# Patient Record
Sex: FEMALE | Race: WHITE | Employment: OTHER | URBAN - METROPOLITAN AREA
[De-identification: names, ages, dates, MRNs, and addresses within clinical notes are randomized per-mention and may not be internally consistent; named-entity substitution may affect disease eponyms.]

---

## 2017-04-11 ENCOUNTER — OFFICE VISIT (OUTPATIENT)
Dept: INTERNAL MEDICINE CLINIC | Age: 67
End: 2017-04-11

## 2017-04-11 VITALS
RESPIRATION RATE: 15 BRPM | BODY MASS INDEX: 32.02 KG/M2 | HEART RATE: 123 BPM | WEIGHT: 174 LBS | SYSTOLIC BLOOD PRESSURE: 124 MMHG | HEIGHT: 62 IN | DIASTOLIC BLOOD PRESSURE: 76 MMHG

## 2017-04-11 DIAGNOSIS — E11.9 TYPE 2 DIABETES MELLITUS WITHOUT COMPLICATION, WITHOUT LONG-TERM CURRENT USE OF INSULIN (HCC): Primary | ICD-10-CM

## 2017-04-11 DIAGNOSIS — I73.9 PVD (PERIPHERAL VASCULAR DISEASE) (HCC): ICD-10-CM

## 2017-04-11 DIAGNOSIS — I10 ESSENTIAL HYPERTENSION: ICD-10-CM

## 2017-04-11 DIAGNOSIS — Z12.31 ENCOUNTER FOR SCREENING MAMMOGRAM FOR BREAST CANCER: ICD-10-CM

## 2017-04-11 DIAGNOSIS — I25.10 CORONARY ARTERY DISEASE INVOLVING NATIVE HEART WITHOUT ANGINA PECTORIS, UNSPECIFIED VESSEL OR LESION TYPE: ICD-10-CM

## 2017-04-11 DIAGNOSIS — E78.2 MIXED HYPERLIPIDEMIA: ICD-10-CM

## 2017-04-11 DIAGNOSIS — Z78.0 POSTMENOPAUSAL: ICD-10-CM

## 2017-04-11 LAB — HBA1C MFR BLD: 9.3 %

## 2017-04-11 PROCEDURE — 83036 HEMOGLOBIN GLYCOSYLATED A1C: CPT | Performed by: INTERNAL MEDICINE

## 2017-04-11 PROCEDURE — 99215 OFFICE O/P EST HI 40 MIN: CPT | Performed by: INTERNAL MEDICINE

## 2017-04-11 RX ORDER — GLIPIZIDE 5 MG/1
5 TABLET ORAL DAILY
Qty: 30 TABLET | Refills: 3 | Status: SHIPPED | OUTPATIENT
Start: 2017-04-11 | End: 2017-05-12 | Stop reason: SDUPTHER

## 2017-04-11 ASSESSMENT — ENCOUNTER SYMPTOMS
WHEEZING: 0
ABDOMINAL PAIN: 0
VOMITING: 0
CONSTIPATION: 0
NAUSEA: 0
COUGH: 0
EYE PAIN: 0
DIARRHEA: 0
SHORTNESS OF BREATH: 0
BACK PAIN: 0
SORE THROAT: 0

## 2017-05-11 ENCOUNTER — TELEPHONE (OUTPATIENT)
Dept: INTERNAL MEDICINE CLINIC | Age: 67
End: 2017-05-11

## 2017-05-12 ENCOUNTER — OFFICE VISIT (OUTPATIENT)
Dept: INTERNAL MEDICINE CLINIC | Age: 67
End: 2017-05-12

## 2017-05-12 VITALS
HEART RATE: 89 BPM | DIASTOLIC BLOOD PRESSURE: 84 MMHG | WEIGHT: 176.2 LBS | SYSTOLIC BLOOD PRESSURE: 132 MMHG | BODY MASS INDEX: 32.23 KG/M2

## 2017-05-12 DIAGNOSIS — E11.9 TYPE 2 DIABETES MELLITUS WITHOUT COMPLICATION, WITHOUT LONG-TERM CURRENT USE OF INSULIN (HCC): Primary | ICD-10-CM

## 2017-05-12 DIAGNOSIS — E78.2 MIXED HYPERLIPIDEMIA: ICD-10-CM

## 2017-05-12 DIAGNOSIS — I25.10 CORONARY ARTERY DISEASE INVOLVING NATIVE HEART WITHOUT ANGINA PECTORIS, UNSPECIFIED VESSEL OR LESION TYPE: ICD-10-CM

## 2017-05-12 DIAGNOSIS — D64.9 ANEMIA, UNSPECIFIED TYPE: ICD-10-CM

## 2017-05-12 PROCEDURE — 83036 HEMOGLOBIN GLYCOSYLATED A1C: CPT | Performed by: INTERNAL MEDICINE

## 2017-05-12 PROCEDURE — 99213 OFFICE O/P EST LOW 20 MIN: CPT | Performed by: INTERNAL MEDICINE

## 2017-05-12 RX ORDER — GLIPIZIDE 5 MG/1
5 TABLET ORAL DAILY
Qty: 30 TABLET | Refills: 5 | Status: SHIPPED | OUTPATIENT
Start: 2017-05-12 | End: 2018-01-15 | Stop reason: SDUPTHER

## 2017-05-12 RX ORDER — CILOSTAZOL 100 MG/1
TABLET ORAL
Refills: 11 | COMMUNITY
Start: 2017-03-17 | End: 2017-05-12

## 2017-05-12 RX ORDER — SIMVASTATIN 10 MG
10 TABLET ORAL NIGHTLY
Qty: 30 TABLET | Refills: 5 | Status: SHIPPED | OUTPATIENT
Start: 2017-05-12 | End: 2017-05-12

## 2017-05-12 RX ORDER — SIMVASTATIN 20 MG
20 TABLET ORAL EVERY EVENING
Qty: 30 TABLET | Refills: 5 | Status: SHIPPED | OUTPATIENT
Start: 2017-05-12 | End: 2018-07-20 | Stop reason: ALTCHOICE

## 2017-05-12 ASSESSMENT — ENCOUNTER SYMPTOMS
SHORTNESS OF BREATH: 0
ABDOMINAL PAIN: 0
EYE PAIN: 0
WHEEZING: 0
DIARRHEA: 0
CONSTIPATION: 0
BACK PAIN: 0
SORE THROAT: 0
COUGH: 0

## 2017-05-15 LAB
CREATININE URINE: 135.3 MG/DL (ref 28–259)
MICROALBUMIN UR-MCNC: 2.7 MG/DL
MICROALBUMIN/CREAT UR-RTO: 20 MG/G (ref 0–30)

## 2017-05-16 PROBLEM — R80.9 MICROALBUMINURIA: Status: ACTIVE | Noted: 2017-05-16

## 2017-09-19 LAB — DIABETIC RETINOPATHY: NEGATIVE

## 2017-10-10 ENCOUNTER — HOSPITAL ENCOUNTER (OUTPATIENT)
Dept: SURGERY | Age: 67
Discharge: HOME | End: 2017-10-10
Attending: INTERNAL MEDICINE | Admitting: INTERNAL MEDICINE

## 2017-10-10 VITALS
WEIGHT: 170 LBS | HEART RATE: 68 BPM | HEIGHT: 63 IN | DIASTOLIC BLOOD PRESSURE: 52 MMHG | OXYGEN SATURATION: 100 % | TEMPERATURE: 97 F | RESPIRATION RATE: 16 BRPM | BODY MASS INDEX: 30.12 KG/M2 | SYSTOLIC BLOOD PRESSURE: 135 MMHG

## 2017-10-10 DIAGNOSIS — R13.10 DYSPHAGIA, UNSPECIFIED TYPE: ICD-10-CM

## 2017-10-10 LAB — GLUCOSE BLD-MCNC: 214 MG/DL (ref 70–99)

## 2017-10-10 RX ORDER — SODIUM CHLORIDE, SODIUM LACTATE, POTASSIUM CHLORIDE, CALCIUM CHLORIDE 600; 310; 30; 20 MG/100ML; MG/100ML; MG/100ML; MG/100ML
INJECTION, SOLUTION INTRAVENOUS CONTINUOUS
Status: DISCONTINUED | OUTPATIENT
Start: 2017-10-10 | End: 2017-10-11 | Stop reason: HOSPADM

## 2017-10-10 RX ADMIN — SODIUM CHLORIDE, SODIUM LACTATE, POTASSIUM CHLORIDE, CALCIUM CHLORIDE: 600; 310; 30; 20 INJECTION, SOLUTION INTRAVENOUS at 07:56

## 2017-10-10 ASSESSMENT — PAIN SCALES - GENERAL
PAINLEVEL_OUTOF10: 0
PAINLEVEL_OUTOF10: 0

## 2017-10-10 ASSESSMENT — PAIN - FUNCTIONAL ASSESSMENT: PAIN_FUNCTIONAL_ASSESSMENT: 0-10

## 2017-10-10 NOTE — OP NOTE
Full note in [facility]    Indication: esophageal stricture    Procedure:  EGD with savary dilation under fluoroscopy    Findings:   esophageal stricture at 15cms from incisors, scope passed with moderate resistance, dilated with 9, 11 and 12mm savary with heme and mucosal tear produced  Large hiatal hernia  Esophageal candidiasis      EBL: 5ml      Complications: none      Plan:  Resume aspirin in 2 days  Resume plavix in  3 days  Resume pletal in 5 days  Soft foods for next 3 days and then advanced as tolerated  RTC in 6 weeks  Twice daily PPI

## 2017-10-10 NOTE — H&P
I have examined the patient within 24 hours before the procedure and there is no change in the history and physical exam  recorded by me previously. I have reviewed with the patient and/or family the risks, benefits, and alternatives to the procedure.     William Cook MD  [facility] gastroenterology  10/10/2017  7:53 AM

## 2017-10-10 NOTE — PROGRESS NOTES
9387  Received in pacu from endo lab. Awake and alert.  at bedside. 0850  Sitting up in bed taking po fluids well. 0930  Discharge instructions given to patient and  at bedside. 0474  Dr. Nava Tapia here to update patient and  on results of procedure. 1895  Discharged to home with . Taken to car per ambulatory with steady gait to car.

## 2018-01-15 DIAGNOSIS — E11.9 TYPE 2 DIABETES MELLITUS WITHOUT COMPLICATION, WITHOUT LONG-TERM CURRENT USE OF INSULIN (HCC): ICD-10-CM

## 2018-01-15 RX ORDER — GLIPIZIDE 5 MG/1
5 TABLET ORAL DAILY
Qty: 30 TABLET | Refills: 0 | Status: SHIPPED | OUTPATIENT
Start: 2018-01-15 | End: 2018-02-19 | Stop reason: SDUPTHER

## 2018-01-31 ENCOUNTER — HOSPITAL ENCOUNTER (OUTPATIENT)
Dept: OTHER | Age: 68
Discharge: HOME | End: 2018-01-31
Attending: INTERNAL MEDICINE | Admitting: INTERNAL MEDICINE

## 2018-01-31 LAB
ALBUMIN SERPL-MCNC: 4 GM/DL (ref 3.4–5)
ALP BLD-CCNC: 105 IU/L (ref 40–129)
ALT SERPL-CCNC: 7 U/L (ref 10–40)
ANION GAP SERPL CALCULATED.3IONS-SCNC: 11 MMOL/L (ref 4–16)
AST SERPL-CCNC: 11 IU/L (ref 15–37)
BILIRUB SERPL-MCNC: 0.2 MG/DL (ref 0–1)
BUN BLDV-MCNC: 9 MG/DL (ref 6–23)
CALCIUM SERPL-MCNC: 9.2 MG/DL (ref 8.3–10.6)
CHLORIDE BLD-SCNC: 99 MMOL/L (ref 99–110)
CO2: 27 MMOL/L (ref 21–32)
CREAT SERPL-MCNC: 0.8 MG/DL (ref 0.6–1.1)
FERRITIN: 7 NG/ML (ref 15–150)
FOLATE: 7.4 NG/ML (ref 3.1–17.5)
GFR AFRICAN AMERICAN: >60 ML/MIN/1.73M2
GFR NON-AFRICAN AMERICAN: >60 ML/MIN/1.73M2
GLUCOSE BLD-MCNC: 130 MG/DL (ref 70–99)
IRON: 12 UG/DL (ref 37–145)
PCT TRANSFERRIN: 3 % (ref 10–44)
POTASSIUM SERPL-SCNC: 3.9 MMOL/L (ref 3.5–5.1)
SODIUM BLD-SCNC: 137 MMOL/L (ref 135–145)
TOTAL IRON BINDING CAPACITY: 412 UG/DL (ref 250–450)
TOTAL PROTEIN: 6.9 GM/DL (ref 6.4–8.2)
UNSATURATED IRON BINDING CAPACITY: 400 UG/DL (ref 110–370)
VITAMIN B-12: 391.9 PG/ML (ref 211–911)

## 2018-02-02 LAB
ALBUMIN ELP: 3.2 GM/DL (ref 3.2–5.6)
ALPHA-1-GLOBULIN: 0.3 GM/DL (ref 0.1–0.4)
ALPHA-2-GLOBULIN: 0.9 GM/DL (ref 0.4–1.2)
BETA GLOBULIN: 1.2 GM/DL (ref 0.5–1.3)
GAMMA GLOBULIN: 1.3 GM/DL (ref 0.5–1.6)
IMMUNOFIXATION ELECTROPHORESIS 1: NORMAL
TOTAL PROTEIN: 6.9 GM/DL (ref 6.4–8.2)

## 2018-02-19 ENCOUNTER — TELEPHONE (OUTPATIENT)
Dept: INTERNAL MEDICINE CLINIC | Age: 68
End: 2018-02-19

## 2018-02-19 ENCOUNTER — OFFICE VISIT (OUTPATIENT)
Dept: INTERNAL MEDICINE CLINIC | Age: 68
End: 2018-02-19

## 2018-02-19 VITALS
WEIGHT: 159 LBS | DIASTOLIC BLOOD PRESSURE: 68 MMHG | SYSTOLIC BLOOD PRESSURE: 126 MMHG | BODY MASS INDEX: 28.17 KG/M2 | HEART RATE: 90 BPM | RESPIRATION RATE: 14 BRPM | OXYGEN SATURATION: 99 %

## 2018-02-19 DIAGNOSIS — E11.9 TYPE 2 DIABETES MELLITUS WITHOUT COMPLICATION, WITHOUT LONG-TERM CURRENT USE OF INSULIN (HCC): ICD-10-CM

## 2018-02-19 LAB — HBA1C MFR BLD: 6.9 %

## 2018-02-19 PROCEDURE — 83036 HEMOGLOBIN GLYCOSYLATED A1C: CPT | Performed by: FAMILY MEDICINE

## 2018-02-19 PROCEDURE — 99213 OFFICE O/P EST LOW 20 MIN: CPT | Performed by: FAMILY MEDICINE

## 2018-02-19 RX ORDER — CILOSTAZOL 100 MG/1
TABLET ORAL
Refills: 11 | COMMUNITY
Start: 2018-01-09 | End: 2018-02-19 | Stop reason: CLARIF

## 2018-02-19 RX ORDER — GLIPIZIDE 5 MG/1
5 TABLET ORAL DAILY
Qty: 30 TABLET | Refills: 5 | Status: SHIPPED | OUTPATIENT
Start: 2018-02-19 | End: 2018-08-23 | Stop reason: SDUPTHER

## 2018-02-19 RX ORDER — LANSOPRAZOLE 30 MG/1
CAPSULE, DELAYED RELEASE ORAL
Refills: 3 | COMMUNITY
Start: 2018-01-09 | End: 2021-01-21

## 2018-02-19 ASSESSMENT — ENCOUNTER SYMPTOMS
SHORTNESS OF BREATH: 0
NAUSEA: 0
VOMITING: 0
BACK PAIN: 0
EYE DISCHARGE: 0
COUGH: 0
SORE THROAT: 0
DIARRHEA: 0
BLOOD IN STOOL: 0
SINUS PRESSURE: 0

## 2018-02-19 ASSESSMENT — PATIENT HEALTH QUESTIONNAIRE - PHQ9
1. LITTLE INTEREST OR PLEASURE IN DOING THINGS: 0
SUM OF ALL RESPONSES TO PHQ9 QUESTIONS 1 & 2: 0
2. FEELING DOWN, DEPRESSED OR HOPELESS: 0
SUM OF ALL RESPONSES TO PHQ QUESTIONS 1-9: 0

## 2018-02-19 NOTE — TELEPHONE ENCOUNTER
Patient has never been under the care of anyone in this office. We'll have to wait until Wednesday when she establishes.

## 2018-02-19 NOTE — PROGRESS NOTES
[facility]  1950  75 y.o. SUBJECT FREDDIE:    Chief Complaint   Patient presents with    Diabetes    Medication Refill     Patient in to establish care with new provider. She has a history of coronary artery disease and diabetes. She is in need of refills on her diabetic medications. She denies any chest pain or shortness of breath. She reports that she is tolerating her medications well. Patient also has a history of iron deficiency and anemia. This was discovered after severe fatigue. Continues with follow-up and workup through GI. Extreme fatigue needed so that she doesn't need as much as well. He's been losing weight over the last year as well. Diabetes   She presents for her follow-up diabetic visit. She has type 2 diabetes mellitus. Her disease course has been improving. There are no hypoglycemic associated symptoms. Pertinent negatives for hypoglycemia include no dizziness, headaches or nervousness/anxiousness. Associated symptoms include fatigue. Pertinent negatives for diabetes include no chest pain and no polydipsia. There are no hypoglycemic complications. Symptoms are stable. Diabetic complications include heart disease. Risk factors for coronary artery disease include diabetes mellitus, stress and post-menopausal. Current diabetic treatment includes oral agent (dual therapy). She is compliant with treatment all of the time. She is following a generally healthy diet. She rarely participates in exercise. There is no change in her home blood glucose trend. An ACE inhibitor/angiotensin II receptor blocker is being taken. She does not see a podiatrist.Eye exam is current. Review of Systems   Constitutional: Positive for fatigue. Negative for chills and fever. HENT: Negative for congestion, ear pain, sinus pressure and sore throat. Eyes: Negative for discharge and visual disturbance. Respiratory: Negative for cough and shortness of breath.     Cardiovascular: Negative for chest pain and leg swelling. Gastrointestinal: Negative for blood in stool, diarrhea, nausea and vomiting. Endocrine: Negative for polydipsia. Genitourinary: Negative for dysuria, frequency and hematuria. Musculoskeletal: Negative for back pain and gait problem. Skin: Negative for rash. Allergic/Immunologic: Negative for environmental allergies and food allergies. Neurological: Negative for dizziness and headaches. Psychiatric/Behavioral: Negative for behavioral problems, sleep disturbance and suicidal ideas. The patient is not nervous/anxious. Past Medical, Family, and Social History have been reviewed today. OBJECTIVE:     /68   Pulse 90   Resp 14   Wt 159 lb (72.1 kg)   LMP 01/19/2002   SpO2 99%   BMI 28.17 kg/m²       Physical Exam   Constitutional: She is oriented to person, place, and time. She appears well-developed and well-nourished. HENT:   Head: Normocephalic and atraumatic. Right Ear: External ear normal.   Left Ear: External ear normal.   Eyes: Conjunctivae and EOM are normal. Pupils are equal, round, and reactive to light. No scleral icterus. Neck: Normal range of motion. Neck supple. Cardiovascular: Normal rate, regular rhythm, normal heart sounds and intact distal pulses. Exam reveals no gallop and no friction rub. No murmur heard. Pulmonary/Chest: Effort normal and breath sounds normal. No respiratory distress. She has no wheezes. She has no rales. Musculoskeletal: Normal range of motion. Neurological: She is alert and oriented to person, place, and time. Skin: Skin is warm and dry. No rash noted. Psychiatric: She has a normal mood and affect. Her behavior is normal. Judgment and thought content normal.       Results for orders placed or performed in visit on 02/19/18   POCT glycosylated hemoglobin (Hb A1C)   Result Value Ref Range    Hemoglobin A1C 6.9 %       ASSESSMENT/ PLAN:    1.  Type 2 diabetes mellitus without complication,

## 2018-03-09 LAB
ALBUMIN SERPL-MCNC: 4 G/DL
ALP BLD-CCNC: 99 U/L
ALT SERPL-CCNC: 8 U/L
ANION GAP SERPL CALCULATED.3IONS-SCNC: ABNORMAL MMOL/L
AST SERPL-CCNC: 13 U/L
AVERAGE GLUCOSE: NORMAL
BASOPHILS ABSOLUTE: 0.1 /ΜL
BASOPHILS RELATIVE PERCENT: 1.1 %
BILIRUB SERPL-MCNC: 0.2 MG/DL (ref 0.1–1.4)
BUN BLDV-MCNC: 12 MG/DL
CALCIUM SERPL-MCNC: 9.4 MG/DL
CHLORIDE BLD-SCNC: 102 MMOL/L
CHOLESTEROL, TOTAL: 290 MG/DL
CHOLESTEROL/HDL RATIO: ABNORMAL
CO2: 23 MMOL/L
CREAT SERPL-MCNC: 0.9 MG/DL
EOSINOPHILS ABSOLUTE: 0.1 /ΜL
EOSINOPHILS RELATIVE PERCENT: 1.5 %
GFR CALCULATED: 66
GLUCOSE BLD-MCNC: 170 MG/DL
HBA1C MFR BLD: 5.4 %
HCT VFR BLD CALC: 35.3 % (ref 36–46)
HDLC SERPL-MCNC: 41 MG/DL (ref 35–70)
HEMOGLOBIN: 10.6 G/DL (ref 12–16)
LDL CHOLESTEROL CALCULATED: 203 MG/DL (ref 0–160)
LYMPHOCYTES ABSOLUTE: 1.4 /ΜL
LYMPHOCYTES RELATIVE PERCENT: 20.1 %
MCH RBC QN AUTO: 21.7 PG
MCHC RBC AUTO-ENTMCNC: 30.1 G/DL
MCV RBC AUTO: 72.2 FL
MONOCYTES ABSOLUTE: 0.5 /ΜL
MONOCYTES RELATIVE PERCENT: 6.8 %
NEUTROPHILS ABSOLUTE: 4.9 /ΜL
NEUTROPHILS RELATIVE PERCENT: 70.5 %
PDW BLD-RTO: 35.1 %
PLATELET # BLD: 452 K/ΜL
PMV BLD AUTO: ABNORMAL FL
POTASSIUM SERPL-SCNC: 4.2 MMOL/L
RBC # BLD: 4.9 10^6/ΜL
SODIUM BLD-SCNC: 140 MMOL/L
TOTAL PROTEIN: 7.1
TRIGL SERPL-MCNC: 230 MG/DL
TSH SERPL DL<=0.05 MIU/L-ACNC: 1.69 UIU/ML
VLDLC SERPL CALC-MCNC: 46 MG/DL
WBC # BLD: 6.9 10^3/ML

## 2018-03-13 DIAGNOSIS — E11.9 TYPE 2 DIABETES MELLITUS WITHOUT COMPLICATION, WITHOUT LONG-TERM CURRENT USE OF INSULIN (HCC): ICD-10-CM

## 2018-03-21 ENCOUNTER — HOSPITAL ENCOUNTER (OUTPATIENT)
Dept: OTHER | Age: 68
Discharge: HOME | End: 2018-03-21
Attending: INTERNAL MEDICINE | Admitting: INTERNAL MEDICINE

## 2018-03-21 LAB
ALBUMIN SERPL-MCNC: 4.4 GM/DL (ref 3.4–5)
ALP BLD-CCNC: 95 IU/L (ref 40–129)
ALT SERPL-CCNC: 8 U/L (ref 10–40)
ANION GAP SERPL CALCULATED.3IONS-SCNC: 15 MMOL/L (ref 4–16)
AST SERPL-CCNC: 11 IU/L (ref 15–37)
BILIRUB SERPL-MCNC: 0.2 MG/DL (ref 0–1)
BUN BLDV-MCNC: 18 MG/DL (ref 6–23)
CALCIUM SERPL-MCNC: 9.4 MG/DL (ref 8.3–10.6)
CHLORIDE BLD-SCNC: 102 MMOL/L (ref 99–110)
CO2: 25 MMOL/L (ref 21–32)
CREAT SERPL-MCNC: 1.2 MG/DL (ref 0.6–1.1)
FERRITIN: 80 NG/ML (ref 15–150)
GFR AFRICAN AMERICAN: 54 ML/MIN/1.73M2
GFR NON-AFRICAN AMERICAN: 45 ML/MIN/1.73M2
GLUCOSE BLD-MCNC: 133 MG/DL (ref 70–99)
IRON: 53 UG/DL (ref 37–145)
PCT TRANSFERRIN: 16 % (ref 10–44)
POTASSIUM SERPL-SCNC: 4.7 MMOL/L (ref 3.5–5.1)
SODIUM BLD-SCNC: 142 MMOL/L (ref 135–145)
TOTAL IRON BINDING CAPACITY: 334 UG/DL (ref 250–450)
TOTAL PROTEIN: 7.4 GM/DL (ref 6.4–8.2)
UNSATURATED IRON BINDING CAPACITY: 281 UG/DL (ref 110–370)

## 2018-04-23 DIAGNOSIS — D64.9 ANEMIA, UNSPECIFIED TYPE: ICD-10-CM

## 2018-05-18 ENCOUNTER — OFFICE VISIT (OUTPATIENT)
Dept: INTERNAL MEDICINE CLINIC | Age: 68
End: 2018-05-18

## 2018-05-18 VITALS
HEART RATE: 85 BPM | RESPIRATION RATE: 14 BRPM | BODY MASS INDEX: 29.23 KG/M2 | DIASTOLIC BLOOD PRESSURE: 90 MMHG | OXYGEN SATURATION: 99 % | WEIGHT: 165 LBS | SYSTOLIC BLOOD PRESSURE: 130 MMHG

## 2018-05-18 DIAGNOSIS — I10 ESSENTIAL HYPERTENSION: ICD-10-CM

## 2018-05-18 DIAGNOSIS — E11.9 TYPE 2 DIABETES MELLITUS WITHOUT COMPLICATION, WITHOUT LONG-TERM CURRENT USE OF INSULIN (HCC): Primary | ICD-10-CM

## 2018-05-18 LAB — HBA1C MFR BLD: 7.1 %

## 2018-05-18 PROCEDURE — 83036 HEMOGLOBIN GLYCOSYLATED A1C: CPT | Performed by: FAMILY MEDICINE

## 2018-05-18 PROCEDURE — 99214 OFFICE O/P EST MOD 30 MIN: CPT | Performed by: FAMILY MEDICINE

## 2018-05-18 ASSESSMENT — ENCOUNTER SYMPTOMS
SORE THROAT: 0
BACK PAIN: 0
EYE DISCHARGE: 0
COUGH: 0
VOMITING: 0
BLOOD IN STOOL: 0
DIARRHEA: 0
SINUS PRESSURE: 0
NAUSEA: 0
BLURRED VISION: 1
SHORTNESS OF BREATH: 0

## 2018-06-27 ENCOUNTER — HOSPITAL ENCOUNTER (OUTPATIENT)
Dept: OTHER | Age: 68
Discharge: HOME | End: 2018-06-27
Attending: INTERNAL MEDICINE | Admitting: INTERNAL MEDICINE

## 2018-06-27 LAB
ALBUMIN SERPL-MCNC: 4.3 GM/DL (ref 3.4–5)
ALP BLD-CCNC: 116 IU/L (ref 40–129)
ALT SERPL-CCNC: 8 U/L (ref 10–40)
ANION GAP SERPL CALCULATED.3IONS-SCNC: 14 MMOL/L (ref 4–16)
APTT: 27.4 SECONDS (ref 21.2–33)
AST SERPL-CCNC: 10 IU/L (ref 15–37)
BILIRUB SERPL-MCNC: 0.3 MG/DL (ref 0–1)
BUN BLDV-MCNC: 13 MG/DL (ref 6–23)
CALCIUM SERPL-MCNC: 9.7 MG/DL (ref 8.3–10.6)
CHLORIDE BLD-SCNC: 99 MMOL/L (ref 99–110)
CO2: 28 MMOL/L (ref 21–32)
CREAT SERPL-MCNC: 0.9 MG/DL (ref 0.6–1.1)
FERRITIN: 32 NG/ML (ref 15–150)
GFR AFRICAN AMERICAN: >60 ML/MIN/1.73M2
GFR NON-AFRICAN AMERICAN: >60 ML/MIN/1.73M2
GLUCOSE BLD-MCNC: 108 MG/DL (ref 70–99)
INR BLD: 0.9 INDEX
IRON: 49 UG/DL (ref 37–145)
PCT TRANSFERRIN: 15 % (ref 10–44)
POTASSIUM SERPL-SCNC: 4.2 MMOL/L (ref 3.5–5.1)
PROTHROMBIN TIME: 10.5 SECONDS
SODIUM BLD-SCNC: 141 MMOL/L (ref 135–145)
TOTAL IRON BINDING CAPACITY: 336 UG/DL (ref 250–450)
TOTAL PROTEIN: 7.4 GM/DL (ref 6.4–8.2)
UNSATURATED IRON BINDING CAPACITY: 287 UG/DL (ref 110–370)

## 2018-06-29 LAB
HEMOGLOBIN A/HEMOGLOBIN TOTAL: 97.2
HEMOGLOBIN A2/HEMOGLOBIN TOTAL: 2.6
HEMOGLOBIN C/HEMOGLOBIN TOTAL: 0
HEMOGLOBIN E QUANTITATION: 0
HEMOGLOBIN ELECTROPHORESIS: NORMAL
HEMOGLOBIN F/HEMOGLOBIN TOTAL: 0.2
HEMOGLOBIN OTHER: 0
HEMOGLOBIN S/HEMOGLOBIN TOTAL: 0
HGB INTERPRETATION: NORMAL

## 2018-07-20 ENCOUNTER — OFFICE VISIT (OUTPATIENT)
Dept: INTERNAL MEDICINE CLINIC | Age: 68
End: 2018-07-20

## 2018-07-20 VITALS
SYSTOLIC BLOOD PRESSURE: 153 MMHG | OXYGEN SATURATION: 97 % | DIASTOLIC BLOOD PRESSURE: 80 MMHG | WEIGHT: 168.6 LBS | BODY MASS INDEX: 29.87 KG/M2 | RESPIRATION RATE: 16 BRPM | HEART RATE: 81 BPM

## 2018-07-20 DIAGNOSIS — E11.9 TYPE 2 DIABETES MELLITUS WITHOUT COMPLICATION, WITHOUT LONG-TERM CURRENT USE OF INSULIN (HCC): ICD-10-CM

## 2018-07-20 DIAGNOSIS — R80.9 MICROALBUMINURIA: ICD-10-CM

## 2018-07-20 DIAGNOSIS — I25.10 CORONARY ARTERY DISEASE INVOLVING NATIVE HEART WITHOUT ANGINA PECTORIS, UNSPECIFIED VESSEL OR LESION TYPE: ICD-10-CM

## 2018-07-20 DIAGNOSIS — I10 ESSENTIAL HYPERTENSION: Primary | ICD-10-CM

## 2018-07-20 DIAGNOSIS — D64.9 ANEMIA, UNSPECIFIED TYPE: ICD-10-CM

## 2018-07-20 LAB — HBA1C MFR BLD: 6.7 %

## 2018-07-20 PROCEDURE — 99213 OFFICE O/P EST LOW 20 MIN: CPT | Performed by: FAMILY MEDICINE

## 2018-07-20 PROCEDURE — 83036 HEMOGLOBIN GLYCOSYLATED A1C: CPT | Performed by: FAMILY MEDICINE

## 2018-07-20 RX ORDER — DOXYCYCLINE HYCLATE 50 MG/1
324 CAPSULE, GELATIN COATED ORAL 2 TIMES DAILY
COMMUNITY
End: 2020-10-01

## 2018-07-20 RX ORDER — AMIODARONE HYDROCHLORIDE 200 MG/1
200 TABLET ORAL DAILY
COMMUNITY
End: 2022-08-02

## 2018-07-20 RX ORDER — LISINOPRIL 5 MG/1
5 TABLET ORAL DAILY
Qty: 30 TABLET | Refills: 3 | Status: SHIPPED | OUTPATIENT
Start: 2018-07-20 | End: 2018-08-23 | Stop reason: SDUPTHER

## 2018-07-20 ASSESSMENT — ENCOUNTER SYMPTOMS
BLOOD IN STOOL: 0
VISUAL CHANGE: 0
BLURRED VISION: 0
EYE DISCHARGE: 0
ORTHOPNEA: 0
SORE THROAT: 0
NAUSEA: 0
SINUS PRESSURE: 0
DIARRHEA: 0
SHORTNESS OF BREATH: 0
VOMITING: 0
COUGH: 0
BACK PAIN: 0

## 2018-07-20 NOTE — PROGRESS NOTES
Jos Kaspervictoriacem  1950  75 y.o. SUBJECT FREDDIE:    Chief Complaint   Patient presents with    Hypertension    Diabetes     HPI    Patient presents for routine follow-up of her hypertension and diabetes. Patient also has a history of coronary artery disease, mixed hyperlipidemia and peripheral vascular disease. Patient's blood pressure is elevated today. She hasn't had any change in her medication although she is not attempting about 8 pounds since her last visit. Patient denies any chest pain or shortness of breath. No swelling in her ankles. Next      Review of Systems   Constitutional: Negative for chills, fatigue and fever. HENT: Negative for congestion, ear pain, sinus pressure and sore throat. Eyes: Negative for discharge and visual disturbance. Respiratory: Negative for cough and shortness of breath. Cardiovascular: Negative for chest pain and leg swelling. Gastrointestinal: Negative for blood in stool, diarrhea, nausea and vomiting. Endocrine: Negative for polydipsia. Genitourinary: Negative for dysuria, frequency and hematuria. Musculoskeletal: Negative for back pain and gait problem. Skin: Negative for rash. Allergic/Immunologic: Negative for environmental allergies and food allergies. Neurological: Negative for dizziness and headaches. Psychiatric/Behavioral: Negative for behavioral problems, sleep disturbance and suicidal ideas. The patient is not nervous/anxious. Past Medical, Family, and Social History have been reviewed today.     OBJECTIVE:     BP (!) 153/80   Pulse 81   Resp 16   Wt 168 lb 9.6 oz (76.5 kg)   LMP 01/19/2002   SpO2 97%   BMI 29.87 kg/m²   BP Readings from Last 3 Encounters:   07/20/18 (!) 153/80   05/18/18 (!) 130/90   02/19/18 126/68       Wt Readings from Last 3 Encounters:   07/20/18 168 lb 9.6 oz (76.5 kg)   05/18/18 165 lb (74.8 kg)   02/19/18 159 lb (72.1 kg)       Lab Results   Component Value Date    CHOL 290 (H) 03/08/2018 tablet    amiodarone (CORDARONE) 200 MG tablet    lisinopril (PRINIVIL;ZESTRIL) 5 MG tablet    Microalbuminuria     Patient continues with lisinopril for microalbuminuria. We'll continue to monitor at least annually. Relevant Medications    lisinopril (PRINIVIL;ZESTRIL) 5 MG tablet    Type 2 diabetes mellitus without complication, without long-term current use of insulin ([address] - Primary     Diabetes control is better. Her A1c is down to 6.7. We'll continue current treatment plan and routine monitoring. Relevant Medications    sitaGLIPtan-metformin (JANUMET)  MG per tablet    glipiZIDE (GLUCOTROL) 5 MG tablet    Other Relevant Orders    POCT glycosylated hemoglobin (Hb A1C) (Completed)            Orders Placed This Encounter   Procedures    POCT glycosylated hemoglobin (Hb A1C)       Return in about 1 month (around 8/20/2018), or if symptoms worsen or fail to improve, for HTN.

## 2018-08-21 DIAGNOSIS — E11.9 TYPE 2 DIABETES MELLITUS WITHOUT COMPLICATION, WITHOUT LONG-TERM CURRENT USE OF INSULIN (HCC): ICD-10-CM

## 2018-08-21 RX ORDER — SITAGLIPTIN AND METFORMIN HYDROCHLORIDE 1000; 50 MG/1; MG/1
TABLET, FILM COATED ORAL
Qty: 30 TABLET | Refills: 0 | Status: SHIPPED | OUTPATIENT
Start: 2018-08-21 | End: 2018-08-23 | Stop reason: SDUPTHER

## 2018-08-23 ENCOUNTER — OFFICE VISIT (OUTPATIENT)
Dept: INTERNAL MEDICINE CLINIC | Age: 68
End: 2018-08-23

## 2018-08-23 VITALS
WEIGHT: 166.4 LBS | HEART RATE: 95 BPM | BODY MASS INDEX: 29.48 KG/M2 | RESPIRATION RATE: 14 BRPM | OXYGEN SATURATION: 98 % | SYSTOLIC BLOOD PRESSURE: 128 MMHG | DIASTOLIC BLOOD PRESSURE: 74 MMHG

## 2018-08-23 DIAGNOSIS — Z72.89 OTHER PROBLEMS RELATED TO LIFESTYLE: ICD-10-CM

## 2018-08-23 DIAGNOSIS — R80.9 MICROALBUMINURIA: ICD-10-CM

## 2018-08-23 DIAGNOSIS — E11.9 TYPE 2 DIABETES MELLITUS WITHOUT COMPLICATION, WITHOUT LONG-TERM CURRENT USE OF INSULIN (HCC): ICD-10-CM

## 2018-08-23 DIAGNOSIS — I10 ESSENTIAL HYPERTENSION: Primary | ICD-10-CM

## 2018-08-23 PROCEDURE — 99213 OFFICE O/P EST LOW 20 MIN: CPT | Performed by: FAMILY MEDICINE

## 2018-08-23 RX ORDER — LISINOPRIL 5 MG/1
5 TABLET ORAL DAILY
Qty: 30 TABLET | Refills: 3 | Status: SHIPPED | OUTPATIENT
Start: 2018-08-23 | End: 2021-01-21 | Stop reason: SDUPTHER

## 2018-08-23 RX ORDER — GLIPIZIDE 5 MG/1
5 TABLET ORAL DAILY
Qty: 30 TABLET | Refills: 5 | Status: SHIPPED | OUTPATIENT
Start: 2018-08-23 | End: 2018-10-22

## 2018-08-23 ASSESSMENT — ENCOUNTER SYMPTOMS
SORE THROAT: 0
BLOOD IN STOOL: 0
NAUSEA: 0
VOMITING: 0
BACK PAIN: 0
COUGH: 0
SINUS PRESSURE: 0
SHORTNESS OF BREATH: 0
DIARRHEA: 0
EYE DISCHARGE: 0

## 2018-08-23 NOTE — ASSESSMENT & PLAN NOTE
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 Have ordered this with refills.

## 2018-08-23 NOTE — PROGRESS NOTES
Mukul Beltran  1950  75 y.o. SUBJECT FREDDIE:    Chief Complaint   Patient presents with    Hypertension    Blood Work     pt wants Hep C screen done, pt refuses to do mammogram and Dexa       HPI     Patient in for recheck on Lisinopril increase to 5 mg daily. She denies any chest pain or sob. No headache or visual changes. Patient would like to get the Hep C ordered. Patient had choked on food more frequently lately. She will be calling Dr. Moises Savage for follow up. No chest pain or sob. No headache or visual changes. Review of Systems   Constitutional: Negative for chills, fatigue and fever. HENT: Negative for congestion, ear pain, sinus pressure and sore throat. Eyes: Negative for discharge and visual disturbance. Respiratory: Negative for cough and shortness of breath. Cardiovascular: Negative for chest pain and leg swelling. Gastrointestinal: Negative for blood in stool, diarrhea, nausea and vomiting. Endocrine: Negative for polydipsia. Genitourinary: Negative for dysuria, frequency and hematuria. Musculoskeletal: Negative for back pain and gait problem. Skin: Negative for rash. Allergic/Immunologic: Negative for environmental allergies and food allergies. Neurological: Negative for dizziness and headaches. Psychiatric/Behavioral: Negative for behavioral problems, sleep disturbance and suicidal ideas. The patient is not nervous/anxious. Past Medical, Family, and Social History have been reviewed today.     OBJECTIVE:     /74   Pulse 95   Resp 14   Wt 166 lb 6.4 oz (75.5 kg)   LMP 01/19/2002   SpO2 98%   BMI 29.48 kg/m²   BP Readings from Last 3 Encounters:   08/23/18 128/74   07/20/18 (!) 153/80   05/18/18 (!) 130/90       Wt Readings from Last 3 Encounters:   08/23/18 166 lb 6.4 oz (75.5 kg)   07/20/18 168 lb 9.6 oz (76.5 kg)   05/18/18 165 lb (74.8 kg)       Lab Results   Component Value Date    CHOL 290 (H) 03/08/2018    CHOL 237 10/12/2016 CHOL 180 08/19/2015     Lab Results   Component Value Date    TRIG 230 (H) 03/08/2018    TRIG 229 10/12/2016    TRIG 243 (H) 08/19/2015     Lab Results   Component Value Date    HDL 41 03/08/2018    HDL 46 10/12/2016    HDL 34 (L) 08/19/2015     Lab Results   Component Value Date    LDLCALC 203 (H) 03/08/2018    LDLCALC 145 10/12/2016    LDLCALC 227 (A) 12/30/2014     Lab Results   Component Value Date    VLDL 46 (H) 03/08/2018    VLDL 46 10/12/2016       Lab Results   Component Value Date    LABA1C 6.7 07/20/2018    LABA1C 7.1 05/18/2018    LABA1C 5.4 03/08/2018       Physical Exam   Constitutional: She is oriented to person, place, and time. She appears well-developed and well-nourished. HENT:   Head: Normocephalic and atraumatic. Right Ear: External ear normal.   Left Ear: External ear normal.   Eyes: Pupils are equal, round, and reactive to light. Conjunctivae and EOM are normal. No scleral icterus. Neck: Normal range of motion. Neck supple. Cardiovascular: Normal rate, regular rhythm, normal heart sounds and intact distal pulses. Exam reveals no gallop and no friction rub. No murmur heard. Pulmonary/Chest: Effort normal and breath sounds normal. No respiratory distress. She has no wheezes. She has no rales. Musculoskeletal: Normal range of motion. Neurological: She is alert and oriented to person, place, and time. Skin: Skin is warm and dry. No rash noted. Psychiatric: She has a normal mood and affect. Her behavior is normal. Judgment and thought content normal.       ASSESSMENT/ PLAN:    Problem List     Essential hypertension - Primary     Patient's blood pressure much better control. Continue lisinopril 5 mg daily. We'll repeat her microalbumin annually. She had this done at the copy that lab hearing to help make sure we get those results.          Relevant Medications    aspirin 81 MG EC tablet    amiodarone (CORDARONE) 200 MG tablet    lisinopril (PRINIVIL;ZESTRIL) 5 MG tablet

## 2018-08-23 NOTE — ASSESSMENT & PLAN NOTE
Patient's blood pressure much better control. Continue lisinopril 5 mg daily. We'll repeat her microalbumin annually. She had this done at the copy that lab hearing to help make sure we get those results.

## 2018-08-24 LAB — HEPATITIS C ANTIBODY INTERPRETATION: NORMAL

## 2018-09-20 LAB — DIABETIC RETINOPATHY: NEGATIVE

## 2018-10-24 ENCOUNTER — HOSPITAL ENCOUNTER (OUTPATIENT)
Age: 68
Setting detail: SPECIMEN
Discharge: HOME | End: 2018-10-24
Payer: COMMERCIAL

## 2018-10-24 LAB
ALBUMIN SERPL-MCNC: 4.1 GM/DL (ref 3.4–5)
ALP BLD-CCNC: 114 IU/L (ref 40–129)
ALT SERPL-CCNC: 7 U/L (ref 10–40)
ANION GAP SERPL CALCULATED.3IONS-SCNC: 15 MMOL/L (ref 4–16)
AST SERPL-CCNC: 10 IU/L (ref 15–37)
BILIRUB SERPL-MCNC: 0.3 MG/DL (ref 0–1)
BUN BLDV-MCNC: 11 MG/DL (ref 6–23)
CALCIUM SERPL-MCNC: 9.7 MG/DL (ref 8.3–10.6)
CHLORIDE BLD-SCNC: 99 MMOL/L (ref 99–110)
CO2: 26 MMOL/L (ref 21–32)
CREAT SERPL-MCNC: 1 MG/DL (ref 0.6–1.1)
FERRITIN: 62 NG/ML (ref 15–150)
GFR AFRICAN AMERICAN: >60 ML/MIN/1.73M2
GFR NON-AFRICAN AMERICAN: 55 ML/MIN/1.73M2
GLUCOSE BLD-MCNC: 101 MG/DL (ref 70–99)
IRON: 53 UG/DL (ref 37–145)
PCT TRANSFERRIN: 16 % (ref 10–44)
POTASSIUM SERPL-SCNC: 4.4 MMOL/L (ref 3.5–5.1)
SODIUM BLD-SCNC: 140 MMOL/L (ref 135–145)
TOTAL IRON BINDING CAPACITY: 338 UG/DL (ref 250–450)
TOTAL PROTEIN: 7.3 GM/DL (ref 6.4–8.2)
UNSATURATED IRON BINDING CAPACITY: 285 UG/DL (ref 110–370)

## 2018-10-24 PROCEDURE — 82728 ASSAY OF FERRITIN: CPT

## 2018-10-24 PROCEDURE — 83540 ASSAY OF IRON: CPT

## 2018-10-24 PROCEDURE — 83550 IRON BINDING TEST: CPT

## 2018-10-24 PROCEDURE — 80053 COMPREHEN METABOLIC PANEL: CPT

## 2018-12-10 ENCOUNTER — HOSPITAL ENCOUNTER (OUTPATIENT)
Dept: CARDIAC CATH/INVASIVE PROCEDURES | Age: 68
Discharge: HOME | End: 2018-12-10
Attending: INTERNAL MEDICINE | Admitting: INTERNAL MEDICINE
Payer: COMMERCIAL

## 2018-12-10 VITALS
OXYGEN SATURATION: 98 % | WEIGHT: 163 LBS | HEART RATE: 83 BPM | DIASTOLIC BLOOD PRESSURE: 77 MMHG | SYSTOLIC BLOOD PRESSURE: 174 MMHG | BODY MASS INDEX: 30.78 KG/M2 | HEIGHT: 61 IN | RESPIRATION RATE: 14 BRPM | TEMPERATURE: 98.7 F

## 2018-12-10 LAB — GLUCOSE BLD-MCNC: 114 MG/DL (ref 70–99)

## 2018-12-10 PROCEDURE — 6370000000 HC RX 637 (ALT 250 FOR IP): Performed by: INTERNAL MEDICINE

## 2018-12-10 PROCEDURE — C1894 INTRO/SHEATH, NON-LASER: HCPCS

## 2018-12-10 PROCEDURE — C1887 CATHETER, GUIDING: HCPCS

## 2018-12-10 PROCEDURE — 94761 N-INVAS EAR/PLS OXIMETRY MLT: CPT

## 2018-12-10 PROCEDURE — 6360000002 HC RX W HCPCS

## 2018-12-10 PROCEDURE — 2709999900 HC NON-CHARGEABLE SUPPLY

## 2018-12-10 PROCEDURE — 2580000003 HC RX 258: Performed by: INTERNAL MEDICINE

## 2018-12-10 PROCEDURE — 6360000004 HC RX CONTRAST MEDICATION

## 2018-12-10 PROCEDURE — 82962 GLUCOSE BLOOD TEST: CPT

## 2018-12-10 PROCEDURE — C1769 GUIDE WIRE: HCPCS

## 2018-12-10 PROCEDURE — 36200 PLACE CATHETER IN AORTA: CPT

## 2018-12-10 PROCEDURE — 2500000003 HC RX 250 WO HCPCS

## 2018-12-10 PROCEDURE — 75630 X-RAY AORTA LEG ARTERIES: CPT

## 2018-12-10 RX ORDER — SODIUM CHLORIDE 9 MG/ML
INJECTION, SOLUTION INTRAVENOUS CONTINUOUS
Status: DISCONTINUED | OUTPATIENT
Start: 2018-12-10 | End: 2018-12-10 | Stop reason: HOSPADM

## 2018-12-10 RX ORDER — SODIUM CHLORIDE 0.9 % (FLUSH) 0.9 %
10 SYRINGE (ML) INJECTION PRN
Status: DISCONTINUED | OUTPATIENT
Start: 2018-12-10 | End: 2018-12-10 | Stop reason: HOSPADM

## 2018-12-10 RX ORDER — DIPHENHYDRAMINE HCL 25 MG
25 TABLET ORAL ONCE
Status: COMPLETED | OUTPATIENT
Start: 2018-12-10 | End: 2018-12-10

## 2018-12-10 RX ORDER — CARVEDILOL 6.25 MG/1
6.25 TABLET ORAL 2 TIMES DAILY WITH MEALS
Qty: 60 TABLET | Refills: 3 | Status: SHIPPED | OUTPATIENT
Start: 2018-12-10

## 2018-12-10 RX ORDER — AMLODIPINE BESYLATE 5 MG/1
5 TABLET ORAL DAILY
Qty: 30 TABLET | Refills: 3 | Status: SHIPPED | OUTPATIENT
Start: 2018-12-10 | End: 2022-04-07

## 2018-12-10 RX ORDER — MORPHINE SULFATE 2 MG/ML
1 INJECTION, SOLUTION INTRAMUSCULAR; INTRAVENOUS
Status: DISCONTINUED | OUTPATIENT
Start: 2018-12-10 | End: 2018-12-10 | Stop reason: HOSPADM

## 2018-12-10 RX ORDER — ACETAMINOPHEN 325 MG/1
650 TABLET ORAL EVERY 4 HOURS PRN
Status: DISCONTINUED | OUTPATIENT
Start: 2018-12-10 | End: 2018-12-10 | Stop reason: HOSPADM

## 2018-12-10 RX ORDER — CARVEDILOL 6.25 MG/1
6.25 TABLET ORAL 2 TIMES DAILY WITH MEALS
Status: DISCONTINUED | OUTPATIENT
Start: 2018-12-10 | End: 2018-12-10 | Stop reason: HOSPADM

## 2018-12-10 RX ORDER — ATROPINE SULFATE 0.4 MG/ML
0.5 AMPUL (ML) INJECTION
Status: DISCONTINUED | OUTPATIENT
Start: 2018-12-10 | End: 2018-12-10 | Stop reason: HOSPADM

## 2018-12-10 RX ORDER — AMLODIPINE BESYLATE 5 MG/1
5 TABLET ORAL DAILY
Status: DISCONTINUED | OUTPATIENT
Start: 2018-12-10 | End: 2018-12-10 | Stop reason: HOSPADM

## 2018-12-10 RX ORDER — SODIUM CHLORIDE 0.9 % (FLUSH) 0.9 %
10 SYRINGE (ML) INJECTION EVERY 12 HOURS SCHEDULED
Status: DISCONTINUED | OUTPATIENT
Start: 2018-12-10 | End: 2018-12-10 | Stop reason: HOSPADM

## 2018-12-10 RX ORDER — DIAZEPAM 5 MG/1
5 TABLET ORAL ONCE
Status: COMPLETED | OUTPATIENT
Start: 2018-12-10 | End: 2018-12-10

## 2018-12-10 RX ADMIN — AMLODIPINE BESYLATE 5 MG: 5 TABLET ORAL at 14:41

## 2018-12-10 RX ADMIN — SODIUM CHLORIDE: 9 INJECTION, SOLUTION INTRAVENOUS at 09:16

## 2018-12-10 RX ADMIN — DIAZEPAM 5 MG: 5 TABLET ORAL at 09:16

## 2018-12-10 RX ADMIN — DIPHENHYDRAMINE HCL 25 MG: 25 TABLET ORAL at 09:16

## 2018-12-10 RX ADMIN — CARVEDILOL 6.25 MG: 6.25 TABLET, FILM COATED ORAL at 14:41

## 2018-12-10 NOTE — H&P
Bilateral knee surgeries done, gallbladder  surgery, _____ esophagus, and CABG done. Last heart cath was done in  2012, found to have severe three vessel coronary artery disease present  at that time, and underwent bypass surgery. Hypertension, hyperlipidemia, and diabetes present. SOCIAL HISTORY:  She does not smoke, does not drink. ALLERGIES:  TETANUS. MEDICATIONS AT HOME:  She is on Glucotrol, Janumet, lisinopril,  amiodarone, Prevacid, Pletal, Plavix, and aspirin. PHYSICAL EXAMINATION:  GENERAL:  The patient is awake and alert, answering questions. VITAL SIGNS:  Temperature is afebrile, pulse is 70, and blood pressure  128/80. HEENT:  Head is normocephalic and atraumatic. Pupils are equal and  reactive. CHEST:  Equal expansion. LUNGS:  Clear to auscultation. No wheezing or rhonchi appreciated. HEART:  Regular rhythm. ABDOMEN:  Soft and nontender. Bowel sounds are present. No  hepatosplenomegaly or guarding appreciated. EXTREMITIES:  No cyanosis or clubbing noted. NEUROLOGIC:  Cranial nerves II through XII are grossly intact. Left leg  pulses are decreased. LABORATORY DATA:  Labs are pending. IMPRESSION:  A 70-year-old female patient with history of coronary  artery disease status post CABG done, history of peripheral vascular  disease status post bilateral lower extremity intervention done,  hypertension, hyperlipidemia present. She is having claudication  symptoms on the left leg present and ZENAIDA's also abnormal.    PLAN:  The plan is for peripheral angiogram.  We will make further  recommendation based on that.         Blessing Valladares MD    D: 12/10/2018 8:30:36       T: 12/10/2018 10:07:01     [email or web address]  Job#: [id number]     Doc#: [id number]    CC:

## 2018-12-10 NOTE — FLOWSHEET NOTE
Received from cath lab. Monitor and alarms on. Rt groin site wnl. No bleeding or hematoma noted. Family at bedside.

## 2018-12-13 ENCOUNTER — HOSPITAL ENCOUNTER (OUTPATIENT)
Dept: CARDIAC CATH/INVASIVE PROCEDURES | Age: 68
Setting detail: OBSERVATION
Discharge: HOME | End: 2018-12-14
Attending: INTERNAL MEDICINE | Admitting: INTERNAL MEDICINE
Payer: COMMERCIAL

## 2018-12-13 VITALS
HEART RATE: 75 BPM | RESPIRATION RATE: 13 BRPM | TEMPERATURE: 98.4 F | OXYGEN SATURATION: 96 % | BODY MASS INDEX: 30.78 KG/M2 | HEIGHT: 61 IN | DIASTOLIC BLOOD PRESSURE: 61 MMHG | SYSTOLIC BLOOD PRESSURE: 131 MMHG | WEIGHT: 163 LBS

## 2018-12-13 LAB
ACTIVATED CLOTTING TIME, LOW RANGE: 171 SEC
ACTIVATED CLOTTING TIME, LOW RANGE: 186 SEC
ACTIVATED CLOTTING TIME, LOW RANGE: 209 SEC
ACTIVATED CLOTTING TIME, LOW RANGE: 232 SEC
ACTIVATED CLOTTING TIME, LOW RANGE: 255 SEC
ACTIVATED CLOTTING TIME, LOW RANGE: 299 SEC
ANION GAP SERPL CALCULATED.3IONS-SCNC: 14 MMOL/L (ref 4–16)
APTT: 27.6 SECONDS (ref 21.2–33)
BUN BLDV-MCNC: 16 MG/DL (ref 6–23)
CALCIUM SERPL-MCNC: 8.9 MG/DL (ref 8.3–10.6)
CHLORIDE BLD-SCNC: 100 MMOL/L (ref 99–110)
CO2: 25 MMOL/L (ref 21–32)
CREAT SERPL-MCNC: 1.3 MG/DL (ref 0.6–1.1)
ESTIMATED AVERAGE GLUCOSE: 131 MG/DL
GFR AFRICAN AMERICAN: 49 ML/MIN/1.73M2
GFR NON-AFRICAN AMERICAN: 41 ML/MIN/1.73M2
GLUCOSE BLD-MCNC: 151 MG/DL (ref 70–99)
GLUCOSE BLD-MCNC: 151 MG/DL (ref 70–99)
GLUCOSE BLD-MCNC: 173 MG/DL (ref 70–99)
HBA1C MFR BLD: 6.2 % (ref 4.2–6.3)
HCT VFR BLD CALC: 43.2 % (ref 37–47)
HEMOGLOBIN: 13.5 GM/DL (ref 12.5–16)
INR BLD: 0.98 INDEX
MCH RBC QN AUTO: 28.5 PG (ref 27–31)
MCHC RBC AUTO-ENTMCNC: 31.3 % (ref 32–36)
MCV RBC AUTO: 91.3 FL (ref 78–100)
PDW BLD-RTO: 12.4 % (ref 11.7–14.9)
PLATELET # BLD: 338 K/CU MM (ref 140–440)
PMV BLD AUTO: 11.2 FL (ref 7.5–11.1)
POTASSIUM SERPL-SCNC: 4.1 MMOL/L (ref 3.5–5.1)
PROTHROMBIN TIME: 11.4 SECONDS (ref 9.12–12.5)
RBC # BLD: 4.73 M/CU MM (ref 4.2–5.4)
SODIUM BLD-SCNC: 139 MMOL/L (ref 135–145)
WBC # BLD: 8.6 K/CU MM (ref 4–10.5)

## 2018-12-13 PROCEDURE — C1887 CATHETER, GUIDING: HCPCS

## 2018-12-13 PROCEDURE — 6370000000 HC RX 637 (ALT 250 FOR IP): Performed by: INTERNAL MEDICINE

## 2018-12-13 PROCEDURE — C1894 INTRO/SHEATH, NON-LASER: HCPCS

## 2018-12-13 PROCEDURE — 85027 COMPLETE CBC AUTOMATED: CPT

## 2018-12-13 PROCEDURE — 6360000002 HC RX W HCPCS

## 2018-12-13 PROCEDURE — 80048 BASIC METABOLIC PNL TOTAL CA: CPT

## 2018-12-13 PROCEDURE — 85347 COAGULATION TIME ACTIVATED: CPT

## 2018-12-13 PROCEDURE — 6370000000 HC RX 637 (ALT 250 FOR IP)

## 2018-12-13 PROCEDURE — 75774 ARTERY X-RAY EACH VESSEL: CPT

## 2018-12-13 PROCEDURE — 83036 HEMOGLOBIN GLYCOSYLATED A1C: CPT

## 2018-12-13 PROCEDURE — 2580000003 HC RX 258: Performed by: INTERNAL MEDICINE

## 2018-12-13 PROCEDURE — 37225 HC FEM POP TERRITORY ATHERECTOMY: CPT

## 2018-12-13 PROCEDURE — C1769 GUIDE WIRE: HCPCS

## 2018-12-13 PROCEDURE — 2709999900 HC NON-CHARGEABLE SUPPLY

## 2018-12-13 PROCEDURE — 85610 PROTHROMBIN TIME: CPT

## 2018-12-13 PROCEDURE — C2623 CATH, TRANSLUMIN, DRUG-COAT: HCPCS

## 2018-12-13 PROCEDURE — 6360000004 HC RX CONTRAST MEDICATION

## 2018-12-13 PROCEDURE — 6360000002 HC RX W HCPCS: Performed by: INTERNAL MEDICINE

## 2018-12-13 PROCEDURE — 82962 GLUCOSE BLOOD TEST: CPT

## 2018-12-13 PROCEDURE — C1760 CLOSURE DEV, VASC: HCPCS

## 2018-12-13 PROCEDURE — 2500000003 HC RX 250 WO HCPCS

## 2018-12-13 PROCEDURE — C1725 CATH, TRANSLUMIN NON-LASER: HCPCS

## 2018-12-13 PROCEDURE — 2580000003 HC RX 258

## 2018-12-13 PROCEDURE — 75710 ARTERY X-RAYS ARM/LEG: CPT

## 2018-12-13 PROCEDURE — G0378 HOSPITAL OBSERVATION PER HR: HCPCS

## 2018-12-13 PROCEDURE — 85730 THROMBOPLASTIN TIME PARTIAL: CPT

## 2018-12-13 PROCEDURE — C1724 CATH, TRANS ATHEREC,ROTATION: HCPCS

## 2018-12-13 RX ORDER — DEXTROSE MONOHYDRATE 25 G/50ML
12.5 INJECTION, SOLUTION INTRAVENOUS PRN
Status: DISCONTINUED | OUTPATIENT
Start: 2018-12-13 | End: 2018-12-13 | Stop reason: SDUPTHER

## 2018-12-13 RX ORDER — MORPHINE SULFATE 2 MG/ML
1 INJECTION, SOLUTION INTRAMUSCULAR; INTRAVENOUS
Status: ACTIVE | OUTPATIENT
Start: 2018-12-13 | End: 2018-12-13

## 2018-12-13 RX ORDER — NICOTINE POLACRILEX 4 MG
15 LOZENGE BUCCAL PRN
Status: DISCONTINUED | OUTPATIENT
Start: 2018-12-13 | End: 2018-12-13 | Stop reason: SDUPTHER

## 2018-12-13 RX ORDER — ACETAMINOPHEN 325 MG/1
650 TABLET ORAL EVERY 4 HOURS PRN
Status: DISCONTINUED | OUTPATIENT
Start: 2018-12-13 | End: 2018-12-13 | Stop reason: SDUPTHER

## 2018-12-13 RX ORDER — ATROPINE SULFATE 0.4 MG/ML
0.5 AMPUL (ML) INJECTION
Status: ACTIVE | OUTPATIENT
Start: 2018-12-13 | End: 2018-12-13

## 2018-12-13 RX ORDER — CILOSTAZOL 50 MG/1
50 TABLET ORAL
Status: DISCONTINUED | OUTPATIENT
Start: 2018-12-13 | End: 2018-12-14 | Stop reason: HOSPADM

## 2018-12-13 RX ORDER — ASPIRIN 81 MG/1
81 TABLET ORAL DAILY
Status: DISCONTINUED | OUTPATIENT
Start: 2018-12-13 | End: 2018-12-14 | Stop reason: HOSPADM

## 2018-12-13 RX ORDER — ONDANSETRON 2 MG/ML
4 INJECTION INTRAMUSCULAR; INTRAVENOUS EVERY 6 HOURS PRN
Status: DISCONTINUED | OUTPATIENT
Start: 2018-12-13 | End: 2018-12-14 | Stop reason: HOSPADM

## 2018-12-13 RX ORDER — CARVEDILOL 6.25 MG/1
6.25 TABLET ORAL 2 TIMES DAILY WITH MEALS
Status: DISCONTINUED | OUTPATIENT
Start: 2018-12-13 | End: 2018-12-14 | Stop reason: HOSPADM

## 2018-12-13 RX ORDER — CLOPIDOGREL BISULFATE 75 MG/1
75 TABLET ORAL DAILY
Status: DISCONTINUED | OUTPATIENT
Start: 2018-12-14 | End: 2018-12-14 | Stop reason: HOSPADM

## 2018-12-13 RX ORDER — GLIPIZIDE 10 MG/1
10 TABLET, FILM COATED, EXTENDED RELEASE ORAL
Status: DISCONTINUED | OUTPATIENT
Start: 2018-12-14 | End: 2018-12-14 | Stop reason: HOSPADM

## 2018-12-13 RX ORDER — SODIUM CHLORIDE 9 MG/ML
INJECTION, SOLUTION INTRAVENOUS CONTINUOUS
Status: DISCONTINUED | OUTPATIENT
Start: 2018-12-13 | End: 2018-12-13 | Stop reason: SDUPTHER

## 2018-12-13 RX ORDER — DIPHENHYDRAMINE HCL 25 MG
25 TABLET ORAL ONCE
Status: COMPLETED | OUTPATIENT
Start: 2018-12-13 | End: 2018-12-13

## 2018-12-13 RX ORDER — NICOTINE POLACRILEX 4 MG
15 LOZENGE BUCCAL PRN
Status: DISCONTINUED | OUTPATIENT
Start: 2018-12-13 | End: 2018-12-14 | Stop reason: HOSPADM

## 2018-12-13 RX ORDER — GLIPIZIDE 5 MG/1
5 TABLET ORAL
Status: DISCONTINUED | OUTPATIENT
Start: 2018-12-13 | End: 2018-12-13 | Stop reason: DRUGHIGH

## 2018-12-13 RX ORDER — ASPIRIN 81 MG/1
81 TABLET, CHEWABLE ORAL DAILY
Status: DISCONTINUED | OUTPATIENT
Start: 2018-12-14 | End: 2018-12-13 | Stop reason: SDUPTHER

## 2018-12-13 RX ORDER — DEXTROSE MONOHYDRATE 50 MG/ML
100 INJECTION, SOLUTION INTRAVENOUS PRN
Status: DISCONTINUED | OUTPATIENT
Start: 2018-12-13 | End: 2018-12-14 | Stop reason: HOSPADM

## 2018-12-13 RX ORDER — SODIUM CHLORIDE 9 MG/ML
INJECTION, SOLUTION INTRAVENOUS CONTINUOUS
Status: DISCONTINUED | OUTPATIENT
Start: 2018-12-13 | End: 2018-12-14 | Stop reason: HOSPADM

## 2018-12-13 RX ORDER — FERROUS GLUCONATE 324(37.5)
324 TABLET ORAL 2 TIMES DAILY WITH MEALS
Status: DISCONTINUED | OUTPATIENT
Start: 2018-12-13 | End: 2018-12-14 | Stop reason: HOSPADM

## 2018-12-13 RX ORDER — CLOPIDOGREL BISULFATE 75 MG/1
75 TABLET ORAL DAILY
Status: DISCONTINUED | OUTPATIENT
Start: 2018-12-13 | End: 2018-12-13 | Stop reason: SDUPTHER

## 2018-12-13 RX ORDER — SODIUM CHLORIDE 0.9 % (FLUSH) 0.9 %
10 SYRINGE (ML) INJECTION EVERY 12 HOURS SCHEDULED
Status: DISCONTINUED | OUTPATIENT
Start: 2018-12-13 | End: 2018-12-14 | Stop reason: HOSPADM

## 2018-12-13 RX ORDER — DIAZEPAM 5 MG/1
5 TABLET ORAL ONCE
Status: COMPLETED | OUTPATIENT
Start: 2018-12-13 | End: 2018-12-13

## 2018-12-13 RX ORDER — SODIUM CHLORIDE 0.9 % (FLUSH) 0.9 %
10 SYRINGE (ML) INJECTION PRN
Status: DISCONTINUED | OUTPATIENT
Start: 2018-12-13 | End: 2018-12-14 | Stop reason: HOSPADM

## 2018-12-13 RX ORDER — DEXTROSE MONOHYDRATE 50 MG/ML
100 INJECTION, SOLUTION INTRAVENOUS PRN
Status: DISCONTINUED | OUTPATIENT
Start: 2018-12-13 | End: 2018-12-13 | Stop reason: SDUPTHER

## 2018-12-13 RX ORDER — AMLODIPINE BESYLATE 5 MG/1
5 TABLET ORAL DAILY
Status: DISCONTINUED | OUTPATIENT
Start: 2018-12-13 | End: 2018-12-14 | Stop reason: HOSPADM

## 2018-12-13 RX ORDER — DEXTROSE MONOHYDRATE 25 G/50ML
12.5 INJECTION, SOLUTION INTRAVENOUS PRN
Status: DISCONTINUED | OUTPATIENT
Start: 2018-12-13 | End: 2018-12-14 | Stop reason: HOSPADM

## 2018-12-13 RX ORDER — ACETAMINOPHEN 500 MG
1000 TABLET ORAL EVERY 6 HOURS PRN
Status: DISCONTINUED | OUTPATIENT
Start: 2018-12-13 | End: 2018-12-14 | Stop reason: HOSPADM

## 2018-12-13 RX ORDER — LISINOPRIL 5 MG/1
5 TABLET ORAL DAILY
Status: DISCONTINUED | OUTPATIENT
Start: 2018-12-13 | End: 2018-12-14 | Stop reason: HOSPADM

## 2018-12-13 RX ORDER — AMIODARONE HYDROCHLORIDE 200 MG/1
200 TABLET ORAL DAILY
Status: DISCONTINUED | OUTPATIENT
Start: 2018-12-13 | End: 2018-12-14 | Stop reason: HOSPADM

## 2018-12-13 RX ADMIN — SODIUM CHLORIDE: 9 INJECTION, SOLUTION INTRAVENOUS at 22:49

## 2018-12-13 RX ADMIN — FERROUS GLUCONATE TAB 324 MG (37.5 MG ELEMENTAL IRON) 324 MG: 324 (37.5 FE) TAB at 17:50

## 2018-12-13 RX ADMIN — SODIUM CHLORIDE: 9 INJECTION, SOLUTION INTRAVENOUS at 10:10

## 2018-12-13 RX ADMIN — AMLODIPINE BESYLATE 5 MG: 5 TABLET ORAL at 17:51

## 2018-12-13 RX ADMIN — CARVEDILOL 6.25 MG: 6.25 TABLET, FILM COATED ORAL at 17:51

## 2018-12-13 RX ADMIN — AMIODARONE HYDROCHLORIDE 200 MG: 200 TABLET ORAL at 17:51

## 2018-12-13 RX ADMIN — ONDANSETRON 4 MG: 2 INJECTION INTRAMUSCULAR; INTRAVENOUS at 17:43

## 2018-12-13 RX ADMIN — DIPHENHYDRAMINE HCL 25 MG: 25 TABLET ORAL at 10:10

## 2018-12-13 RX ADMIN — LISINOPRIL 5 MG: 5 TABLET ORAL at 17:51

## 2018-12-13 RX ADMIN — CILOSTAZOL 50 MG: 50 TABLET ORAL at 17:51

## 2018-12-13 RX ADMIN — INSULIN LISPRO 1 UNITS: 100 INJECTION, SOLUTION INTRAVENOUS; SUBCUTANEOUS at 22:25

## 2018-12-13 RX ADMIN — DIAZEPAM 5 MG: 5 TABLET ORAL at 10:09

## 2018-12-13 NOTE — H&P
[address], [address] [facility]                              HISTORY AND PHYSICAL    PATIENT NAME: Felipe Lassiter                    :        1950  MED REC NO:   [id number]                          ROOM:  ACCOUNT NO:   [de-identified]                           ADMIT DATE: 2018  PROVIDER:     Alli Rosenbaum MD    HISTORY OF PRESENT ILLNESS:  The patient underwent a peripheral  angiogram few days ago. The patient is found to have abdominal aorta is  patent. Bilateral renal arteries are patent. Bilateral common,  external, internal, common femoral arteries were patent. Bilateral  profunda arteries are patent. Right SFA has mild disease. Right  popliteal arteries were patent. AT was filling the foot and collateral  was filing the PT in the right leg. Left SFA is 100% occluded. Popliteal arteries have moderate disease. AT was filling the foot with  collateral circulation with a weak flow. The patient is here for  intervention of the left leg today. The patient has some renal  insufficiency and therefore the patient was brought back for  intervention today. PAST MEDICAL HISTORY:  Coronary artery disease. She is status post CABG  done. She had a four vessel CABG done in . LIMA to LAD, SVG to  circ, SVG to PDA, SVG to RCA. She is status post both legs intervention  in the past.  Now left leg intervention again. History of hypertension, hyperlipidemia, diabetes present, and anemia. PAST SURGICAL HISTORY:  Bilateral knee surgery, gallbladder surgery  done, CABG done. Heart catheterization done in  found to have  significant native three vessel disease present and underwent bypass  surgery. Bilateral peripheral intervention of both legs. SOCIAL HISTORY:  She does not smoke and does not drink. ALLERGIES:  TETANUS.     MEDICATIONS:  Please see the list.    PHYSICAL EXAMINATION:  GENERAL:  The

## 2018-12-13 NOTE — FLOWSHEET NOTE
#6F RFA sheath pulled and manual pressure initiated and held x25 minutes per this RN. Site ecchymotic prior to sheath pull and had noted golf ball sized hematoma.  After hemostasis achieved, no hematoma noted and D Stat and tegaderm secured; pt tolerated well

## 2018-12-13 NOTE — FLOWSHEET NOTE
Received from cath lab. Monitor and alarms on. Awaiting on bed for admission; will continue to monitor.

## 2018-12-13 NOTE — PLAN OF CARE
Problem: Infection:  Goal: Will remain free from infection  Will remain free from infection  Outcome: Ongoing      Problem: Bleeding:  Goal: Will show no signs and symptoms of excessive bleeding  Will show no signs and symptoms of excessive bleeding  Outcome: Ongoing

## 2018-12-14 LAB
ANION GAP SERPL CALCULATED.3IONS-SCNC: 11 MMOL/L (ref 4–16)
BUN BLDV-MCNC: 16 MG/DL (ref 6–23)
CALCIUM SERPL-MCNC: 8.5 MG/DL (ref 8.3–10.6)
CHLORIDE BLD-SCNC: 103 MMOL/L (ref 99–110)
CO2: 26 MMOL/L (ref 21–32)
CREAT SERPL-MCNC: 1.1 MG/DL (ref 0.6–1.1)
GFR AFRICAN AMERICAN: 60 ML/MIN/1.73M2
GFR NON-AFRICAN AMERICAN: 49 ML/MIN/1.73M2
GLUCOSE BLD-MCNC: 140 MG/DL (ref 70–99)
GLUCOSE BLD-MCNC: 175 MG/DL (ref 70–99)
HCT VFR BLD CALC: 37.1 % (ref 37–47)
HEMOGLOBIN: 11.1 GM/DL (ref 12.5–16)
MCH RBC QN AUTO: 28.2 PG (ref 27–31)
MCHC RBC AUTO-ENTMCNC: 29.9 % (ref 32–36)
MCV RBC AUTO: 94.2 FL (ref 78–100)
PDW BLD-RTO: 12.3 % (ref 11.7–14.9)
PLATELET # BLD: 258 K/CU MM (ref 140–440)
PMV BLD AUTO: 11.2 FL (ref 7.5–11.1)
POTASSIUM SERPL-SCNC: 4.9 MMOL/L (ref 3.5–5.1)
RBC # BLD: 3.94 M/CU MM (ref 4.2–5.4)
SODIUM BLD-SCNC: 140 MMOL/L (ref 135–145)
WBC # BLD: 7.2 K/CU MM (ref 4–10.5)

## 2018-12-14 PROCEDURE — G0378 HOSPITAL OBSERVATION PER HR: HCPCS

## 2018-12-14 PROCEDURE — 80048 BASIC METABOLIC PNL TOTAL CA: CPT

## 2018-12-14 PROCEDURE — 2580000003 HC RX 258: Performed by: INTERNAL MEDICINE

## 2018-12-14 PROCEDURE — 94761 N-INVAS EAR/PLS OXIMETRY MLT: CPT

## 2018-12-14 PROCEDURE — 36415 COLL VENOUS BLD VENIPUNCTURE: CPT

## 2018-12-14 PROCEDURE — 6370000000 HC RX 637 (ALT 250 FOR IP): Performed by: INTERNAL MEDICINE

## 2018-12-14 PROCEDURE — 82962 GLUCOSE BLOOD TEST: CPT

## 2018-12-14 PROCEDURE — 85027 COMPLETE CBC AUTOMATED: CPT

## 2018-12-14 RX ADMIN — CILOSTAZOL 50 MG: 50 TABLET ORAL at 08:16

## 2018-12-14 RX ADMIN — CARVEDILOL 6.25 MG: 6.25 TABLET, FILM COATED ORAL at 08:15

## 2018-12-14 RX ADMIN — AMIODARONE HYDROCHLORIDE 200 MG: 200 TABLET ORAL at 08:16

## 2018-12-14 RX ADMIN — ASPIRIN 81 MG: 81 TABLET, COATED ORAL at 08:16

## 2018-12-14 RX ADMIN — LISINOPRIL 5 MG: 5 TABLET ORAL at 08:15

## 2018-12-14 RX ADMIN — CLOPIDOGREL BISULFATE 75 MG: 75 TABLET ORAL at 08:16

## 2018-12-14 RX ADMIN — FERROUS GLUCONATE TAB 324 MG (37.5 MG ELEMENTAL IRON) 324 MG: 324 (37.5 FE) TAB at 08:15

## 2018-12-14 RX ADMIN — GLIPIZIDE 10 MG: 10 TABLET, FILM COATED, EXTENDED RELEASE ORAL at 08:15

## 2018-12-14 RX ADMIN — Medication 2000 UNITS: at 08:15

## 2018-12-14 RX ADMIN — AMLODIPINE BESYLATE 5 MG: 5 TABLET ORAL at 08:16

## 2018-12-14 RX ADMIN — SODIUM CHLORIDE, PRESERVATIVE FREE 10 ML: 5 INJECTION INTRAVENOUS at 08:16

## 2018-12-14 NOTE — PROCEDURES
complication noted.         Caitlin Guerra MD    D: 12/13/2018 14:05:55       T: 12/13/2018 18:28:58     [email or web address]  Job#: [id number]     Doc#: [id number]    CC:

## 2018-12-14 NOTE — PROGRESS NOTES
Progress Note( Dr. Ruma Hussein)  12/14/2018  Subjective:   Admit Date: 12/13/2018  PCP: No primary care provider on file. Admitted For :    Consulted For:     Interval History:    Denies any chest pains,   Denies SOB . Denies nausea or vomiting. No new bowel or bladder symptoms. No intake or output data in the 24 hours ending 12/14/18 0740    DATA    CBC:   Recent Labs      12/13/18   0930  12/14/18   0444   WBC  8.6  7.2   HGB  13.5  11.1*   PLT  338  258    CMP:  Recent Labs      12/13/18   0930  12/14/18   0444   NA  139  140   K  4.1  4.9   CL  100  103   CO2  25  26   BUN  16  16   CREATININE  1.3*  1.1   CALCIUM  8.9  8.5     Lipids:   Lab Results   Component Value Date    CHOL 290 03/08/2018    HDL 41 03/08/2018    TRIG 230 03/08/2018     Glucose:  Recent Labs      12/13/18   1748  12/13/18   2156   POCGLU  151*  151*     [id number]:  Lab Results   Component Value Date    LABA1C 6.2 12/13/2018     High Sensitivity TSH:   Lab Results   Component Value Date    TSHHS 1.101 01/22/2014     Free T3:   Lab Results   Component Value Date    FT3 2.6 08/02/2012     Free T4:  Lab Results   Component Value Date    T4FREE 1.19 01/22/2014       No results found.     Scheduled Medicines   Medications:    aspirin  81 mg Oral Daily    cilostazol  50 mg Oral BID AC    vitamin D  2,000 Units Oral Daily    amiodarone  200 mg Oral Daily    ferrous gluconate  324 mg Oral BID WC    lisinopril  5 mg Oral Daily    carvedilol  6.25 mg Oral BID WC    amLODIPine  5 mg Oral Daily    sodium chloride flush  10 mL Intravenous 2 times per day    clopidogrel  75 mg Oral Daily    insulin lispro  0-6 Units Subcutaneous TID WC    insulin lispro  0-3 Units Subcutaneous Nightly    glipiZIDE  10 mg Oral QAM AC      Infusions:    sodium chloride 75 mL/hr at 12/13/18 2249    dextrose           Objective:   Vitals: /61   Pulse 75   Temp 98.4 °F (36.9 °C) (Oral)   Resp 13   Ht 5' 1\" (1.549 m)   Wt 163 lb (73.9 kg)

## 2019-02-25 ENCOUNTER — HOSPITAL ENCOUNTER (OUTPATIENT)
Age: 69
Setting detail: SPECIMEN
Discharge: HOME | End: 2019-02-25
Payer: COMMERCIAL

## 2019-02-25 LAB
ALBUMIN SERPL-MCNC: 4 GM/DL (ref 3.4–5)
ALP BLD-CCNC: 101 IU/L (ref 40–129)
ALT SERPL-CCNC: 10 U/L (ref 10–40)
ANION GAP SERPL CALCULATED.3IONS-SCNC: 13 MMOL/L (ref 4–16)
AST SERPL-CCNC: 13 IU/L (ref 15–37)
BILIRUB SERPL-MCNC: 0.3 MG/DL (ref 0–1)
BUN BLDV-MCNC: 12 MG/DL (ref 6–23)
CALCIUM SERPL-MCNC: 8.6 MG/DL (ref 8.3–10.6)
CHLORIDE BLD-SCNC: 101 MMOL/L (ref 99–110)
CO2: 25 MMOL/L (ref 21–32)
CREAT SERPL-MCNC: 1 MG/DL (ref 0.6–1.1)
FERRITIN: 76 NG/ML (ref 15–150)
GFR AFRICAN AMERICAN: >60 ML/MIN/1.73M2
GFR NON-AFRICAN AMERICAN: 55 ML/MIN/1.73M2
GLUCOSE BLD-MCNC: 160 MG/DL (ref 70–99)
IRON: 76 UG/DL (ref 37–145)
PCT TRANSFERRIN: 26 % (ref 10–44)
POTASSIUM SERPL-SCNC: 4.1 MMOL/L (ref 3.5–5.1)
SODIUM BLD-SCNC: 139 MMOL/L (ref 135–145)
TOTAL IRON BINDING CAPACITY: 292 UG/DL (ref 250–450)
TOTAL PROTEIN: 6.8 GM/DL (ref 6.4–8.2)
UNSATURATED IRON BINDING CAPACITY: 216 UG/DL (ref 110–370)

## 2019-02-25 PROCEDURE — 83540 ASSAY OF IRON: CPT

## 2019-02-25 PROCEDURE — 82728 ASSAY OF FERRITIN: CPT

## 2019-02-25 PROCEDURE — 80053 COMPREHEN METABOLIC PANEL: CPT

## 2019-02-25 PROCEDURE — 83550 IRON BINDING TEST: CPT

## 2019-04-16 ENCOUNTER — OFFICE VISIT (OUTPATIENT)
Dept: INTERNAL MEDICINE CLINIC | Age: 69
End: 2019-04-16
Payer: COMMERCIAL

## 2019-04-16 VITALS
OXYGEN SATURATION: 98 % | WEIGHT: 174 LBS | HEIGHT: 61 IN | BODY MASS INDEX: 32.85 KG/M2 | DIASTOLIC BLOOD PRESSURE: 70 MMHG | HEART RATE: 72 BPM | SYSTOLIC BLOOD PRESSURE: 130 MMHG

## 2019-04-16 DIAGNOSIS — I10 ESSENTIAL HYPERTENSION: ICD-10-CM

## 2019-04-16 DIAGNOSIS — E11.9 TYPE 2 DIABETES MELLITUS WITHOUT COMPLICATION, WITHOUT LONG-TERM CURRENT USE OF INSULIN (HCC): Primary | ICD-10-CM

## 2019-04-16 DIAGNOSIS — E78.5 HYPERLIPIDEMIA, UNSPECIFIED HYPERLIPIDEMIA TYPE: ICD-10-CM

## 2019-04-16 PROCEDURE — 99203 OFFICE O/P NEW LOW 30 MIN: CPT | Performed by: FAMILY MEDICINE

## 2019-04-16 RX ORDER — GLIPIZIDE 5 MG/1
5 TABLET ORAL DAILY
Qty: 30 TABLET | Refills: 5 | Status: SHIPPED | OUTPATIENT
Start: 2019-04-16 | End: 2019-07-06 | Stop reason: SDUPTHER

## 2019-04-16 RX ORDER — METOPROLOL SUCCINATE 25 MG/1
TABLET, EXTENDED RELEASE ORAL
Refills: 11 | COMMUNITY
Start: 2019-03-15 | End: 2021-07-13 | Stop reason: SINTOL

## 2019-04-16 ASSESSMENT — PATIENT HEALTH QUESTIONNAIRE - PHQ9
SUM OF ALL RESPONSES TO PHQ9 QUESTIONS 1 & 2: 0
2. FEELING DOWN, DEPRESSED OR HOPELESS: 0
SUM OF ALL RESPONSES TO PHQ QUESTIONS 1-9: 0
SUM OF ALL RESPONSES TO PHQ QUESTIONS 1-9: 0
1. LITTLE INTEREST OR PLEASURE IN DOING THINGS: 0

## 2019-04-21 ASSESSMENT — ENCOUNTER SYMPTOMS
CHEST TIGHTNESS: 0
ABDOMINAL PAIN: 0
BLOOD IN STOOL: 0
NAUSEA: 0
SHORTNESS OF BREATH: 0
WHEEZING: 0
BACK PAIN: 0
EYES NEGATIVE: 1

## 2019-04-21 NOTE — PROGRESS NOTES
Crystal Rosenbaum  1950  70 y.o.  female    Chief Complaint   Patient presents with    New Patient         History of Present Illness  Crystal Rosenbaum is a 70 y.o. female who presents today for new patient visit. She has DM II, HTN, and HL. Last labs reviewed. No Cp or Sob.  -DM II- Last Hba1c 6.2. No low BS. Stable on Glipizide and Janumet  -HTN- Stable on medications  -HL- Last labs not controlled. , and she is not on any medication.  -Pt with CAD and PAD. She is stable and managed by Dr. Sergo Ingram      Review of Systems   Constitutional: Negative for chills, diaphoresis and fever. HENT: Negative. Eyes: Negative. Respiratory: Negative for chest tightness, shortness of breath and wheezing. Cardiovascular: Negative for chest pain, palpitations and leg swelling. Gastrointestinal: Negative for abdominal pain, blood in stool and nausea. Genitourinary: Negative for difficulty urinating and dysuria. Musculoskeletal: Negative for back pain and neck pain. Skin: Negative. Neurological: Negative for dizziness, light-headedness and headaches. Psychiatric/Behavioral:        No changes in mood       Allergies   Allergen Reactions    Tetanus Toxoids Swelling and Rash     fever       Past Medical History:   Diagnosis Date    Arthritis     CAD (coronary artery disease)     follows with Dr Funmi Garcia Diabetes mellitus type 2, uncontrolled ([facility] [address]     \"dx 2949    Diastolic dysfunction 90/1380    [facility]    Hiatal hernia     History of blood transfusion     \"had blood transfusion with 4th child- have hx also of iron infusions for anemia 2017    Hyperlipidemia     Hypertension     IBS (irritable bowel syndrome)     with diarrhea    Iron deficiency anemia     Iron Infusions- follows with Julia Araiza and Jose G Cardenas    LVH (left ventricular hypertrophy) 09/2012    [facility]    Multiple gastric polyps 2004    Dr Hannah Mcintyre Obesity     PAD (peripheral artery disease) ([facility] [address]     S/P CABG x 4 08/06/2012    4V CABG- LIMA-LAD, SVG-CX, SVG-PDA, SVG-RCA- Follows with Dr Gisselle Trotter Sinus tachycardia     follows with [facility]    SVT (supra[facility]ricular tachycardia) [facility])     old chart gives hx of SVT in the past    Vitamin D deficiency        Past Surgical History:   Procedure Laterality Date    BALLOON ANGIOPLASTY, ARTERY Right 08/19/2015    RIght SFA 90%->10%, Right Popliteal 1000%->10%    CARDIAC CATHETERIZATION  08/02/2012    CARDIAC SURGERY      open heart surgery 8/2012    CHOLECYSTECTOMY  15+ yrs ago    COLONOSCOPY  2017    CORONARY ARTERY BYPASS GRAFT  08/02/2012    4V CABG- LIMA-LAD, SVG-CX, SVG-PDA, SVG-RCA    ENDOSCOPY, COLON, DIAGNOSTIC  10/10/2017    esophageal stricture, hiatal hernia, candidiasis    KNEE SURGERY      biltateral- \"total of 9 surgeries- all scopes\"    TRANSLUMINAL ANGIOPLASTY Left 09/23/2015 9/23/2015-Left mid and distal SFA and Left Popliteal    TUBAL LIGATION  1978     Family History   Problem Relation Age of Onset    Kidney Disease Mother         Dialysis    Diabetes Mother     High Blood Pressure Mother     Coronary Art Dis Mother         MI   [facility] Cancer Father         pancreatic cancer (Smoking)    Coronary Art Dis Father 43        MI early onset    Diabetes Other        Social History     Tobacco Use    Smoking status: Never Smoker    Smokeless tobacco: Never Used   Substance Use Topics    Alcohol use: No    Drug use: No       Current Outpatient Medications   Medication Sig Dispense Refill    metoprolol succinate (TOPROL XL) 25 MG extended release tablet TK 1 T PO QPM  11    sitaGLIPtan-metformin (JANUMET)  MG per tablet Take 1 tablet by mouth daily 30 tablet 5    glipiZIDE (GLUCOTROL) 5 MG tablet Take 1 tablet by mouth daily 30 tablet 5    carvedilol (COREG) 6.25 MG tablet Take 1 tablet by mouth 2 times daily (with meals) 60 tablet 3    amLODIPine (NORVASC) 5 MG tablet Take 1 tablet by mouth Psychiatric: She has a normal mood and affect. Vitals reviewed. ASSESSMENT:  1. Type 2 diabetes mellitus without complication, without long-term current use of insulin ([address]    2. Essential hypertension    3. Hyperlipidemia, unspecified hyperlipidemia type        PLAN:    Orders Placed This Encounter   Procedures    Hemoglobin A1C    Lipid Panel    Microalbumin / Creatinine Urine Ratio       Orders Placed This Encounter   Medications    sitaGLIPtan-metformin (JANUMET)  MG per tablet     Sig: Take 1 tablet by mouth daily     Dispense:  30 tablet     Refill:  5    glipiZIDE (GLUCOTROL) 5 MG tablet     Sig: Take 1 tablet by mouth daily     Dispense:  30 tablet     Refill:  5   Old records reviewed. Obtain last notes from specialists to update her medical record  Continue medication  Monitor BS  Refused preventative screens  Keep f/u with specialists         Return for Follow up in 5 to 6 months for diabetes.     Electronically Signed by Dora Merritt DO

## 2019-05-13 ENCOUNTER — HOSPITAL ENCOUNTER (OUTPATIENT)
Age: 69
Setting detail: SPECIMEN
Discharge: HOME | End: 2019-05-13
Payer: COMMERCIAL

## 2019-05-13 LAB
FERRITIN: 77 NG/ML (ref 15–150)
IRON: 61 UG/DL (ref 37–145)
PCT TRANSFERRIN: 20 % (ref 10–44)
TOTAL IRON BINDING CAPACITY: 311 UG/DL (ref 250–450)
UNSATURATED IRON BINDING CAPACITY: 250 UG/DL (ref 110–370)

## 2019-05-13 PROCEDURE — 82728 ASSAY OF FERRITIN: CPT

## 2019-05-13 PROCEDURE — 83550 IRON BINDING TEST: CPT

## 2019-05-13 PROCEDURE — 83540 ASSAY OF IRON: CPT

## 2019-06-12 LAB
CREATININE, URINE: 203.5
MICROALBUMIN/CREAT 24H UR: 1330 MG/G{CREAT}
MICROALBUMIN/CREAT UR-RTO: 7

## 2019-06-13 ENCOUNTER — TELEPHONE (OUTPATIENT)
Dept: INTERNAL MEDICINE CLINIC | Age: 69
End: 2019-06-13

## 2019-06-13 NOTE — TELEPHONE ENCOUNTER
[facility] lab: , nl <100, HDL 35 nl >50,  nl < 150 ( she is diabetic and has refused Statins or other treatment for cholesterol) She can change her diet--lower saturated fats, carbs, sweets  Hba1c 6.7 controlled  Microalbumin/Cre ratio: 7 normal

## 2019-07-06 DIAGNOSIS — E11.9 TYPE 2 DIABETES MELLITUS WITHOUT COMPLICATION, WITHOUT LONG-TERM CURRENT USE OF INSULIN (HCC): ICD-10-CM

## 2019-07-08 RX ORDER — GLIPIZIDE 5 MG/1
5 TABLET ORAL DAILY
Qty: 90 TABLET | Refills: 1 | Status: SHIPPED | OUTPATIENT
Start: 2019-07-08 | End: 2019-11-14

## 2019-08-14 ENCOUNTER — OFFICE VISIT (OUTPATIENT)
Dept: INTERNAL MEDICINE CLINIC | Age: 69
End: 2019-08-14
Payer: COMMERCIAL

## 2019-08-14 VITALS
HEIGHT: 61 IN | HEART RATE: 83 BPM | BODY MASS INDEX: 32.06 KG/M2 | WEIGHT: 169.8 LBS | OXYGEN SATURATION: 97 % | DIASTOLIC BLOOD PRESSURE: 78 MMHG | SYSTOLIC BLOOD PRESSURE: 116 MMHG

## 2019-08-14 DIAGNOSIS — E11.9 TYPE 2 DIABETES MELLITUS WITHOUT COMPLICATION, WITHOUT LONG-TERM CURRENT USE OF INSULIN (HCC): Primary | ICD-10-CM

## 2019-08-14 DIAGNOSIS — R13.10 DYSPHAGIA, UNSPECIFIED TYPE: ICD-10-CM

## 2019-08-14 PROCEDURE — 99213 OFFICE O/P EST LOW 20 MIN: CPT | Performed by: FAMILY MEDICINE

## 2019-08-19 ASSESSMENT — ENCOUNTER SYMPTOMS
NAUSEA: 0
BLOOD IN STOOL: 0
COUGH: 0
ABDOMINAL PAIN: 0
SHORTNESS OF BREATH: 0
CONSTIPATION: 0
DIARRHEA: 0
BACK PAIN: 0
CHEST TIGHTNESS: 0

## 2019-11-14 DIAGNOSIS — E11.9 TYPE 2 DIABETES MELLITUS WITHOUT COMPLICATION, WITHOUT LONG-TERM CURRENT USE OF INSULIN (HCC): ICD-10-CM

## 2019-11-14 RX ORDER — GLIPIZIDE 5 MG/1
5 TABLET ORAL DAILY
Qty: 90 TABLET | Refills: 0 | Status: SHIPPED | OUTPATIENT
Start: 2019-11-14 | End: 2019-12-18 | Stop reason: SDUPTHER

## 2019-12-09 RX ORDER — SITAGLIPTIN 50 MG/1
TABLET, FILM COATED ORAL
Qty: 30 TABLET | Refills: 0 | Status: SHIPPED | OUTPATIENT
Start: 2019-12-09 | End: 2019-12-18 | Stop reason: SDUPTHER

## 2019-12-18 DIAGNOSIS — E11.9 TYPE 2 DIABETES MELLITUS WITHOUT COMPLICATION, WITHOUT LONG-TERM CURRENT USE OF INSULIN (HCC): ICD-10-CM

## 2019-12-18 RX ORDER — GLIPIZIDE 5 MG/1
5 TABLET ORAL DAILY
Qty: 90 TABLET | Refills: 0 | Status: SHIPPED | OUTPATIENT
Start: 2019-12-18 | End: 2020-03-03

## 2020-01-02 ENCOUNTER — OFFICE VISIT (OUTPATIENT)
Dept: INTERNAL MEDICINE CLINIC | Age: 70
End: 2020-01-02
Payer: COMMERCIAL

## 2020-01-02 VITALS
WEIGHT: 171 LBS | BODY MASS INDEX: 32.28 KG/M2 | OXYGEN SATURATION: 98 % | SYSTOLIC BLOOD PRESSURE: 117 MMHG | HEART RATE: 61 BPM | DIASTOLIC BLOOD PRESSURE: 68 MMHG | HEIGHT: 61 IN

## 2020-01-02 PROCEDURE — 99214 OFFICE O/P EST MOD 30 MIN: CPT | Performed by: FAMILY MEDICINE

## 2020-01-02 RX ORDER — ATORVASTATIN CALCIUM 20 MG/1
20 TABLET, FILM COATED ORAL DAILY
Qty: 90 TABLET | Refills: 0 | Status: SHIPPED | OUTPATIENT
Start: 2020-01-02 | End: 2020-04-07

## 2020-01-02 ASSESSMENT — PATIENT HEALTH QUESTIONNAIRE - PHQ9
SUM OF ALL RESPONSES TO PHQ9 QUESTIONS 1 & 2: 0
SUM OF ALL RESPONSES TO PHQ QUESTIONS 1-9: 0
1. LITTLE INTEREST OR PLEASURE IN DOING THINGS: 0
2. FEELING DOWN, DEPRESSED OR HOPELESS: 0
SUM OF ALL RESPONSES TO PHQ QUESTIONS 1-9: 0

## 2020-01-02 NOTE — PROGRESS NOTES
Roro Peng  1950  78 y.o.  female    Chief Complaint   Patient presents with    Follow-up     Pt here for 4 month f/u Diabetes         History of Present Illness  Roro Peng is a 78 y.o. female who presents today for a check up. Last labs reviewed. She has seen Gi and she will have a EGD with dilation in February. She c/o of some more irritation to the sides of her mouth for the past month. Hx of anemia and she has follow up with hematology  -DM II- Controlled on Metformin, Glipizide and Januvia  -HTN-Controlled on medications  -HLD-Last CCT000, HDL 35, . Pt apparently was on a Statin in the past. She is willing to restart    Review of Systems   Constitutional: Negative for diaphoresis and fever. HENT:        Irritation at corners of lips   Respiratory: Negative for cough, chest tightness and shortness of breath. Cardiovascular: Negative for chest pain and palpitations. Gastrointestinal: Negative for abdominal pain and nausea. Genitourinary: Negative for difficulty urinating and dysuria. Musculoskeletal: Negative for back pain and joint swelling. Neurological: Negative for dizziness and headaches. Psychiatric/Behavioral: Negative for dysphoric mood and sleep disturbance. Allergies   Allergen Reactions    Tetanus Toxoids Swelling and Rash     fever       Past Medical History:   Diagnosis Date    Anemia     Managed by Dr. Noé Cedeno CAD (coronary artery disease)     follows with Dr Froylan Santos Diabetes mellitus type 2, uncontrolled ([facility] [address]     \"dx 6323    Di[facility] 02/3303    [facility]    Hiatal hernia     History of blood transfusion     \"had blood transfusion with 4th child- have hx also of iron infusions for anemia 2017    Hyperlipidemia     Hypertension     IBS (irritable bowel syndrome)     with diarrhea    Iron deficiency anemia     Iron Infusions- follows with Julia Araiza and Jose G Cardeans    LVH (left ventricular hypertrophy) 09/2012    [facility]    Multiple gastric polyps 2004    Dr Emperatriz Humphrey Obesity     PAD (peripheral artery disease) ([facility] [address]     S/P CABG x 4 08/06/2012    4V CABG- LIMA-LAD, SVG-CX, SVG-PDA, SVG-RCA- Follows with Dr Bernard Cain Sinus tachycardia     follows with [facility]    SV[facility])     old chart gives hx of SVT in the past    Vitamin D deficiency        Past Surgical History:   Procedure Laterality Date    BALLOON ANGIOPLASTY, ARTERY Right 08/19/2015    RIght SFA 90%->10%, Right Popliteal 1000%->10%    CARDIAC CATHETERIZATION  08/02/2012    CARDIAC SURGERY      open heart surgery 8/2012    CHOLECYSTECTOMY  15+ yrs ago    COLONOSCOPY  2017    CORONARY ARTERY BYPASS GRAFT  08/02/2012    4V CABG- LIMA-LAD, SVG-CX, SVG-PDA, SVG-RCA    ENDOSCOPY, COLON, DIAGNOSTIC  10/10/2017    esophageal stricture, hiatal hernia, candidiasis   Kell Pak      biltateral- \"total of 9 surgeries- all scopes\"    TRANSLUMINAL ANGIOPLASTY Left 09/23/2015 9/23/2015-Left mid and distal SFA and Left Popliteal    TUBAL LIGATION  1978       Family History   Problem Relation Age of Onset    Kidney Disease Mother         Dialysis    Diabetes Mother     High Blood Pressure Mother     Coronary Art Dis Mother         MI   [facility] Cancer Father         pancreatic cancer (Smoking)    Coronary Art Dis Father 43        MI early onset    Diabetes Other        Social History     Tobacco Use    Smoking status: Never Smoker    Smokeless tobacco: Never Used   Substance Use Topics    Alcohol use: No    Drug use: No       Current Outpatient Medications   Medication Sig Dispense Refill    SITagliptin (JANUVIA) 50 MG tablet Take 1 tablet by mouth daily 90 tablet 1    atorvastatin (LIPITOR) 20 MG tablet Take 1 tablet by mouth daily 90 tablet 0    mupirocin (BACTROBAN) 2 % ointment Apply topically 3 times daily to affected area as directed 22 g 0    glipiZIDE (GLUCOTROL) 5 MG tablet Take 1 tablet by mouth daily 90 tablet 0    metFORMIN (GLUCOPHAGE) 1000 MG tablet Take 1 tablet by mouth daily (with breakfast) 90 tablet 0    metoprolol succinate (TOPROL XL) 25 MG extended release tablet TK 1 T PO QPM  11    carvedilol (COREG) 6.25 MG tablet Take 1 tablet by mouth 2 times daily (with meals) 60 tablet 3    amLODIPine (NORVASC) 5 MG tablet Take 1 tablet by mouth daily 30 tablet 3    lisinopril (PRINIVIL;ZESTRIL) 5 MG tablet Take 1 tablet by mouth daily 30 tablet 3    amiodarone (CORDARONE) 200 MG tablet Take 200 mg by mouth daily      ferrous gluconate (FERGON) 324 (38 Fe) MG tablet Take 324 mg by mouth 2 times daily      lansoprazole (PREVACID) 30 MG delayed release capsule TK 1 C PO BID  3    Cholecalciferol (VITAMIN D) 2000 UNITS CAPS capsule Take 2,000 Units by mouth daily Then weekly 50,000      cilostazol (PLETAL) 50 MG tablet Take 1 tablet by mouth 2 times daily (before meals) 60 tablet 3    clopidogrel (PLAVIX) 75 MG tablet Take 1 tablet by mouth daily 30 tablet 3    acetaminophen (TYLENOL) 500 MG tablet Take 1,000 mg by mouth every 6 hours as needed for Pain      aspirin 81 MG EC tablet Take 81 mg by mouth daily. No current facility-administered medications for this visit. OBJECTIVE:    /68   Pulse 61   Ht 5' 0.98\" (1.549 m)   Wt 171 lb (77.6 kg)   LMP 01/19/2002   SpO2 98%   Breastfeeding? No   BMI 32.33 kg/m²     Physical Exam  Vitals signs reviewed. Constitutional:       General: She is not in acute distress. Appearance: She is well-developed. HENT:      Mouth/Throat:      Comments: Slight cracking to corners of the mouth  Eyes:      Conjunctiva/sclera: Conjunctivae normal.   Neck:      Musculoskeletal: Neck supple. Cardiovascular:      Rate and Rhythm: Normal rate and regular rhythm. Heart sounds: Normal heart sounds.    Pulmonary:      Effort: Pulmonary effort is normal. No respiratory distress. Breath sounds: Normal breath sounds. Abdominal:      General: Bowel sounds are normal.      Palpations: Abdomen is soft. Tenderness: There is no tenderness. Musculoskeletal:      Right lower leg: No edema. Left lower leg: No edema. Neurological:      Mental Status: She is alert and oriented to person, place, and time. Cranial Nerves: No cranial nerve deficit. Psychiatric:         Mood and Affect: Mood normal.         ASSESSMENT:  1. Type 2 diabetes mellitus without complication, without long-term current use of insulin ([facility] [address]    2. Essential hypertension    3. Hyperlipidemia, unspecified hyperlipidemia type    4. Angular cheilitis        PLAN:    Orders Placed This Encounter   Procedures    Hemoglobin A1C    BASIC METABOLIC PANEL       Orders Placed This Encounter   Medications    SITagliptin (JANUVIA) 50 MG tablet     Sig: Take 1 tablet by mouth daily     Dispense:  90 tablet     Refill:  1    atorvastatin (LIPITOR) 20 MG tablet     Sig: Take 1 tablet by mouth daily     Dispense:  90 tablet     Refill:  0    mupirocin (BACTROBAN) 2 % ointment     Sig: Apply topically 3 times daily to affected area as directed     Dispense:  22 g     Refill:  0   Obtain lab  Continue medications. Start Lipitor  Start Bactroban as directed  ADR's explained  The patient is asked to make an attempt to improve diet and exercise patterns to aid in medical management   Keep f/u with specialists  Pt will call if any problems or symptoms persist           Return in about 3 months (around 4/2/2020) for HLD.     Electronically Signed by Antony Fenton DO

## 2020-01-05 PROBLEM — K13.0 ANGULAR CHEILITIS: Status: ACTIVE | Noted: 2020-01-05

## 2020-01-05 ASSESSMENT — ENCOUNTER SYMPTOMS
COUGH: 0
BACK PAIN: 0
SHORTNESS OF BREATH: 0
NAUSEA: 0
CHEST TIGHTNESS: 0
ABDOMINAL PAIN: 0

## 2020-03-03 RX ORDER — GLIPIZIDE 5 MG/1
5 TABLET ORAL DAILY
Qty: 90 TABLET | Refills: 0 | Status: SHIPPED | OUTPATIENT
Start: 2020-03-03 | End: 2020-05-26

## 2020-04-07 RX ORDER — ATORVASTATIN CALCIUM 20 MG/1
20 TABLET, FILM COATED ORAL DAILY
Qty: 90 TABLET | Refills: 0 | Status: SHIPPED | OUTPATIENT
Start: 2020-04-07 | End: 2020-06-26 | Stop reason: SDUPTHER

## 2020-04-14 PROBLEM — D50.0 IRON DEFICIENCY ANEMIA SECONDARY TO BLOOD LOSS (CHRONIC): Status: ACTIVE | Noted: 2020-04-14

## 2020-04-14 PROBLEM — R58 HEMORRHAGE, NOT ELSEWHERE CLASSIFIED: Status: ACTIVE | Noted: 2020-04-14

## 2020-04-14 PROBLEM — I25.89 OTHER FORMS OF CHRONIC ISCHEMIC HEART DISEASE: Status: ACTIVE | Noted: 2020-04-14

## 2020-04-14 PROBLEM — K44.9 DIAPHRAGMATIC HERNIA WITHOUT OBSTRUCTION OR GANGRENE: Status: ACTIVE | Noted: 2020-04-14

## 2020-04-14 PROBLEM — Z87.19 PERSONAL HISTORY OF OTHER DISEASES OF THE DIGESTIVE SYSTEM: Status: ACTIVE | Noted: 2020-04-14

## 2020-05-23 ENCOUNTER — HOSPITAL ENCOUNTER (OUTPATIENT)
Age: 70
Setting detail: SPECIMEN
Discharge: HOME | End: 2020-05-23
Payer: COMMERCIAL

## 2020-05-23 PROCEDURE — U0002 COVID-19 LAB TEST NON-CDC: HCPCS

## 2020-05-25 LAB
SARS-COV-2: NOT DETECTED
SOURCE: NORMAL

## 2020-05-26 RX ORDER — GLIPIZIDE 5 MG/1
5 TABLET ORAL DAILY
Qty: 90 TABLET | Refills: 0 | Status: SHIPPED | OUTPATIENT
Start: 2020-05-26 | End: 2020-06-26 | Stop reason: SDUPTHER

## 2020-06-25 LAB
BUN BLDV-MCNC: 20 MG/DL (ref 3–29)
BUN/CREAT BLD: 17 (ref 7–25)
CALCIUM SERPL-MCNC: 9.3 MG/DL (ref 8.5–10.5)
CHLORIDE BLD-SCNC: 107 MEQ/L (ref 96–110)
CO2: 24 MEQ/L (ref 19–32)
CREAT SERPL-MCNC: 1.2 MG/DL (ref 0.5–1.2)
FASTING STATUS: ABNORMAL
GFR AFRICAN AMERICAN: 53 MLS/MIN/1.73M2
GFR NON-AFRICAN AMERICAN: 46 MLS/MIN/1.73M2
GLUCOSE BLD-MCNC: 100 MG/DL (ref 70–99)
HBA1C MFR BLD: 6.2 % (ref 4–6)
POTASSIUM SERPL-SCNC: 4.8 MEQ/L (ref 3.4–5.3)
SODIUM BLD-SCNC: 145 MEQ/L (ref 135–148)

## 2020-06-26 ENCOUNTER — OFFICE VISIT (OUTPATIENT)
Dept: INTERNAL MEDICINE CLINIC | Age: 70
End: 2020-06-26
Payer: COMMERCIAL

## 2020-06-26 VITALS
WEIGHT: 168.8 LBS | OXYGEN SATURATION: 97 % | BODY MASS INDEX: 31.91 KG/M2 | DIASTOLIC BLOOD PRESSURE: 80 MMHG | HEART RATE: 61 BPM | SYSTOLIC BLOOD PRESSURE: 130 MMHG

## 2020-06-26 PROCEDURE — 99214 OFFICE O/P EST MOD 30 MIN: CPT | Performed by: FAMILY MEDICINE

## 2020-06-26 RX ORDER — GLIPIZIDE 5 MG/1
5 TABLET ORAL DAILY
Qty: 90 TABLET | Refills: 1 | Status: SHIPPED | OUTPATIENT
Start: 2020-06-26 | End: 2021-01-21 | Stop reason: SDUPTHER

## 2020-06-26 RX ORDER — ATORVASTATIN CALCIUM 20 MG/1
20 TABLET, FILM COATED ORAL DAILY
Qty: 90 TABLET | Refills: 1 | Status: SHIPPED | OUTPATIENT
Start: 2020-06-26 | End: 2020-10-01

## 2020-06-26 NOTE — PROGRESS NOTES
3    lisinopril (PRINIVIL;ZESTRIL) 5 MG tablet Take 1 tablet by mouth daily 30 tablet 3    amiodarone (CORDARONE) 200 MG tablet Take 200 mg by mouth daily      ferrous gluconate (FERGON) 324 (38 Fe) MG tablet Take 324 mg by mouth 2 times daily      lansoprazole (PREVACID) 30 MG delayed release capsule TK 1 C PO BID  3    Cholecalciferol (VITAMIN D) 2000 UNITS CAPS capsule Take 2,000 Units by mouth daily Then weekly 50,000      cilostazol (PLETAL) 50 MG tablet Take 1 tablet by mouth 2 times daily (before meals) 60 tablet 3    clopidogrel (PLAVIX) 75 MG tablet Take 1 tablet by mouth daily 30 tablet 3    acetaminophen (TYLENOL) 500 MG tablet Take 1,000 mg by mouth every 6 hours as needed for Pain      aspirin 81 MG EC tablet Take 81 mg by mouth daily. No current facility-administered medications for this visit. OBJECTIVE:    /80   Pulse 61   Wt 168 lb 12.8 oz (76.6 kg)   LMP 01/19/2002   SpO2 97%   Breastfeeding No   BMI 31.91 kg/m²     Physical Exam  Vitals signs reviewed. Constitutional:       General: She is not in acute distress. Appearance: She is well-developed. Eyes:      Conjunctiva/sclera: Conjunctivae normal.   Neck:      Musculoskeletal: Neck supple. Cardiovascular:      Rate and Rhythm: Normal rate and regular rhythm. Heart sounds: Normal heart sounds. Pulmonary:      Effort: Pulmonary effort is normal. No respiratory distress. Breath sounds: Normal breath sounds. Abdominal:      General: Bowel sounds are normal.      Palpations: Abdomen is soft. Tenderness: There is no abdominal tenderness. Musculoskeletal:      Right lower leg: No edema. Left lower leg: No edema. Neurological:      Mental Status: She is alert and oriented to person, place, and time. Cranial Nerves: No cranial nerve deficit. Psychiatric:         Mood and Affect: Mood normal.         ASSESSMENT:  1.  Type 2 diabetes mellitus without complication, without

## 2020-06-27 ASSESSMENT — ENCOUNTER SYMPTOMS
CHEST TIGHTNESS: 0
BLOOD IN STOOL: 0
ABDOMINAL PAIN: 0
BACK PAIN: 0
CONSTIPATION: 0
SHORTNESS OF BREATH: 0
DIARRHEA: 0
COUGH: 0
NAUSEA: 0

## 2020-07-21 RX ORDER — SITAGLIPTIN 50 MG/1
TABLET, FILM COATED ORAL
Qty: 90 TABLET | Refills: 1 | Status: SHIPPED | OUTPATIENT
Start: 2020-07-21 | End: 2021-01-19

## 2020-09-25 LAB
A/G RATIO: 1.6
ALBUMIN SERPL-MCNC: 4.1 G/DL
ALP BLD-CCNC: 87 U/L
ALT SERPL-CCNC: 6 U/L
AST SERPL-CCNC: 10 U/L
BILIRUB SERPL-MCNC: 0.5 MG/DL (ref 0.1–1.4)
BILIRUBIN DIRECT: 0.2 MG/DL
BILIRUBIN, INDIRECT: NORMAL
CHOLESTEROL, TOTAL: 219 MG/DL
CHOLESTEROL/HDL RATIO: NORMAL
CREATININE, URINE: 85.1
GLOBULIN: 2.5
HDLC SERPL-MCNC: 36 MG/DL (ref 35–70)
LDL CHOLESTEROL CALCULATED: 151 MG/DL (ref 0–160)
MICROALBUMIN/CREAT 24H UR: 1230 MG/G{CREAT}
MICROALBUMIN/CREAT UR-RTO: 14
NONHDLC SERPL-MCNC: NORMAL MG/DL
PROTEIN TOTAL: 6.6 G/DL
TRIGL SERPL-MCNC: 160 MG/DL
VLDLC SERPL CALC-MCNC: 32 MG/DL

## 2020-10-01 ENCOUNTER — OFFICE VISIT (OUTPATIENT)
Dept: INTERNAL MEDICINE CLINIC | Age: 70
End: 2020-10-01
Payer: COMMERCIAL

## 2020-10-01 VITALS
OXYGEN SATURATION: 99 % | BODY MASS INDEX: 32.14 KG/M2 | TEMPERATURE: 97.1 F | WEIGHT: 170 LBS | DIASTOLIC BLOOD PRESSURE: 60 MMHG | HEART RATE: 60 BPM | SYSTOLIC BLOOD PRESSURE: 104 MMHG

## 2020-10-01 PROCEDURE — 99214 OFFICE O/P EST MOD 30 MIN: CPT | Performed by: FAMILY MEDICINE

## 2020-10-01 RX ORDER — ATORVASTATIN CALCIUM 40 MG/1
40 TABLET, FILM COATED ORAL DAILY
Qty: 90 TABLET | Refills: 1 | Status: SHIPPED | OUTPATIENT
Start: 2020-10-01 | End: 2021-01-21

## 2020-10-01 ASSESSMENT — ENCOUNTER SYMPTOMS
ABDOMINAL PAIN: 0
NAUSEA: 0
COUGH: 0
CHEST TIGHTNESS: 0
SHORTNESS OF BREATH: 0
BACK PAIN: 0

## 2020-10-01 NOTE — PROGRESS NOTES
x 4 08/06/2012    4V CABG- LIMA-LAD, SVG-CX, SVG-PDA, SVG-RCA- Follows with Dr Marie Abad Sinus tachycardia     [facility] with [facility]    SV[facility]supra[facility]ricular [facility]cardia[facility])     old chart gives hx of SVT in the past    Vitamin D deficiency        Past Surgical History:   Procedure Laterality Date    BALLOON ANGIOPLASTY, ARTERY Right 08/19/2015    RIght SFA 90%->10%, Right Popliteal 1000%->10%    CARDIAC CATHETERIZATION  08/02/2012    CARDIAC SURGERY      open heart surgery 8/2012    CHOLECYSTECTOMY  15+ yrs ago    COLONOSCOPY  2017    CORONARY ARTERY BYPASS GRAFT  08/02/2012    4V CABG- LIMA-LAD, SVG-CX, SVG-PDA, SVG-RCA    ENDOSCOPY, COLON, DIAGNOSTIC  10/10/2017    esophageal stricture, hiatal hernia, candidiasis   Emma Da Silva      biltateral- \"total of 9 surgeries- all scopes\"    TRANSLUMINAL ANGIOPLASTY Left 09/23/2015 9/23/2015-Left mid and distal SFA and Left Popliteal    TUBAL LIGATION  1978       Family History   Problem Relation Age of Onset    Kidney Disease Mother         Dialysis    Diabetes Mother     High Blood Pressure Mother     Coronary Art Dis Mother         MI   [place] Cancer Father         pancreatic cancer (Smoking)    Coronary Art Dis Father 43        MI early onset    Diabetes Other        Social History     Tobacco Use    Smoking status: Never Smoker    Smokeless tobacco: Never Used   Substance Use Topics    Alcohol use: No    Drug use: No       Current Outpatient Medications   Medication Sig Dispense Refill    atorvastatin (LIPITOR) 40 MG tablet Take 1 tablet by mouth daily 90 tablet 1    JANUVIA 50 MG tablet TAKE 1 TABLET BY MOUTH DAILY 90 tablet 1    glipiZIDE (GLUCOTROL) 5 MG tablet Take 1 tablet by mouth daily 90 tablet 1    metoprolol succinate (TOPROL XL) 25 MG extended release tablet TK 1 T PO QPM  11    carvedilol (COREG) 6.25 MG tablet Take 1 tablet by mouth 2 times daily (with meals) 60 tablet 3    lisinopril (PRINIVIL;ZESTRIL) 5 MG tablet Take 1 tablet by mouth daily 30 tablet 3    lansoprazole (PREVACID) 30 MG delayed release capsule TK 1 C PO BID  3    Cholecalciferol (VITAMIN D) 2000 UNITS CAPS capsule Take 2,000 Units by mouth daily Then weekly 50,000      clopidogrel (PLAVIX) 75 MG tablet Take 1 tablet by mouth daily 30 tablet 3    acetaminophen (TYLENOL) 500 MG tablet Take 1,000 mg by mouth every 6 hours as needed for Pain      aspirin 81 MG EC tablet Take 81 mg by mouth daily.  amLODIPine (NORVASC) 5 MG tablet Take 1 tablet by mouth daily 30 tablet 3    amiodarone (CORDARONE) 200 MG tablet Take 200 mg by mouth daily       No current facility-administered medications for this visit. OBJECTIVE:    /60   Pulse 60   Temp 97.1 °F (36.2 °C)   Wt 170 lb (77.1 kg)   LMP 01/19/2002   SpO2 99%   BMI 32.14 kg/m²     Physical Exam  Vitals signs reviewed. Constitutional:       General: She is not in acute distress. Appearance: She is well-developed. Eyes:      Extraocular Movements: Extraocular movements intact. Conjunctiva/sclera: Conjunctivae normal.      Pupils: Pupils are equal, round, and reactive to light. Neck:      Musculoskeletal: Neck supple. Cardiovascular:      Rate and Rhythm: Normal rate and regular rhythm. Pulmonary:      Effort: Pulmonary effort is normal. No respiratory distress. Breath sounds: Normal breath sounds. Abdominal:      General: Bowel sounds are normal. There is no distension. Palpations: Abdomen is soft. Tenderness: There is no abdominal tenderness. Musculoskeletal:      Right lower leg: No edema. Left lower leg: No edema. Comments: Crepitance L knee   Skin:     General: Skin is warm and dry. Neurological:      Mental Status: She is alert and oriented to person, place, and time. Cranial Nerves: No cranial nerve deficit. Psychiatric:         Mood and Affect: Mood normal.         ASSESSMENT:  1.  Type 2 diabetes mellitus without complication, without long-term current use of insulin ([address]    2. Hyperlipidemia, unspecified hyperlipidemia type    3. Essential hypertension    4. Chronic pain of left knee        PLAN:    Orders Placed This Encounter   Procedures    BASIC METABOLIC PANEL    Lipid Panel    Hemoglobin A1C       Orders Placed This Encounter   Medications    atorvastatin (LIPITOR) 40 MG tablet     Sig: Take 1 tablet by mouth daily     Dispense:  90 tablet     Refill:  1   Increase Lipitor 40  Obtain labs before next visit  Pt off Metformin. She stopped herself. Monitor BS  Declines Xray at this time  The patient is asked to make an attempt to improve diet and exercise patterns to aid in medical management of this problem. Keep f/u with specialists         Return for Follow up  in 3 to 4  months for diabetes.     Electronically Signed by Elana Singh DO

## 2020-10-10 PROBLEM — R58 HEMORRHAGE, NOT ELSEWHERE CLASSIFIED: Status: RESOLVED | Noted: 2020-04-14 | Resolved: 2020-10-10

## 2020-10-10 PROBLEM — Z72.89 OTHER PROBLEMS RELATED TO LIFESTYLE: Status: RESOLVED | Noted: 2018-08-23 | Resolved: 2020-10-10

## 2020-10-10 ASSESSMENT — ENCOUNTER SYMPTOMS
DIARRHEA: 0
CONSTIPATION: 0
BLOOD IN STOOL: 0

## 2021-01-18 LAB
AVERAGE GLUCOSE: NORMAL
BUN BLDV-MCNC: 17 MG/DL
CALCIUM SERPL-MCNC: 9.4 MG/DL
CHLORIDE BLD-SCNC: 104 MMOL/L
CHOLESTEROL, TOTAL: 234 MG/DL
CHOLESTEROL/HDL RATIO: ABNORMAL
CO2: 24 MMOL/L
CREAT SERPL-MCNC: 1.2 MG/DL
GFR CALCULATED: NORMAL
GLUCOSE BLD-MCNC: 203 MG/DL
HBA1C MFR BLD: 7.5 %
HDLC SERPL-MCNC: 39 MG/DL (ref 35–70)
LDL CHOLESTEROL CALCULATED: 164 MG/DL (ref 0–160)
NONHDLC SERPL-MCNC: ABNORMAL MG/DL
POTASSIUM SERPL-SCNC: 4.9 MMOL/L
SODIUM BLD-SCNC: 140 MMOL/L
TRIGL SERPL-MCNC: 154 MG/DL
VLDLC SERPL CALC-MCNC: 31 MG/DL

## 2021-01-19 DIAGNOSIS — E11.9 TYPE 2 DIABETES MELLITUS WITHOUT COMPLICATION, WITHOUT LONG-TERM CURRENT USE OF INSULIN (HCC): ICD-10-CM

## 2021-01-19 RX ORDER — SITAGLIPTIN 50 MG/1
TABLET, FILM COATED ORAL
Qty: 90 TABLET | Refills: 1 | Status: SHIPPED | OUTPATIENT
Start: 2021-01-19 | End: 2021-01-21 | Stop reason: SDUPTHER

## 2021-01-21 ENCOUNTER — OFFICE VISIT (OUTPATIENT)
Dept: INTERNAL MEDICINE CLINIC | Age: 71
End: 2021-01-21
Payer: COMMERCIAL

## 2021-01-21 VITALS
HEART RATE: 80 BPM | HEIGHT: 63 IN | DIASTOLIC BLOOD PRESSURE: 66 MMHG | TEMPERATURE: 97.1 F | BODY MASS INDEX: 31.01 KG/M2 | SYSTOLIC BLOOD PRESSURE: 118 MMHG | OXYGEN SATURATION: 99 % | WEIGHT: 175 LBS

## 2021-01-21 DIAGNOSIS — E11.9 TYPE 2 DIABETES MELLITUS WITHOUT COMPLICATION, WITHOUT LONG-TERM CURRENT USE OF INSULIN (HCC): Primary | ICD-10-CM

## 2021-01-21 DIAGNOSIS — I10 ESSENTIAL HYPERTENSION: ICD-10-CM

## 2021-01-21 DIAGNOSIS — E78.5 HYPERLIPIDEMIA, UNSPECIFIED HYPERLIPIDEMIA TYPE: ICD-10-CM

## 2021-01-21 PROCEDURE — 3051F HG A1C>EQUAL 7.0%<8.0%: CPT | Performed by: FAMILY MEDICINE

## 2021-01-21 PROCEDURE — 99214 OFFICE O/P EST MOD 30 MIN: CPT | Performed by: FAMILY MEDICINE

## 2021-01-21 RX ORDER — GLIPIZIDE 5 MG/1
5 TABLET ORAL 2 TIMES DAILY WITH MEALS
Qty: 180 TABLET | Refills: 1 | Status: SHIPPED | OUTPATIENT
Start: 2021-01-21 | End: 2021-06-03 | Stop reason: SDUPTHER

## 2021-01-21 RX ORDER — OMEPRAZOLE 40 MG/1
CAPSULE, DELAYED RELEASE ORAL
Status: ON HOLD | COMMUNITY
Start: 2020-11-09 | End: 2022-04-02 | Stop reason: HOSPADM

## 2021-01-21 RX ORDER — LISINOPRIL 5 MG/1
5 TABLET ORAL DAILY
Qty: 90 TABLET | Refills: 1 | Status: SHIPPED | OUTPATIENT
Start: 2021-01-21 | End: 2021-06-03 | Stop reason: SDUPTHER

## 2021-01-21 RX ORDER — LISINOPRIL 5 MG/1
5 TABLET ORAL DAILY
Qty: 30 TABLET | Refills: 3 | Status: SHIPPED | OUTPATIENT
Start: 2021-01-21 | End: 2021-01-21

## 2021-01-21 RX ORDER — ROSUVASTATIN CALCIUM 10 MG/1
10 TABLET, COATED ORAL NIGHTLY
Qty: 90 TABLET | Refills: 1 | Status: SHIPPED | OUTPATIENT
Start: 2021-01-21 | End: 2021-06-03 | Stop reason: SDUPTHER

## 2021-01-21 ASSESSMENT — ENCOUNTER SYMPTOMS
ABDOMINAL PAIN: 0
DIARRHEA: 0
CONSTIPATION: 0
COUGH: 0
NAUSEA: 0
CHEST TIGHTNESS: 0
BACK PAIN: 0
SHORTNESS OF BREATH: 0

## 2021-01-21 ASSESSMENT — PATIENT HEALTH QUESTIONNAIRE - PHQ9
SUM OF ALL RESPONSES TO PHQ QUESTIONS 1-9: 1

## 2021-01-21 NOTE — PROGRESS NOTES
Brody Watkins  1950  69 y.o.  female    Chief Complaint   Patient presents with    3 Month Follow-Up         History of Present Illness  Brody Watkins is a 69 y.o. female who presents today for a check up. -DM II- Hba1c 7.5- elevated was 6.2. Pt on Januvia and Glipizide. She stopped Metformin as she did not 'trust' the production of this medication  -HLD- Uncontrolled-, HDL 39, Trig 154  -HTN-Stable  -CAD- managed by cardiology  Esophageal stricture- Pt to follow up with GI for additional procedures    Review of Systems   Constitutional: Negative for diaphoresis and fever. HENT: Negative. Respiratory: Negative for cough, chest tightness and shortness of breath. Cardiovascular: Negative for chest pain and palpitations. Gastrointestinal: Negative for abdominal pain, constipation, diarrhea and nausea. Genitourinary: Negative for difficulty urinating. Musculoskeletal: Negative for back pain. Neurological: Negative for dizziness and headaches. Psychiatric/Behavioral: Negative for dysphoric mood. Allergies   Allergen Reactions    Tetanus Toxoids Swelling and Rash     fever       Past Medical History:   Diagnosis Date    Anemia     Managed by Dr. Allie Raygoza CAD (coronary artery disease)     follows with Dr Nithya Valle Diabetes mellitus type 2, uncontrolled ([facility] [address]     \"dx 4286    Diastolic dysfunction 05/6477    [facility]    Esophageal stricture     dilation per GI    Hiatal hernia     History of blood transfusion     \"had blood transfusion with 4th child- have hx also of iron infusions for anemia 2017    Hyperlipidemia     Hypertension     IBS (irritable bowel syndrome)     with diarrhea    Iron deficiency anemia     Iron Infusions- follows with Julia Araiza and Jose G Cardenas    LVH (left ventricular hypertrophy) 09/2012    [facility]    Multiple gastric polyps 2004    Dr Marylu Bryan Obesity  PAD (peripheral artery disease) ([facility])     S/P CABG x 4 08/06/2012    4V CABG- LIMA-LAD, SVG-CX, SVG-PDA, SVG-RCA- Follows with Dr Salzaar Harvey Sinus tachycardia     follows with [facility]    SVT [facility]supraventricular tachycardia) [facility])     old chart gives hx of SVT in the past    Vitamin D deficiency        Past Surgical History:   Procedure Laterality Date    BALLOON ANGIOPLASTY, ARTERY Right 08/19/2015    RIght SFA 90%->10%, Right Popliteal 1000%->10%    CARDIAC CATHETERIZATION  08/02/2012    CARDIAC SURGERY      open heart surgery 8/2012    CHOLECYSTECTOMY  15+ yrs ago    COLONOSCOPY  2017    CORONARY ARTERY BYPASS GRAFT  08/02/2012    4V CABG- LIMA-LAD, SVG-CX, SVG-PDA, SVG-RCA    ENDOSCOPY, COLON, DIAGNOSTIC  10/10/2017    esophageal stricture, hiatal hernia, candidiasis   Micah Barraza      biltateral- \"total of 9 surgeries- all scopes\"    TRANSLUMINAL ANGIOPLASTY Left 09/23/2015 9/23/2015-Left mid and distal SFA and Left Popliteal    TUBAL LIGATION  1978       Family History   Problem Relation Age of Onset    Kidney Disease Mother         Dialysis    Diabetes Mother     High Blood Pressure Mother     Coronary Art Dis Mother         MI   [facility] Father         pancreatic cancer (Smoking)    Coronary Art Dis Father 43        MI early onset    Diabetes Other        Social History     Tobacco Use    Smoking status: Never Smoker    Smokeless tobacco: Never Used   Substance Use Topics    Alcohol use: No    Drug use: No       Current Outpatient Medications   Medication Sig Dispense Refill    omeprazole (PRILOSEC) 40 MG delayed release capsule TK 1 C PO QD 30 MIN B PREMA      glipiZIDE (GLUCOTROL) 5 MG tablet Take 1 tablet by mouth 2 times daily (with meals) 180 tablet 1    SITagliptin (JANUVIA) 50 MG tablet TAKE 1 TABLET BY MOUTH DAILY 90 tablet 1    rosuvastatin (CRESTOR) 10 MG tablet Take 1 tablet by mouth nightly 90 tablet 1  metoprolol succinate (TOPROL XL) 25 MG extended release tablet TK 1 T PO QPM  11    carvedilol (COREG) 6.25 MG tablet Take 1 tablet by mouth 2 times daily (with meals) 60 tablet 3    amLODIPine (NORVASC) 5 MG tablet Take 1 tablet by mouth daily 30 tablet 3    Cholecalciferol (VITAMIN D) 2000 UNITS CAPS capsule Take 2,000 Units by mouth daily Then weekly 50,000      clopidogrel (PLAVIX) 75 MG tablet Take 1 tablet by mouth daily 30 tablet 3    acetaminophen (TYLENOL) 500 MG tablet Take 1,000 mg by mouth every 6 hours as needed for Pain      aspirin 81 MG EC tablet Take 81 mg by mouth daily.  lisinopril (PRINIVIL;ZESTRIL) 5 MG tablet TAKE 1 TABLET BY MOUTH DAILY 90 tablet 1    amiodarone (CORDARONE) 200 MG tablet Take 200 mg by mouth daily       No current facility-administered medications for this visit. OBJECTIVE:    /66   Pulse 80   Temp 97.1 °F (36.2 °C)   Ht 5' 3\" (1.6 m)   Wt 175 lb (79.4 kg)   LMP 01/19/2002   SpO2 99%   BMI 31.00 kg/m²     Physical Exam  Vitals signs reviewed. Constitutional:       General: She is not in acute distress. Eyes:      Extraocular Movements: Extraocular movements intact. Conjunctiva/sclera: Conjunctivae normal.      Pupils: Pupils are equal, round, and reactive to light. Neck:      Musculoskeletal: Neck supple. Vascular: No carotid bruit. Cardiovascular:      Rate and Rhythm: Normal rate and regular rhythm. Pulmonary:      Effort: Pulmonary effort is normal. No respiratory distress. Breath sounds: Normal breath sounds. Abdominal:      General: Bowel sounds are normal.      Palpations: Abdomen is soft. Tenderness: There is no abdominal tenderness. Musculoskeletal:      Right lower leg: No edema. Left lower leg: No edema. Neurological:      Mental Status: She is alert and oriented to person, place, and time. Cranial Nerves: No cranial nerve deficit.    Psychiatric:         Mood and Affect: Mood normal. ASSESSMENT:  1. Type 2 diabetes mellitus without complication, without long-term current use of insulin ([facility] [address]    2. Hyperlipidemia, unspecified hyperlipidemia type    3. Essential hypertension        PLAN:    Orders Placed This Encounter   Procedures    Hemoglobin A1C    Lipid Panel    BASIC METABOLIC PANEL       Orders Placed This Encounter   Medications    glipiZIDE (GLUCOTROL) 5 MG tablet     Sig: Take 1 tablet by mouth 2 times daily (with meals)     Dispense:  180 tablet     Refill:  1    DISCONTD: lisinopril (PRINIVIL;ZESTRIL) 5 MG tablet     Sig: Take 1 tablet by mouth daily     Dispense:  30 tablet     Refill:  3     Please DC previous order for lisinopril 2.5 patients now been increased to 5 mg daily.  SITagliptin (JANUVIA) 50 MG tablet     Sig: TAKE 1 TABLET BY MOUTH DAILY     Dispense:  90 tablet     Refill:  1    rosuvastatin (CRESTOR) 10 MG tablet     Sig: Take 1 tablet by mouth nightly     Dispense:  90 tablet     Refill:  1     D/C Lipitor   Obtain lab  Increase Glipizide to bid  D/C Lipitor and Start Crestor  The patient is asked to make an attempt to improve diet and exercise patterns to aid in medical management of this problem. Keep f/u with specialists         Return in about 4 months (around 5/21/2021) for Check up, Diabetes.     Electronically Signed by Casey Choudhary DO

## 2021-04-06 NOTE — PROCEDURES
[address] [place] [address], [address] [place] [address]                            CARDIAC CATHETERIZATION    PATIENT NAME: Rehana Elias                    :        1950  MED REC NO:   [id number]                          ROOM:  ACCOUNT NO:   [de-identified]                           ADMIT DATE: 12/10/2018  PROVIDER:     Antonia Miller MD    DATE OF PROCEDURE:  12/10/2018    PERIPHERAL ANGIOGRAM REPORT    INDICATION:  Claudication in the left leg. The patient is a 69-year-old female patient with a history of coronary  artery disease status post CABG done , known to have peripheral vascular  disease also present. The patient was brought to the cath lab today. Informed consent was  obtained from the patient. The patient was prepped and draped in a  sterile fashion. The patient was injected with 20 mL of 2% lidocaine in  the right femoral artery region. Using a micropuncture needle, left  femoral artery was canalized and a 4-Bulgarian sheath was placed in the  left femoral artery. The entire procedure was done using guidewire. The sheath was flushed between the procedures. Using a pigtail catheter, abdominal angiogram was performed. Abdominal  angiogram showed the abdominal aorta was patent. Bilateral renal  arteries were widely patent. Bilateral common iliac arteries were  patent. Bilateral internal arteries were patent. Bilateral external  arteries were patent. Selective angiogram was performed of both legs. Then, using a RIM catheter it was crossed over. Left leg angiogram was  performed. Left leg angiogram shows left common femoral artery is  patent. Left SFA is occluded. Left profunda was patent. Left SFA  fills in almost in the popliteal region. The popliteal artery also has  moderate disease present. It is under fill, but it fills in the  popliteal artery.     Below the knee in the left leg, there is a one vessel runoff Note Text (......Xxx Chief Complaint.): This diagnosis correlates with the

## 2021-04-12 ENCOUNTER — TELEPHONE (OUTPATIENT)
Dept: INTERNAL MEDICINE CLINIC | Age: 71
End: 2021-04-12

## 2021-04-20 NOTE — TELEPHONE ENCOUNTER
Pt called requesting refill of Atorvastatin. Atorvastatin d/c'd at [address] 01/21/21. Changed to Rosuvastatin on 01/21/21. Pt did not realize, she had been taking both Rx. Pt is aware to d/c Atorvastatin, and continue Rosuvastatin. Recheck labs, 1 week prior to appt. No further action is needed, at this time.

## 2021-06-01 DIAGNOSIS — E11.9 TYPE 2 DIABETES MELLITUS WITHOUT COMPLICATION, WITHOUT LONG-TERM CURRENT USE OF INSULIN (HCC): ICD-10-CM

## 2021-06-01 DIAGNOSIS — E78.5 HYPERLIPIDEMIA, UNSPECIFIED HYPERLIPIDEMIA TYPE: ICD-10-CM

## 2021-06-01 LAB
ANION GAP SERPL CALCULATED.3IONS-SCNC: 12 MMOL/L (ref 3–16)
BUN BLDV-MCNC: 18 MG/DL (ref 7–20)
CALCIUM SERPL-MCNC: 9.2 MG/DL (ref 8.3–10.6)
CHLORIDE BLD-SCNC: 101 MMOL/L (ref 99–110)
CHOLESTEROL, TOTAL: 218 MG/DL (ref 0–199)
CO2: 25 MMOL/L (ref 21–32)
CREAT SERPL-MCNC: 1.1 MG/DL (ref 0.6–1.2)
GFR AFRICAN AMERICAN: 59
GFR NON-AFRICAN AMERICAN: 49
GLUCOSE BLD-MCNC: 198 MG/DL (ref 70–99)
HDLC SERPL-MCNC: 37 MG/DL (ref 40–60)
LDL CHOLESTEROL CALCULATED: 136 MG/DL
POTASSIUM SERPL-SCNC: 4.6 MMOL/L (ref 3.5–5.1)
SODIUM BLD-SCNC: 138 MMOL/L (ref 136–145)
TRIGL SERPL-MCNC: 227 MG/DL (ref 0–150)
VLDLC SERPL CALC-MCNC: 45 MG/DL

## 2021-06-02 LAB
ESTIMATED AVERAGE GLUCOSE: 200.1 MG/DL
HBA1C MFR BLD: 8.6 %

## 2021-06-03 ENCOUNTER — OFFICE VISIT (OUTPATIENT)
Dept: INTERNAL MEDICINE CLINIC | Age: 71
End: 2021-06-03
Payer: COMMERCIAL

## 2021-06-03 VITALS
OXYGEN SATURATION: 100 % | BODY MASS INDEX: 32.7 KG/M2 | SYSTOLIC BLOOD PRESSURE: 140 MMHG | DIASTOLIC BLOOD PRESSURE: 80 MMHG | HEART RATE: 72 BPM | WEIGHT: 184.6 LBS

## 2021-06-03 DIAGNOSIS — E78.5 HYPERLIPIDEMIA, UNSPECIFIED HYPERLIPIDEMIA TYPE: ICD-10-CM

## 2021-06-03 DIAGNOSIS — E11.9 TYPE 2 DIABETES MELLITUS WITHOUT COMPLICATION, WITHOUT LONG-TERM CURRENT USE OF INSULIN (HCC): Primary | ICD-10-CM

## 2021-06-03 DIAGNOSIS — G89.29 CHRONIC PAIN OF LEFT KNEE: ICD-10-CM

## 2021-06-03 DIAGNOSIS — M25.562 CHRONIC PAIN OF LEFT KNEE: ICD-10-CM

## 2021-06-03 DIAGNOSIS — I10 ESSENTIAL HYPERTENSION: ICD-10-CM

## 2021-06-03 PROCEDURE — 99214 OFFICE O/P EST MOD 30 MIN: CPT | Performed by: FAMILY MEDICINE

## 2021-06-03 PROCEDURE — 3052F HG A1C>EQUAL 8.0%<EQUAL 9.0%: CPT | Performed by: FAMILY MEDICINE

## 2021-06-03 RX ORDER — METFORMIN HYDROCHLORIDE 500 MG/1
500 TABLET, EXTENDED RELEASE ORAL
Qty: 90 TABLET | Refills: 1 | Status: SHIPPED | OUTPATIENT
Start: 2021-06-03 | End: 2021-06-29

## 2021-06-03 RX ORDER — LISINOPRIL 5 MG/1
5 TABLET ORAL DAILY
Qty: 90 TABLET | Refills: 1 | Status: SHIPPED | OUTPATIENT
Start: 2021-06-03 | End: 2022-02-10

## 2021-06-03 RX ORDER — ROSUVASTATIN CALCIUM 10 MG/1
10 TABLET, COATED ORAL NIGHTLY
Qty: 90 TABLET | Refills: 1 | Status: SHIPPED | OUTPATIENT
Start: 2021-06-03 | End: 2022-02-09

## 2021-06-03 RX ORDER — GLIPIZIDE 5 MG/1
5 TABLET ORAL 2 TIMES DAILY WITH MEALS
Qty: 180 TABLET | Refills: 1 | Status: SHIPPED | OUTPATIENT
Start: 2021-06-03 | End: 2021-09-07 | Stop reason: SDUPTHER

## 2021-06-03 ASSESSMENT — ENCOUNTER SYMPTOMS
BACK PAIN: 0
CHEST TIGHTNESS: 0
COUGH: 0
ABDOMINAL PAIN: 0
SHORTNESS OF BREATH: 0
NAUSEA: 0

## 2021-06-03 NOTE — PROGRESS NOTES
and headaches. Psychiatric/Behavioral: Negative for dysphoric mood. Allergies   Allergen Reactions    Tetanus Toxoids Swelling and Rash     fever       Past Medical History:   Diagnosis Date    Anemia     Managed by Dr. Cristel Canela CAD (coronary artery disease)     follows with Dr Agusto Daly Diabetes mellitus type 2, uncontrolled ([facility] [address]     \"dx 5095    Diastolic dysfunction 56/6850    [facility]    Esophageal stricture     dilation per GI    Hiatal hernia     History of blood transfusion     \"had blood transfusion with 4th child- have hx also of iron infusions for anemia 2017    Hyperlipidemia     Hypertension     IBS (irritable bowel syndrome)     with diarrhea    Iron deficiency anemia     Iron Infusions- follows with Julia Araiza and Jose G Cardenas    LVH (left ventricular hypertrophy) 09/2012    [facility]    Multiple gastric polyps 2004    Dr Raquel Ortiz Obesity     PAD (peripheral artery disease) ([facility] [address]     S/P CABG x 4 08/06/2012    4V CABG- LIMA-LAD, SVG-CX, SVG-PDA, SVG-RCA- Follows with Dr Mitul Nickerson Sinus tachycardia     follows with [facility]    SVT (supraventricular tachycardia) [facility])     old chart gives hx of SVT in the past    Vitamin D deficiency        Past Surgical History:   Procedure Laterality Date    BALLOON ANGIOPLASTY, ARTERY Right 08/19/2015    RIght SFA 90%->10%, Right Popliteal 1000%->10%    CARDIAC CATHETERIZATION  08/02/2012    CARDIAC SURGERY      open heart surgery 8/2012    CHOLECYSTECTOMY  15+ yrs ago    COLONOSCOPY  2017    CORONARY ARTERY BYPASS GRAFT  08/02/2012    4V CABG- LIMA-LAD, SVG-CX, SVG-PDA, SVG-RCA    ENDOSCOPY, COLON, DIAGNOSTIC  10/10/2017    esophageal stricture, hiatal hernia, candidiasis    ESOPHAGEAL DILATATION      Dr. Garcia Organ      biltateral- \"total of 9 surgeries- all scopes\"    TRANSLUMINAL ANGIOPLASTY Left 09/23/2015 9/23/2015-Left mid and distal SFA and Left Popliteal    TUBAL LIGATION  1978       Family History   Problem Relation Age of Onset    Kidney Disease Mother         Dialysis    Diabetes Mother     High Blood Pressure Mother     Coronary Art Dis Mother         MI   [facility] Father         pancreatic cancer (Smoking)    Coronary Art Dis Father 43        MI early onset    Diabetes Other        Social History     Tobacco Use    Smoking status: Never Smoker    Smokeless tobacco: Never Used   Vaping Use    Vaping Use: Never used   Substance Use Topics    Alcohol use: No    Drug use: No       Current Outpatient Medications   Medication Sig Dispense Refill    metFORMIN (GLUCOPHAGE-XR) 500 MG extended release tablet Take 1 tablet by mouth daily (with breakfast) 90 tablet 1    lisinopril (PRINIVIL;ZESTRIL) 5 MG tablet Take 1 tablet by mouth daily 90 tablet 1    rosuvastatin (CRESTOR) 10 MG tablet Take 1 tablet by mouth nightly 90 tablet 1    glipiZIDE (GLUCOTROL) 5 MG tablet Take 1 tablet by mouth 2 times daily (with meals) 180 tablet 1    omeprazole (PRILOSEC) 40 MG delayed release capsule TK 1 C PO QD 30 MIN B PREMA      SITagliptin (JANUVIA) 50 MG tablet TAKE 1 TABLET BY MOUTH DAILY 90 tablet 1    metoprolol succinate (TOPROL XL) 25 MG extended release tablet TK 1 T PO QPM  11    carvedilol (COREG) 6.25 MG tablet Take 1 tablet by mouth 2 times daily (with meals) 60 tablet 3    amLODIPine (NORVASC) 5 MG tablet Take 1 tablet by mouth daily 30 tablet 3    amiodarone (CORDARONE) 200 MG tablet Take 200 mg by mouth daily      Cholecalciferol (VITAMIN D) 2000 UNITS CAPS capsule Take 2,000 Units by mouth daily Then weekly 50,000      clopidogrel (PLAVIX) 75 MG tablet Take 1 tablet by mouth daily 30 tablet 3    acetaminophen (TYLENOL) 500 MG tablet Take 1,000 mg by mouth every 6 hours as needed for Pain      aspirin 81 MG EC tablet Take 81 mg by mouth daily. No current facility-administered medications for this visit.        OBJECTIVE: BP (!) 140/80   Pulse 72   Wt 184 lb 9.6 oz (83.7 kg)   LMP 01/19/2002   SpO2 100%   BMI 32.70 kg/m²     Physical Exam  Vitals reviewed. Constitutional:       General: She is not in acute distress. Eyes:      Conjunctiva/sclera: Conjunctivae normal.   Cardiovascular:      Rate and Rhythm: Normal rate and regular rhythm. Pulmonary:      Effort: Pulmonary effort is normal. No respiratory distress. Breath sounds: Normal breath sounds. Abdominal:      General: Bowel sounds are normal.      Palpations: Abdomen is soft. Tenderness: There is no abdominal tenderness. Musculoskeletal:      Cervical back: Neck supple. Right lower leg: No edema. Left lower leg: No edema. Comments: Crepitance in L knee   Neurological:      Mental Status: She is alert and oriented to person, place, and time. Cranial Nerves: No cranial nerve deficit. Psychiatric:         Mood and Affect: Mood normal.         ASSESSMENT:  1. Type 2 diabetes mellitus without complication, without long-term current use of insulin ([facility] [address]    2. Essential hypertension    3. Hyperlipidemia, unspecified hyperlipidemia type    4.  Chronic pain of left knee        PLAN:    Orders Placed This Encounter   Procedures    XR KNEE LEFT (3 VIEWS)    Hemoglobin A1C    BASIC METABOLIC PANEL       Orders Placed This Encounter   Medications    metFORMIN (GLUCOPHAGE-XR) 500 MG extended release tablet     Sig: Take 1 tablet by mouth daily (with breakfast)     Dispense:  90 tablet     Refill:  1    lisinopril (PRINIVIL;ZESTRIL) 5 MG tablet     Sig: Take 1 tablet by mouth daily     Dispense:  90 tablet     Refill:  1     **Patient requests 90 days supply**    rosuvastatin (CRESTOR) 10 MG tablet     Sig: Take 1 tablet by mouth nightly     Dispense:  90 tablet     Refill:  1     D/C Lipitor    glipiZIDE (GLUCOTROL) 5 MG tablet     Sig: Take 1 tablet by mouth 2 times daily (with meals)     Dispense:  180 tablet     Refill:  1   Obtain lab Obtain Xray  Continue medications  Restart Metformin  ADR's explained  The patient is asked to make an attempt to improve diet and exercise patterns   Keep f/u with specialists           Return in about 4 months (around 10/3/2021) for Diabetes.     Electronically Signed by Alonso Langley DO

## 2021-06-09 ENCOUNTER — HOSPITAL ENCOUNTER (OUTPATIENT)
Dept: GENERAL RADIOLOGY | Age: 71
Discharge: HOME | End: 2021-06-09
Payer: COMMERCIAL

## 2021-06-09 ENCOUNTER — HOSPITAL ENCOUNTER (OUTPATIENT)
Age: 71
Discharge: HOME | End: 2021-06-09
Payer: COMMERCIAL

## 2021-06-09 DIAGNOSIS — G89.29 CHRONIC PAIN OF LEFT KNEE: ICD-10-CM

## 2021-06-09 DIAGNOSIS — M25.562 CHRONIC PAIN OF LEFT KNEE: ICD-10-CM

## 2021-06-09 PROCEDURE — 73562 X-RAY EXAM OF KNEE 3: CPT

## 2021-06-28 ENCOUNTER — TELEPHONE (OUTPATIENT)
Dept: INTERNAL MEDICINE CLINIC | Age: 71
End: 2021-06-28

## 2021-06-28 NOTE — TELEPHONE ENCOUNTER
Pt called c/o leg cramping, worse at night and bruising of arms and legs. No known trauma, she believes this is a SE of Metformin. Please advise.

## 2021-06-29 NOTE — TELEPHONE ENCOUNTER
Pt returned call. Informed pt, that PCP may call after hours, keep phone handy. Pt voiced understanding/thanks.

## 2021-07-13 RX ORDER — AMLODIPINE BESYLATE 5 MG/1
5 TABLET ORAL DAILY
Qty: 30 TABLET | Refills: 3 | OUTPATIENT
Start: 2021-07-13

## 2021-07-13 RX ORDER — CARVEDILOL 6.25 MG/1
6.25 TABLET ORAL 2 TIMES DAILY WITH MEALS
Qty: 60 TABLET | Refills: 3 | OUTPATIENT
Start: 2021-07-13

## 2021-08-12 ENCOUNTER — TELEPHONE (OUTPATIENT)
Dept: INTERNAL MEDICINE CLINIC | Age: 71
End: 2021-08-12

## 2021-08-12 NOTE — TELEPHONE ENCOUNTER
Pt was tested 8/04/21, at [facility] on [address] [place] [address]. She is Covid-19 (+)      Persistent dry cough, low grade 99.0 temp. She denies any other Covid-19 like SX. She is requesting Rx for Cough Syrup to [facility] on [address]. Advised pt on quarantine and [place] protocol. She voiced understanding/thanks. Informed pt of eligibility for infusion [place], she will consider and contact office, if she chooses to do so. She was also informed, this is time sensitive. Please advise.

## 2021-08-13 RX ORDER — BENZONATATE 100 MG/1
100 CAPSULE ORAL 3 TIMES DAILY PRN
Qty: 30 CAPSULE | Refills: 0 | Status: ON HOLD | OUTPATIENT
Start: 2021-08-13 | End: 2021-08-27

## 2021-08-18 ENCOUNTER — HOSPITAL ENCOUNTER (INPATIENT)
Age: 71
LOS: 9 days | Discharge: HOME | DRG: 177 | End: 2021-08-27
Attending: EMERGENCY MEDICINE | Admitting: INTERNAL MEDICINE
Payer: MEDICARE

## 2021-08-18 ENCOUNTER — APPOINTMENT (OUTPATIENT)
Dept: GENERAL RADIOLOGY | Age: 71
DRG: 177 | End: 2021-08-18
Payer: MEDICARE

## 2021-08-18 DIAGNOSIS — U07.1 COVID-19: Primary | ICD-10-CM

## 2021-08-18 DIAGNOSIS — R73.9 HYPERGLYCEMIA: ICD-10-CM

## 2021-08-18 DIAGNOSIS — R09.02 HYPOXIA: ICD-10-CM

## 2021-08-18 DIAGNOSIS — J18.9 MULTIFOCAL PNEUMONIA: ICD-10-CM

## 2021-08-18 DIAGNOSIS — R53.83 FATIGUE, UNSPECIFIED TYPE: ICD-10-CM

## 2021-08-18 PROBLEM — J96.01 ACUTE HYPOXEMIC RESPIRATORY FAILURE DUE TO COVID-19 (HCC): Status: ACTIVE | Noted: 2021-08-18

## 2021-08-18 LAB
ABO/RH: NORMAL
ALBUMIN SERPL-MCNC: 2.9 GM/DL (ref 3.4–5)
ALP BLD-CCNC: 75 IU/L (ref 40–128)
ALT SERPL-CCNC: 10 U/L (ref 10–40)
ANION GAP SERPL CALCULATED.3IONS-SCNC: 16 MMOL/L (ref 4–16)
ANTIBODY SCREEN: NEGATIVE
AST SERPL-CCNC: 20 IU/L (ref 15–37)
BASE EXCESS: 3 (ref 0–2.4)
BASOPHILS ABSOLUTE: 0 K/CU MM
BASOPHILS RELATIVE PERCENT: 0.3 % (ref 0–1)
BILIRUB SERPL-MCNC: 0.5 MG/DL (ref 0–1)
BUN BLDV-MCNC: 33 MG/DL (ref 6–23)
CALCIUM SERPL-MCNC: 9.6 MG/DL (ref 8.3–10.6)
CHLORIDE BLD-SCNC: 105 MMOL/L (ref 99–110)
CO2: 21 MMOL/L (ref 21–32)
COMMENT: NORMAL
CREAT SERPL-MCNC: 1.4 MG/DL (ref 0.6–1.1)
DIFFERENTIAL TYPE: ABNORMAL
EOSINOPHILS ABSOLUTE: 0 K/CU MM
EOSINOPHILS RELATIVE PERCENT: 0 % (ref 0–3)
GFR AFRICAN AMERICAN: 45 ML/MIN/1.73M2
GFR NON-AFRICAN AMERICAN: 37 ML/MIN/1.73M2
GLUCOSE BLD-MCNC: 340 MG/DL (ref 70–99)
GLUCOSE BLD-MCNC: 382 MG/DL (ref 70–99)
GLUCOSE BLD-MCNC: 417 MG/DL (ref 70–99)
HCO3 VENOUS: 22 MMOL/L (ref 19–25)
HCT VFR BLD CALC: 40 % (ref 37–47)
HEMOGLOBIN: 12 GM/DL (ref 12.5–16)
IMMATURE NEUTROPHIL %: 1.9 % (ref 0–0.43)
LYMPHOCYTES ABSOLUTE: 1 K/CU MM
LYMPHOCYTES RELATIVE PERCENT: 13.5 % (ref 24–44)
MCH RBC QN AUTO: 26 PG (ref 27–31)
MCHC RBC AUTO-ENTMCNC: 30 % (ref 32–36)
MCV RBC AUTO: 86.6 FL (ref 78–100)
MONOCYTES ABSOLUTE: 0.4 K/CU MM
MONOCYTES RELATIVE PERCENT: 5.1 % (ref 0–4)
NUCLEATED RBC %: 0 %
O2 SAT, VEN: 59.7 % (ref 50–70)
PCO2, VEN: 39 MMHG (ref 38–52)
PDW BLD-RTO: 13.7 % (ref 11.7–14.9)
PH VENOUS: 7.36 (ref 7.32–7.42)
PLATELET # BLD: 475 K/CU MM (ref 140–440)
PMV BLD AUTO: 10.7 FL (ref 7.5–11.1)
PO2, VEN: 31 MMHG (ref 28–48)
POTASSIUM SERPL-SCNC: 4.5 MMOL/L (ref 3.5–5.1)
RBC # BLD: 4.62 M/CU MM (ref 4.2–5.4)
SARS-COV-2, NAAT: DETECTED
SEGMENTED NEUTROPHILS ABSOLUTE COUNT: 5.9 K/CU MM
SEGMENTED NEUTROPHILS RELATIVE PERCENT: 79.2 % (ref 36–66)
SODIUM BLD-SCNC: 142 MMOL/L (ref 135–145)
SOURCE: ABNORMAL
TOTAL IMMATURE NEUTOROPHIL: 0.14 K/CU MM
TOTAL NUCLEATED RBC: 0 K/CU MM
TOTAL PROTEIN: 7.4 GM/DL (ref 6.4–8.2)
WBC # BLD: 7.4 K/CU MM (ref 4–10.5)

## 2021-08-18 PROCEDURE — 86850 RBC ANTIBODY SCREEN: CPT

## 2021-08-18 PROCEDURE — 1200000000 HC SEMI PRIVATE

## 2021-08-18 PROCEDURE — 2580000003 HC RX 258: Performed by: EMERGENCY MEDICINE

## 2021-08-18 PROCEDURE — 86901 BLOOD TYPING SEROLOGIC RH(D): CPT

## 2021-08-18 PROCEDURE — 84145 PROCALCITONIN (PCT): CPT

## 2021-08-18 PROCEDURE — 82962 GLUCOSE BLOOD TEST: CPT

## 2021-08-18 PROCEDURE — 6360000002 HC RX W HCPCS: Performed by: EMERGENCY MEDICINE

## 2021-08-18 PROCEDURE — 2580000003 HC RX 258: Performed by: NURSE PRACTITIONER

## 2021-08-18 PROCEDURE — 2060000000 HC ICU INTERMEDIATE R&B

## 2021-08-18 PROCEDURE — 84443 ASSAY THYROID STIM HORMONE: CPT

## 2021-08-18 PROCEDURE — 83690 ASSAY OF LIPASE: CPT

## 2021-08-18 PROCEDURE — 96361 HYDRATE IV INFUSION ADD-ON: CPT

## 2021-08-18 PROCEDURE — 86900 BLOOD TYPING SEROLOGIC ABO: CPT

## 2021-08-18 PROCEDURE — 6370000000 HC RX 637 (ALT 250 FOR IP): Performed by: NURSE PRACTITIONER

## 2021-08-18 PROCEDURE — 87635 SARS-COV-2 COVID-19 AMP PRB: CPT

## 2021-08-18 PROCEDURE — 83735 ASSAY OF MAGNESIUM: CPT

## 2021-08-18 PROCEDURE — 82010 KETONE BODYS QUAN: CPT

## 2021-08-18 PROCEDURE — 93005 ELECTROCARDIOGRAM TRACING: CPT | Performed by: EMERGENCY MEDICINE

## 2021-08-18 PROCEDURE — 96374 THER/PROPH/DIAG INJ IV PUSH: CPT

## 2021-08-18 PROCEDURE — 71045 X-RAY EXAM CHEST 1 VIEW: CPT

## 2021-08-18 PROCEDURE — 6360000002 HC RX W HCPCS: Performed by: NURSE PRACTITIONER

## 2021-08-18 PROCEDURE — 82805 BLOOD GASES W/O2 SATURATION: CPT

## 2021-08-18 PROCEDURE — 6370000000 HC RX 637 (ALT 250 FOR IP): Performed by: EMERGENCY MEDICINE

## 2021-08-18 PROCEDURE — 87040 BLOOD CULTURE FOR BACTERIA: CPT

## 2021-08-18 PROCEDURE — 94761 N-INVAS EAR/PLS OXIMETRY MLT: CPT

## 2021-08-18 PROCEDURE — 94640 AIRWAY INHALATION TREATMENT: CPT

## 2021-08-18 PROCEDURE — 84484 ASSAY OF TROPONIN QUANT: CPT

## 2021-08-18 PROCEDURE — 99285 EMERGENCY DEPT VISIT HI MDM: CPT

## 2021-08-18 PROCEDURE — 83880 ASSAY OF NATRIURETIC PEPTIDE: CPT

## 2021-08-18 PROCEDURE — 80053 COMPREHEN METABOLIC PANEL: CPT

## 2021-08-18 PROCEDURE — 2700000000 HC OXYGEN THERAPY PER DAY

## 2021-08-18 PROCEDURE — 85025 COMPLETE CBC W/AUTO DIFF WBC: CPT

## 2021-08-18 RX ORDER — ONDANSETRON 4 MG/1
4 TABLET, ORALLY DISINTEGRATING ORAL EVERY 8 HOURS PRN
Status: DISCONTINUED | OUTPATIENT
Start: 2021-08-18 | End: 2021-08-27 | Stop reason: HOSPADM

## 2021-08-18 RX ORDER — SODIUM CHLORIDE 9 MG/ML
25 INJECTION, SOLUTION INTRAVENOUS PRN
Status: DISCONTINUED | OUTPATIENT
Start: 2021-08-18 | End: 2021-08-27 | Stop reason: HOSPADM

## 2021-08-18 RX ORDER — GUAIFENESIN 100 MG/5ML
200 SOLUTION ORAL ONCE
Status: COMPLETED | OUTPATIENT
Start: 2021-08-18 | End: 2021-08-18

## 2021-08-18 RX ORDER — DEXAMETHASONE SODIUM PHOSPHATE 10 MG/ML
6 INJECTION, SOLUTION INTRAMUSCULAR; INTRAVENOUS EVERY 24 HOURS
Status: DISCONTINUED | OUTPATIENT
Start: 2021-08-18 | End: 2021-08-18

## 2021-08-18 RX ORDER — SODIUM CHLORIDE 0.9 % (FLUSH) 0.9 %
5-40 SYRINGE (ML) INJECTION PRN
Status: DISCONTINUED | OUTPATIENT
Start: 2021-08-18 | End: 2021-08-27 | Stop reason: HOSPADM

## 2021-08-18 RX ORDER — ROSUVASTATIN CALCIUM 5 MG/1
10 TABLET, COATED ORAL NIGHTLY
Status: DISCONTINUED | OUTPATIENT
Start: 2021-08-18 | End: 2021-08-27 | Stop reason: HOSPADM

## 2021-08-18 RX ORDER — ACETAMINOPHEN 325 MG/1
650 TABLET ORAL EVERY 6 HOURS PRN
Status: DISCONTINUED | OUTPATIENT
Start: 2021-08-18 | End: 2021-08-27 | Stop reason: HOSPADM

## 2021-08-18 RX ORDER — ONDANSETRON 2 MG/ML
4 INJECTION INTRAMUSCULAR; INTRAVENOUS EVERY 6 HOURS PRN
Status: DISCONTINUED | OUTPATIENT
Start: 2021-08-18 | End: 2021-08-27 | Stop reason: HOSPADM

## 2021-08-18 RX ORDER — GUAIFENESIN/DEXTROMETHORPHAN 100-10MG/5
5 SYRUP ORAL EVERY 4 HOURS PRN
Status: DISCONTINUED | OUTPATIENT
Start: 2021-08-18 | End: 2021-08-27 | Stop reason: HOSPADM

## 2021-08-18 RX ORDER — ASPIRIN 81 MG/1
81 TABLET ORAL DAILY
Status: DISCONTINUED | OUTPATIENT
Start: 2021-08-18 | End: 2021-08-27 | Stop reason: HOSPADM

## 2021-08-18 RX ORDER — VITAMIN B COMPLEX
2000 TABLET ORAL DAILY
Status: DISCONTINUED | OUTPATIENT
Start: 2021-08-18 | End: 2021-08-20 | Stop reason: SDUPTHER

## 2021-08-18 RX ORDER — 0.9 % SODIUM CHLORIDE 0.9 %
1000 INTRAVENOUS SOLUTION INTRAVENOUS ONCE
Status: COMPLETED | OUTPATIENT
Start: 2021-08-18 | End: 2021-08-18

## 2021-08-18 RX ORDER — DEXTROSE MONOHYDRATE 50 MG/ML
100 INJECTION, SOLUTION INTRAVENOUS PRN
Status: DISCONTINUED | OUTPATIENT
Start: 2021-08-18 | End: 2021-08-27 | Stop reason: HOSPADM

## 2021-08-18 RX ORDER — CARVEDILOL 6.25 MG/1
6.25 TABLET ORAL 2 TIMES DAILY WITH MEALS
Status: DISCONTINUED | OUTPATIENT
Start: 2021-08-19 | End: 2021-08-27 | Stop reason: HOSPADM

## 2021-08-18 RX ORDER — DEXAMETHASONE SODIUM PHOSPHATE 10 MG/ML
6 INJECTION, SOLUTION INTRAMUSCULAR; INTRAVENOUS EVERY 24 HOURS
Status: DISCONTINUED | OUTPATIENT
Start: 2021-08-19 | End: 2021-08-27 | Stop reason: HOSPADM

## 2021-08-18 RX ORDER — ACETAMINOPHEN 650 MG/1
650 SUPPOSITORY RECTAL EVERY 6 HOURS PRN
Status: DISCONTINUED | OUTPATIENT
Start: 2021-08-18 | End: 2021-08-27 | Stop reason: HOSPADM

## 2021-08-18 RX ORDER — ALBUTEROL SULFATE 90 UG/1
2 AEROSOL, METERED RESPIRATORY (INHALATION) EVERY 6 HOURS PRN
Status: DISCONTINUED | OUTPATIENT
Start: 2021-08-18 | End: 2021-08-27 | Stop reason: HOSPADM

## 2021-08-18 RX ORDER — DEXTROSE MONOHYDRATE 25 G/50ML
12.5 INJECTION, SOLUTION INTRAVENOUS PRN
Status: DISCONTINUED | OUTPATIENT
Start: 2021-08-18 | End: 2021-08-27 | Stop reason: HOSPADM

## 2021-08-18 RX ORDER — METOPROLOL SUCCINATE 25 MG/1
25 TABLET, EXTENDED RELEASE ORAL NIGHTLY
Status: ON HOLD | COMMUNITY
End: 2021-08-27 | Stop reason: HOSPADM

## 2021-08-18 RX ORDER — DEXAMETHASONE SODIUM PHOSPHATE 10 MG/ML
10 INJECTION, SOLUTION INTRAMUSCULAR; INTRAVENOUS ONCE
Status: COMPLETED | OUTPATIENT
Start: 2021-08-18 | End: 2021-08-18

## 2021-08-18 RX ORDER — POLYETHYLENE GLYCOL 3350 17 G/17G
17 POWDER, FOR SOLUTION ORAL DAILY PRN
Status: DISCONTINUED | OUTPATIENT
Start: 2021-08-18 | End: 2021-08-27 | Stop reason: HOSPADM

## 2021-08-18 RX ORDER — BENZONATATE 100 MG/1
100 CAPSULE ORAL ONCE
Status: COMPLETED | OUTPATIENT
Start: 2021-08-18 | End: 2021-08-18

## 2021-08-18 RX ORDER — PANTOPRAZOLE SODIUM 40 MG/1
40 TABLET, DELAYED RELEASE ORAL
Status: DISCONTINUED | OUTPATIENT
Start: 2021-08-19 | End: 2021-08-19

## 2021-08-18 RX ORDER — SODIUM CHLORIDE 0.9 % (FLUSH) 0.9 %
5-40 SYRINGE (ML) INJECTION EVERY 12 HOURS SCHEDULED
Status: DISCONTINUED | OUTPATIENT
Start: 2021-08-18 | End: 2021-08-27 | Stop reason: HOSPADM

## 2021-08-18 RX ORDER — NICOTINE POLACRILEX 4 MG
15 LOZENGE BUCCAL PRN
Status: DISCONTINUED | OUTPATIENT
Start: 2021-08-18 | End: 2021-08-27 | Stop reason: HOSPADM

## 2021-08-18 RX ADMIN — GUAIFENESIN 200 MG: 200 SOLUTION ORAL at 16:41

## 2021-08-18 RX ADMIN — AZITHROMYCIN MONOHYDRATE 500 MG: 500 INJECTION, POWDER, LYOPHILIZED, FOR SOLUTION INTRAVENOUS at 19:35

## 2021-08-18 RX ADMIN — CEFTRIAXONE SODIUM 1000 MG: 1 INJECTION, POWDER, FOR SOLUTION INTRAMUSCULAR; INTRAVENOUS at 18:56

## 2021-08-18 RX ADMIN — CEFEPIME HYDROCHLORIDE 2000 MG: 2 INJECTION, POWDER, FOR SOLUTION INTRAVENOUS at 22:09

## 2021-08-18 RX ADMIN — DEXAMETHASONE SODIUM PHOSPHATE 10 MG: 10 INJECTION, SOLUTION INTRAMUSCULAR; INTRAVENOUS at 17:18

## 2021-08-18 RX ADMIN — ALBUTEROL SULFATE 2 PUFF: 90 AEROSOL, METERED RESPIRATORY (INHALATION) at 17:01

## 2021-08-18 RX ADMIN — ENOXAPARIN SODIUM 30 MG: 30 INJECTION SUBCUTANEOUS at 22:10

## 2021-08-18 RX ADMIN — SODIUM CHLORIDE 1000 ML: 9 INJECTION, SOLUTION INTRAVENOUS at 16:43

## 2021-08-18 RX ADMIN — BISACODYL 5 MG: 5 TABLET, COATED ORAL at 22:10

## 2021-08-18 RX ADMIN — ROSUVASTATIN CALCIUM 10 MG: 5 TABLET, COATED ORAL at 22:09

## 2021-08-18 RX ADMIN — ASPIRIN 81 MG: 81 TABLET, COATED ORAL at 22:09

## 2021-08-18 RX ADMIN — BENZONATATE 100 MG: 100 CAPSULE ORAL at 16:41

## 2021-08-18 ASSESSMENT — ENCOUNTER SYMPTOMS
EYES NEGATIVE: 1
ABDOMINAL PAIN: 0
ALLERGIC/IMMUNOLOGIC NEGATIVE: 1
SHORTNESS OF BREATH: 1
COUGH: 1
CONSTIPATION: 0
NAUSEA: 0
VOMITING: 0
GASTROINTESTINAL NEGATIVE: 1
SORE THROAT: 0

## 2021-08-18 NOTE — ED NOTES
[place],lactic acid,venous ph and type & screen was drawn on patient     Jesenia Woodsem  08/18/21 [id number]

## 2021-08-18 NOTE — ED PROVIDER NOTES
Twelve-lead EKG obtained at 1420 on 18 August 2021 and interpreted by me in the absence of a cardiologist.  There is no criteria ST elevation or reciprocal change. There are no hyperacute T wave changes. There is no sign of acute ischemia or infarction. This tracing shows a normal sinus rhythm with nonspecific T wave changes. Rate and intervals are 88 beats per minute, WI interval 120 milliseconds, QRS duration 82 milliseconds, QTc interval 430 milliseconds, and R axis normal at 12 degrees. There are no old EKGs currently viable for comparison.         Priyanka Murphy MD  08/18/21 [id number]

## 2021-08-18 NOTE — CARE COORDINATION
CM consult per Dr Barbara Chamorro to initiate discharge planning. Pt to ER for fatigue, COVID +. Pt has [address] CM/LSW visited pt who lives alone, dtr is staying with pt to care for her while she is ill. Pt and dtr denied any needs for discharge. Dr Barbara Chamorro tells Harriett Banuelos CM/ LSW, he plans to discharge pt home. MAX COOPER/WILI      Addendum: Rodney Gregorio RN states pt is now admitted and wants co complete MPOA paperwork while here. CM not going into pt room at this time due to positive COVID, without N95 mask at this time. . RN will explain it can be completed once pt out of quarantine.  MAX COOPER/CM Statement Selected

## 2021-08-18 NOTE — ED NOTES
DR Hailey Garcia ANSWERED     Azul Singh.  [facility]  08/18/21 [id number]

## 2021-08-18 NOTE — ED PROVIDER NOTES
[facility]      TRIAGE CHIEF COMPLAINT:   Fatigue (tested pos for COVID 08/08/2021)      [place]:  Viviano Lesches is a 69 y.o. female that presents with complaint of fatigue, malaise, cough, congestion, decreased activity and appetite. Patient was diagnosed with COVID-19 on the eighth of this month. She has not been vaccinated. States she got it from her family. Family was concerned that she has not been eating or drinking well with they were worried she was dehydrated. Fevers have subsided still has a cough nonproductive. Has been on cough medication but no antibiotics. Patient does have medical problems again not of them have been vaccinated. Patient denies other questions or concerns, just fatigue malaise no energy. No chest pain shortness of breath abdominal pain nausea vomiting or diarrhea. [place] REVIEW OF SYSTEMS:  At least 10 systems reviewed and otherwise acutely negative except as in the [address] Review of Systems   Constitutional: Positive for activity change, appetite change, chills and fatigue. HENT: Positive for congestion. Eyes: Negative. Respiratory: Positive for cough. Cardiovascular: Negative. Gastrointestinal: Negative. Endocrine: Negative. Genitourinary: Negative. Musculoskeletal: Negative. Skin: Negative. Allergic/Immunologic: Negative. Neurological: Negative. Hematological: Negative. Psychiatric/Behavioral: Negative. All other systems reviewed and are negative.       Past Medical History:   Diagnosis Date    Anemia     Managed by Dr. Usman Lowry CAD (coronary artery disease)     follows with Dr Audrey Briceno Diabetes mellitus type 2, uncontrolled ([facility] [address]     \"dx 2779    Diastolic dysfunction 92/4075    [facility]    Esophageal stricture     dilation per GI    Hiatal hernia     History of blood transfusion     \"had blood transfusion with 4th child- have hx also of iron infusions for anemia 2017    Hyperlipidemia     Hypertension     IBS (irritable bowel syndrome)     with diarrhea    Iron deficiency anemia     Iron Infusions- follows with Julia Araiza and Jose G Cardenas    LVH (left ventricular hypertrophy) 09/2012    [facility]    Multiple gastric polyps 2004    Dr Eugenio Goode    Obesity     PAD (peripheral artery disease) ([address]     S/P CABG x 4 08/06/2012    4V CABG- LIMA-LAD, SVG-CX, SVG-PDA, SVG-RCA- Follows with Dr Rona Manzo Sinus tachycardia     follows with [facility]    SVT (supraventricular tachycardia) [facility])     old chart gives hx of SVT in the past    Vitamin D deficiency      Past Surgical History:   Procedure Laterality Date    BALLOON ANGIOPLASTY, ARTERY Right 08/19/2015    RIght SFA 90%->10%, Right Popliteal 1000%->10%    CARDIAC CATHETERIZATION  08/02/2012    CARDIAC SURGERY      open heart surgery 8/2012    CHOLECYSTECTOMY  15+ yrs ago    COLONOSCOPY  2017    CORONARY ARTERY BYPASS GRAFT  08/02/2012    4V CABG- LIMA-LAD, SVG-CX, SVG-PDA, SVG-RCA    ENDOSCOPY, COLON, DIAGNOSTIC  10/10/2017    esophageal stricture, hiatal hernia, candidiasis   [facility]      Dr. Adelita Fair      biltateral- \"total of 9 surgeries- all scopes\"    TRANSLUMINAL ANGIOPLASTY Left 09/23/2015 9/23/2015-Left mid and distal SFA and Left Popliteal    TUBAL LIGATION  1978     Family History   Problem Relation Age of Onset    Kidney Disease Mother         Dialysis    Diabetes Mother     High Blood Pressure Mother     Coronary Art Dis Mother         MI   [facility] [address] Cancer Father         pancreatic cancer (Smoking)    Coronary Art Dis Father 43        MI early onset    Diabetes Other      Social History     Socioeconomic History    Marital status:      Spouse name: Sydni Troncoso Number of children: 11    Years of education: Not on file    Highest education level: Not on file   Occupational History    Not on file   Tobacco Use    Smoking status: Never Smoker    Smokeless tobacco: Never Used   Vaping Use    Vaping Use: Never used   Substance and Sexual Activity    Alcohol use: No    Drug use: No    Sexual activity: Not Currently     Partners: Male   Other Topics Concern    Not on file   Social History Narrative    Not on file     Social Determinants of Health     Financial Resource Strain:     Difficulty of Paying Living Expenses:    Food Insecurity:     Worried About Running Out of Food in the Last Year:     [address] in the Last Year:    Transportation Needs:     Lack of Transportation (Medical):      Lack of Transportation (Non-Medical):    Physical Activity:     Days of Exercise per Week:     Minutes of Exercise per Session:    Stress:     Feeling of Stress :    Social Connections:     Frequency of Communication with Friends and Family:     Frequency of Social Gatherings with Friends and Family:     Attends Mandaen Services:     Active Member of Clubs or Organizations:     Attends Club or Organization Meetings:     Marital Status:    Intimate Partner Violence:     Fear of Current or Ex-Partner:     Emotionally Abused:     Physically Abused:     Sexually Abused:      Current Facility-Administered Medications   Medication Dose Route Frequency Provider Last Rate Last Admin    albuterol sulfate  (90 Base) MCG/ACT inhaler 2 puff  2 puff Inhalation Q6H PRN Baby Rainer, APRN - CNP   2 puff at 08/18/21 1701    sodium chloride flush 0.9 % injection 5-40 mL  5-40 mL Intravenous 2 times per day JOSE RAMON Eugene CNP        sodium chloride flush 0.9 % injection 5-40 mL  5-40 mL Intravenous PRN JOSE RAMON Eugene CNP        0.9 % sodium chloride infusion  25 mL Intravenous PRN JOSE RAMON Eugene CNP        enoxaparin (LOVENOX) injection 30 mg  30 mg Subcutaneous BID Baby Rainer, APRN - CNP   30 mg at 08/18/21 2210    ondansetron (ZOFRAN-ODT) disintegrating tablet 4 mg  4 mg Oral Q8H PRN JOSE RAMON Farr CNP        Or    ondansetron (ZOFRAN) injection 4 mg  4 mg Intravenous Q6H PRN JOSE RAMON Eugene CNP        polyethylene glycol (GLYCOLAX) packet 17 g  17 g Oral Daily PRN JOSE RAMON Eugene CNP        acetaminophen (TYLENOL) tablet 650 mg  650 mg Oral Q6H PRN JOSE RAMON Eugene CNP        Or    acetaminophen (TYLENOL) suppository 650 mg  650 mg Rectal Q6H PRN JOSE RAMON Eugene CNP        guaiFENesin-dextromethorphan (ROBITUSSIN DM) 100-10 MG/5ML syrup 5 mL  5 mL Oral Q4H PRN JOSE RAMON Eugene CNP        Vitamin D (CHOLECALCIFEROL) tablet 2,000 Units  2,000 Units Oral Daily JOSE RAMON Eugene CNP        azithromycin (ZITHROMAX) 500 mg in dextrose 5 % 250 mL IVPB  500 mg Intravenous Q24H JOSE RAMON Eugene CNP        cefepime (MAXIPIME) 2000 mg IVPB minibag  2,000 mg Intravenous Q12H JOSE RAMON Eugene  mL/hr at 08/18/21 2209 2,000 mg at 08/18/21 2209    bisacodyl (DULCOLAX) EC tablet 5 mg  5 mg Oral Daily JOSE RAMON Eugene CNP   5 mg at 08/18/21 2210    dexamethasone (PF) (DECADRON) injection 6 mg  6 mg Intravenous Q24H JOSE RAMON Eugene CNP        aspirin EC tablet 81 mg  81 mg Oral Daily JOSE RAMON Eugene CNP   81 mg at 08/18/21 2209    insulin lispro (HUMALOG) injection vial 0-12 Units  0-12 Units Subcutaneous TID WC JOSE RAMON Eugene CNP        insulin lispro (HUMALOG) injection vial 0-6 Units  0-6 Units Subcutaneous Nightly JOSE RAMON Eugene CNP   6 Units at 08/18/21 2215    carvedilol (COREG) tablet 6.25 mg  6.25 mg Oral BID  JOSE RAMON Eugene CNP        rosuvastatin (CRESTOR) tablet 10 mg  10 mg Oral Nightly JOSE RAMON Eugene CNP   10 mg at 08/18/21 2209    pantoprazole (PROTONIX) tablet 40 mg  40 mg Oral QAM AC JOSE RAMON Eugene CNP        glucose (GLUTOSE) 40 % oral gel 15 g  15 g Oral PRN Sheryl I JOSE RAMON Vanegas - CNP        dextrose 50 % IV solution  12.5 g Intravenous PRN Sheryl I JOSE RAMON Vanegas - CNP        glucagon (rDNA) injection 1 mg  1 mg Intramuscular PRN Sheryl I JOSE RAMON Vanegas - CNP        dextrose 5 % solution  100 mL/hr Intravenous PRN Sheryl I JAIME VanegasN - CNP          Allergies   Allergen Reactions    Tetanus Toxoids Swelling and Rash     fever     Current Facility-Administered Medications   Medication Dose Route Frequency Provider Last Rate Last Admin    albuterol sulfate  (90 Base) MCG/ACT inhaler 2 puff  2 puff Inhalation Q6H PRN Sheryl Vanegas APRN - CNP   2 puff at 08/18/21 1701    sodium chloride flush 0.9 % injection 5-40 mL  5-40 mL Intravenous 2 times per day JOSE RAMON Eugene - CNP        sodium chloride flush 0.9 % injection 5-40 mL  5-40 mL Intravenous PRN Sheryl I JOSE RAMON Vanegas - CNP        0.9 % sodium chloride infusion  25 mL Intravenous PRN Sheryl I JOSE RAMON Vanegas - CNP        enoxaparin (LOVENOX) injection 30 mg  30 mg Subcutaneous BID JOSE RAMON Eugene - CNP   30 mg at 08/18/21 2210    ondansetron (ZOFRAN-ODT) disintegrating tablet 4 mg  4 mg Oral Q8H PRN Sheryl I JOSE RAMON Vanegas - CNP        Or    ondansetron (ZOFRAN) injection 4 mg  4 mg Intravenous Q6H PRN JOSE RAMON Eugene - CNP        polyethylene glycol (GLYCOLAX) packet 17 g  17 g Oral Daily PRN JOSE RAMON Eugene - CNP        acetaminophen (TYLENOL) tablet 650 mg  650 mg Oral Q6H PRN Sheryl I JOSE RAMON Vanegas - CNP        Or    acetaminophen (TYLENOL) suppository 650 mg  650 mg Rectal Q6H PRN Sheryl I JOSE RAMON Vanegas - CNP        guaiFENesin-dextromethorphan (ROBITUSSIN DM) 100-10 MG/5ML syrup 5 mL  5 mL Oral Q4H PRN Sheryl I JOSE RAMON Vanegas CNP        Vitamin D (CHOLECALCIFEROL) tablet 2,000 Units  2,000 Units Oral Daily JOSE RAMON Eugene CNP        azithromycin (ZITHROMAX) 500 mg in dextrose 5 % 250 mL IVPB  500 mg Intravenous Q24H JOSE RAMON Eugene CNP        cefepime (MAXIPIME) 2000 mg IVPB minibag  2,000 mg Intravenous Q12H JOSE RAMON [place]  mL/hr at 08/18/21 2209 2,000 mg at 08/18/21 2209    bisacodyl (DULCOLAX) EC tablet 5 mg  5 mg Oral Daily JOSE RAMON Eugene CNP   5 mg at 08/18/21 2210    dexamethasone (PF) (DECADRON) injection 6 mg  6 mg Intravenous Q24H JOSE RAMON Eugene CNP        aspirin EC tablet 81 mg  81 mg Oral Daily JOSE RAMON Eugene CNP   81 mg at 08/18/21 2209    insulin lispro (HUMALOG) injection vial 0-12 Units  0-12 Units Subcutaneous TID  JOSE RAMON Eugene CNP        insulin lispro (HUMALOG) injection vial 0-6 Units  0-6 Units Subcutaneous Nightly JOSE RAMON [place] CNP   6 Units at 08/18/21 2215    carvedilol (COREG) tablet 6.25 mg  6.25 mg Oral BID  JOSE RAMON Eugene CNP        rosuvastatin (CRESTOR) tablet 10 mg  10 mg Oral Nightly JOSE RAMON Eugene CNP   10 mg at 08/18/21 2209    pantoprazole (PROTONIX) tablet 40 mg  40 mg Oral QAM AC JOSE RAMON Eugene CNP        glucose (GLUTOSE) 40 % oral gel 15 g  15 g Oral PRN JOSE RAMON Eugene CNP        dextrose 50 % IV solution  12.5 g Intravenous PRN JOSE RAMON Eugene CNP        glucagon (rDNA) injection 1 mg  1 mg Intramuscular PRN JOSE RAMON [place] CNP        dextrose 5 % solution  100 mL/hr Intravenous JOSE RAMON Hendricks CNP           Nursing Notes Reviewed    VITAL SIGNS:  ED Triage Vitals   Enc Vitals Group      BP 08/18/21 1411 131/65      Pulse 08/18/21 1411 90      Resp 08/18/21 1411 18      Temp 08/18/21 1412 98.8 °F (37.1 °C)      Temp Source 08/18/21 1411 Oral      SpO2 08/18/21 1411 93 %      Weight 08/18/21 1411 160 lb (72.6 kg)      Height 08/18/21 1411 5' 3\" (1.6 m)      Head Circumference --       Peak Flow --       Pain Score -- Pain Loc --       Pain Edu? --       Excl. in [address]? --        PHYSICAL EXAM:  Physical Exam  Vitals and nursing note reviewed. Constitutional:       General: She is sleeping. She is not in acute distress. Appearance: Normal appearance. She is well-developed and well-groomed. She is not ill-appearing, toxic-appearing or diaphoretic. HENT:      Head: Normocephalic and atraumatic. Right Ear: External ear normal.      Left Ear: External ear normal.      Nose: Congestion present. Eyes:      General: No scleral icterus. Right eye: No discharge. Left eye: No discharge. Extraocular Movements: Extraocular movements intact. Conjunctiva/sclera: Conjunctivae normal.   Neck:      Vascular: No JVD. Trachea: Phonation normal.   Cardiovascular:      Rate and Rhythm: Normal rate and regular rhythm. Pulses: Normal pulses. Heart sounds: Normal heart sounds. No murmur heard. No friction rub. No gallop. Pulmonary:      Effort: Pulmonary effort is normal. No respiratory distress. Breath sounds: Normal breath sounds. No stridor. No wheezing, rhonchi or rales. Comments: Cough present  Abdominal:      General: Bowel sounds are normal. There is no distension. Palpations: Abdomen is soft. There is no mass. Tenderness: There is no abdominal tenderness. There is no guarding or rebound. Negative signs include Loera's sign, Rovsing's sign and McBurney's sign. Hernia: No hernia is present. Musculoskeletal:         General: No swelling, tenderness, deformity or signs of injury. Normal range of motion. Cervical back: Full passive range of motion without pain and normal range of motion. No edema, erythema, signs of trauma, rigidity, torticollis or crepitus. No pain with movement. Normal range of motion. Right lower leg: No edema. Left lower leg: No edema. Skin:     General: Skin is warm. Coloration: Skin is not jaundiced or pale. 0.14 K/CU MM    Immature Neutrophil % 1.9 (H) 0 - 0.43 %   CMP   Result Value Ref Range    Sodium 142 135 - 145 MMOL/L    Potassium 4.5 3.5 - 5.1 MMOL/L    Chloride 105 99 - 110 mMol/L    CO2 21 21 - 32 MMOL/L    BUN 33 (H) 6 - 23 MG/DL    CREATININE 1.4 (H) 0.6 - 1.1 MG/DL    Glucose 382 (H) 70 - 99 MG/DL    Calcium 9.6 8.3 - 10.6 MG/DL    Albumin 2.9 (L) 3.4 - 5.0 GM/DL    Total Protein 7.4 6.4 - 8.2 GM/DL    Total Bilirubin 0.5 0.0 - 1.0 MG/DL    ALT 10 10 - 40 U/L    AST 20 15 - 37 IU/L    Alkaline Phosphatase 75 40 - 128 IU/L    GFR Non- 37 (L) >60 mL/min/1.73m2    GFR  45 (L) >60 mL/min/1.73m2    Anion Gap 16 4 - 16   Blood Gas, Venous   Result Value Ref Range    pH, [place] 7.36 7.32 - 7.42    pCO2, [place] 39 38 - 52 mmHG    pO2, [place] 31 28 - 48 mmHG    Base Excess 3 (H) 0 - 2.4    HCO3, Venous 22.0 19 - 25 MMOL/L    O2 Sat, [place] 59.7 50 - 70 %   POCT Glucose   Result Value Ref Range    POC Glucose 340 (H) 70 - 99 MG/DL   TYPE AND SCREEN   Result Value Ref Range    ABO/Rh A POSITIVE     Antibody Screen NEGATIVE     Comment TUBE YIELDS ABO WHILE SPRCA YIELDS NEG SCREEN         Radiographs (if obtained):  [] The following radiograph was interpreted by myself in the absence of a radiologist:  [x] Radiologist's Report Reviewed:    CXR    EKG (if obtained): (All EKG's are interpreted by myself in the absence of a cardiologist)    I did order an EKG but unfortunately it was not performed. Total critical care time today provided was at least 30 minutes. This excludes seperately billable procedure. Critical care time provided for reviewing labs, images giving breathing treatments, steroids, oxygen, antibiotics, consulting medicine that required close evaluation and/or intervention with concern for patient decompensation. MDM:    Patient here with fatigue and malaise, COVID-19 positive here again tested positive on eighth of this month.   Got up from her family none of which including herself but not vaccinated. She has been at home with her  has not been eating or drinking well having cough congestio fatigue malaise. Granddaughter is worried she is dehydrated. Patient otherwise appears well vital signs are stable she is in room sleeping she is not confused she has no current pain again vital signs are stable I did order lab work she was mildly hyperglycemic I will give her IV fluids she is not currently in DKA. Will check chest x-ray her creatinine is 1.4 not horrible we will have case management see them about a full discharge home with home health care and assistance with daily activities otherwise patient stable. Patient did have multifocal pneumonia seen on chest x-ray earlier cultures, given antibiotics she is requiring oxygen as she does desaturate to 88% at rest.  She will desaturate more for exertion. We will get cultures given Decadron and again breathing treatments will consult hospital medicine for admission otherwise patient stable. She is on eloquis. Her serum creatinine is mildly elevated she is mildly hyperglycemic and IV fluids not evidence of DKA. Patient otherwise stable admitted. I did order an EKG but unfortunately was not performed patient admitted    CLINICAL IMPRESSION:  Final diagnoses:   Fatigue, unspecified type   COVID-19   Multifocal pneumonia   Hypoxia   Hyperglycemia       (Please note that portions of this note may have been completed with a voice recognition program. Efforts were made to edit the dictations but occasionally words aremis-transcribed.)    DISPOSITION REFERRAL (if applicable):  No follow-up provider specified.     DISPOSITION MEDICATIONS (if applicable):  Current Discharge Medication List             Hussain Cadet, [facility],   08/19/21 0055

## 2021-08-19 LAB
ALBUMIN SERPL-MCNC: 3.3 GM/DL (ref 3.4–5)
ALP BLD-CCNC: 74 IU/L (ref 40–129)
ALT SERPL-CCNC: 8 U/L (ref 10–40)
ANION GAP SERPL CALCULATED.3IONS-SCNC: 16 MMOL/L (ref 4–16)
ANISOCYTOSIS: ABNORMAL
AST SERPL-CCNC: 14 IU/L (ref 15–37)
BACTERIA: ABNORMAL /HPF
BANDED NEUTROPHILS ABSOLUTE COUNT: 0.14 K/CU MM
BANDED NEUTROPHILS RELATIVE PERCENT: 3 % (ref 5–11)
BILIRUB SERPL-MCNC: 0.3 MG/DL (ref 0–1)
BILIRUBIN URINE: NEGATIVE MG/DL
BLOOD, URINE: ABNORMAL
BUN BLDV-MCNC: 40 MG/DL (ref 6–23)
CALCIUM SERPL-MCNC: 8.7 MG/DL (ref 8.3–10.6)
CHLORIDE BLD-SCNC: 104 MMOL/L (ref 99–110)
CLARITY: ABNORMAL
CO2: 21 MMOL/L (ref 21–32)
COLOR: ABNORMAL
CREAT SERPL-MCNC: 1.2 MG/DL (ref 0.6–1.1)
D DIMER: 887 NG/ML(DDU)
DIFFERENTIAL TYPE: ABNORMAL
EKG ATRIAL RATE: 88 BPM
EKG DIAGNOSIS: NORMAL
EKG P AXIS: 15 DEGREES
EKG P-R INTERVAL: 120 MS
EKG Q-T INTERVAL: 356 MS
EKG QRS DURATION: 82 MS
EKG QTC CALCULATION (BAZETT): 430 MS
EKG R AXIS: 12 DEGREES
EKG T AXIS: -16 DEGREES
EKG VENTRICULAR RATE: 88 BPM
FERRITIN: 566 NG/ML (ref 15–150)
FIBRINOGEN LEVEL: 877 MG/DL (ref 196.9–442.1)
GFR AFRICAN AMERICAN: 54 ML/MIN/1.73M2
GFR NON-AFRICAN AMERICAN: 44 ML/MIN/1.73M2
GLUCOSE BLD-MCNC: 214 MG/DL (ref 70–99)
GLUCOSE BLD-MCNC: 283 MG/DL (ref 70–99)
GLUCOSE BLD-MCNC: 350 MG/DL (ref 70–99)
GLUCOSE BLD-MCNC: 385 MG/DL (ref 70–99)
GLUCOSE BLD-MCNC: 390 MG/DL (ref 70–99)
GLUCOSE, URINE: >500 MG/DL
HCT VFR BLD CALC: 37.9 % (ref 37–47)
HEMOGLOBIN: 11.6 GM/DL (ref 12.5–16)
KETONES, URINE: ABNORMAL MG/DL
LACTATE DEHYDROGENASE: 400 IU/L (ref 120–246)
LACTATE: 1.2 MMOL/L (ref 0.4–2)
LEUKOCYTE ESTERASE, URINE: ABNORMAL
LIPASE: 45 IU/L (ref 13–60)
LYMPHOCYTES ABSOLUTE: 0.3 K/CU MM
LYMPHOCYTES RELATIVE PERCENT: 7 % (ref 24–44)
MAGNESIUM: 2.3 MG/DL (ref 1.8–2.4)
MCH RBC QN AUTO: 26.1 PG (ref 27–31)
MCHC RBC AUTO-ENTMCNC: 30.6 % (ref 32–36)
MCV RBC AUTO: 85.4 FL (ref 78–100)
METAMYELOCYTES ABSOLUTE COUNT: 0.09 K/CU MM
METAMYELOCYTES PERCENT: 2 %
MONOCYTES ABSOLUTE: 0 K/CU MM
MONOCYTES RELATIVE PERCENT: 1 % (ref 0–4)
MUCUS: ABNORMAL HPF
NITRITE URINE, QUANTITATIVE: NEGATIVE
NON SQUAM EPI CELLS: <1 /HPF
PDW BLD-RTO: 13.8 % (ref 11.7–14.9)
PH, URINE: 6 (ref 5–8)
PLATELET # BLD: 439 K/CU MM (ref 140–440)
PMV BLD AUTO: 10.9 FL (ref 7.5–11.1)
POTASSIUM SERPL-SCNC: 4.3 MMOL/L (ref 3.5–5.1)
PRO-BNP: 2340 PG/ML
PROCALCITONIN: 0.32
PROTEIN UA: 30 MG/DL
RBC # BLD: 4.44 M/CU MM (ref 4.2–5.4)
RBC URINE: <1 /HPF (ref 0–6)
SEGMENTED NEUTROPHILS ABSOLUTE COUNT: 4.1 K/CU MM
SEGMENTED NEUTROPHILS RELATIVE PERCENT: 87 % (ref 36–66)
SODIUM BLD-SCNC: 141 MMOL/L (ref 135–145)
SPECIFIC GRAVITY UA: 1.02 (ref 1–1.03)
SQUAMOUS EPITHELIAL: 1 /HPF
TOTAL PROTEIN: 6.3 GM/DL (ref 6.4–8.2)
TRICHOMONAS: ABNORMAL /HPF
TROPONIN T: <0.01 NG/ML
TSH HIGH SENSITIVITY: 0.19 UIU/ML (ref 0.27–4.2)
UROBILINOGEN, URINE: NEGATIVE MG/DL (ref 0.2–1)
VITAMIN D 25-HYDROXY: 47.9 NG/ML
WBC # BLD: 4.6 K/CU MM (ref 4–10.5)
WBC UA: 1 /HPF (ref 0–5)

## 2021-08-19 PROCEDURE — 87449 NOS EACH ORGANISM AG IA: CPT

## 2021-08-19 PROCEDURE — 82962 GLUCOSE BLOOD TEST: CPT

## 2021-08-19 PROCEDURE — 83605 ASSAY OF LACTIC ACID: CPT

## 2021-08-19 PROCEDURE — 94761 N-INVAS EAR/PLS OXIMETRY MLT: CPT

## 2021-08-19 PROCEDURE — 6370000000 HC RX 637 (ALT 250 FOR IP): Performed by: NURSE PRACTITIONER

## 2021-08-19 PROCEDURE — 6370000000 HC RX 637 (ALT 250 FOR IP): Performed by: FAMILY MEDICINE

## 2021-08-19 PROCEDURE — 85027 COMPLETE CBC AUTOMATED: CPT

## 2021-08-19 PROCEDURE — 87899 AGENT NOS ASSAY W/OPTIC: CPT

## 2021-08-19 PROCEDURE — 80053 COMPREHEN METABOLIC PANEL: CPT

## 2021-08-19 PROCEDURE — 83615 LACTATE (LD) (LDH) ENZYME: CPT

## 2021-08-19 PROCEDURE — 2060000000 HC ICU INTERMEDIATE R&B

## 2021-08-19 PROCEDURE — 85007 BL SMEAR W/DIFF WBC COUNT: CPT

## 2021-08-19 PROCEDURE — 1200000000 HC SEMI PRIVATE

## 2021-08-19 PROCEDURE — 2580000003 HC RX 258: Performed by: NURSE PRACTITIONER

## 2021-08-19 PROCEDURE — 85384 FIBRINOGEN ACTIVITY: CPT

## 2021-08-19 PROCEDURE — 82728 ASSAY OF FERRITIN: CPT

## 2021-08-19 PROCEDURE — 93010 ELECTROCARDIOGRAM REPORT: CPT | Performed by: INTERNAL MEDICINE

## 2021-08-19 PROCEDURE — 6360000002 HC RX W HCPCS: Performed by: NURSE PRACTITIONER

## 2021-08-19 PROCEDURE — 2700000000 HC OXYGEN THERAPY PER DAY

## 2021-08-19 PROCEDURE — 82306 VITAMIN D 25 HYDROXY: CPT

## 2021-08-19 PROCEDURE — 81001 URINALYSIS AUTO W/SCOPE: CPT

## 2021-08-19 PROCEDURE — 87086 URINE CULTURE/COLONY COUNT: CPT

## 2021-08-19 PROCEDURE — 36415 COLL VENOUS BLD VENIPUNCTURE: CPT

## 2021-08-19 PROCEDURE — 85379 FIBRIN DEGRADATION QUANT: CPT

## 2021-08-19 RX ORDER — PANTOPRAZOLE SODIUM 40 MG/1
40 TABLET, DELAYED RELEASE ORAL
Status: DISCONTINUED | OUTPATIENT
Start: 2021-08-19 | End: 2021-08-27 | Stop reason: HOSPADM

## 2021-08-19 RX ORDER — GLIPIZIDE 5 MG/1
5 TABLET ORAL
Status: DISCONTINUED | OUTPATIENT
Start: 2021-08-19 | End: 2021-08-27 | Stop reason: HOSPADM

## 2021-08-19 RX ORDER — LOPERAMIDE HYDROCHLORIDE 2 MG/1
2 CAPSULE ORAL 4 TIMES DAILY PRN
Status: DISCONTINUED | OUTPATIENT
Start: 2021-08-19 | End: 2021-08-27 | Stop reason: HOSPADM

## 2021-08-19 RX ORDER — INSULIN GLARGINE 100 [IU]/ML
20 INJECTION, SOLUTION SUBCUTANEOUS NIGHTLY
Status: DISCONTINUED | OUTPATIENT
Start: 2021-08-19 | End: 2021-08-21

## 2021-08-19 RX ADMIN — GLIPIZIDE 5 MG: 5 TABLET ORAL at 09:23

## 2021-08-19 RX ADMIN — PANTOPRAZOLE SODIUM 40 MG: 40 TABLET, DELAYED RELEASE ORAL at 16:55

## 2021-08-19 RX ADMIN — INSULIN LISPRO 4 UNITS: 100 INJECTION, SOLUTION INTRAVENOUS; SUBCUTANEOUS at 16:57

## 2021-08-19 RX ADMIN — PANTOPRAZOLE SODIUM 40 MG: 40 TABLET, DELAYED RELEASE ORAL at 09:23

## 2021-08-19 RX ADMIN — ROSUVASTATIN CALCIUM 10 MG: 5 TABLET, COATED ORAL at 21:32

## 2021-08-19 RX ADMIN — GLIPIZIDE 5 MG: 5 TABLET ORAL at 16:55

## 2021-08-19 RX ADMIN — ENOXAPARIN SODIUM 30 MG: 30 INJECTION SUBCUTANEOUS at 21:32

## 2021-08-19 RX ADMIN — SODIUM CHLORIDE, PRESERVATIVE FREE 10 ML: 5 INJECTION INTRAVENOUS at 09:27

## 2021-08-19 RX ADMIN — INSULIN GLARGINE 20 UNITS: 100 INJECTION, SOLUTION SUBCUTANEOUS at 21:32

## 2021-08-19 RX ADMIN — ASPIRIN 81 MG: 81 TABLET, COATED ORAL at 09:24

## 2021-08-19 RX ADMIN — INSULIN LISPRO 10 UNITS: 100 INJECTION, SOLUTION INTRAVENOUS; SUBCUTANEOUS at 12:03

## 2021-08-19 RX ADMIN — AZITHROMYCIN MONOHYDRATE 500 MG: 500 INJECTION, POWDER, LYOPHILIZED, FOR SOLUTION INTRAVENOUS at 21:31

## 2021-08-19 RX ADMIN — BISACODYL 5 MG: 5 TABLET, COATED ORAL at 09:24

## 2021-08-19 RX ADMIN — CARVEDILOL 6.25 MG: 6.25 TABLET, FILM COATED ORAL at 09:24

## 2021-08-19 RX ADMIN — LOPERAMIDE HYDROCHLORIDE 2 MG: 2 CAPSULE ORAL at 18:09

## 2021-08-19 RX ADMIN — CEFEPIME HYDROCHLORIDE 2000 MG: 2 INJECTION, POWDER, FOR SOLUTION INTRAVENOUS at 09:26

## 2021-08-19 RX ADMIN — CARVEDILOL 6.25 MG: 6.25 TABLET, FILM COATED ORAL at 16:55

## 2021-08-19 RX ADMIN — SODIUM CHLORIDE, PRESERVATIVE FREE 10 ML: 5 INJECTION INTRAVENOUS at 21:33

## 2021-08-19 RX ADMIN — ENOXAPARIN SODIUM 30 MG: 30 INJECTION SUBCUTANEOUS at 09:26

## 2021-08-19 RX ADMIN — Medication 2000 UNITS: at 09:26

## 2021-08-19 RX ADMIN — INSULIN LISPRO 10 UNITS: 100 INJECTION, SOLUTION INTRAVENOUS; SUBCUTANEOUS at 09:46

## 2021-08-19 RX ADMIN — CEFEPIME HYDROCHLORIDE 2000 MG: 2 INJECTION, POWDER, FOR SOLUTION INTRAVENOUS at 22:40

## 2021-08-19 RX ADMIN — DEXAMETHASONE SODIUM PHOSPHATE 6 MG: 10 INJECTION INTRAMUSCULAR; INTRAVENOUS at 09:25

## 2021-08-19 NOTE — PROGRESS NOTES
Hospitalist Progress Note      Name:  Quinton Duarte /Age/Sex: 1950  (69 y.o. female)   MRN & CSN:  [id number] & [id number] Admission Date/Time: 2021  3:58 PM   Location:  -A PCP: [address] Day: 2    Assessment and Plan:   Quinton Duarte is a 69 y.o.  female  who presents with <principal problem not specified>    1. PNEUMONIA DUE TO COVID-19  -Stable continue oxygen and steroid    2. ACUTE RESPIRATORY FAILURE WITH HYPOXIA  -sec to above. See plan above, continue oxygen and wean as tolerated    3. TYPE 2 DIABETES WITH HYPERGLYCEMIA  -glucose high, on SSI coverage, home meds held on admission. Restart glipizide and continue to monitor FBGs, also added basal insulin    4. CORONARY ARTERY DISEASE  -stable no issues    5. ESSENTIAL HYPERTENSION  -Stable monitor        Diet ADULT DIET; Regular; 3 carb choices (45 gm/meal)  Adult Oral Nutrition Supplement; Diabetic Oral Supplement   DVT Prophylaxis [x] Lovenox, []  Heparin, [] SCDs, [] Ambulation   GI Prophylaxis [] PPI,  [] H2 Blocker,  [] Carafate,  [] Diet/Tube Feeds   Code Status Full Code   Disposition Patient requires continued admission due to resp failure   MDM [] Low, [] Moderate,[]  High  Patient's risk as above due to resp failure     History of Present Illness:     Chief Complaint: <principal problem not specified>  Quinton Duarte is a 69 y.o.  female  who presents with SOB    Currently she is resting comfortably in bed currently on 2L oxygen and saturating in low 90s%. No specific complaints       Ten point ROS reviewed negative, unless as noted above    Objective: Intake/Output Summary (Last 24 hours) at 2021 1508  Last data filed at 2021 1206  Gross per 24 hour   Intake 400 ml   Output 100 ml   Net 300 ml      Vitals:   Vitals:    21 1203   BP: (!) 151/66   Pulse: 70   Resp: 29   Temp:    SpO2: 95%     Physical Exam:   GEN Awake female, sitting upright in bed in no apparent distress. Appears given age. EYES Pupils are equally round. No scleral erythema, discharge, or conjunctivitis. HENT Mucous membranes are moist. Oral pharynx without exudates, no evidence of thrush. NECK Supple, no apparent thyromegaly or masses. RESP Clear to auscultation, no wheezes, rales or rhonchi. Symmetric chest movement  CARDIO/VASC S1/S2 auscultated. Regular rate without appreciable murmurs, rubs, or gallops. No JVD or carotid bruits. Peripheral pulses equal bilaterally and palpable. No peripheral edema. GI Abdomen is soft without significant tenderness, masses, or guarding. Bowel sounds are normoactive. Rectal exam deferred. HEME/LYMPH No palpable cervical lymphadenopathy and no hepatosplenomegaly. No petechiae or ecchymoses. MSK No gross joint deformities. SKIN Normal coloration, warm, dry. NEURO Cranial nerves appear grossly intact, normal speech, no lateralizing weakness. PSYCH Awake, alert, oriented x 4. Affect appropriate.     Medications:   Medications:    glipiZIDE  5 mg Oral BID AC    insulin glargine  20 Units Subcutaneous Nightly    pantoprazole  40 mg Oral BID AC    sodium chloride flush  5-40 mL Intravenous 2 times per day    enoxaparin  30 mg Subcutaneous BID    Vitamin D  2,000 Units Oral Daily    azithromycin  500 mg Intravenous Q24H    cefepime  2,000 mg Intravenous Q12H    bisacodyl  5 mg Oral Daily    dexamethasone  6 mg Intravenous Q24H    aspirin  81 mg Oral Daily    insulin lispro  0-12 Units Subcutaneous TID WC    insulin lispro  0-6 Units Subcutaneous Nightly    carvedilol  6.25 mg Oral BID WC    rosuvastatin  10 mg Oral Nightly      Infusions:    sodium chloride      dextrose       PRN Meds: albuterol sulfate HFA, 2 puff, Q6H PRN  sodium chloride flush, 5-40 mL, PRN  sodium chloride, 25 mL, PRN  ondansetron, 4 mg, Q8H PRN   Or  ondansetron, 4 mg, Q6H PRN  polyethylene glycol, 17 g, Daily PRN  acetaminophen, 650 mg, Q6H PRN   Or  acetaminophen, 650 mg, Q6H PRN  guaiFENesin-dextromethorphan, 5 mL, Q4H PRN  glucose, 15 g, PRN  dextrose, 12.5 g, PRN  glucagon (rDNA), 1 mg, PRN  dextrose, 100 mL/hr, PRN

## 2021-08-19 NOTE — H&P
HISTORY AND PHYSICAL             Date: 8/18/2021        Patient Name: Liam Herron     YOB: 1950      Age:  69 y.o. Chief Complaint     Chief Complaint   Patient presents with    Fatigue     tested pos for COVID 08/08/2021      Fatigue general malaise    History Obtained From   patient, family member -granddaughter, electronic medical record    History of Present Illness   69-year-old female who tested positive for Covid 8/8/2021. Presents with worsening malaise x3 days, cough, congestion, decreased appetite activity and appetite. She has not been vaccinated. Rapid COVID-19 is confirmed positive. Patient states she has been on cough medicine at home but no antibiotics. She reports she has been ambulating around home for last week has been staying in bed for past 2 to 3 days. Denies nausea vomiting diarrhea blood in stool or urine. Past Medical History     Past Medical History:   Diagnosis Date    Anemia     Managed by Dr. Carmen Holly CAD (coronary artery disease)     follows with Dr Yonny Nicolas Diabetes mellitus type 2, uncontrolled ([facility] [address]     \"dx 7327    Diastolic dysfunction 52/9140    [facility]    Esophageal stricture     dilation per GI    Hiatal hernia     History of blood transfusion     \"had blood transfusion with 4th child- have hx also of iron infusions for anemia 2017    Hyperlipidemia     Hypertension     IBS (irritable bowel syndrome)     with diarrhea    Iron deficiency anemia     Iron Infusions- follows with Julia Araiza and Jose G Cardenas    LVH (left ventricular hypertrophy) 09/2012    [facility]    Multiple gastric polyps 2004    Dr Donna Miller Obesity     PAD (peripheral artery disease) ([facility] [address]     S/P CABG x 4 08/06/2012    4V CABG- LIMA-LAD, SVG-CX, SVG-PDA, SVG-RCA- Follows with Dr Malissa Hernandez Sinus tachycardia     follows with [facility]    SVT (supraventricular tachycardia) [facility])     old chart gives hx of SVT in the past    Vitamin D deficiency         Past Surgical History     Past Surgical History:   Procedure Laterality Date    BALLOON ANGIOPLASTY, ARTERY Right 08/19/2015    RIght SFA 90%->10%, Right Popliteal 1000%->10%    CARDIAC CATHETERIZATION  08/02/2012    CARDIAC SURGERY      open heart surgery 8/2012    CHOLECYSTECTOMY  15+ yrs ago    COLONOSCOPY  2017    CORONARY ARTERY BYPASS GRAFT  08/02/2012    4V CABG- LIMA-LAD, SVG-CX, SVG-PDA, SVG-RCA    ENDOSCOPY, COLON, DIAGNOSTIC  10/10/2017    esophageal stricture, hiatal hernia, candidiasis    ESOPHAGEAL DILATATION      Dr. Sheridan Jackson      biltateral- \"total of 9 surgeries- all scopes\"    TRANSLUMINAL ANGIOPLASTY Left 09/23/2015 9/23/2015-Left mid and distal SFA and Left Popliteal    TUBAL LIGATION  1978        Medications Prior to Admission     Prior to Admission medications    Medication Sig Start Date End Date Taking?  Authorizing Provider   metoprolol succinate (TOPROL XL) 25 MG extended release tablet Take 25 mg by mouth nightly   Yes Historical Provider, MD   benzonatate (TESSALON) 100 MG capsule Take 1 capsule by mouth 3 times daily as needed for Cough 8/13/21 8/20/21  Reba Duran, DO   SITagliptin (JANUVIA) 50 MG tablet TAKE 1 TABLET BY MOUTH DAILY 7/13/21   Reba Duran, DO   dapagliflozin (FARXIGA) 5 MG tablet Take 1 tablet by mouth every morning 6/29/21   Reba Duran, DO   lisinopril (PRINIVIL;ZESTRIL) 5 MG tablet Take 1 tablet by mouth daily 6/3/21   Reba Duran, DO   rosuvastatin (CRESTOR) 10 MG tablet Take 1 tablet by mouth nightly 6/3/21   Reba Duran, DO   glipiZIDE (GLUCOTROL) 5 MG tablet Take 1 tablet by mouth 2 times daily (with meals) 6/3/21   Reba Duran, DO   omeprazole (PRILOSEC) 40 MG delayed release capsule TK 1 C PO QD 30 MIN B PREMA 11/9/20   Historical Provider, MD   carvedilol (COREG) 6.25 MG tablet Take 1 tablet by mouth 2 times daily (with meals) 12/10/18   Alina Pino MD amLODIPine (NORVASC) 5 MG tablet Take 1 tablet by mouth daily 12/10/18   Gilbert Britton MD   amiodarone (CORDARONE) 200 MG tablet Take 200 mg by mouth daily    Historical Provider, MD   Cholecalciferol (VITAMIN D) 2000 UNITS CAPS capsule Take 2,000 Units by mouth daily Then weekly 50,000    Historical Provider, MD   clopidogrel (PLAVIX) 75 MG tablet Take 1 tablet by mouth daily 8/20/15   Gilbert Britton MD   acetaminophen (TYLENOL) 500 MG tablet Take 1,000 mg by mouth every 6 hours as needed for Pain    Historical Provider, MD   aspirin 81 MG EC tablet Take 81 mg by mouth daily. Historical Provider, MD        Allergies   Tetanus toxoids    Social History     Social History     Tobacco History     Smoking Status  Never Smoker    Smokeless Tobacco Use  Never Used          Alcohol History     Alcohol Use Status  No          Drug Use     Drug Use Status  No          Sexual Activity     Sexually Active  Not Currently Partners  Male                Family History     Family History   Problem Relation Age of Onset    Kidney Disease Mother         Dialysis    Diabetes Mother     High Blood Pressure Mother     Coronary Art Dis Mother         MI    Cancer Father         pancreatic cancer (Smoking)    Coronary Art Dis Father 43        MI early onset    Diabetes Other        Review of Systems   Review of Systems   Constitutional: Positive for activity change, appetite change, chills and fever. HENT: Negative for congestion and sore throat. Eyes: Negative. Respiratory: Positive for cough and shortness of breath. Cardiovascular: Negative for chest pain and leg swelling. Gastrointestinal: Negative for abdominal pain, constipation, nausea and vomiting. Endocrine: Negative. Genitourinary: Negative for dysuria and hematuria. Musculoskeletal: Positive for myalgias. Negative for neck pain. Skin: Negative for wound. Neurological: Negative for dizziness and light-headedness.    All other systems reviewed and are negative. Physical Exam   BP (!) 156/70   Pulse 92   Temp 98.8 °F (37.1 °C) (Oral)   Resp 29   Ht 5' 3\" (1.6 m)   Wt 160 lb (72.6 kg)   LMP 01/19/2002   SpO2 93%   BMI 28.34 kg/m²     Physical Exam  Vitals and nursing note reviewed. Constitutional:       General: She is not in acute distress. Appearance: She is ill-appearing. HENT:      Head: Normocephalic. Mouth/Throat:      Mouth: Mucous membranes are moist.      Pharynx: Posterior oropharyngeal erythema present. Cardiovascular:      Pulses: Normal pulses. Pulmonary:      Effort: Pulmonary effort is normal. No respiratory distress. Breath sounds: Decreased air movement present. Rhonchi present. No wheezing. Abdominal:      General: Abdomen is flat. Bowel sounds are normal. There is no distension. Musculoskeletal:         General: Normal range of motion. Skin:     General: Skin is warm and dry. Capillary Refill: Capillary refill takes 2 to 3 seconds. Neurological:      General: No focal deficit present. Mental Status: She is alert and oriented to person, place, and time.          Labs      Recent Results (from the past 24 hour(s))   POCT Glucose    Collection Time: 08/18/21  2:14 PM   Result Value Ref Range    POC Glucose 340 (H) 70 - 99 MG/DL   CBC auto diff    Collection Time: 08/18/21  2:22 PM   Result Value Ref Range    WBC 7.4 4.0 - 10.5 K/CU MM    RBC 4.62 4.2 - 5.4 M/CU MM    Hemoglobin 12.0 (L) 12.5 - 16.0 GM/DL    Hematocrit 40.0 37 - 47 %    MCV 86.6 78 - 100 FL    MCH 26.0 (L) 27 - 31 PG    MCHC 30.0 (L) 32.0 - 36.0 %    RDW 13.7 11.7 - 14.9 %    Platelets 659 (H) 688 - 440 K/CU MM    MPV 10.7 7.5 - 11.1 FL    Differential Type AUTOMATED DIFFERENTIAL     Segs Relative 79.2 (H) 36 - 66 %    Lymphocytes % 13.5 (L) 24 - 44 %    Monocytes % 5.1 (H) 0 - 4 %    Eosinophils % 0.0 0 - 3 %    Basophils % 0.3 0 - 1 %    Segs Absolute 5.9 K/CU MM    Lymphocytes Absolute 1.0 K/CU MM    Monocytes Absolute 0.4 K/CU MM    Eosinophils Absolute 0.0 K/CU MM    Basophils Absolute 0.0 K/CU MM    Nucleated RBC % 0.0 %    Total Nucleated RBC 0.0 K/CU MM    Total Immature Neutrophil 0.14 K/CU MM    Immature Neutrophil % 1.9 (H) 0 - 0.43 %   CMP    Collection Time: 08/18/21  2:22 PM   Result Value Ref Range    Sodium 142 135 - 145 MMOL/L    Potassium 4.5 3.5 - 5.1 MMOL/L    Chloride 105 99 - 110 mMol/L    CO2 21 21 - 32 MMOL/L    BUN 33 (H) 6 - 23 MG/DL    CREATININE 1.4 (H) 0.6 - 1.1 MG/DL    Glucose 382 (H) 70 - 99 MG/DL    Calcium 9.6 8.3 - 10.6 MG/DL    Albumin 2.9 (L) 3.4 - 5.0 GM/DL    Total Protein 7.4 6.4 - 8.2 GM/DL    Total Bilirubin 0.5 0.0 - 1.0 MG/DL    ALT 10 10 - 40 U/L    AST 20 15 - 37 IU/L    Alkaline Phosphatase 75 40 - 128 IU/L    GFR Non- 37 (L) >60 mL/min/1.73m2    GFR  45 (L) >60 mL/min/1.73m2    Anion Gap 16 4 - 16   TYPE AND SCREEN    Collection Time: 08/18/21  2:22 PM   Result Value Ref Range    ABO/Rh A POSITIVE     Antibody Screen NEGATIVE     Comment TUBE YIELDS ABO WHILE SPRCA YIELDS NEG SCREEN    Blood Gas, Venous    Collection Time: 08/18/21  4:30 PM   Result Value Ref Range    pH, [place] 7.36 7.32 - 7.42    pCO2, [place] 39 38 - 52 mmHG    pO2, [place] 31 28 - 48 mmHG    Base Excess 3 (H) 0 - 2.4    HCO3, Venous 22.0 19 - 25 MMOL/L    O2 Sat, [place] 59.7 50 - 70 %   COVID-19, Rapid    Collection Time: 08/18/21  4:45 PM    Specimen: Nasopharyngeal   Result Value Ref Range    Source THROAT     SARS-CoV-2, NAAT DETECTED (A) NOT DETECTED        Imaging/Diagnostics Last 24 Hours   XR CHEST PORTABLE    Result Date: 8/18/2021  EXAMINATION: ONE XRAY VIEW OF THE CHEST 8/18/2021 4:20 pm COMPARISON: None.  HISTORY: ORDERING SYSTEM PROVIDED HISTORY: covid/fatigue TECHNOLOGIST PROVIDED HISTORY: Reason for exam:->covid/fatigue Reason for Exam: covid+    fatigue Acuity: Acute Type of Exam: Initial Relevant Medical/Surgical History: cad FINDINGS: Bilateral airspace disease is identified Held  ADULT DIET;  Regular; 3 carb choices (45 gm/meal)  DIET EFFECTIVE NOW      Signed and Held                 DVT Prophylaxis [x] Lovenox, []  Heparin, [] SCDs, [] Ambulation   GI Prophylaxis [x] PPI,  [] H2 Blocker,  [] Carafate,  [x] Diet/Tube Feeds   Code Status Prior   Disposition Patient requires continued admission due to COVID 19 PNA     MDM [] Low, [] Moderate,[x]  High  Patient's risk as above                   Consultations Ordered:  IP CONSULT TO CASE MANAGEMENT  IP CONSULT TO HOSPITALIST  IP CONSULT TO Kathy    Electronically signed by JOSE RAMON Henderson CNP on 8/18/21 at 8:27 PM EDT

## 2021-08-19 NOTE — FLOWSHEET NOTE
08/19/21 0935   Encounter Summary   Services provided to: Patient not available   Referral/Consult From: Rounding   Routine   Type Initial   Assessment Unable to respond

## 2021-08-20 LAB
CULTURE: NORMAL
D DIMER: 751 NG/ML(DDU)
GLUCOSE BLD-MCNC: 278 MG/DL (ref 70–99)
GLUCOSE BLD-MCNC: 297 MG/DL (ref 70–99)
GLUCOSE BLD-MCNC: 318 MG/DL (ref 70–99)
GLUCOSE BLD-MCNC: 348 MG/DL (ref 70–99)
LEGIONELLA URINARY AG: NEGATIVE
LV EF: 53 %
LVEF MODALITY: NORMAL
Lab: NORMAL
SPECIMEN: NORMAL
STREP PNEUMONIAE ANTIGEN: NORMAL

## 2021-08-20 PROCEDURE — 84145 PROCALCITONIN (PCT): CPT

## 2021-08-20 PROCEDURE — 1200000000 HC SEMI PRIVATE

## 2021-08-20 PROCEDURE — 94761 N-INVAS EAR/PLS OXIMETRY MLT: CPT

## 2021-08-20 PROCEDURE — 93306 TTE W/DOPPLER COMPLETE: CPT

## 2021-08-20 PROCEDURE — 6360000002 HC RX W HCPCS: Performed by: NURSE PRACTITIONER

## 2021-08-20 PROCEDURE — 2060000000 HC ICU INTERMEDIATE R&B

## 2021-08-20 PROCEDURE — 6370000000 HC RX 637 (ALT 250 FOR IP): Performed by: NURSE PRACTITIONER

## 2021-08-20 PROCEDURE — 6370000000 HC RX 637 (ALT 250 FOR IP): Performed by: FAMILY MEDICINE

## 2021-08-20 PROCEDURE — 82962 GLUCOSE BLOOD TEST: CPT

## 2021-08-20 PROCEDURE — 2700000000 HC OXYGEN THERAPY PER DAY

## 2021-08-20 PROCEDURE — 2580000003 HC RX 258: Performed by: NURSE PRACTITIONER

## 2021-08-20 PROCEDURE — 36415 COLL VENOUS BLD VENIPUNCTURE: CPT

## 2021-08-20 PROCEDURE — 85379 FIBRIN DEGRADATION QUANT: CPT

## 2021-08-20 RX ORDER — BENZONATATE 100 MG/1
100 CAPSULE ORAL 3 TIMES DAILY PRN
Status: DISCONTINUED | OUTPATIENT
Start: 2021-08-20 | End: 2021-08-27 | Stop reason: HOSPADM

## 2021-08-20 RX ORDER — SUCRALFATE 1 G/1
1 TABLET ORAL 2 TIMES DAILY
Status: DISCONTINUED | OUTPATIENT
Start: 2021-08-20 | End: 2021-08-27 | Stop reason: HOSPADM

## 2021-08-20 RX ORDER — AMIODARONE HYDROCHLORIDE 200 MG/1
200 TABLET ORAL DAILY
Status: DISCONTINUED | OUTPATIENT
Start: 2021-08-20 | End: 2021-08-27 | Stop reason: HOSPADM

## 2021-08-20 RX ORDER — SUCRALFATE 1 G/1
1 TABLET ORAL 2 TIMES DAILY
Status: ON HOLD | COMMUNITY
Start: 2021-08-04 | End: 2022-04-02 | Stop reason: HOSPADM

## 2021-08-20 RX ORDER — CLOPIDOGREL BISULFATE 75 MG/1
75 TABLET ORAL DAILY
Status: DISCONTINUED | OUTPATIENT
Start: 2021-08-20 | End: 2021-08-27 | Stop reason: HOSPADM

## 2021-08-20 RX ORDER — AMLODIPINE BESYLATE 5 MG/1
5 TABLET ORAL DAILY
Status: DISCONTINUED | OUTPATIENT
Start: 2021-08-20 | End: 2021-08-27 | Stop reason: HOSPADM

## 2021-08-20 RX ORDER — LISINOPRIL 5 MG/1
5 TABLET ORAL DAILY
Status: DISCONTINUED | OUTPATIENT
Start: 2021-08-20 | End: 2021-08-27 | Stop reason: HOSPADM

## 2021-08-20 RX ORDER — ALOGLIPTIN 12.5 MG/1
12.5 TABLET, FILM COATED ORAL DAILY
Status: DISCONTINUED | OUTPATIENT
Start: 2021-08-20 | End: 2021-08-27 | Stop reason: HOSPADM

## 2021-08-20 RX ADMIN — AMIODARONE HYDROCHLORIDE 200 MG: 200 TABLET ORAL at 16:22

## 2021-08-20 RX ADMIN — BISACODYL 5 MG: 5 TABLET, COATED ORAL at 08:01

## 2021-08-20 RX ADMIN — ENOXAPARIN SODIUM 30 MG: 30 INJECTION SUBCUTANEOUS at 20:52

## 2021-08-20 RX ADMIN — ASPIRIN 81 MG: 81 TABLET, COATED ORAL at 08:00

## 2021-08-20 RX ADMIN — CARVEDILOL 6.25 MG: 6.25 TABLET, FILM COATED ORAL at 16:22

## 2021-08-20 RX ADMIN — GLIPIZIDE 5 MG: 5 TABLET ORAL at 08:01

## 2021-08-20 RX ADMIN — ENOXAPARIN SODIUM 30 MG: 30 INJECTION SUBCUTANEOUS at 08:00

## 2021-08-20 RX ADMIN — Medication 2000 UNITS: at 08:00

## 2021-08-20 RX ADMIN — Medication 2000 UNITS: at 16:21

## 2021-08-20 RX ADMIN — SUCRALFATE 1 G: 1 TABLET ORAL at 20:52

## 2021-08-20 RX ADMIN — CEFEPIME HYDROCHLORIDE 2000 MG: 2 INJECTION, POWDER, FOR SOLUTION INTRAVENOUS at 20:53

## 2021-08-20 RX ADMIN — GLIPIZIDE 5 MG: 5 TABLET ORAL at 16:22

## 2021-08-20 RX ADMIN — CLOPIDOGREL BISULFATE 75 MG: 75 TABLET ORAL at 16:22

## 2021-08-20 RX ADMIN — PANTOPRAZOLE SODIUM 40 MG: 40 TABLET, DELAYED RELEASE ORAL at 16:22

## 2021-08-20 RX ADMIN — AZITHROMYCIN MONOHYDRATE 500 MG: 500 INJECTION, POWDER, LYOPHILIZED, FOR SOLUTION INTRAVENOUS at 18:10

## 2021-08-20 RX ADMIN — CEFEPIME HYDROCHLORIDE 2000 MG: 2 INJECTION, POWDER, FOR SOLUTION INTRAVENOUS at 08:01

## 2021-08-20 RX ADMIN — INSULIN LISPRO 8 UNITS: 100 INJECTION, SOLUTION INTRAVENOUS; SUBCUTANEOUS at 11:37

## 2021-08-20 RX ADMIN — SODIUM CHLORIDE, PRESERVATIVE FREE 10 ML: 5 INJECTION INTRAVENOUS at 20:53

## 2021-08-20 RX ADMIN — INSULIN LISPRO 9 UNITS: 100 INJECTION, SOLUTION INTRAVENOUS; SUBCUTANEOUS at 16:22

## 2021-08-20 RX ADMIN — ALOGLIPTIN 12.5 MG: 12.5 TABLET, FILM COATED ORAL at 14:48

## 2021-08-20 RX ADMIN — INSULIN LISPRO 6 UNITS: 100 INJECTION, SOLUTION INTRAVENOUS; SUBCUTANEOUS at 08:01

## 2021-08-20 RX ADMIN — DEXAMETHASONE SODIUM PHOSPHATE 6 MG: 10 INJECTION INTRAMUSCULAR; INTRAVENOUS at 08:00

## 2021-08-20 RX ADMIN — ROSUVASTATIN CALCIUM 10 MG: 5 TABLET, COATED ORAL at 20:52

## 2021-08-20 RX ADMIN — LISINOPRIL 5 MG: 5 TABLET ORAL at 16:22

## 2021-08-20 RX ADMIN — AMLODIPINE BESYLATE 5 MG: 5 TABLET ORAL at 16:22

## 2021-08-20 RX ADMIN — CARVEDILOL 6.25 MG: 6.25 TABLET, FILM COATED ORAL at 08:01

## 2021-08-20 RX ADMIN — PANTOPRAZOLE SODIUM 40 MG: 40 TABLET, DELAYED RELEASE ORAL at 08:00

## 2021-08-20 RX ADMIN — INSULIN GLARGINE 20 UNITS: 100 INJECTION, SOLUTION SUBCUTANEOUS at 20:55

## 2021-08-20 RX ADMIN — SODIUM CHLORIDE, PRESERVATIVE FREE 10 ML: 5 INJECTION INTRAVENOUS at 08:01

## 2021-08-20 NOTE — PROGRESS NOTES
Hospitalist Progress Note      Name:  Jeevan Hancock /Age/Sex: 1950  (69 y.o. female)   MRN & CSN:  [id number] & [id number] Admission Date/Time: 2021  3:58 PM   Location:  -A PCP: [address] Day: 3    Assessment and Plan:   Jeevan Hancock is a 69 y.o.  female  who presents with <principal problem not specified>  1. PNEUMONIA DUE TO COVID-19  -Stable she is feeling better today, will continue antibiotics and steroids for now     2. ACUTE RESPIRATORY FAILURE WITH HYPOXIA  -sec to above. See plan above, continue oxygen and wean as tolerated     3. TYPE 2 DIABETES WITH HYPERGLYCEMIA  -glucose has improved but still high. Changed SSI to high coverage and restarted januvia, continue to monitor FBGs     4. CORONARY ARTERY DISEASE  -stable no issues     5. ESSENTIAL HYPERTENSION  -stable monitor BP. It was slightly elevated noted home meds not ordered. Restarted today    6. CKD 3a  -stable will recheck tomorrow        Diet ADULT DIET; Regular; 3 carb choices (45 gm/meal)  Adult Oral Nutrition Supplement; Diabetic Oral Supplement   DVT Prophylaxis [x] Lovenox, []  Heparin, [] SCDs, [] Ambulation   GI Prophylaxis [] PPI,  [] H2 Blocker,  [] Carafate,  [] Diet/Tube Feeds   Code Status Full Code   Disposition Patient requires continued admission due to resp failure   MDM [] Low, [] Moderate,[]  High  Patient's risk as above due to resp failure     History of Present Illness:     Chief Complaint: <principal problem not specified>  Jeevan Hancock is a 69 y.o.  female  who presents with SOB  No issues overnight. She slept well, appetite has been good. Her granddaughter present at bedside. No other specific complaints       Ten point ROS reviewed negative, unless as noted above    Objective:        Intake/Output Summary (Last 24 hours) at 2021 1750  Last data filed at 2021 204  Gross per 24 hour   Intake 100 ml   Output --   Net 100 ml      Vitals:   Vitals:    21 1700 BP: (!) 104/54   Pulse: 73   Resp: 22   Temp:    SpO2: (!) 87%     Physical Exam:   GEN Awake female, sitting upright in bed in no apparent distress. Appears given age. EYES Pupils are equally round. No scleral erythema, discharge, or conjunctivitis. HENT Mucous membranes are moist. Oral pharynx without exudates, no evidence of thrush. NECK Supple, no apparent thyromegaly or masses. RESP few scatt rhonchi lower lung fields, no wheezes, rales   Symmetric chest movement   CARDIO/VASC S1/S2 auscultated. Regular rate without appreciable murmurs, rubs, or gallops. No JVD or carotid bruits. Peripheral pulses equal bilaterally and palpable. No peripheral edema. GI Abdomen is soft without significant tenderness, masses, or guarding. Bowel sounds are normoactive. Rectal exam deferred. HEME/LYMPH No palpable cervical lymphadenopathy and no hepatosplenomegaly. No petechiae or ecchymoses. MSK No gross joint deformities. SKIN Normal coloration, warm, dry. NEURO Cranial nerves appear grossly intact, normal speech, no lateralizing weakness. PSYCH Awake, alert, oriented x 4. Affect appropriate.     Medications:   Medications:    alogliptin  12.5 mg Oral Daily    insulin lispro  0-18 Units Subcutaneous TID WC    insulin lispro  0-9 Units Subcutaneous Nightly    amiodarone  200 mg Oral Daily    amLODIPine  5 mg Oral Daily    vitamin D  2,000 Units Oral Daily    clopidogrel  75 mg Oral Daily    lisinopril  5 mg Oral Daily    sucralfate  1 g Oral BID    glipiZIDE  5 mg Oral BID AC    insulin glargine  20 Units Subcutaneous Nightly    pantoprazole  40 mg Oral BID AC    sodium chloride flush  5-40 mL Intravenous 2 times per day    enoxaparin  30 mg Subcutaneous BID    azithromycin  500 mg Intravenous Q24H    cefepime  2,000 mg Intravenous Q12H    bisacodyl  5 mg Oral Daily    dexamethasone  6 mg Intravenous Q24H    aspirin  81 mg Oral Daily    insulin lispro  0-6 Units Subcutaneous Nightly    carvedilol 6.25 mg Oral BID WC    rosuvastatin  10 mg Oral Nightly      Infusions:    sodium chloride      dextrose       PRN Meds: benzonatate, 100 mg, TID PRN  loperamide, 2 mg, 4x Daily PRN  albuterol sulfate HFA, 2 puff, Q6H PRN  sodium chloride flush, 5-40 mL, PRN  sodium chloride, 25 mL, PRN  ondansetron, 4 mg, Q8H PRN   Or  ondansetron, 4 mg, Q6H PRN  polyethylene glycol, 17 g, Daily PRN  acetaminophen, 650 mg, Q6H PRN   Or  acetaminophen, 650 mg, Q6H PRN  guaiFENesin-dextromethorphan, 5 mL, Q4H PRN  glucose, 15 g, PRN  dextrose, 12.5 g, PRN  glucagon (rDNA), 1 mg, PRN  dextrose, 100 mL/hr, PRN

## 2021-08-20 NOTE — CARE COORDINATION
Chart reviewed. Patient is from home; appears independent. She has a PCP and insurance that assists with Rx when needed. Original ED plan was home; will follow for needs. Jeovanny Moreno RN     [id number] Contacted patient by phone in room. She is alert and able to discuss needs. She has no DME or HHC prior to admission. She is agreeable to Home O2 or HHC if needed. Reinforced CM role; will follow.  Jeovanny Moreno RN

## 2021-08-21 LAB
ANION GAP SERPL CALCULATED.3IONS-SCNC: 12 MMOL/L (ref 4–16)
BETA-HYDROXYBUTYRATE: 9.9 MG/DL (ref 0–3)
BUN BLDV-MCNC: 33 MG/DL (ref 6–23)
CALCIUM SERPL-MCNC: 9 MG/DL (ref 8.3–10.6)
CHLORIDE BLD-SCNC: 102 MMOL/L (ref 99–110)
CO2: 24 MMOL/L (ref 21–32)
CREAT SERPL-MCNC: 0.9 MG/DL (ref 0.6–1.1)
D DIMER: 691 NG/ML(DDU)
GFR AFRICAN AMERICAN: >60 ML/MIN/1.73M2
GFR NON-AFRICAN AMERICAN: >60 ML/MIN/1.73M2
GLUCOSE BLD-MCNC: 143 MG/DL (ref 70–99)
GLUCOSE BLD-MCNC: 256 MG/DL (ref 70–99)
GLUCOSE BLD-MCNC: 285 MG/DL (ref 70–99)
GLUCOSE BLD-MCNC: 303 MG/DL (ref 70–99)
GLUCOSE BLD-MCNC: 320 MG/DL (ref 70–99)
GLUCOSE BLD-MCNC: 334 MG/DL (ref 70–99)
HCT VFR BLD CALC: 35.8 % (ref 37–47)
HEMOGLOBIN: 11.4 GM/DL (ref 12.5–16)
MCH RBC QN AUTO: 26.1 PG (ref 27–31)
MCHC RBC AUTO-ENTMCNC: 31.8 % (ref 32–36)
MCV RBC AUTO: 81.9 FL (ref 78–100)
PDW BLD-RTO: 13.4 % (ref 11.7–14.9)
PLATELET # BLD: 497 K/CU MM (ref 140–440)
PMV BLD AUTO: 11 FL (ref 7.5–11.1)
POTASSIUM SERPL-SCNC: 4 MMOL/L (ref 3.5–5.1)
PROCALCITONIN: 0.17
RBC # BLD: 4.37 M/CU MM (ref 4.2–5.4)
SODIUM BLD-SCNC: 138 MMOL/L (ref 135–145)
WBC # BLD: 11.8 K/CU MM (ref 4–10.5)

## 2021-08-21 PROCEDURE — 1200000000 HC SEMI PRIVATE

## 2021-08-21 PROCEDURE — 6370000000 HC RX 637 (ALT 250 FOR IP): Performed by: FAMILY MEDICINE

## 2021-08-21 PROCEDURE — 36415 COLL VENOUS BLD VENIPUNCTURE: CPT

## 2021-08-21 PROCEDURE — 2700000000 HC OXYGEN THERAPY PER DAY

## 2021-08-21 PROCEDURE — 80048 BASIC METABOLIC PNL TOTAL CA: CPT

## 2021-08-21 PROCEDURE — 94761 N-INVAS EAR/PLS OXIMETRY MLT: CPT

## 2021-08-21 PROCEDURE — 85027 COMPLETE CBC AUTOMATED: CPT

## 2021-08-21 PROCEDURE — 6360000002 HC RX W HCPCS: Performed by: NURSE PRACTITIONER

## 2021-08-21 PROCEDURE — 82962 GLUCOSE BLOOD TEST: CPT

## 2021-08-21 PROCEDURE — 6370000000 HC RX 637 (ALT 250 FOR IP): Performed by: NURSE PRACTITIONER

## 2021-08-21 PROCEDURE — 85379 FIBRIN DEGRADATION QUANT: CPT

## 2021-08-21 PROCEDURE — 2580000003 HC RX 258: Performed by: NURSE PRACTITIONER

## 2021-08-21 RX ORDER — INSULIN GLARGINE 100 [IU]/ML
30 INJECTION, SOLUTION SUBCUTANEOUS NIGHTLY
Status: DISCONTINUED | OUTPATIENT
Start: 2021-08-21 | End: 2021-08-23

## 2021-08-21 RX ADMIN — INSULIN LISPRO 12 UNITS: 100 INJECTION, SOLUTION INTRAVENOUS; SUBCUTANEOUS at 09:10

## 2021-08-21 RX ADMIN — ENOXAPARIN SODIUM 30 MG: 30 INJECTION SUBCUTANEOUS at 09:12

## 2021-08-21 RX ADMIN — ASPIRIN 81 MG: 81 TABLET, COATED ORAL at 09:12

## 2021-08-21 RX ADMIN — CLOPIDOGREL BISULFATE 75 MG: 75 TABLET ORAL at 09:12

## 2021-08-21 RX ADMIN — ROSUVASTATIN CALCIUM 10 MG: 5 TABLET, COATED ORAL at 20:13

## 2021-08-21 RX ADMIN — CEFEPIME HYDROCHLORIDE 2000 MG: 2 INJECTION, POWDER, FOR SOLUTION INTRAVENOUS at 09:11

## 2021-08-21 RX ADMIN — AMIODARONE HYDROCHLORIDE 200 MG: 200 TABLET ORAL at 09:13

## 2021-08-21 RX ADMIN — PANTOPRAZOLE SODIUM 40 MG: 40 TABLET, DELAYED RELEASE ORAL at 05:54

## 2021-08-21 RX ADMIN — CARVEDILOL 6.25 MG: 6.25 TABLET, FILM COATED ORAL at 09:11

## 2021-08-21 RX ADMIN — INSULIN LISPRO 3 UNITS: 100 INJECTION, SOLUTION INTRAVENOUS; SUBCUTANEOUS at 16:27

## 2021-08-21 RX ADMIN — CEFEPIME HYDROCHLORIDE 2000 MG: 2 INJECTION, POWDER, FOR SOLUTION INTRAVENOUS at 20:14

## 2021-08-21 RX ADMIN — PANTOPRAZOLE SODIUM 40 MG: 40 TABLET, DELAYED RELEASE ORAL at 16:28

## 2021-08-21 RX ADMIN — SODIUM CHLORIDE, PRESERVATIVE FREE 10 ML: 5 INJECTION INTRAVENOUS at 20:14

## 2021-08-21 RX ADMIN — ENOXAPARIN SODIUM 30 MG: 30 INJECTION SUBCUTANEOUS at 20:13

## 2021-08-21 RX ADMIN — ALOGLIPTIN 12.5 MG: 12.5 TABLET, FILM COATED ORAL at 09:10

## 2021-08-21 RX ADMIN — CARVEDILOL 6.25 MG: 6.25 TABLET, FILM COATED ORAL at 16:28

## 2021-08-21 RX ADMIN — GLIPIZIDE 5 MG: 5 TABLET ORAL at 05:54

## 2021-08-21 RX ADMIN — SUCRALFATE 1 G: 1 TABLET ORAL at 09:13

## 2021-08-21 RX ADMIN — INSULIN GLARGINE 30 UNITS: 100 INJECTION, SOLUTION SUBCUTANEOUS at 20:14

## 2021-08-21 RX ADMIN — AMLODIPINE BESYLATE 5 MG: 5 TABLET ORAL at 09:12

## 2021-08-21 RX ADMIN — GLIPIZIDE 5 MG: 5 TABLET ORAL at 16:28

## 2021-08-21 RX ADMIN — SUCRALFATE 1 G: 1 TABLET ORAL at 20:13

## 2021-08-21 RX ADMIN — DEXAMETHASONE SODIUM PHOSPHATE 6 MG: 10 INJECTION INTRAMUSCULAR; INTRAVENOUS at 09:28

## 2021-08-21 RX ADMIN — SODIUM CHLORIDE 25 ML: 9 INJECTION, SOLUTION INTRAVENOUS at 09:20

## 2021-08-21 RX ADMIN — LISINOPRIL 5 MG: 5 TABLET ORAL at 09:12

## 2021-08-21 RX ADMIN — INSULIN LISPRO 12 UNITS: 100 INJECTION, SOLUTION INTRAVENOUS; SUBCUTANEOUS at 11:32

## 2021-08-21 RX ADMIN — Medication 2000 UNITS: at 09:11

## 2021-08-21 ASSESSMENT — PAIN SCALES - GENERAL: PAINLEVEL_OUTOF10: 0

## 2021-08-21 NOTE — PROGRESS NOTES
Hospitalist Progress Note      Name:  Caity Ramos /Age/Sex: 1950  (69 y.o. female)   MRN & CSN:  [id number] & [id number] Admission Date/Time: 2021  3:58 PM   Location:  -A PCP: [address] Day: 4    Assessment and Plan:   Caity Ramos is a 69 y.o.  female  who presents with <principal problem not specified>      1. PNEUMONIA DUE TO COVID-19  -Stable she is feeling better today, will continue antibiotics and steroids for now. Procal has trended downward. DC azithromycin. She has been weak, order PT/OT evaluation     2. ACUTE RESPIRATORY FAILURE WITH HYPOXIA  -sec to above. Stable is on 4L at 93% will continue and wean as tolerated     3. TYPE 2 DIABETES WITH HYPERGLYCEMIA  -glucose still high. Currently on high SSI coverage. Will increase basal insulin to 30 units and continue to monitor     4. CORONARY ARTERY DISEASE  -stable no issues. Echo was unremarkable     5. ESSENTIAL HYPERTENSION  -BP has been up and down but still within acceptable range for now will continue to monitor BP     6. CKD 3a  Diet ADULT DIET; Regular; 3 carb choices (45 gm/meal)  Adult Oral Nutrition Supplement; Diabetic Oral Supplement   DVT Prophylaxis [x] Lovenox, []  Heparin, [] SCDs, [] Ambulation   GI Prophylaxis [] PPI,  [] H2 Blocker,  [] Carafate,  [] Diet/Tube Feeds   Code Status Full Code   Disposition Patient requires continued admission due to resp failure   MDM [] Low, [] Moderate,[]  High  Patient's risk as above due to resp failure     History of Present Illness:     Chief Complaint: <principal problem not specified>  Caity Ramos is a 69 y.o.  female  who presents with SOB     Overall she is better she is sitting up in chair in no distress. No specific complaints       Ten point ROS reviewed negative, unless as noted above    Objective:        Intake/Output Summary (Last 24 hours) at 2021 1440  Last data filed at 2021 1814  Gross per 24 hour   Intake --   Output 650 ml   Net -650 ml      Vitals:   Vitals:    08/21/21 0755   BP:    Pulse:    Resp:    Temp: 97.8 °F (36.6 °C)   SpO2:      Physical Exam:   GEN Awake female, sitting upright in bed in no apparent distress. Appears given age. EYES Pupils are equally round. No scleral erythema, discharge, or conjunctivitis. HENT Mucous membranes are moist. Oral pharynx without exudates, no evidence of thrush. NECK Supple, no apparent thyromegaly or masses. RESP Clear to auscultation, no wheezes, rales or rhonchi. Symmetric chest movement   CARDIO/VASC S1/S2 auscultated. Regular rate without appreciable murmurs, rubs, or gallops. No JVD or carotid bruits. Peripheral pulses equal bilaterally and palpable. No peripheral edema. GI Abdomen is soft without significant tenderness, masses, or guarding. Bowel sounds are normoactive. Rectal exam deferred. HEME/LYMPH No palpable cervical lymphadenopathy and no hepatosplenomegaly. No petechiae or ecchymoses. MSK No gross joint deformities. SKIN Normal coloration, warm, dry. NEURO Cranial nerves appear grossly intact, normal speech, no lateralizing weakness. PSYCH Awake, alert, oriented x 4. Affect appropriate.     Medications:   Medications:    insulin glargine  30 Units Subcutaneous Nightly    alogliptin  12.5 mg Oral Daily    insulin lispro  0-18 Units Subcutaneous TID     insulin lispro  0-9 Units Subcutaneous Nightly    amiodarone  200 mg Oral Daily    amLODIPine  5 mg Oral Daily    vitamin D  2,000 Units Oral Daily    clopidogrel  75 mg Oral Daily    lisinopril  5 mg Oral Daily    sucralfate  1 g Oral BID    glipiZIDE  5 mg Oral BID AC    pantoprazole  40 mg Oral BID AC    sodium chloride flush  5-40 mL Intravenous 2 times per day    enoxaparin  30 mg Subcutaneous BID    azithromycin  500 mg Intravenous Q24H    cefepime  2,000 mg Intravenous Q12H    dexamethasone  6 mg Intravenous Q24H    aspirin  81 mg Oral Daily    carvedilol  6.25 mg Oral BID     rosuvastatin  10 mg Oral Nightly      Infusions:    sodium chloride 25 mL (08/21/21 0920)    dextrose       PRN Meds: benzonatate, 100 mg, TID PRN  bisacodyl, 5 mg, Daily PRN  loperamide, 2 mg, 4x Daily PRN  albuterol sulfate HFA, 2 puff, Q6H PRN  sodium chloride flush, 5-40 mL, PRN  sodium chloride, 25 mL, PRN  ondansetron, 4 mg, Q8H PRN   Or  ondansetron, 4 mg, Q6H PRN  polyethylene glycol, 17 g, Daily PRN  acetaminophen, 650 mg, Q6H PRN   Or  acetaminophen, 650 mg, Q6H PRN  guaiFENesin-dextromethorphan, 5 mL, Q4H PRN  glucose, 15 g, PRN  dextrose, 12.5 g, PRN  glucagon (rDNA), 1 mg, PRN  dextrose, 100 mL/hr, PRN

## 2021-08-21 NOTE — PROGRESS NOTES
Pt got up to chair with assistance after breakfast and stayed until after lunch. Pt tolerated the moves, but was out of breath and o2 sats dropped to 85 both when getting into the chair and when getting back in bed. o2 returned to 90s within a minute of resting. Pt is now currently in bed, resting quietly.

## 2021-08-22 LAB
GLUCOSE BLD-MCNC: 194 MG/DL (ref 70–99)
GLUCOSE BLD-MCNC: 203 MG/DL (ref 70–99)
GLUCOSE BLD-MCNC: 209 MG/DL (ref 70–99)
GLUCOSE BLD-MCNC: 251 MG/DL (ref 70–99)

## 2021-08-22 PROCEDURE — 94640 AIRWAY INHALATION TREATMENT: CPT

## 2021-08-22 PROCEDURE — 6370000000 HC RX 637 (ALT 250 FOR IP): Performed by: FAMILY MEDICINE

## 2021-08-22 PROCEDURE — 2700000000 HC OXYGEN THERAPY PER DAY

## 2021-08-22 PROCEDURE — 6370000000 HC RX 637 (ALT 250 FOR IP): Performed by: NURSE PRACTITIONER

## 2021-08-22 PROCEDURE — 2580000003 HC RX 258: Performed by: NURSE PRACTITIONER

## 2021-08-22 PROCEDURE — 6360000002 HC RX W HCPCS: Performed by: NURSE PRACTITIONER

## 2021-08-22 PROCEDURE — 94761 N-INVAS EAR/PLS OXIMETRY MLT: CPT

## 2021-08-22 PROCEDURE — 1200000000 HC SEMI PRIVATE

## 2021-08-22 PROCEDURE — 82962 GLUCOSE BLOOD TEST: CPT

## 2021-08-22 RX ADMIN — ENOXAPARIN SODIUM 30 MG: 30 INJECTION SUBCUTANEOUS at 09:01

## 2021-08-22 RX ADMIN — DEXAMETHASONE SODIUM PHOSPHATE 6 MG: 10 INJECTION INTRAMUSCULAR; INTRAVENOUS at 09:00

## 2021-08-22 RX ADMIN — INSULIN GLARGINE 30 UNITS: 100 INJECTION, SOLUTION SUBCUTANEOUS at 21:37

## 2021-08-22 RX ADMIN — INSULIN LISPRO 6 UNITS: 100 INJECTION, SOLUTION INTRAVENOUS; SUBCUTANEOUS at 12:42

## 2021-08-22 RX ADMIN — LISINOPRIL 5 MG: 5 TABLET ORAL at 09:00

## 2021-08-22 RX ADMIN — PANTOPRAZOLE SODIUM 40 MG: 40 TABLET, DELAYED RELEASE ORAL at 16:40

## 2021-08-22 RX ADMIN — GLIPIZIDE 5 MG: 5 TABLET ORAL at 16:40

## 2021-08-22 RX ADMIN — INSULIN LISPRO 3 UNITS: 100 INJECTION, SOLUTION INTRAVENOUS; SUBCUTANEOUS at 08:53

## 2021-08-22 RX ADMIN — AMLODIPINE BESYLATE 5 MG: 5 TABLET ORAL at 09:00

## 2021-08-22 RX ADMIN — CLOPIDOGREL BISULFATE 75 MG: 75 TABLET ORAL at 09:00

## 2021-08-22 RX ADMIN — CEFEPIME HYDROCHLORIDE 2000 MG: 2 INJECTION, POWDER, FOR SOLUTION INTRAVENOUS at 21:33

## 2021-08-22 RX ADMIN — CARVEDILOL 6.25 MG: 6.25 TABLET, FILM COATED ORAL at 09:00

## 2021-08-22 RX ADMIN — SODIUM CHLORIDE, PRESERVATIVE FREE 10 ML: 5 INJECTION INTRAVENOUS at 21:34

## 2021-08-22 RX ADMIN — SODIUM CHLORIDE, PRESERVATIVE FREE 10 ML: 5 INJECTION INTRAVENOUS at 09:01

## 2021-08-22 RX ADMIN — ALOGLIPTIN 12.5 MG: 12.5 TABLET, FILM COATED ORAL at 09:00

## 2021-08-22 RX ADMIN — CARVEDILOL 6.25 MG: 6.25 TABLET, FILM COATED ORAL at 16:40

## 2021-08-22 RX ADMIN — ENOXAPARIN SODIUM 30 MG: 30 INJECTION SUBCUTANEOUS at 21:34

## 2021-08-22 RX ADMIN — ROSUVASTATIN CALCIUM 10 MG: 5 TABLET, COATED ORAL at 21:34

## 2021-08-22 RX ADMIN — INSULIN LISPRO 9 UNITS: 100 INJECTION, SOLUTION INTRAVENOUS; SUBCUTANEOUS at 16:40

## 2021-08-22 RX ADMIN — ASPIRIN 81 MG: 81 TABLET, COATED ORAL at 09:00

## 2021-08-22 RX ADMIN — ALBUTEROL SULFATE 2 PUFF: 90 AEROSOL, METERED RESPIRATORY (INHALATION) at 08:15

## 2021-08-22 RX ADMIN — GLIPIZIDE 5 MG: 5 TABLET ORAL at 09:00

## 2021-08-22 RX ADMIN — AMIODARONE HYDROCHLORIDE 200 MG: 200 TABLET ORAL at 09:00

## 2021-08-22 RX ADMIN — CEFEPIME HYDROCHLORIDE 2000 MG: 2 INJECTION, POWDER, FOR SOLUTION INTRAVENOUS at 09:01

## 2021-08-22 RX ADMIN — Medication 2000 UNITS: at 09:00

## 2021-08-22 RX ADMIN — PANTOPRAZOLE SODIUM 40 MG: 40 TABLET, DELAYED RELEASE ORAL at 05:55

## 2021-08-22 RX ADMIN — SUCRALFATE 1 G: 1 TABLET ORAL at 09:00

## 2021-08-22 ASSESSMENT — PAIN SCALES - GENERAL
PAINLEVEL_OUTOF10: 0
PAINLEVEL_OUTOF10: 0

## 2021-08-22 NOTE — PROGRESS NOTES
08/22/21 0815   Oxygen Therapy/Pulse Ox   O2 Therapy Oxygen   $Oxygen $Daily Charge   O2 Device High flow nasal cannula   O2 Flow Rate (L/min) 6 L/min   Resp 20   SpO2 91 %   Pulse Oximeter Device Mode Continuous   $Pulse Oximeter $Spot check (multiple/continuous)   Blood Gas  Performed? No   placed patient on a 7 L HFNC.   Will wean as tolerated

## 2021-08-22 NOTE — PROGRESS NOTES
Hospitalist Progress Note      Name:  Paul Gallagher /Age/Sex: 1950  (69 y.o. female)   MRN & CSN:  [id number] & [id number] Admission Date/Time: 2021  3:58 PM   Location:  -A PCP: [address] Day: 5    Assessment and Plan:   Paul Gallagher is a 69 y.o.  female  who presents with <principal problem not specified>    1. PNEUMONIA DUE TO COVID-19  -Stable she is feeling better today, will continue antibiotics and steroids for now.      2. ACUTE RESPIRATORY FAILURE WITH HYPOXIA  -sec to above. Stable is on 4L at 95% Will wean as tolerated     3. TYPE 2 DIABETES WITH HYPERGLYCEMIA  -FBGs trending downward will continue current regimen and monitor     4. CORONARY ARTERY DISEASE  -stable no issues. Echo was unremarkable     5. ESSENTIAL HYPERTENSION  Stable follow    6. GENERALIZED WEAKNESS  -awaiting PT/OT evaluation    Diet ADULT DIET; Regular; 3 carb choices (45 gm/meal)  Adult Oral Nutrition Supplement; Diabetic Oral Supplement   DVT Prophylaxis [x] Lovenox, []  Heparin, [] SCDs, [] Ambulation   GI Prophylaxis [] PPI,  [] H2 Blocker,  [] Carafate,  [] Diet/Tube Feeds   Code Status Full Code   Disposition Patient requires continued admission due to resp failure   MDM [] Low, [] Moderate,[]  High  Patient's risk as above due to resp failure     History of Present Illness:     Chief Complaint: <principal problem not specified>  Paul Gallagher is a 69 y.o.  female  who presents with SOB    NO issues, she continues to feel improved. Currently sitting up in bed. Appetite good no specific complaints       Ten point ROS reviewed negative, unless as noted above    Objective:   No intake or output data in the 24 hours ending 21 1442   Vitals:   Vitals:    21 0815   BP:    Pulse:    Resp: 20   Temp:    SpO2: 91%     Physical Exam:   GEN Awake female, sitting upright in bed in no apparent distress. Appears given age. EYES Pupils are equally round.   No scleral erythema, discharge, or conjunctivitis. HENT Mucous membranes are moist. Oral pharynx without exudates, no evidence of thrush. NECK Supple, no apparent thyromegaly or masses. RESP Clear to auscultation, no wheezes, rales or rhonchi. Symmetric chest movement  CARDIO/VASC S1/S2 auscultated. Regular rate without appreciable murmurs, rubs, or gallops. No JVD or carotid bruits. Peripheral pulses equal bilaterally and palpable. No peripheral edema. GI Abdomen is soft without significant tenderness, masses, or guarding. Bowel sounds are normoactive. Rectal exam deferred. HEME/LYMPH No palpable cervical lymphadenopathy and no hepatosplenomegaly. No petechiae or ecchymoses. MSK No gross joint deformities. SKIN Normal coloration, warm, dry. NEURO Cranial nerves appear grossly intact, normal speech, no lateralizing weakness. PSYCH Awake, alert, oriented x 4. Affect appropriate.     Medications:   Medications:    insulin glargine  30 Units Subcutaneous Nightly    alogliptin  12.5 mg Oral Daily    insulin lispro  0-18 Units Subcutaneous TID WC    insulin lispro  0-9 Units Subcutaneous Nightly    amiodarone  200 mg Oral Daily    amLODIPine  5 mg Oral Daily    vitamin D  2,000 Units Oral Daily    clopidogrel  75 mg Oral Daily    lisinopril  5 mg Oral Daily    sucralfate  1 g Oral BID    glipiZIDE  5 mg Oral BID AC    pantoprazole  40 mg Oral BID AC    sodium chloride flush  5-40 mL Intravenous 2 times per day    enoxaparin  30 mg Subcutaneous BID    cefepime  2,000 mg Intravenous Q12H    dexamethasone  6 mg Intravenous Q24H    aspirin  81 mg Oral Daily    carvedilol  6.25 mg Oral BID WC    rosuvastatin  10 mg Oral Nightly      Infusions:    sodium chloride 25 mL (08/21/21 0920)    dextrose       PRN Meds: benzonatate, 100 mg, TID PRN  bisacodyl, 5 mg, Daily PRN  loperamide, 2 mg, 4x Daily PRN  albuterol sulfate HFA, 2 puff, Q6H PRN  sodium chloride flush, 5-40 mL, PRN  sodium chloride, 25 mL, PRN  ondansetron, 4 mg, Q8H PRN   Or  ondansetron, 4 mg, Q6H PRN  polyethylene glycol, 17 g, Daily PRN  acetaminophen, 650 mg, Q6H PRN   Or  acetaminophen, 650 mg, Q6H PRN  guaiFENesin-dextromethorphan, 5 mL, Q4H PRN  glucose, 15 g, PRN  dextrose, 12.5 g, PRN  glucagon (rDNA), 1 mg, PRN  dextrose, 100 mL/hr, PRN

## 2021-08-22 NOTE — PROGRESS NOTES
08/22/21 1607   Oxygen Therapy/Pulse Ox   O2 Therapy Oxygen   O2 Device High flow nasal cannula   O2 Flow Rate (L/min) 6 L/min   Resp 20   SpO2 96 %   Pulse Oximeter Device Mode Continuous   decreased to 4LNC. Patient tolerated well.   Will continue to wean as tolerated

## 2021-08-23 LAB
CULTURE: NORMAL
CULTURE: NORMAL
GLUCOSE BLD-MCNC: 167 MG/DL (ref 70–99)
GLUCOSE BLD-MCNC: 207 MG/DL (ref 70–99)
GLUCOSE BLD-MCNC: 230 MG/DL (ref 70–99)
GLUCOSE BLD-MCNC: 88 MG/DL (ref 70–99)
Lab: NORMAL
Lab: NORMAL
SPECIMEN: NORMAL
SPECIMEN: NORMAL

## 2021-08-23 PROCEDURE — 6360000002 HC RX W HCPCS: Performed by: NURSE PRACTITIONER

## 2021-08-23 PROCEDURE — 1200000000 HC SEMI PRIVATE

## 2021-08-23 PROCEDURE — 97530 THERAPEUTIC ACTIVITIES: CPT

## 2021-08-23 PROCEDURE — 97166 OT EVAL MOD COMPLEX 45 MIN: CPT

## 2021-08-23 PROCEDURE — 97162 PT EVAL MOD COMPLEX 30 MIN: CPT

## 2021-08-23 PROCEDURE — 6370000000 HC RX 637 (ALT 250 FOR IP): Performed by: FAMILY MEDICINE

## 2021-08-23 PROCEDURE — 6370000000 HC RX 637 (ALT 250 FOR IP): Performed by: NURSE PRACTITIONER

## 2021-08-23 PROCEDURE — 97535 SELF CARE MNGMENT TRAINING: CPT

## 2021-08-23 PROCEDURE — 82962 GLUCOSE BLOOD TEST: CPT

## 2021-08-23 PROCEDURE — 2580000003 HC RX 258: Performed by: NURSE PRACTITIONER

## 2021-08-23 RX ORDER — INSULIN GLARGINE 100 [IU]/ML
40 INJECTION, SOLUTION SUBCUTANEOUS NIGHTLY
Status: DISCONTINUED | OUTPATIENT
Start: 2021-08-23 | End: 2021-08-24

## 2021-08-23 RX ADMIN — ASPIRIN 81 MG: 81 TABLET, COATED ORAL at 08:44

## 2021-08-23 RX ADMIN — GLIPIZIDE 5 MG: 5 TABLET ORAL at 16:59

## 2021-08-23 RX ADMIN — ALOGLIPTIN 12.5 MG: 12.5 TABLET, FILM COATED ORAL at 08:44

## 2021-08-23 RX ADMIN — PANTOPRAZOLE SODIUM 40 MG: 40 TABLET, DELAYED RELEASE ORAL at 08:51

## 2021-08-23 RX ADMIN — INSULIN GLARGINE 40 UNITS: 100 INJECTION, SOLUTION SUBCUTANEOUS at 23:04

## 2021-08-23 RX ADMIN — CLOPIDOGREL BISULFATE 75 MG: 75 TABLET ORAL at 08:44

## 2021-08-23 RX ADMIN — CARVEDILOL 6.25 MG: 6.25 TABLET, FILM COATED ORAL at 16:59

## 2021-08-23 RX ADMIN — SUCRALFATE 1 G: 1 TABLET ORAL at 08:44

## 2021-08-23 RX ADMIN — ENOXAPARIN SODIUM 30 MG: 30 INJECTION SUBCUTANEOUS at 08:43

## 2021-08-23 RX ADMIN — SODIUM CHLORIDE, PRESERVATIVE FREE 10 ML: 5 INJECTION INTRAVENOUS at 20:08

## 2021-08-23 RX ADMIN — ROSUVASTATIN CALCIUM 10 MG: 5 TABLET, COATED ORAL at 20:06

## 2021-08-23 RX ADMIN — Medication 2000 UNITS: at 08:44

## 2021-08-23 RX ADMIN — CEFEPIME HYDROCHLORIDE 2000 MG: 2 INJECTION, POWDER, FOR SOLUTION INTRAVENOUS at 08:41

## 2021-08-23 RX ADMIN — INSULIN LISPRO 6 UNITS: 100 INJECTION, SOLUTION INTRAVENOUS; SUBCUTANEOUS at 12:14

## 2021-08-23 RX ADMIN — SUCRALFATE 1 G: 1 TABLET ORAL at 20:07

## 2021-08-23 RX ADMIN — SODIUM CHLORIDE, PRESERVATIVE FREE 10 ML: 5 INJECTION INTRAVENOUS at 08:42

## 2021-08-23 RX ADMIN — AMLODIPINE BESYLATE 5 MG: 5 TABLET ORAL at 08:44

## 2021-08-23 RX ADMIN — AMIODARONE HYDROCHLORIDE 200 MG: 200 TABLET ORAL at 08:48

## 2021-08-23 RX ADMIN — LISINOPRIL 5 MG: 5 TABLET ORAL at 08:45

## 2021-08-23 RX ADMIN — PANTOPRAZOLE SODIUM 40 MG: 40 TABLET, DELAYED RELEASE ORAL at 16:59

## 2021-08-23 RX ADMIN — CEFEPIME HYDROCHLORIDE 2000 MG: 2 INJECTION, POWDER, FOR SOLUTION INTRAVENOUS at 20:06

## 2021-08-23 RX ADMIN — ENOXAPARIN SODIUM 30 MG: 30 INJECTION SUBCUTANEOUS at 20:07

## 2021-08-23 RX ADMIN — GLIPIZIDE 5 MG: 5 TABLET ORAL at 08:51

## 2021-08-23 RX ADMIN — DEXAMETHASONE SODIUM PHOSPHATE 6 MG: 10 INJECTION INTRAMUSCULAR; INTRAVENOUS at 08:42

## 2021-08-23 NOTE — PROGRESS NOTES
Occupational Therapy    [facility] CARE OCCUPATIONAL THERAPY EVALUATION  Pawan Moreland, 1950, 2010/2010-A, 8/23/2021    History  [place]:  The primary encounter diagnosis was COVID-19. Diagnoses of Fatigue, unspecified type, Multifocal pneumonia, Hypoxia, and Hyperglycemia were also pertinent to this visit. Patient  has a past medical history of Anemia, Arthritis, CAD (coronary artery disease), Colon polyps, COVID-19, Diabetes mellitus type 2, uncontrolled ([address] Diastolic dysfunction, Esophageal stricture, Hiatal hernia, History of blood transfusion, Hyperlipidemia, Hypertension, IBS (irritable bowel syndrome), Iron deficiency anemia, LVH (left ventricular hypertrophy), Multiple gastric polyps, Obesity, PAD (peripheral artery disease) ([address] S/P CABG x 4, Sinus tachycardia, SVT (supraventricular tachycardia) ([facility] and Vitamin D deficiency. Patient  has a past surgical history that includes knee surgery; Tubal ligation (1978); Coronary artery bypass graft (08/02/2012); Balloon angioplasty, artery (Right, 08/19/2015); transluminal angioplasty (Left, 09/23/2015); Cardiac surgery; Cardiac catheterization (08/02/2012); Cholecystectomy (15+ yrs ago); Colonoscopy (2017); Endoscopy, colon, diagnostic (10/10/2017); and Esophagus dilation. Subjective:  Patient states:  \"I'd like to get this underwear changed\"    Pain:  No.    Communication with other providers:  Handoff to RN, co-eval with PT.    Restrictions: Droplet Plus, General Precautions, Fall Risk    Home Setup/Prior level of function  Social/Functional History  Lives With: Spouse  Type of Home: House  Home Layout: One level  Home Access: Level entry  Bathroom Shower/Tub: Tub/Shower unit  Bathroom Toilet: Standard  Bathroom Accessibility: Accessible  ADL Assistance: [address] [place] [address] Avenue: Independent  Homemaking Responsibilities: Yes  Ambulation Assistance: Independent  Transfer Assistance: Independent  Active : Yes  Occupation: Retired  Leisure & Hobbies: babysit for family  Additional Comments: no h/o falls    Examination of body systems (includes body structures/functions, activity/participation limitations):  · Observation:  Supine in bed upon arrival, agreeable to therapy, bear  · Vision:  Glasses  · Hearing:  [facility]  · Cardiopulmonary:  6-7L of 02 desaturation w/ cardiopulmonary ~84%, w/ instruction in pursed lip breathing increased ~91% in 1 minute      Body Systems and functions:  · ROM R/L:  WFL. · Strength R/L:  4-/5,   · Sensation: WFL  · Tone: Normal  · Coordination: WFL  · Perception: WNL    Activities of Daily Living (ADLs):  · Feeding: Kaleb  · Grooming: CGA (washing face w/ warm washcloth)  · UB bathing: SBA  · LB bathing: Jani (washing jennifer area/buttocks in stand, fatigued, required seated rest break. See Cardiopulmonary for details)  · UB dressing: CGA   · LB dressing: Jani (doffing soiled depends in stand)  · Toileting: CGA (See LB bathing/dressing for details, pt soiled self at beginning of session)    Cognitive and Psychosocial Functioning:  · Overall cognitive status: WFL  · Affect: Normal        Mobility:  · Supine to sit:  SBA  · Transfers: CGA from EOB up to stand  · Sitting balance:  SBA. · Standing balance:  CGA  · Functional mobility: CGA ~5 feet.   · Toilet/Shower Transfers: DNT             AM-PAC Daily Activity Inpatient   How much help for putting on and taking off regular lower body clothing?: A Little  How much help for Bathing?: A Little  How much help for Toileting?: A Little  How much help for putting on and taking off regular upper body clothing?: A Little  How much help for taking care of personal grooming?: A Little  How much help for eating meals?: None  AM-PAC Inpatient Daily Activity Raw Score: 19  AM-PAC Inpatient ADL T-Scale Score : 40.22  ADL Inpatient CMS 0-100% Score: 42.8  ADL Inpatient CMS G-Code Modifier : CK    Treatment:  Self Care Training:   Cues were given for safety, sequence, UE/LE placement, visual cues, and balance. Activities performed today included grooming, LB bathing/dressing, toileting    Safety: patient left in chair with chair alarm, call light within reach, RN notified, gait belt used. Assessment:  Pt is a 68 yo female admitted from home for Acute hypoxemic respiratory failure due to COVID-19. Pt at baseline is Independent for ADLs Independent for high level IADLs and Independent for functional transfers/mobility. Pt currently presents w/ deficits in ADL and high level IADL independence, functional activity tolerance, dynamic sitting and standing balance and tolerance and functional transfers, BUE strength, SOB and OOB activity Pt would benefit from continued acute care OT services w/ discharge to SNF  Complexity: Moderate  Prognosis: Good, no significant barriers to participation at this time.    Plan  Times per week: 2x+  Times per day: Daily  Current Treatment Recommendations: Strengthening, Endurance Training, Patient/Caregiver Education & Training, Equipment Evaluation, Education, & procurement, Self-Care / ADL, Balance Training, Pain Management, Home Management Training, Functional Mobility Training, Safety Education & Training, Positioning, ROM     Equipment: defer    Goals:  Pt goal: go home  Time Frame for STGs: discharge  Goal 1: Pt will perform UE ADLs Independent  Goal 2: Pt will perform LE ADLs Independent  Goal 3: Pt will perform toileting Independent  Goal 4: Pt will perform functional transfer w/ AD Kaleb  Goal 5: Pt will perform functional mobility w/ AD Kaleb  Goal 6: Pt will perform therex/theract in order to increase functional activity tolerance and dynamic standing balance    Treatment plan:  Pt will perform functional task in stand reaching in all 3 planes in order to increase dynamic standing balance and functional activity tolerance    Recommendations for NURSING activity: Up to chair for all 3 meals and up to [facility] for all toileting

## 2021-08-23 NOTE — PROGRESS NOTES
Hospitalist Progress Note      Name:  Elena Hammond /Age/Sex: 1950  (69 y.o. female)   MRN & CSN:  [id number] & [id number] Admission Date/Time: 2021  3:58 PM   Location:  -A PCP: [address] Day: 6    Assessment and Plan:   Elena Hammond is a 69 y.o.  female  who presents with <principal problem not specified>    1. PNEUMONIA DUE TO COVID-19  -Stable she is feeling same today, same treatment     2. ACUTE RESPIRATORY FAILURE WITH HYPOXIA  -sec to above. Stable is on 4L at 95% Will wean as tolerated     3. TYPE 2 DIABETES WITH HYPERGLYCEMIA  -FBGs trending downward but still slightly over 200. Increase basal insulin and continue to monitor FBGs     4. CORONARY ARTERY DISEASE  -stable no issues. Echo was unremarkable     5. ESSENTIAL HYPERTENSION  Stable follow     6. GENERALIZED WEAKNESS  -awaiting PT/Ot recommendations    Diet ADULT DIET; Regular; 3 carb choices (45 gm/meal)  Adult Oral Nutrition Supplement; Diabetic Oral Supplement   DVT Prophylaxis [x] Lovenox, []  Heparin, [] SCDs, [] Ambulation   GI Prophylaxis [] PPI,  [] H2 Blocker,  [] Carafate,  [] Diet/Tube Feeds   Code Status Full Code   Disposition Patient requires continued admission due to resp failure   MDM [] Low, [] Moderate,[]  High  Patient's risk as above due to resp failure     History of Present Illness:     Chief Complaint: <principal problem not specified>  Elena Hammond is a 69 y.o.  female  who presents with SOB    No issues overnight. Currently sitting up in chair. She is feelin better, still weak has not gotten up yet. She was seen by therapy but note still pending. No specific complaints       Ten point ROS reviewed negative, unless as noted above    Objective:        Intake/Output Summary (Last 24 hours) at 2021 1006  Last data filed at 2021 0842  Gross per 24 hour   Intake 105 ml   Output 900 ml   Net -795 ml      Vitals:   Vitals:    21 1000   BP: 109/62   Pulse: 62 benzonatate, 100 mg, TID PRN  bisacodyl, 5 mg, Daily PRN  loperamide, 2 mg, 4x Daily PRN  albuterol sulfate HFA, 2 puff, Q6H PRN  sodium chloride flush, 5-40 mL, PRN  sodium chloride, 25 mL, PRN  ondansetron, 4 mg, Q8H PRN   Or  ondansetron, 4 mg, Q6H PRN  polyethylene glycol, 17 g, Daily PRN  acetaminophen, 650 mg, Q6H PRN   Or  acetaminophen, 650 mg, Q6H PRN  guaiFENesin-dextromethorphan, 5 mL, Q4H PRN  glucose, 15 g, PRN  dextrose, 12.5 g, PRN  glucagon (rDNA), 1 mg, PRN  dextrose, 100 mL/hr, PRN

## 2021-08-23 NOTE — CONSULTS
[address] KARSTEN Jean, 1950, 2010/2010-A, 8/23/2021    History  [place]:  The primary encounter diagnosis was COVID-19. Diagnoses of Fatigue, unspecified type, Multifocal pneumonia, Hypoxia, and Hyperglycemia were also pertinent to this visit. Patient  has a past medical history of Anemia, Arthritis, CAD (coronary artery disease), Colon polyps, COVID-19, Diabetes mellitus type 2, uncontrolled ([address] Diastolic dysfunction, Esophageal stricture, Hiatal hernia, History of blood transfusion, Hyperlipidemia, Hypertension, IBS (irritable bowel syndrome), Iron deficiency anemia, LVH (left ventricular hypertrophy), Multiple gastric polyps, Obesity, PAD (peripheral artery disease) ([facility] S/P CABG x 4, Sinus tachycardia, SVT (supraventricular tachycardia) ([facility] and Vitamin D deficiency. Patient  has a past surgical history that includes knee surgery; Tubal ligation (1978); Coronary artery bypass graft (08/02/2012); Balloon angioplasty, artery (Right, 08/19/2015); transluminal angioplasty (Left, 09/23/2015); Cardiac surgery; Cardiac catheterization (08/02/2012); Cholecystectomy (15+ yrs ago); Colonoscopy (2017); Endoscopy, colon, diagnostic (10/10/2017); and Esophagus dilation. Subjective:  Patient states: \"Id like to get this underwear changed\". Pain:  No c/o. Communication with other providers:  Handoff to RN, co-eval with OT, RN present end of session.    Restrictions: Fall risk, Tele, 6-7 L 02, desat with mobility, Purewick    Home Setup/Prior level of function  Social/Functional History  Lives With: Spouse  Type of Home: House  Home Layout: One level  Home Access: Level entry  Bathroom Shower/Tub: Tub/Shower unit  Bathroom Toilet: Standard  Bathroom Accessibility: Accessible  ADL Assistance: Independent  Homemaking Assistance: Independent  Homemaking Responsibilities: Yes  Ambulation Assistance: Independent  Transfer Assistance: Independent  Active : Yes  Occupation: Retired  Leisure & Hobbies: babysit for family  Additional Comments: no h/o falls    Examination of body systems (includes body structures/functions, activity/participation limitations):  · Observation:  Pt sitting up in bed, awake, on 6L 02 high-flow n/c, depends soiled with urine upon arrival  · Vision:  Glasses  · Hearing:  [facility]  · Cardiopulmonary:  Pt initially on 6L 02 ranged from 90-79% with PT increasing to 7L in order to promote recovery to 90%. Pt requires increased time seated rest to recover Sp02. BP taken in seated: 109/49 mmHg. · Cognition: WFL, A&O x3, see OT/SLP note for further evaluation. Musculoskeletal  · ROM R/L:  WFL BLE. · Strength R/L:  Generally 4-/5 BLE, decreased in function and endurance. · Neuro:  H/o CVA    · Gait pattern: AMB x5 ft to chair with decreased foot clearance and zane, AMB along bed with slight L trunk lean and UE support on mattress for steadying. Mobility:  · Supine to sit:  SBA  · Transfers: CGA  · Sitting balance:  SBA. · Standing balance:  CGA. · Gait: CGA    [facility] 6 Clicks Inpatient Mobility:  AM-PAC Inpatient Mobility Raw Score : 18    Treatment:  Bed mobility: With prompting, pt performs supine>sit transfer with SBA for safety purposes. With cues pt able to scoot anteriorly to EOB for feet to floor, SBA. Sitting balance: Pt maintains good upright sitting balance at EOB during PT assessment of LE ROM/MMT/sensation and preparation for mobility, additional seated rest breaks in between standing tasks d/t fatigue. Pt tolerates sitting x5 min without LOB. STS: Pt performs x3 STS from EOB with close CGA for support, min increased time to upright d/t weakness. Return to seated good eccentric control with adequate safety awareness demonstrated by pt reaching back for suface.     Standing balance: Pt demonstrates good static and dynamic standing balance during reaching tasks for jennifer care in standing x1'/x1' with seated rest break  Between d/t fatigue. Sp02 decreases to 79% on 6L with exertion, PT increases to 7L Sp02 increases with rest.    Gait: Pt AMB x5 ft along EOB to chair with pt demonstrating decreased foot clearance and zane, AMB along bed with slight L trunk lean and UE support on mattress for steadying. Increased fatigue and desat to 83% with mobility. EDU: PT discussed pt current LOF and prior LOF, educated on possible need for therapy services upon d/c as pt demonstrates significant decline. Safety: patient left in chair with LE reclined, all needs in reach, call light within reach, RN notified, gait belt used. Assessment:  Pt is a 68 y/o female admitted to ED 8/18 per chart with complaint of fatigue, malaise, cough, congestion, decreased activity and appetite. Patient was diagnosed with COVID-19 on the eighth of this month. Per pt report pt is from home with spouse where pt is  for multiple children of the family, is typically active w/o AD. At this time pt presents with decline in function, deficits in: functional balance and endurance, decreased LE strength, decreased activity tolerance d/t 02 desat with mobility. At this time pt would benefit from continued PT services in order to address deficits and promote return to PLOF. PT to recommend d/c to SNF v.s home with 24/7 and [facility] at this time pending pt improvement. Complexity: Moderate  Prognosis: Good, no significant barriers to participation at this time. Plan Times per week: 3+/week, 1 week,   Discharge Recommendations: Continue to assess pending progress, Subacute/Skilled Nursing Facility, 24 hour supervision or assist, Home with Home health PT  Equipment: Defer     Goals:  Short term goals  Time Frame for Short term goals: 1 week  Short term goal 1: Pt will AMB x30 ft with LRAD and CGA for safety, vitals stable.   Short term goal 2: Pt will perform transfers to/from commode with min v/c and SBA  Short term goal 3: Pt will tolerate x5

## 2021-08-24 LAB
ANION GAP SERPL CALCULATED.3IONS-SCNC: 11 MMOL/L (ref 4–16)
BUN BLDV-MCNC: 35 MG/DL (ref 6–23)
CALCIUM SERPL-MCNC: 8.8 MG/DL (ref 8.3–10.6)
CHLORIDE BLD-SCNC: 102 MMOL/L (ref 99–110)
CO2: 22 MMOL/L (ref 21–32)
CREAT SERPL-MCNC: 1.1 MG/DL (ref 0.6–1.1)
GFR AFRICAN AMERICAN: 59 ML/MIN/1.73M2
GFR NON-AFRICAN AMERICAN: 49 ML/MIN/1.73M2
GLUCOSE BLD-MCNC: 108 MG/DL (ref 70–99)
GLUCOSE BLD-MCNC: 118 MG/DL (ref 70–99)
GLUCOSE BLD-MCNC: 123 MG/DL (ref 70–99)
GLUCOSE BLD-MCNC: 140 MG/DL (ref 70–99)
GLUCOSE BLD-MCNC: 150 MG/DL (ref 70–99)
GLUCOSE BLD-MCNC: 48 MG/DL (ref 70–99)
HCT VFR BLD CALC: 36.8 % (ref 37–47)
HEMOGLOBIN: 11.5 GM/DL (ref 12.5–16)
MCH RBC QN AUTO: 26.4 PG (ref 27–31)
MCHC RBC AUTO-ENTMCNC: 31.3 % (ref 32–36)
MCV RBC AUTO: 84.4 FL (ref 78–100)
PDW BLD-RTO: 13.6 % (ref 11.7–14.9)
PLATELET # BLD: 473 K/CU MM (ref 140–440)
PMV BLD AUTO: 11.8 FL (ref 7.5–11.1)
POTASSIUM SERPL-SCNC: 3.7 MMOL/L (ref 3.5–5.1)
RBC # BLD: 4.36 M/CU MM (ref 4.2–5.4)
SODIUM BLD-SCNC: 135 MMOL/L (ref 135–145)
WBC # BLD: 14 K/CU MM (ref 4–10.5)

## 2021-08-24 PROCEDURE — 36415 COLL VENOUS BLD VENIPUNCTURE: CPT

## 2021-08-24 PROCEDURE — 6370000000 HC RX 637 (ALT 250 FOR IP): Performed by: NURSE PRACTITIONER

## 2021-08-24 PROCEDURE — 6370000000 HC RX 637 (ALT 250 FOR IP): Performed by: FAMILY MEDICINE

## 2021-08-24 PROCEDURE — 94761 N-INVAS EAR/PLS OXIMETRY MLT: CPT

## 2021-08-24 PROCEDURE — 85027 COMPLETE CBC AUTOMATED: CPT

## 2021-08-24 PROCEDURE — 94664 DEMO&/EVAL PT USE INHALER: CPT

## 2021-08-24 PROCEDURE — 80048 BASIC METABOLIC PNL TOTAL CA: CPT

## 2021-08-24 PROCEDURE — 2700000000 HC OXYGEN THERAPY PER DAY

## 2021-08-24 PROCEDURE — 6360000002 HC RX W HCPCS: Performed by: NURSE PRACTITIONER

## 2021-08-24 PROCEDURE — 82962 GLUCOSE BLOOD TEST: CPT

## 2021-08-24 PROCEDURE — 1200000000 HC SEMI PRIVATE

## 2021-08-24 PROCEDURE — 2580000003 HC RX 258: Performed by: NURSE PRACTITIONER

## 2021-08-24 RX ADMIN — ASPIRIN 81 MG: 81 TABLET, COATED ORAL at 09:35

## 2021-08-24 RX ADMIN — SODIUM CHLORIDE, PRESERVATIVE FREE 10 ML: 5 INJECTION INTRAVENOUS at 09:35

## 2021-08-24 RX ADMIN — LISINOPRIL 5 MG: 5 TABLET ORAL at 09:35

## 2021-08-24 RX ADMIN — AMIODARONE HYDROCHLORIDE 200 MG: 200 TABLET ORAL at 09:35

## 2021-08-24 RX ADMIN — CEFEPIME HYDROCHLORIDE 2000 MG: 2 INJECTION, POWDER, FOR SOLUTION INTRAVENOUS at 20:58

## 2021-08-24 RX ADMIN — PANTOPRAZOLE SODIUM 40 MG: 40 TABLET, DELAYED RELEASE ORAL at 06:02

## 2021-08-24 RX ADMIN — Medication 2000 UNITS: at 09:36

## 2021-08-24 RX ADMIN — CEFEPIME HYDROCHLORIDE 2000 MG: 2 INJECTION, POWDER, FOR SOLUTION INTRAVENOUS at 09:34

## 2021-08-24 RX ADMIN — DEXAMETHASONE SODIUM PHOSPHATE 6 MG: 10 INJECTION INTRAMUSCULAR; INTRAVENOUS at 09:38

## 2021-08-24 RX ADMIN — ENOXAPARIN SODIUM 30 MG: 30 INJECTION SUBCUTANEOUS at 09:34

## 2021-08-24 RX ADMIN — ENOXAPARIN SODIUM 30 MG: 30 INJECTION SUBCUTANEOUS at 20:59

## 2021-08-24 RX ADMIN — CLOPIDOGREL BISULFATE 75 MG: 75 TABLET ORAL at 09:35

## 2021-08-24 RX ADMIN — AMLODIPINE BESYLATE 5 MG: 5 TABLET ORAL at 09:35

## 2021-08-24 RX ADMIN — PANTOPRAZOLE SODIUM 40 MG: 40 TABLET, DELAYED RELEASE ORAL at 16:56

## 2021-08-24 RX ADMIN — GLIPIZIDE 5 MG: 5 TABLET ORAL at 06:02

## 2021-08-24 RX ADMIN — SUCRALFATE 1 G: 1 TABLET ORAL at 09:36

## 2021-08-24 RX ADMIN — SUCRALFATE 1 G: 1 TABLET ORAL at 20:58

## 2021-08-24 RX ADMIN — ROSUVASTATIN CALCIUM 10 MG: 5 TABLET, COATED ORAL at 20:58

## 2021-08-24 ASSESSMENT — PAIN SCALES - GENERAL: PAINLEVEL_OUTOF10: 0

## 2021-08-24 NOTE — CARE COORDINATION
Reviewed PT/OT recs of SNF. Contacted patient by phone in room. She is able to participate. Discussed PT/OT recs for SNF. Patient declines- has \"plenty of help at home\". Patient feels that she will do better when home. Reinforced concern for safety; patient continues to decline.  Bianca Lin RN

## 2021-08-24 NOTE — PROGRESS NOTES
Hospitalist Progress Note       8/24/2021 12:36 PM  Admit Date: 8/18/2021    PCP: Yashira Cervantes DO     Assessment and Plan:   1. Severe COVID-19 with hypoxia: Chest x-ray showed bilateral infiltrates. Echo-EF 50 to 55%. Procalcitonin is normal.  Blood culture-no growth. Will discontinue antibiotics. Continue dexamethasone. Patient is progressively improving-wean off oxygen as tolerated. 2.  Diabetes type 2: On glipizide, Farxiga and Januvia at home-currently on glipizide and alogliptin. Lantus and insulin sliding scale added-we will stop Lantus because of hypoglycemia for now and if necessary, will resume at lower dose. 3.  Generalized weakness: Continue PT/OT. Chronic problems include hypertension, hyperlipidemia, history of SVT, chronic diastolic CHF and CAD (status post CABG). DVT prophylaxis: Lovenox  Disposition: Home with  and daughter. Possibly in 1 to 2 days. Patient Active Problem List:     Anemia     Vitamin D deficiency     Type 2 diabetes mellitus without complication, without long-term current use of insulin ([facility] [address] [address]     Coronary artery disease involving native heart without angina pectoris     Essential hypertension     Mixed hyperlipidemia     PVD (peripheral vascular disease) (HCC)     Microalbuminuria     Angular cheilitis     Iron deficiency anemia secondary to blood loss (chronic)     Other forms of chronic ischemic heart disease     Diaphragmatic hernia without obstruction or gangrene     Personal history of other diseases of the digestive system     Esophageal stricture     Hiatal hernia     Iron deficiency anemia     Acute hypoxemic respiratory failure due to COVID-19 [facility])      Subjective:     Chief Complaint   Patient presents with    Fatigue     tested pos for COVID 08/08/2021       F/U:  Interval History: Discussed with the nurse. Patient had hypoglycemia this morning. Appetite is better. No fever or chills. Shortness of breath is better.     Objective:   No intake or output data in the 24 hours ending 08/24/21 1236   Vitals:   Vitals:    08/24/21 1219   BP:    Pulse: 52   Resp:    Temp:    SpO2: 95%     Physical Exam:  General: No apparent respiratory distress. Generally weak. Eyes: Not pale. Anicteric. Neck: No neck mass or thyromegaly  Lymph node: No cervical or supraclavicular lymphadenopathy  Neurology: Oriented x3. Generalized weakness. Cardiovascular: Regular. No murmur or S3  Respiratory: No wheezes or crepitation  Abdomen: Soft. No tenderness. No palpable mass. Extremities: No pedal edema.   Feet are warm    Electronically signed by Saige Mcelroy MD on 8/24/2021 at 12:36 PM  Rounding Hospitalist

## 2021-08-24 NOTE — PROGRESS NOTES
bs 48. Pt alert and oriented. Pt non symptomatic. Pt breakfast at bedside and also given orange juice.  Pt notified of current blood sugar and need to drink juice and eat eat breakfast along with that bs will be retaken shortly

## 2021-08-24 NOTE — PROGRESS NOTES
Family expressing concern because pt does not normally take insulin or lantus at home. They feel it is not needed at this time and could be contributing to pt low bs. Per family pt does not normally eat much food at baseline. Family requesting at this time insulin and lantus be held.  notified.

## 2021-08-25 LAB
GLUCOSE BLD-MCNC: 150 MG/DL (ref 70–99)
GLUCOSE BLD-MCNC: 182 MG/DL (ref 70–99)
GLUCOSE BLD-MCNC: 257 MG/DL (ref 70–99)
GLUCOSE BLD-MCNC: 76 MG/DL (ref 70–99)

## 2021-08-25 PROCEDURE — 6370000000 HC RX 637 (ALT 250 FOR IP): Performed by: FAMILY MEDICINE

## 2021-08-25 PROCEDURE — 6360000002 HC RX W HCPCS: Performed by: NURSE PRACTITIONER

## 2021-08-25 PROCEDURE — 1200000000 HC SEMI PRIVATE

## 2021-08-25 PROCEDURE — 82962 GLUCOSE BLOOD TEST: CPT

## 2021-08-25 PROCEDURE — 6370000000 HC RX 637 (ALT 250 FOR IP): Performed by: NURSE PRACTITIONER

## 2021-08-25 PROCEDURE — 97530 THERAPEUTIC ACTIVITIES: CPT

## 2021-08-25 PROCEDURE — 94761 N-INVAS EAR/PLS OXIMETRY MLT: CPT

## 2021-08-25 PROCEDURE — 2580000003 HC RX 258: Performed by: NURSE PRACTITIONER

## 2021-08-25 PROCEDURE — 2700000000 HC OXYGEN THERAPY PER DAY

## 2021-08-25 RX ADMIN — SUCRALFATE 1 G: 1 TABLET ORAL at 20:32

## 2021-08-25 RX ADMIN — SUCRALFATE 1 G: 1 TABLET ORAL at 10:39

## 2021-08-25 RX ADMIN — ENOXAPARIN SODIUM 30 MG: 30 INJECTION SUBCUTANEOUS at 10:13

## 2021-08-25 RX ADMIN — CLOPIDOGREL BISULFATE 75 MG: 75 TABLET ORAL at 10:14

## 2021-08-25 RX ADMIN — DEXAMETHASONE SODIUM PHOSPHATE 6 MG: 10 INJECTION INTRAMUSCULAR; INTRAVENOUS at 10:15

## 2021-08-25 RX ADMIN — PANTOPRAZOLE SODIUM 40 MG: 40 TABLET, DELAYED RELEASE ORAL at 06:32

## 2021-08-25 RX ADMIN — PANTOPRAZOLE SODIUM 40 MG: 40 TABLET, DELAYED RELEASE ORAL at 16:33

## 2021-08-25 RX ADMIN — AMLODIPINE BESYLATE 5 MG: 5 TABLET ORAL at 10:14

## 2021-08-25 RX ADMIN — ENOXAPARIN SODIUM 30 MG: 30 INJECTION SUBCUTANEOUS at 20:32

## 2021-08-25 RX ADMIN — AMIODARONE HYDROCHLORIDE 200 MG: 200 TABLET ORAL at 10:14

## 2021-08-25 RX ADMIN — INSULIN LISPRO 3 UNITS: 100 INJECTION, SOLUTION INTRAVENOUS; SUBCUTANEOUS at 16:33

## 2021-08-25 RX ADMIN — Medication 2000 UNITS: at 10:39

## 2021-08-25 RX ADMIN — CEFEPIME HYDROCHLORIDE 2000 MG: 2 INJECTION, POWDER, FOR SOLUTION INTRAVENOUS at 10:13

## 2021-08-25 RX ADMIN — ASPIRIN 81 MG: 81 TABLET, COATED ORAL at 10:14

## 2021-08-25 RX ADMIN — ROSUVASTATIN CALCIUM 10 MG: 5 TABLET, COATED ORAL at 20:32

## 2021-08-25 RX ADMIN — SODIUM CHLORIDE, PRESERVATIVE FREE 10 ML: 5 INJECTION INTRAVENOUS at 20:32

## 2021-08-25 RX ADMIN — SODIUM CHLORIDE, PRESERVATIVE FREE 10 ML: 5 INJECTION INTRAVENOUS at 10:14

## 2021-08-25 NOTE — PROGRESS NOTES
Occupational Therapy      Occupational Therapy Treatment Note      Name: Bruno Sesay MRN: [id number] :   1950   Date:  2021   Admission Date: 2021 Room:  -A     Primary Problem:  COVID-19    Restrictions/Precautions:    General Precautions, Fall Risk, Droplet Plus    Communication with other providers:  Nursing handoff    Subjective:  Patient states:  \"I'm sorry, I don't know why I got so dizzy today\"  Pain: No (location, type, intensity)    Objective:    Observation:  Pt received supine in bed, 1L of 02, agreeable to therapy  Objective Measures:  02 saturation decreased ~84% during stand, returned to supine increased ~92% w/ pursed lip breathing in 1 minute    Treatment, including education:  Therapeutic Activity Training:   Therapeutic activity training was instructed today. Cues were given for safety, sequence, UE/LE placement, awareness, and balance.     Activities performed today included bed mobility training, sup-sit, sit-stand, stand to sit, sit to supine, scooting to HOB    Supine to sit Supervision, sitting EOB Supervision ~2 minutes to decrease dizziness, STS from EOB up to stand CGA, attempted functional mobility pt swayed and became very dizzy, stand to sit Jani due to dizziness, sit to supine SBA, Scooting to HOB SBA    See Objective Measures for details    Pt supine in bed, bed alarm on, call light at side, nursing notified      Assessment / Impression:    Patient's tolerance of treatment: Pt tolerated treatment poorly  Adverse Reaction: increaed dizziness/SOB  Significant change in status and impact:  dizziness  Barriers to improvement: dizziness/SOB      Plan for Next Session:    Continue OT POC    Time in:  1038  Time out:  1054  Timed treatment minutes:  16 minutes  Total treatment time:  16 minutes      Electronically signed by:    Tong GARRISON/L [id number]  11:34 AM,2021

## 2021-08-25 NOTE — PROGRESS NOTES
Hospitalist Progress Note       8/25/2021 11:48 AM  Admit Date: 8/18/2021    PCP: Ace Villegas,      Assessment and Plan:   1. Severe COVID-19 with hypoxia: Chest x-ray showed bilateral infiltrates. Echo-EF 50 to 55%. Procalcitonin is normal.  Blood culture-no growth-antibiotics discontinued. Continue dexamethasone. Now on 1 to 2 L/min of oxygen. Patient is progressively improving-wean off oxygen as tolerated. 2.  Diabetes type 2: On glipizide, Farxiga and Januvia at home-currently on glipizide and alogliptin. Monitor glucose and cover with insulin sliding scale. 3.  Generalized weakness: Continue PT/OT. Chronic problems include hypertension, hyperlipidemia, history of SVT, chronic diastolic CHF and CAD (status post CABG). DVT prophylaxis: Lovenox  Disposition: Home with  and daughter. Possibly in 1 to 2 days. Patient Active Problem List:     Anemia     Vitamin D deficiency     Type 2 diabetes mellitus without complication, without long-term current use of insulin ([facility] [address]     Coronary artery disease involving native heart without angina pectoris     Essential hypertension     Mixed hyperlipidemia     PVD (peripheral vascular disease) (HCC)     Microalbuminuria     Angular cheilitis     Iron deficiency anemia secondary to blood loss (chronic)     Other forms of chronic ischemic heart disease     Diaphragmatic hernia without obstruction or gangrene     Personal history of other diseases of the digestive system     Esophageal stricture     Hiatal hernia     Iron deficiency anemia     Acute hypoxemic respiratory failure due to COVID-19 [facility])      Subjective:     Chief Complaint   Patient presents with    Fatigue     tested pos for COVID 08/08/2021       F/U:  Interval History: Discussed with the nurse at bedside. No more hypoglycemia. Patient feels better. No fever or chills. Objective:        Intake/Output Summary (Last 24 hours) at 8/25/2021 1148  Last data filed at 8/24/2021 2209  Gross per 24 hour   Intake --   Output 800 ml   Net -800 ml      Vitals:   Vitals:    08/25/21 0801   BP:    Pulse:    Resp:    Temp:    SpO2: 93%     Physical Exam:  General: No apparent respiratory distress. Generally weak. Eyes: Not pale. Anicteric. Neck: No neck mass or thyromegaly  Lymph node: No cervical or supraclavicular lymphadenopathy  Neurology: Oriented x3. Generalized weakness. Cardiovascular: Regular. No murmur or S3  Respiratory: No wheezes or crepitation  Abdomen: Soft. No tenderness. No palpable mass. Extremities: No pedal edema.   Feet are warm    Electronically signed by Yuridia Bolton MD on 8/25/2021 at 11:48 AM  Rounding Hospitalist

## 2021-08-25 NOTE — PROGRESS NOTES
Comprehensive Nutrition Assessment    Type and Reason for Visit:  Initial, RD Nutrition Re-Screen/LOS    Nutrition Recommendations/Plan:   · Continue carb controlled diet   · Add high protein, low carb snacks  · Discontinue diabetic oral nutrition supplement   · Encourage snacks as prn between meals   · Please document po intakes     Nutrition Assessment:  Admitted with severe COVID-19 with hypoxia, DMII, and DVT. PMH: HTN, HLD, Iron deficiency anemia, hiatal hernia, s/p CABG. Pt on regular carb 3 diet. Reports eating \"fine\" with oatmeal and peaches at breakfast. Pt typically is a small eater at baseline per family. Eats three small meals d/t concerns of hiatal hernia. Encouraged snacks between meals. Pt had issue with low BS in the morning. Agreed to have a evening snack with carb and high protein. Dislikes glucerna supp, will d/c. Per chart review, pt with 7.2% in 2 months, pt denies any wt loss. Will follow as a high nutrition risk r/t predicted suboptimal intake. Malnutrition Assessment:  Malnutrition Status: At risk for malnutrition (Comment)    Context:  Acute Illness (in contact isolation d/t covid-19)     Findings of the 6 clinical characteristics of malnutrition:  Energy Intake:  Mild decrease in energy intake (Comment)  Weight Loss:  1 - 7.5% over 3 months (7.2% over 2 months)     Body Fat Loss:  Unable to assess     Muscle Mass Loss:  Unable to assess    Fluid Accumulation:  Unable to assess     Strength:  Not Performed    Estimated Daily Nutrient Needs:  Energy (kcal):  4073-8363 ([address]); Weight Used for Energy Requirements:  Current     Protein (g):  52-62 (1-1.2 g/kg IBW);  Weight Used for Protein Requirements:  Ideal        Fluid (ml/day):  1500; Method Used for Fluid Requirements:  1 ml/kcal      Nutrition Related Findings:  A1c 8.6% (taken 6/3/21) blood sugars vary 70-140s, pt receiving decadron, glucotrol, immodium      Wounds:  None       Current Nutrition Therapies:    ADULT DIET; Regular; 3 carb choices (45 gm/meal)  Adult Oral Nutrition Supplement; Diabetic Oral Supplement    Anthropometric Measures:  · Height: 5' 3\" (160 cm)  · Current Body Weight: 170 lb 10.2 oz (77.4 kg)   · Admission Body Weight:      · Usual Body Weight: 184 lb (83.5 kg) (6/3/21)     · Ideal Body Weight: 115 lbs; % Ideal Body Weight 148.4 %   · BMI: 30.2  · Adjusted Body Weight:  ; No Adjustment   · Adjusted BMI:      · BMI Categories: Obese Class 1 (BMI 30.0-34. 9)       Nutrition Diagnosis:   · Unintended weight loss related to early satiety, inadequate protein-energy intake (reduced appetite) as evidenced by intake 0-25% (weight loss of 7.2% over 2 months)    Nutrition Interventions:   Food and/or Nutrient Delivery:  Continue Current Diet, Discontinue Oral Nutrition Supplement, Snacks (Comment)  Nutrition Education/Counseling:  Education needed   Coordination of Nutrition Care:  Continue to monitor while inpatient, Coordination of Community Care    Goals:  Pt will consume at least 50% of meals and snacks       Nutrition Monitoring and Evaluation:   Behavioral-Environmental Outcomes:  None Identified   Food/Nutrient Intake Outcomes:  Food and Nutrient Intake  Physical Signs/Symptoms Outcomes:  Biochemical Data, GI Status, Weight, Constipation     Discharge Planning:     Too soon to determine     Electronically signed by Miguel Angel Gil RD, LD on 8/25/21 at 11:58 AM EDT    [id number]

## 2021-08-25 NOTE — PLAN OF CARE
Care plans ongoing at this time
Nutrition Problem #1: Unintended weight loss  Intervention: Food and/or Nutrient Delivery: Continue Current Diet, Discontinue Oral Nutrition Supplement, Snacks (Comment)  Nutritional Goals: Pt will consume at least 50% of meals and snacks
Absence of new skin breakdown  Outcome: Ongoing

## 2021-08-26 LAB
GLUCOSE BLD-MCNC: 106 MG/DL (ref 70–99)
GLUCOSE BLD-MCNC: 185 MG/DL (ref 70–99)
GLUCOSE BLD-MCNC: 266 MG/DL (ref 70–99)
GLUCOSE BLD-MCNC: 327 MG/DL (ref 70–99)
GLUCOSE BLD-MCNC: 84 MG/DL (ref 70–99)

## 2021-08-26 PROCEDURE — 1200000000 HC SEMI PRIVATE

## 2021-08-26 PROCEDURE — 6370000000 HC RX 637 (ALT 250 FOR IP): Performed by: NURSE PRACTITIONER

## 2021-08-26 PROCEDURE — 6360000002 HC RX W HCPCS: Performed by: NURSE PRACTITIONER

## 2021-08-26 PROCEDURE — 6370000000 HC RX 637 (ALT 250 FOR IP): Performed by: FAMILY MEDICINE

## 2021-08-26 PROCEDURE — 94761 N-INVAS EAR/PLS OXIMETRY MLT: CPT

## 2021-08-26 PROCEDURE — 2700000000 HC OXYGEN THERAPY PER DAY

## 2021-08-26 PROCEDURE — 2580000003 HC RX 258: Performed by: NURSE PRACTITIONER

## 2021-08-26 PROCEDURE — 82962 GLUCOSE BLOOD TEST: CPT

## 2021-08-26 RX ADMIN — INSULIN LISPRO 9 UNITS: 100 INJECTION, SOLUTION INTRAVENOUS; SUBCUTANEOUS at 17:15

## 2021-08-26 RX ADMIN — SODIUM CHLORIDE, PRESERVATIVE FREE 10 ML: 5 INJECTION INTRAVENOUS at 08:58

## 2021-08-26 RX ADMIN — ROSUVASTATIN CALCIUM 10 MG: 5 TABLET, COATED ORAL at 20:57

## 2021-08-26 RX ADMIN — ASPIRIN 81 MG: 81 TABLET, COATED ORAL at 08:57

## 2021-08-26 RX ADMIN — DEXAMETHASONE SODIUM PHOSPHATE 6 MG: 10 INJECTION INTRAMUSCULAR; INTRAVENOUS at 08:57

## 2021-08-26 RX ADMIN — INSULIN LISPRO 3 UNITS: 100 INJECTION, SOLUTION INTRAVENOUS; SUBCUTANEOUS at 12:29

## 2021-08-26 RX ADMIN — PANTOPRAZOLE SODIUM 40 MG: 40 TABLET, DELAYED RELEASE ORAL at 05:36

## 2021-08-26 RX ADMIN — Medication 2000 UNITS: at 08:56

## 2021-08-26 RX ADMIN — AMLODIPINE BESYLATE 5 MG: 5 TABLET ORAL at 08:57

## 2021-08-26 RX ADMIN — SODIUM CHLORIDE, PRESERVATIVE FREE 10 ML: 5 INJECTION INTRAVENOUS at 20:58

## 2021-08-26 RX ADMIN — CARVEDILOL 6.25 MG: 6.25 TABLET, FILM COATED ORAL at 08:57

## 2021-08-26 RX ADMIN — ENOXAPARIN SODIUM 30 MG: 30 INJECTION SUBCUTANEOUS at 20:58

## 2021-08-26 RX ADMIN — LISINOPRIL 5 MG: 5 TABLET ORAL at 08:57

## 2021-08-26 RX ADMIN — ALOGLIPTIN 12.5 MG: 12.5 TABLET, FILM COATED ORAL at 08:57

## 2021-08-26 RX ADMIN — CLOPIDOGREL BISULFATE 75 MG: 75 TABLET ORAL at 08:57

## 2021-08-26 RX ADMIN — SUCRALFATE 1 G: 1 TABLET ORAL at 20:57

## 2021-08-26 RX ADMIN — CARVEDILOL 6.25 MG: 6.25 TABLET, FILM COATED ORAL at 16:41

## 2021-08-26 RX ADMIN — ENOXAPARIN SODIUM 30 MG: 30 INJECTION SUBCUTANEOUS at 08:57

## 2021-08-26 RX ADMIN — PANTOPRAZOLE SODIUM 40 MG: 40 TABLET, DELAYED RELEASE ORAL at 16:41

## 2021-08-26 RX ADMIN — AMIODARONE HYDROCHLORIDE 200 MG: 200 TABLET ORAL at 08:57

## 2021-08-26 RX ADMIN — GLIPIZIDE 5 MG: 5 TABLET ORAL at 16:41

## 2021-08-26 RX ADMIN — SUCRALFATE 1 G: 1 TABLET ORAL at 08:57

## 2021-08-26 ASSESSMENT — PAIN SCALES - GENERAL: PAINLEVEL_OUTOF10: 0

## 2021-08-26 NOTE — PROGRESS NOTES
Hospitalist Progress Note       8/26/2021 11:44 AM  Admit Date: 8/18/2021    PCP: Lauren Ontiveros DO     Assessment and Plan:   1. Severe COVID-19 with hypoxia: Chest x-ray showed bilateral infiltrates. Echo-EF 50 to 55%. Procalcitonin is normal.  Blood culture-no growth-antibiotics discontinued. Continue dexamethasone (last dose on 8/27/2021). Now on 1 liter per minute of oxygen. Patient is progressively improving-wean off oxygen as tolerated. 2.  Diabetes type 2: On glipizide, Farxiga and Januvia at home-currently on glipizide and alogliptin. Monitor glucose and cover with insulin sliding scale. 3.  Generalized weakness: Continue PT/OT. Chronic problems include hypertension, hyperlipidemia, history of SVT, chronic diastolic CHF and CAD (status post CABG). DVT prophylaxis: Lovenox  Disposition: Home with  and daughter. Possibly tomorrow a.m. Patient Active Problem List:     Anemia     Vitamin D deficiency     Type 2 diabetes mellitus without complication, without long-term current use of insulin ([facility] [address]     Coronary artery disease involving native heart without angina pectoris     Essential hypertension     Mixed hyperlipidemia     PVD (peripheral vascular disease) (HCC)     Microalbuminuria     Angular cheilitis     Iron deficiency anemia secondary to blood loss (chronic)     Other forms of chronic ischemic heart disease     Diaphragmatic hernia without obstruction or gangrene     Personal history of other diseases of the digestive system     Esophageal stricture     Hiatal hernia     Iron deficiency anemia     Acute hypoxemic respiratory failure due to COVID-19 [facility])      Subjective:     Chief Complaint   Patient presents with    Fatigue     tested pos for COVID 08/08/2021       F/U:  Interval History:     Patient feels better. Shortness of breath has improved.        Objective:     No intake or output data in the 24 hours ending 08/26/21 1144   Vitals:   Vitals:    08/26/21 0800   BP: 123/73   Pulse: 67   Resp: 15   Temp: 98.6 °F (37 °C)   SpO2: 93%     Physical Exam:  General: No apparent respiratory distress. Generally weak. Eyes: Not pale. Anicteric. Neck: No neck mass or thyromegaly  Lymph node: No cervical or supraclavicular lymphadenopathy  Neurology: Oriented x3. Generalized weakness. Cardiovascular: Regular. No murmur or S3  Respiratory: No wheezes or crepitation  Abdomen: Soft. No tenderness. No palpable mass. Extremities: No pedal edema.   Feet are warm    Electronically signed by Kiersten Britton MD on 8/26/2021 at 11:44 AM  Rounding Hospitalist

## 2021-08-27 VITALS
BODY MASS INDEX: 30.23 KG/M2 | DIASTOLIC BLOOD PRESSURE: 54 MMHG | OXYGEN SATURATION: 94 % | SYSTOLIC BLOOD PRESSURE: 127 MMHG | HEART RATE: 56 BPM | HEIGHT: 63 IN | TEMPERATURE: 97.9 F | RESPIRATION RATE: 14 BRPM | WEIGHT: 170.64 LBS

## 2021-08-27 LAB
GLUCOSE BLD-MCNC: 243 MG/DL (ref 70–99)
GLUCOSE BLD-MCNC: 246 MG/DL (ref 70–99)

## 2021-08-27 PROCEDURE — 2580000003 HC RX 258: Performed by: NURSE PRACTITIONER

## 2021-08-27 PROCEDURE — 6370000000 HC RX 637 (ALT 250 FOR IP): Performed by: FAMILY MEDICINE

## 2021-08-27 PROCEDURE — 94761 N-INVAS EAR/PLS OXIMETRY MLT: CPT

## 2021-08-27 PROCEDURE — 82962 GLUCOSE BLOOD TEST: CPT

## 2021-08-27 PROCEDURE — 6370000000 HC RX 637 (ALT 250 FOR IP): Performed by: NURSE PRACTITIONER

## 2021-08-27 PROCEDURE — 94618 PULMONARY STRESS TESTING: CPT

## 2021-08-27 PROCEDURE — 6360000002 HC RX W HCPCS: Performed by: NURSE PRACTITIONER

## 2021-08-27 RX ORDER — BENZONATATE 100 MG/1
100 CAPSULE ORAL 3 TIMES DAILY PRN
Qty: 30 CAPSULE | Refills: 0 | Status: SHIPPED | OUTPATIENT
Start: 2021-08-27 | End: 2021-09-03

## 2021-08-27 RX ADMIN — SUCRALFATE 1 G: 1 TABLET ORAL at 09:23

## 2021-08-27 RX ADMIN — PANTOPRAZOLE SODIUM 40 MG: 40 TABLET, DELAYED RELEASE ORAL at 05:34

## 2021-08-27 RX ADMIN — ASPIRIN 81 MG: 81 TABLET, COATED ORAL at 09:23

## 2021-08-27 RX ADMIN — LISINOPRIL 5 MG: 5 TABLET ORAL at 09:23

## 2021-08-27 RX ADMIN — AMIODARONE HYDROCHLORIDE 200 MG: 200 TABLET ORAL at 09:24

## 2021-08-27 RX ADMIN — AMLODIPINE BESYLATE 5 MG: 5 TABLET ORAL at 09:24

## 2021-08-27 RX ADMIN — DEXAMETHASONE SODIUM PHOSPHATE 6 MG: 10 INJECTION INTRAMUSCULAR; INTRAVENOUS at 09:22

## 2021-08-27 RX ADMIN — Medication 2000 UNITS: at 09:23

## 2021-08-27 RX ADMIN — SODIUM CHLORIDE, PRESERVATIVE FREE 10 ML: 5 INJECTION INTRAVENOUS at 09:24

## 2021-08-27 RX ADMIN — INSULIN LISPRO 6 UNITS: 100 INJECTION, SOLUTION INTRAVENOUS; SUBCUTANEOUS at 09:34

## 2021-08-27 RX ADMIN — INSULIN LISPRO 6 UNITS: 100 INJECTION, SOLUTION INTRAVENOUS; SUBCUTANEOUS at 11:48

## 2021-08-27 RX ADMIN — CARVEDILOL 6.25 MG: 6.25 TABLET, FILM COATED ORAL at 09:23

## 2021-08-27 RX ADMIN — ENOXAPARIN SODIUM 30 MG: 30 INJECTION SUBCUTANEOUS at 09:28

## 2021-08-27 RX ADMIN — ALOGLIPTIN 12.5 MG: 12.5 TABLET, FILM COATED ORAL at 09:23

## 2021-08-27 RX ADMIN — CLOPIDOGREL BISULFATE 75 MG: 75 TABLET ORAL at 09:24

## 2021-08-27 ASSESSMENT — PAIN SCALES - GENERAL: PAINLEVEL_OUTOF10: 0

## 2021-08-27 NOTE — PROGRESS NOTES
8/27/2021 1:26 PM  Patient Room #: 2010/2010-A  Patient Name: Eren Black    (Step 1 Done by RN if possible otherwise call Pulmonary Diagnostics)  1. Place patient on room air at rest for at least 30 minutes. If patient falls below 88% before 30 minutes then you can record the level and stop. Record room air saturation level 82 %. If patient is at 88% or below, they will qualify for home oxygen and you can stop. If level does not fall below 88%, fill in level above. If indicated continue to Step 2. Signature:_Elena Cantu RRT___ Date: __08/27/2021_  (Step 2&3 Done by [facility])  2. Ambulate patient on room air until saturation falls below 89%. Record level of room air saturation with ambulation___ %. Next, place patient back on ___lpm oxygen and ambulate, record level __%. (Note:  this level must show improvement from room air level done with ambulation.)  If patients saturation on room air with ambulation is 88% or below AND patient shows improvement with oxygen during ambulation, they will qualify for home oxygen and you can stop. If patient does not drop below 89%, then patient should have an overnight oximetry trending on room air to see if level falls below 88%. Complete level in Step 3 below. 3. Room air overnight oximetry level 88 % for___  cumulative minutes. If patients room air oxygen level is < 89% for at least 5 cumulative minutes, patient will qualify for home oxygen and you can stop.         (Attach Night Trending Report)    Complete order below: Diagnosis:__COVID-19__  Home oxygen at:  Length of Need: ? Lifetime X 3 Months     _2__lpm or __%   via  [x] nasal cannula  []mask  [] other:___         [x]continuous [x]  with activity  [x]  Nocturnal   [x] Portable Tanks [x]  Concentrator        Therapist Signature:_Elena Cantu RRT__     Date:  __08/27/2021_  Physician Signature:  __Electronically Signed in EMR_    Date:___  Physician Printed Name:  Pat Franklin MD HILLMANI# _[id number]___  [x] Patient Qualifies      [] Patient Does NOT qualify

## 2021-08-27 NOTE — PROGRESS NOTES
Home O2 evaluation performed, patient assisted to bathroom and ambulating in room. O2 sat dropped to 82%, placed back on 2L.

## 2021-08-27 NOTE — PROGRESS NOTES
Discharge teaching completed with patient. Awaiting home oxygen order from Dr. Alise Arango, and have discussed with him twice. Family ready to  patient upon receipt of home O2. Follow up appointment set up with [facility] center, Dr. William Shafer office states she has no available appointments at this time.

## 2021-08-27 NOTE — PROGRESS NOTES
Patient is not requiring oxygen at rest but O2 sats dropped to 82% on exertion and therefore placed on 2 L/min of oxygen.

## 2021-08-27 NOTE — DISCHARGE SUMMARY
Charbel [facility] 1950 [id number]  PCP:  Wandy Connors DO    Admit date: 8/18/2021  Admitting Physician: Nela Alba MD    Discharge date: 8/27/2021 Discharge Physician: Mariana Norton MD      Reason for admission:   Chief Complaint   Patient presents with    Fatigue     tested pos for COVID 08/08/2021     Present on Admission:   Acute hypoxemic respiratory failure due to COVID-19 [facility])       Discharge Diagnoses Include:  1. Severe COVID-19 with hypoxia  2. Generalized weakness  3. Diabetes type 2    Hospital Course:  76-year-old female who tested positive for Covid 8/8/2021. Presents with worsening malaise x3 days, cough, congestion, decreased appetite activity and appetite. She has not been vaccinated. Rapid COVID-19 is confirmed positive. Patient states she has been on cough medicine at home but no antibiotics. She reports she has been ambulating around home for last week has been staying in bed for past 2 to 3 days. Denies nausea vomiting diarrhea blood in stool or urine. Admitted as:  1. Severe COVID-19 with hypoxia: Chest x-ray showed bilateral infiltrates. Echo-EF 50 to 55%. Procalcitonin is normal.  Blood culture-no growth and therefore antibiotics were discontinued. Treated successfully with dexamethasone (last dose on 8/27/2021). Oxygen was weaned off but at the time of discharge, O2 sat dropped to 82% on exertion and patient was discharged on 2 L/min home oxygen.     2.  Diabetes type 2: On glipizide, Farxiga and Januvia at home. Used insulin sliding scale during the admission. Patient was discharged on home regimen.      3.  Generalized weakness: Received PT/OT.     Chronic problems include hypertension, hyperlipidemia, history of SVT, chronic diastolic CHF and CAD (status post CABG).     DVT prophylaxis: Lovenox    Patient was seen in hospital for  COVID-19    . I am prescribing oxygen because the diagnosis and testing requires the patient to have oxygen in the home. Conditions will improve or be benefited by oxygen use. The patient is able to perform good mobility and therefore requires the use of a portable oxygen system for ambulation. Pt was personally examined by me on the day of discharge with the following findings: Granddaughter at bedside. Patient was feeling well and ready to go home. No new complaints. Vital signs: Noted  General: No apparent respiratory distress. Eyes: Not pale. Anicteric. Neck: No neck mass or thyromegaly  Lymph node: No cervical or supraclavicular lymphadenopathy  Neurology: Oriented x3. Generalized weakness but better. Cardiovascular: Regular. No murmur or S3  Respiratory: No wheezes or crepitation  Abdomen: Soft. No tenderness. No palpable mass. Extremities: No pedal edema. Feet are warm    Procedures: None    Significant Diagnostic Studies at discharge:   CBC:   Lab Results   Component Value Date    WBC 14.0 08/24/2021    RBC 4.36 08/24/2021    HGB 11.5 08/24/2021    HCT 36.8 08/24/2021    MCV 84.4 08/24/2021    MCH 26.4 08/24/2021    MCHC 31.3 08/24/2021    RDW 13.6 08/24/2021     08/24/2021    MPV 11.8 08/24/2021     BMP:    Lab Results   Component Value Date     08/24/2021    K 3.7 08/24/2021     08/24/2021    CO2 22 08/24/2021    BUN 35 08/24/2021    LABALBU 3.3 08/19/2021    CREATININE 1.1 08/24/2021    CALCIUM 8.8 08/24/2021    GFRAA 59 08/24/2021    LABGLOM 49 08/24/2021    GLUCOSE 150 08/24/2021       Patient Instructions:   Patricia Cooper   Home Medication Instructions NITIN:[id number]    Printed on:08/27/21 1344   Medication Information                      acetaminophen (TYLENOL) 500 MG tablet  Take 1,000 mg by mouth every 6 hours as needed for Pain             amiodarone (CORDARONE) 200 MG tablet  Take 200 mg by mouth daily             amLODIPine (NORVASC) 5 MG tablet  Take 1 tablet by mouth daily             aspirin 81 MG EC tablet  Take 81 mg by mouth daily.                benzonatate (TESSALON) 100 MG capsule  Take 1 capsule by mouth 3 times daily as needed for Cough             carvedilol (COREG) 6.25 MG tablet  Take 1 tablet by mouth 2 times daily (with meals)             Cholecalciferol (VITAMIN D) 2000 UNITS CAPS capsule  Take 2,000 Units by mouth daily Then weekly 50,000             clopidogrel (PLAVIX) 75 MG tablet  Take 1 tablet by mouth daily             dapagliflozin (FARXIGA) 5 MG tablet  Take 1 tablet by mouth every morning             glipiZIDE (GLUCOTROL) 5 MG tablet  Take 1 tablet by mouth 2 times daily (with meals)             lisinopril (PRINIVIL;ZESTRIL) 5 MG tablet  Take 1 tablet by mouth daily             omeprazole (PRILOSEC) 40 MG delayed release capsule  TK 1 C PO QD 30 MIN B PREMA             rosuvastatin (CRESTOR) 10 MG tablet  Take 1 tablet by mouth nightly             SITagliptin (JANUVIA) 50 MG tablet  TAKE 1 TABLET BY MOUTH DAILY             sucralfate (CARAFATE) 1 GM tablet  Take 1 tablet by mouth 2 times daily                  Code Status: Full Code     Consults:   IP CONSULT TO CASE MANAGEMENT  IP CONSULT TO HOSPITALIST  IP CONSULT TO SPIRITUAL SERVICES    Diet: diabetic diet    Activity: activity as tolerated   Work:    Discharged Condition: good    Prognosis: Good    Disposition: home    Follow-up with   1. PCP within   5-7    Days: Reevaluate in the clinic for continuation of home oxygen. Follow up labs: None. Discharge Physician Signed: Electronically signed by Jonathon Isidro MD on 8/27/2021 at 1:44 PM    The patient was seen and examined on day of discharge and this discharge summary is in conjunction with any daily progress note from day of discharge.   Time spent on discharge in the examination, evaluation, counseling and review of medications and discharge plan: >30 minutes

## 2021-08-27 NOTE — PROGRESS NOTES
Doctor Please copy and paste below in your progress note per DME requirements. Patient was seen in hospital for  COVID-19    . I am prescribing oxygen because the diagnosis and testing requires the patient to have oxygen in the home. Conditions will improve or be benefited by oxygen use. The patient is able to perform good mobility and therefore requires the use of a portable oxygen system for ambulation.

## 2021-08-30 ENCOUNTER — CARE COORDINATION (OUTPATIENT)
Dept: CASE MANAGEMENT | Age: 71
End: 2021-08-30

## 2021-08-31 ENCOUNTER — CARE COORDINATION (OUTPATIENT)
Dept: CASE MANAGEMENT | Age: 71
End: 2021-08-31

## 2021-08-31 DIAGNOSIS — J96.01 ACUTE HYPOXEMIC RESPIRATORY FAILURE DUE TO COVID-19 (HCC): Primary | ICD-10-CM

## 2021-08-31 DIAGNOSIS — U07.1 ACUTE HYPOXEMIC RESPIRATORY FAILURE DUE TO COVID-19 (HCC): Primary | ICD-10-CM

## 2021-08-31 NOTE — CARE COORDINATION
[facility] Transitions Initial Follow Up Call    Call within 2 business days of discharge: Yes    Patient: Olivia German Patient : 1950   MRN: [id number]  Reason for Admission: Hypoxia due to + Covid 19  Discharge Date: 21 RARS: Readmission Risk Score: 16      Last Discharge [address] [address]       Complaint Diagnosis Description Type Department Provider    21 Fatigue COVID-19 . .. ED to Hosp-Admission (Discharged) (ADMITTED) [facility] 2E Yoselyn Jose MD; Rey Diaz. .. Spoke with: Michael Fowler Author Garrick [phone]. Verified Vickie Monroe on [employer] form on file. This CTN had left a vm message on pt's primary # [phone] to return my call prior to speaking with Papito Zhang. Vickie Monroe reports pt is doing well, states   she is feeling a bit better everyday. She continues to use Home O2 provided @ 2L/NC continuously. Reviewed DC meds & AVS. Reiterated importance of post hospital follow up appt within 7 days: pt has appt with Ania Lashonda @ [facility] on 2021. Emphasized CDC guidelines & importance of adhering to self quaranatine/home isolation protocols. Pt & grandtr verbalize understanding.      Facility: [address] [place] [address] [facility]    Non-face-to-face services provided:  Scheduled appointment with PCP-2021  Education of patient/family/caregiver/guardian to support self-management-CDC guidelines  Assessment and support for treatment adherence and medication management-DC meds reviewed    Care Transitions 24 Hour Call    Schedule Follow Up Appointment with PCP: Completed  Do you have any ongoing symptoms?: No  Do you have a copy of your discharge instructions?: Yes  Do you have all of your prescriptions and are they filled?: Yes  Have you been contacted by a [facility] [place] Pharmacist?: No  Have you scheduled your follow up appointment?: Yes  How are you going to get to your appointment?: Car - family or friend to transport  Were you discharged with any Home Care or Post Acute Services: No  Do you feel like you have everything you need to keep you well at home?: Yes  Care Transitions Interventions   Home Care Waiver: Declined        DME Assistance: Completed   Disease Association: Completed             Follow Up  Future Appointments   Date Time Provider Jodi Baca   2021 10:30 AM JOSE RAMON Lopez CNP 5 [facility]   10/4/2021  8:30 AM [facility]     Transitions of Care Initial Call    Was this an external facility discharge? No Discharge Facility: NA    Challenges to be reviewed by the provider   Additional needs identified to be addressed with provider: No  none             Method of communication with provider : none      Advance Care Planning:   Does patient have an Advance Directive: reviewed and current. Was this a readmission? No  Patient stated reason for admission:  Hypoxia from [facility]  Patients top risk factors for readmission: medical condition-DM2, Obesity,  CAD, PVD, HTN    Care Transition Nurse (CTN) contacted the grandtr by telephone to perform post hospital discharge assessment. Verified name and  with grandtr as identifiers. Provided introduction to self, and explanation of the CTN role. CTN reviewed discharge instructions, medical action plan and red flags with grandtr who verbalized understanding. Grandtr given an opportunity to ask questions and does not have any further questions or concerns at this time. Were discharge instructions available to patient? Yes. Reviewed appropriate site of care based on symptoms and resources available to patient including: PCP, Urgent care clinics and When to call 911. The family agrees to contact the PCP office for questions related to their healthcare. Medication reconciliation was performed with grandtr, who verbalizes understanding of administration of home medications. Advised obtaining a 90-day supply of all daily and as-needed medications.      Covid Risk Education     Educated patient about risk for severe COVID-19 due to risk factors according to CDC guidelines. CTN reviewed discharge instructions, medical action plan and red flag symptoms with the family who verbalized understanding. Discussed COVID vaccination status: Yes. Education provided on COVID-19 vaccination as appropriate. Discussed exposure protocols and quarantine with CDC Guidelines. Family was given an opportunity to verbalize any questions and concerns and agrees to contact CTN or health care provider for questions related to their healthcare. Reviewed and educated family on any new and changed medications related to discharge diagnosis. Was patient discharged with a pulse oximeter? Yes Discussed and confirmed pulse oximeter discharge instructions and when to notify provider or seek emergency care. CTN provided contact information. Plan for follow-up call in 5-7 days based on severity of symptoms and risk factors.       Lisa Bliss -[phone]

## 2021-09-07 ENCOUNTER — OFFICE VISIT (OUTPATIENT)
Dept: FAMILY MEDICINE CLINIC | Age: 71
End: 2021-09-07
Payer: COMMERCIAL

## 2021-09-07 VITALS
HEIGHT: 63 IN | HEART RATE: 75 BPM | RESPIRATION RATE: 17 BRPM | DIASTOLIC BLOOD PRESSURE: 72 MMHG | OXYGEN SATURATION: 96 % | SYSTOLIC BLOOD PRESSURE: 102 MMHG | BODY MASS INDEX: 30.23 KG/M2 | WEIGHT: 170.6 LBS

## 2021-09-07 DIAGNOSIS — J96.01 ACUTE HYPOXEMIC RESPIRATORY FAILURE DUE TO COVID-19 (HCC): Primary | ICD-10-CM

## 2021-09-07 DIAGNOSIS — U07.1 ACUTE HYPOXEMIC RESPIRATORY FAILURE DUE TO COVID-19 (HCC): Primary | ICD-10-CM

## 2021-09-07 DIAGNOSIS — R53.1 GENERALIZED WEAKNESS: ICD-10-CM

## 2021-09-07 DIAGNOSIS — E11.9 TYPE 2 DIABETES MELLITUS WITHOUT COMPLICATION, WITHOUT LONG-TERM CURRENT USE OF INSULIN (HCC): ICD-10-CM

## 2021-09-07 PROCEDURE — 1111F DSCHRG MED/CURRENT MED MERGE: CPT | Performed by: NURSE PRACTITIONER

## 2021-09-07 PROCEDURE — 99495 TRANSJ CARE MGMT MOD F2F 14D: CPT | Performed by: NURSE PRACTITIONER

## 2021-09-07 RX ORDER — GLIPIZIDE 5 MG/1
5 TABLET ORAL 2 TIMES DAILY WITH MEALS
Qty: 180 TABLET | Refills: 0 | Status: SHIPPED | OUTPATIENT
Start: 2021-09-07 | End: 2021-12-06

## 2021-09-07 SDOH — ECONOMIC STABILITY: FOOD INSECURITY: WITHIN THE PAST 12 MONTHS, YOU WORRIED THAT YOUR FOOD WOULD RUN OUT BEFORE YOU GOT MONEY TO BUY MORE.: NEVER TRUE

## 2021-09-07 SDOH — ECONOMIC STABILITY: FOOD INSECURITY: WITHIN THE PAST 12 MONTHS, THE FOOD YOU BOUGHT JUST DIDN'T LAST AND YOU DIDN'T HAVE MONEY TO GET MORE.: NEVER TRUE

## 2021-09-07 ASSESSMENT — ENCOUNTER SYMPTOMS
CHEST TIGHTNESS: 0
RHINORRHEA: 0
SINUS PAIN: 0
SORE THROAT: 0
VOMITING: 0
DIARRHEA: 0
SINUS PRESSURE: 0
SHORTNESS OF BREATH: 1
COUGH: 0
WHEEZING: 0
NAUSEA: 0

## 2021-09-07 ASSESSMENT — SOCIAL DETERMINANTS OF HEALTH (SDOH): HOW HARD IS IT FOR YOU TO PAY FOR THE VERY BASICS LIKE FOOD, HOUSING, MEDICAL CARE, AND HEATING?: NOT HARD AT ALL

## 2021-09-07 NOTE — PROGRESS NOTES
Post-Discharge Transitional Care Management Services or Hospital Follow Up      Wyatt Mena   YOB: 1950    Date of Office Visit:  9/7/2021  Date of Hospital Admission: 8/18/21  Date of Hospital Discharge: 8/27/21  Readmission Risk Score(high >=14%.  Medium >=10%):Readmission Risk Score: 16      Care management risk score Rising risk (score 2-5) and Complex Care (Scores >=6): 1     Non face to face  following discharge, date last encounter closed (first attempt may have been earlier): 8/31/2021  3:22 PM 8/31/2021  3:22 PM    Call initiated 2 business days of discharge: Yes     Patient Active Problem List   Diagnosis    Anemia    Vitamin D deficiency    Type 2 diabetes mellitus without complication, without long-term current use of insulin ([address]    Coronary artery disease involving native heart without angina pectoris    Essential hypertension    Mixed hyperlipidemia    PVD (peripheral vascular disease) ([address]    Microalbuminuria    Angular cheilitis    Iron deficiency anemia secondary to blood loss (chronic)    Other forms of chronic ischemic heart disease    Diaphragmatic hernia without obstruction or gangrene    Personal history of other diseases of the digestive system    Esophageal stricture    Hiatal hernia    Iron deficiency anemia    Acute hypoxemic respiratory failure due to COVID-19 (HCC)       Allergies   Allergen Reactions    Tetanus Toxoids Swelling and Rash     fever       Medications listed as ordered at the time of discharge from hospital   BRENDAN [address] Medication Instructions RUIZ:    Printed on:09/07/21 7831   Medication Information                      acetaminophen (TYLENOL) 500 MG tablet  Take 1,000 mg by mouth every 6 hours as needed for Pain             amiodarone (CORDARONE) 200 MG tablet  Take 200 mg by mouth daily             amLODIPine (NORVASC) 5 MG tablet  Take 1 tablet by mouth daily             aspirin 81 MG EC tablet  Take 81 mg by mouth daily.               carvedilol (COREG) 6.25 MG tablet  Take 1 tablet by mouth 2 times daily (with meals)             Cholecalciferol (VITAMIN D) 2000 UNITS CAPS capsule  Take 2,000 Units by mouth daily Then weekly 50,000             clopidogrel (PLAVIX) 75 MG tablet  Take 1 tablet by mouth daily             dapagliflozin (FARXIGA) 5 MG tablet  Take 1 tablet by mouth every morning             glipiZIDE (GLUCOTROL) 5 MG tablet  Take 1 tablet by mouth 2 times daily (with meals)             lisinopril (PRINIVIL;ZESTRIL) 5 MG tablet  Take 1 tablet by mouth daily             omeprazole (PRILOSEC) 40 MG delayed release capsule  TK 1 C PO QD 30 MIN B PREMA             rosuvastatin (CRESTOR) 10 MG tablet  Take 1 tablet by mouth nightly             SITagliptin (JANUVIA) 50 MG tablet  TAKE 1 TABLET BY MOUTH DAILY             sucralfate (CARAFATE) 1 GM tablet  Take 1 tablet by mouth 2 times daily                   Medications marked \"taking\" at this time  Outpatient Medications Marked as Taking for the 9/7/21 encounter (Office Visit) with JOSE RAMON Bojorquez CNP   Medication Sig Dispense Refill    sucralfate (CARAFATE) 1 GM tablet Take 1 tablet by mouth 2 times daily      SITagliptin (JANUVIA) 50 MG tablet TAKE 1 TABLET BY MOUTH DAILY 90 tablet 1    dapagliflozin (FARXIGA) 5 MG tablet Take 1 tablet by mouth every morning 30 tablet 3    lisinopril (PRINIVIL;ZESTRIL) 5 MG tablet Take 1 tablet by mouth daily 90 tablet 1    rosuvastatin (CRESTOR) 10 MG tablet Take 1 tablet by mouth nightly 90 tablet 1    glipiZIDE (GLUCOTROL) 5 MG tablet Take 1 tablet by mouth 2 times daily (with meals) 180 tablet 1    omeprazole (PRILOSEC) 40 MG delayed release capsule TK 1 C PO QD 30 MIN B PREMA      carvedilol (COREG) 6.25 MG tablet Take 1 tablet by mouth 2 times daily (with meals) 60 tablet 3    amLODIPine (NORVASC) 5 MG tablet Take 1 tablet by mouth daily 30 tablet 3    amiodarone (CORDARONE) 200 MG tablet Take 200 mg by mouth daily  Cholecalciferol (VITAMIN D) 2000 UNITS CAPS capsule Take 2,000 Units by mouth daily Then weekly 50,000      acetaminophen (TYLENOL) 500 MG tablet Take 1,000 mg by mouth every 6 hours as needed for Pain      aspirin 81 MG EC tablet Take 81 mg by mouth daily. Medications patient taking as of now reconciled against medications ordered at time of hospital discharge: Yes    Chief Complaint   Patient presents with    Follow-Up from Hospital     covid       Patient is here for a hospital follow up. Patient was admitted to the hospital on 8/18 with acute hypoxemic respiratory failure due to covid. Chest x-ray in hospital showed bilateral infiltrates. Echo EF 50 to 55 percent. Procalcitonin was normal. Blood cultures showed no growth so antibiotic were discontinued. Se was treated successfully with dexamethasone - last does 8/27. Her oxygen was weaned off by the time of discharge. Patient's oxygen saturation dropped to 82 percent on exertion and patient was discharged on 2 L of oxygen. Patient states she is doing well. Patient states she does believe when she does more walking or exertion her oxygen saturation would drop. Patient states she does not have a pulse ox at home. Patient states she is wearing the oxygen at home but not all the time. Patient states she is back to watching her grand children. Inpatient course: Discharge summary reviewed- see chart. Interval history/Current status: Stable    Review of Systems   Constitutional: Positive for fatigue (improved). Negative for appetite change, chills and fever. HENT: Negative for congestion, ear pain, postnasal drip, rhinorrhea, sinus pressure, sinus pain, sneezing and sore throat. Respiratory: Positive for shortness of breath (improved). Negative for cough, chest tightness and wheezing. Cardiovascular: Negative for chest pain and palpitations. Gastrointestinal: Negative for diarrhea, nausea and vomiting. Skin: Negative for rash. Neurological: Negative for dizziness, light-headedness and headaches. Vitals:    09/07/21 1023   BP: 102/72   Pulse: 75   Resp: 17   SpO2: 96%   Weight: 170 lb 9.6 oz (77.4 kg)   Height: 5' 3\" (1.6 m)     Body mass index is 30.22 kg/m². Wt Readings from Last 3 Encounters:   09/07/21 170 lb 9.6 oz (77.4 kg)   08/25/21 170 lb 10.2 oz (77.4 kg)   06/03/21 184 lb 9.6 oz (83.7 kg)     BP Readings from Last 3 Encounters:   09/07/21 102/72   08/27/21 (!) 127/54   06/03/21 (!) 140/80       Physical Exam  Constitutional:       Appearance: Normal appearance. HENT:      Head: Normocephalic. Cardiovascular:      Rate and Rhythm: Normal rate and regular rhythm. Heart sounds: Normal heart sounds. Pulmonary:      Effort: Pulmonary effort is normal.      Breath sounds: Normal breath sounds. Musculoskeletal:      Cervical back: Neck supple. Skin:     General: Skin is warm and dry. Neurological:      Mental Status: She is alert and oriented to person, place, and time. Psychiatric:         Mood and Affect: Mood normal.         Behavior: Behavior normal.             Assessment/Plan:  1. Acute hypoxemic respiratory failure due to COVID-19 [facility])  Improved. Continue to wear her oxygen at 2 L NC. Advised patient to get a pulse ox and monitor this with exertion with the oxygen off and keep a log. Patient to follow up with PCP on 10/4/2018.  - NE DISCHARGE MEDS RECONCILED W/ CURRENT OUTPATIENT MED LIST    2. Type 2 diabetes mellitus without complication, without long-term current use of insulin ([facility]  Patient states blood sugars have been running good. She states nothing over 120. Continue glipizide, farxiga, and Januvia. - NE DISCHARGE MEDS RECONCILED W/ CURRENT OUTPATIENT MED LIST  - glipiZIDE (GLUCOTROL) 5 MG tablet; Take 1 tablet by mouth 2 times daily (with meals)  Dispense: 180 tablet; Refill: 0    3. Generalized weakness  Improved.    - NE DISCHARGE MEDS RECONCILED W/ CURRENT OUTPATIENT MED LIST        Medical Decision Making: moderate complexity

## 2021-09-09 ENCOUNTER — CARE COORDINATION (OUTPATIENT)
Dept: CASE MANAGEMENT | Age: 71
End: 2021-09-09

## 2021-09-09 NOTE — CARE COORDINATION
[facility] Transitions Follow Up Call    2021    Patient: Radha Calle  Patient : 1950   MRN: [id number]  Reason for Admission: Hypoxia due to + Covid 19  Discharge Date: 21 RARS: Readmission Risk Score: 16    Spoke with: rufino    [facility] attempted outreach for care transition follow up call. Left HIPPA compliant message and contact information for call back. Care Transitions Subsequent and Final Call    Subsequent and Final Calls  Care Transitions Interventions  Other Interventions:            Follow Up  Future Appointments   Date Time Provider Jodi Baca   10/4/2021  8:30 AM Kadie Gomez, DO N [facility] NOR [facility] Gutierrez Mention, LPN

## 2021-09-17 ENCOUNTER — CARE COORDINATION (OUTPATIENT)
Dept: CASE MANAGEMENT | Age: 71
End: 2021-09-17

## 2021-09-17 NOTE — CARE COORDINATION
[facility] Transitions Follow Up Call    2021    Patient: Izabela Wetzel  Patient : 1950   MRN: <[id number]>  Reason for Admission: \  Discharge Date: 21 RARS: Readmission Risk Score: 16    Attempted to reach pt for follow up call. Left message requesting call back. Care Transitions Subsequent and Final Call    Subsequent and Final Calls  Care Transitions Interventions  Other Interventions:            Follow Up  Future Appointments   Date Time Provider Jodi Baca   10/4/2021  8:30 AM Shade Mercedez, DO N [facility]       Velia Lento

## 2021-09-23 ENCOUNTER — CARE COORDINATION (OUTPATIENT)
Dept: CASE MANAGEMENT | Age: 71
End: 2021-09-23

## 2021-09-23 NOTE — CARE COORDINATION
[facility] Transitions Follow Up Call    2021    Patient: Bradly Walter  Patient : 1950   MRN: <[id number]>  Reason for Admission: Hypoxia due to + Covid 19  Discharge Date: 21 RARS: Readmission Risk Score: 16    Attempted to contact patient for follow up  transition call. Person that answered call stated patient wasn't there and hung the phone up. Will continue to follow. Care Transitions Subsequent and Final Call    Subsequent and Final Calls  Care Transitions Interventions   Home Care Waiver: Declined        DME Assistance: Completed   Disease Association: Completed      Other Interventions:            Follow Up  Future Appointments   Date Time Provider Jodi Baca   10/4/2021  8:30 AM Ardis Kaiser, DO N [facility] Cower, LPN

## 2021-10-04 ENCOUNTER — OFFICE VISIT (OUTPATIENT)
Dept: INTERNAL MEDICINE CLINIC | Age: 71
End: 2021-10-04
Payer: COMMERCIAL

## 2021-10-04 VITALS
SYSTOLIC BLOOD PRESSURE: 130 MMHG | WEIGHT: 169.4 LBS | HEIGHT: 63 IN | HEART RATE: 74 BPM | TEMPERATURE: 97.9 F | BODY MASS INDEX: 30.02 KG/M2 | OXYGEN SATURATION: 98 % | DIASTOLIC BLOOD PRESSURE: 70 MMHG

## 2021-10-04 DIAGNOSIS — E11.9 TYPE 2 DIABETES MELLITUS WITHOUT COMPLICATION, WITHOUT LONG-TERM CURRENT USE OF INSULIN (HCC): Primary | ICD-10-CM

## 2021-10-04 DIAGNOSIS — I10 ESSENTIAL HYPERTENSION: ICD-10-CM

## 2021-10-04 DIAGNOSIS — N18.32 STAGE 3B CHRONIC KIDNEY DISEASE (HCC): ICD-10-CM

## 2021-10-04 DIAGNOSIS — E78.5 HYPERLIPIDEMIA, UNSPECIFIED HYPERLIPIDEMIA TYPE: ICD-10-CM

## 2021-10-04 DIAGNOSIS — Z86.16 HISTORY OF COVID-19: ICD-10-CM

## 2021-10-04 PROCEDURE — 99214 OFFICE O/P EST MOD 30 MIN: CPT | Performed by: FAMILY MEDICINE

## 2021-10-04 PROCEDURE — 3051F HG A1C>EQUAL 7.0%<8.0%: CPT | Performed by: FAMILY MEDICINE

## 2021-10-04 RX ORDER — DULAGLUTIDE 0.75 MG/.5ML
0.75 INJECTION, SOLUTION SUBCUTANEOUS WEEKLY
Qty: 4 PEN | Refills: 2 | Status: SHIPPED | OUTPATIENT
Start: 2021-10-04 | End: 2021-12-28

## 2021-10-04 ASSESSMENT — ENCOUNTER SYMPTOMS
BACK PAIN: 0
BLOOD IN STOOL: 0
NAUSEA: 0
DIARRHEA: 0
SHORTNESS OF BREATH: 0
CONSTIPATION: 0
ABDOMINAL PAIN: 0
COUGH: 0
EYES NEGATIVE: 1

## 2021-10-04 NOTE — PROGRESS NOTES
Amy Tyler (:  1950) is a 69 y.o. female,Established patient, here for evaluation of the following chief complaint(s):  Follow-up (4 month), Diabetes, and Results (lab)         ASSESSMENT/PLAN:  1. Type 2 diabetes mellitus without complication, without long-term current use of insulin ([address] Chronic  D/C Farxiga and start Trulicity  -     Dulaglutide (TRULICITY) 4.00 QZ/0.8XH SOPN; Inject 0.75 mg into the skin once a week, Disp-4 pen, R-2Normal  -     BASIC METABOLIC PANEL; Future  -     Microalbumin / Creatinine Urine Ratio; Future  -     Urinalysis with Microscopic; Future  ADR's explained. Pt understands  Pt will stop Januvia when she starts Trulicity  2. Essential hypertension,chronic  -     CBC Auto Differential; Future  -     Urinalysis with Microscopic; Future  3. Hyperlipidemia, unspecified hyperlipidemia type, chronic  Continue medications  4. History of COVID-19  Pt declines repeat CXR  5. Stage 3b chronic kidney disease ([address]  Avoid Nsaids. Stay hydrated    On this date 10/4/2021 I have spent 30 minutes reviewing previous notes, test results and face to face with the patient discussing the diagnosis and importance of compliance with the treatment plan as well as documenting on the day of the visit. Return in about 3 months (around 2022) for Diabetes.          Subjective   SUBJECTIVE/OBJECTIVE:  HPI: This 68 yo F here for the following:  Patient Active Problem List    Diagnosis Date Noted    Acute hypoxemic respiratory failure due to COVID-19 ([facility] 2021    Esophageal stricture     Hiatal hernia     Iron deficiency anemia     Iron deficiency anemia secondary to blood loss (chronic) 2020    Other forms of chronic ischemic heart disease 2020    Diaphragmatic hernia without obstruction or gangrene 2020    Personal history of other diseases of the digestive system 2020    Angular cheilitis 2020    Microalbuminuria 2017    PVD (peripheral nightly 90 tablet 1    omeprazole (PRILOSEC) 40 MG delayed release capsule TK 1 C PO QD 30 MIN B PREMA      carvedilol (COREG) 6.25 MG tablet Take 1 tablet by mouth 2 times daily (with meals) 60 tablet 3    amLODIPine (NORVASC) 5 MG tablet Take 1 tablet by mouth daily 30 tablet 3    amiodarone (CORDARONE) 200 MG tablet Take 200 mg by mouth daily      Cholecalciferol (VITAMIN D) 2000 UNITS CAPS capsule Take 2,000 Units by mouth daily Then weekly 50,000      clopidogrel (PLAVIX) 75 MG tablet Take 1 tablet by mouth daily 30 tablet 3    acetaminophen (TYLENOL) 500 MG tablet Take 1,000 mg by mouth every 6 hours as needed for Pain      aspirin 81 MG EC tablet Take 81 mg by mouth daily. No current facility-administered medications for this visit. Vitals:    10/04/21 0816   BP: 130/70   Pulse: 74   Temp: 97.9 °F (36.6 °C)   SpO2: 98%   Weight: 169 lb 6.4 oz (76.8 kg)   Height: 5' 3\" (1.6 m)     Objective   Physical Exam  Vitals reviewed. Constitutional:       General: She is not in acute distress. HENT:      Right Ear: Tympanic membrane normal.      Left Ear: Tympanic membrane normal.   Eyes:      Extraocular Movements: Extraocular movements intact. Conjunctiva/sclera: Conjunctivae normal.   Cardiovascular:      Rate and Rhythm: Normal rate and regular rhythm. Pulmonary:      Effort: Pulmonary effort is normal. No respiratory distress. Breath sounds: Normal breath sounds. Abdominal:      General: Bowel sounds are normal.      Palpations: Abdomen is soft. Tenderness: There is no abdominal tenderness. Musculoskeletal:      Cervical back: Neck supple. Right lower leg: No edema. Left lower leg: No edema. Neurological:      Mental Status: She is alert and oriented to person, place, and time. Psychiatric:         Mood and Affect: Mood normal.                An electronic signature was used to authenticate this note.     --Nakul Abernathy DO

## 2021-11-01 DIAGNOSIS — I10 ESSENTIAL HYPERTENSION: ICD-10-CM

## 2021-11-01 DIAGNOSIS — E11.9 TYPE 2 DIABETES MELLITUS WITHOUT COMPLICATION, WITHOUT LONG-TERM CURRENT USE OF INSULIN (HCC): ICD-10-CM

## 2021-11-01 LAB
ANION GAP SERPL CALCULATED.3IONS-SCNC: 15 MMOL/L (ref 3–16)
BACTERIA: ABNORMAL /HPF
BASOPHILS ABSOLUTE: 0.1 K/UL (ref 0–0.2)
BASOPHILS RELATIVE PERCENT: 0.9 %
BILIRUBIN URINE: NEGATIVE
BLOOD, URINE: NEGATIVE
BUN BLDV-MCNC: 14 MG/DL (ref 7–20)
CALCIUM SERPL-MCNC: 9.5 MG/DL (ref 8.3–10.6)
CHLORIDE BLD-SCNC: 102 MMOL/L (ref 99–110)
CLARITY: ABNORMAL
CO2: 22 MMOL/L (ref 21–32)
COLOR: YELLOW
CREAT SERPL-MCNC: 1.1 MG/DL (ref 0.6–1.2)
CREATININE URINE: 97.6 MG/DL (ref 28–259)
EOSINOPHILS ABSOLUTE: 0.8 K/UL (ref 0–0.6)
EOSINOPHILS RELATIVE PERCENT: 9.2 %
EPITHELIAL CELLS, UA: 2 /HPF (ref 0–5)
GFR AFRICAN AMERICAN: 59
GFR NON-AFRICAN AMERICAN: 49
GLUCOSE BLD-MCNC: 169 MG/DL (ref 70–99)
GLUCOSE URINE: >=1000 MG/DL
HCT VFR BLD CALC: 39.6 % (ref 36–48)
HEMOGLOBIN: 12.8 G/DL (ref 12–16)
HYALINE CASTS: 3 /LPF (ref 0–8)
KETONES, URINE: NEGATIVE MG/DL
LEUKOCYTE ESTERASE, URINE: ABNORMAL
LYMPHOCYTES ABSOLUTE: 2.4 K/UL (ref 1–5.1)
LYMPHOCYTES RELATIVE PERCENT: 27.2 %
MCH RBC QN AUTO: 26 PG (ref 26–34)
MCHC RBC AUTO-ENTMCNC: 32.3 G/DL (ref 31–36)
MCV RBC AUTO: 80.4 FL (ref 80–100)
MICROALBUMIN UR-MCNC: 2.6 MG/DL
MICROALBUMIN/CREAT UR-RTO: 26.6 MG/G (ref 0–30)
MICROSCOPIC EXAMINATION: YES
MONOCYTES ABSOLUTE: 0.7 K/UL (ref 0–1.3)
MONOCYTES RELATIVE PERCENT: 7.8 %
NEUTROPHILS ABSOLUTE: 4.8 K/UL (ref 1.7–7.7)
NEUTROPHILS RELATIVE PERCENT: 54.9 %
NITRITE, URINE: NEGATIVE
PDW BLD-RTO: 15.7 % (ref 12.4–15.4)
PH UA: 5.5 (ref 5–8)
PLATELET # BLD: 337 K/UL (ref 135–450)
PMV BLD AUTO: 9.9 FL (ref 5–10.5)
POTASSIUM SERPL-SCNC: 4.6 MMOL/L (ref 3.5–5.1)
PROTEIN UA: NEGATIVE MG/DL
RBC # BLD: 4.92 M/UL (ref 4–5.2)
RBC UA: 3 /HPF (ref 0–4)
SODIUM BLD-SCNC: 139 MMOL/L (ref 136–145)
SPECIFIC GRAVITY UA: 1.03 (ref 1–1.03)
URINE TYPE: ABNORMAL
UROBILINOGEN, URINE: 0.2 E.U./DL
WBC # BLD: 8.7 K/UL (ref 4–11)
WBC UA: 31 /HPF (ref 0–5)

## 2021-11-16 RX ORDER — DAPAGLIFLOZIN 5 MG/1
TABLET, FILM COATED ORAL
Qty: 30 TABLET | Refills: 1 | Status: SHIPPED | OUTPATIENT
Start: 2021-11-16 | End: 2022-01-21

## 2021-12-04 DIAGNOSIS — E11.9 TYPE 2 DIABETES MELLITUS WITHOUT COMPLICATION, WITHOUT LONG-TERM CURRENT USE OF INSULIN (HCC): ICD-10-CM

## 2021-12-06 RX ORDER — GLIPIZIDE 5 MG/1
TABLET ORAL
Qty: 180 TABLET | Refills: 0 | Status: SHIPPED | OUTPATIENT
Start: 2021-12-06 | End: 2022-03-08 | Stop reason: SDUPTHER

## 2021-12-15 ENCOUNTER — HOSPITAL ENCOUNTER (OUTPATIENT)
Dept: LAB | Age: 71
Discharge: HOME | End: 2021-12-15
Payer: COMMERCIAL

## 2021-12-15 LAB
ANION GAP SERPL CALCULATED.3IONS-SCNC: 10 MMOL/L (ref 4–16)
APTT: 28 SECONDS (ref 25.1–37.1)
BASOPHILS ABSOLUTE: 0.1 K/CU MM
BASOPHILS RELATIVE PERCENT: 0.9 % (ref 0–1)
BUN BLDV-MCNC: 21 MG/DL (ref 6–23)
CALCIUM SERPL-MCNC: 9.6 MG/DL (ref 8.3–10.6)
CHLORIDE BLD-SCNC: 104 MMOL/L (ref 99–110)
CO2: 25 MMOL/L (ref 21–32)
CREAT SERPL-MCNC: 1.2 MG/DL (ref 0.6–1.1)
DIFFERENTIAL TYPE: ABNORMAL
EOSINOPHILS ABSOLUTE: 0.6 K/CU MM
EOSINOPHILS RELATIVE PERCENT: 7.3 % (ref 0–3)
GFR AFRICAN AMERICAN: 53 ML/MIN/1.73M2
GFR NON-AFRICAN AMERICAN: 44 ML/MIN/1.73M2
GLUCOSE BLD-MCNC: 144 MG/DL (ref 70–99)
HCT VFR BLD CALC: 40.1 % (ref 37–47)
HEMOGLOBIN: 12.1 GM/DL (ref 12.5–16)
IMMATURE NEUTROPHIL %: 0.3 % (ref 0–0.43)
INR BLD: 0.85 INDEX
LYMPHOCYTES ABSOLUTE: 2 K/CU MM
LYMPHOCYTES RELATIVE PERCENT: 25.8 % (ref 24–44)
MCH RBC QN AUTO: 25.1 PG (ref 27–31)
MCHC RBC AUTO-ENTMCNC: 30.2 % (ref 32–36)
MCV RBC AUTO: 83.2 FL (ref 78–100)
MONOCYTES ABSOLUTE: 0.6 K/CU MM
MONOCYTES RELATIVE PERCENT: 8.2 % (ref 0–4)
NUCLEATED RBC %: 0 %
PDW BLD-RTO: 14.3 % (ref 11.7–14.9)
PLATELET # BLD: 354 K/CU MM (ref 140–440)
PMV BLD AUTO: 11.1 FL (ref 7.5–11.1)
POTASSIUM SERPL-SCNC: 5.2 MMOL/L (ref 3.5–5.1)
PROTHROMBIN TIME: 10.9 SECONDS (ref 11.7–14.5)
RBC # BLD: 4.82 M/CU MM (ref 4.2–5.4)
SEGMENTED NEUTROPHILS ABSOLUTE COUNT: 4.5 K/CU MM
SEGMENTED NEUTROPHILS RELATIVE PERCENT: 57.5 % (ref 36–66)
SODIUM BLD-SCNC: 139 MMOL/L (ref 135–145)
TOTAL IMMATURE NEUTOROPHIL: 0.02 K/CU MM
TOTAL NUCLEATED RBC: 0 K/CU MM
WBC # BLD: 7.8 K/CU MM (ref 4–10.5)

## 2021-12-15 PROCEDURE — U0005 INFEC AGEN DETEC AMPLI PROBE: HCPCS

## 2021-12-15 PROCEDURE — 85025 COMPLETE CBC W/AUTO DIFF WBC: CPT

## 2021-12-15 PROCEDURE — U0003 INFECTIOUS AGENT DETECTION BY NUCLEIC ACID (DNA OR RNA); SEVERE ACUTE RESPIRATORY SYNDROME CORONAVIRUS 2 (SARS-COV-2) (CORONAVIRUS DISEASE [COVID-19]), AMPLIFIED PROBE TECHNIQUE, MAKING USE OF HIGH THROUGHPUT TECHNOLOGIES AS DESCRIBED BY CMS-2020-01-R: HCPCS

## 2021-12-15 PROCEDURE — 85730 THROMBOPLASTIN TIME PARTIAL: CPT

## 2021-12-15 PROCEDURE — 85610 PROTHROMBIN TIME: CPT

## 2021-12-15 PROCEDURE — 80048 BASIC METABOLIC PNL TOTAL CA: CPT

## 2021-12-15 PROCEDURE — 36415 COLL VENOUS BLD VENIPUNCTURE: CPT

## 2021-12-16 LAB
SARS-COV-2: NOT DETECTED
SOURCE: NORMAL

## 2021-12-20 ENCOUNTER — HOSPITAL ENCOUNTER (OUTPATIENT)
Dept: INTERVENTIONAL RADIOLOGY/VASCULAR | Age: 71
Discharge: HOME | End: 2021-12-20
Payer: COMMERCIAL

## 2021-12-20 VITALS
DIASTOLIC BLOOD PRESSURE: 72 MMHG | SYSTOLIC BLOOD PRESSURE: 136 MMHG | HEART RATE: 68 BPM | OXYGEN SATURATION: 98 % | TEMPERATURE: 96.8 F | RESPIRATION RATE: 15 BRPM | WEIGHT: 169 LBS | HEIGHT: 62 IN | BODY MASS INDEX: 31.1 KG/M2

## 2021-12-20 DIAGNOSIS — I65.23 BILATERAL CAROTID ARTERY STENOSIS: ICD-10-CM

## 2021-12-20 PROCEDURE — 36223 PLACE CATH CAROTID/INOM ART: CPT

## 2021-12-20 PROCEDURE — 6360000004 HC RX CONTRAST MEDICATION: Performed by: PHYSICIAN ASSISTANT

## 2021-12-20 PROCEDURE — 2709999900 HC NON-CHARGEABLE SUPPLY

## 2021-12-20 PROCEDURE — C1769 GUIDE WIRE: HCPCS

## 2021-12-20 PROCEDURE — C1887 CATHETER, GUIDING: HCPCS

## 2021-12-20 PROCEDURE — C1894 INTRO/SHEATH, NON-LASER: HCPCS

## 2021-12-20 RX ORDER — IODIXANOL 320 MG/ML
50 INJECTION, SOLUTION INTRAVASCULAR
Status: COMPLETED | OUTPATIENT
Start: 2021-12-20 | End: 2021-12-20

## 2021-12-20 RX ORDER — SODIUM CHLORIDE 0.9 % (FLUSH) 0.9 %
10 SYRINGE (ML) INJECTION PRN
Status: DISCONTINUED | OUTPATIENT
Start: 2021-12-20 | End: 2021-12-21 | Stop reason: HOSPADM

## 2021-12-20 RX ADMIN — IODIXANOL 50 ML: 320 INJECTION, SOLUTION INTRAVASCULAR at 10:59

## 2021-12-20 ASSESSMENT — PAIN - FUNCTIONAL ASSESSMENT: PAIN_FUNCTIONAL_ASSESSMENT: 0-10

## 2021-12-20 NOTE — FLOWSHEET NOTE
Patient granddaughter called to receive information regarding procedure. This RN asked patient permission to give medical information to her granddaughter; permission was given per alert and oriented patient. Update given to family. Patient without distress at this time.

## 2021-12-20 NOTE — FLOWSHEET NOTE
Escorted to [facility] via wheelchair with granddaughter to private vehicle. 5FR R femoral artery procedure site free of bleeding, oozing, and hematoma. Patient denies any needs or distress upon discharge.

## 2021-12-20 NOTE — PROGRESS NOTES
PROCEDURE PERFORMED: carotid angiogram with Dr. Ruiz Debbie: diagnostic     INFORMED CONSENT:  Obtained prior to procedure. Consent placed in chart. COOKIE SCORE PRE PROCEDURE:   10     PT TRANSPORTED FROM:   [facility]                                 TO THE IR ROOM:     Large     PT IN THE ROOM AT WHAT TIME:    9707    ASSESSMENT: Pt alert & oriented. Pedal pulses palpable bilaterally     TIME OUT COMPLETE: 1017    BARRIER PRECAUTIONS & STERILE TECHNIQUE:               Pt transferred to the table and positioned supine for comfort. Warm blankets given. Pt placed on Cardiac Monitor. Pt prepped and draped in a sterile fashion with chlorhexadine.     PAIN/LOCAL ANESTHESIA/SEDATION MANAGEMENT:           Local: Lidocaine given by Dr. Malone [employer]:           ACCESS TIME: 1823 right femoral artery          US/FLUORO: Yes          WIRE USED: 0.35 angled stiff glidewire 180cm          SHEATH USED: 5Fr with 5F Pigtail x 90cm          CATHETER USED: 4Fr Beltran 2 Glidecath          CONTRAST/CC: Visipaque 360     STERILE DRESSINGS: guaze & tegaderm applied by Arnulfo Alcantar RN    SPECIMENS: None    EBL: <3cc    FOLLOW- UP X-RAY: None    COMPLICATIONS/ OUTCOME: None    STAFF PRESENT DURING PROCEDURE: Dr. Michelle Amaya RN, Margarette RN, Esther RN    COOKIE SCORE POST PROCEDURE:  10        REPORT CALLED TO: Jeevan Hendricks 13 @ bedside    PT LEFT ROOM AT WHAT TIME:     6189

## 2021-12-20 NOTE — PROGRESS NOTES
Discharge instructions reviewed. Questions answered and verbalized understanding. Patient ambulated in hallway without difficulty. PIV removed. Femoral Site WNL.

## 2021-12-28 DIAGNOSIS — E11.9 TYPE 2 DIABETES MELLITUS WITHOUT COMPLICATION, WITHOUT LONG-TERM CURRENT USE OF INSULIN (HCC): ICD-10-CM

## 2021-12-28 RX ORDER — DULAGLUTIDE 0.75 MG/.5ML
INJECTION, SOLUTION SUBCUTANEOUS
Qty: 2 ML | Refills: 0 | Status: SHIPPED | OUTPATIENT
Start: 2021-12-28 | End: 2022-01-04

## 2022-01-04 ENCOUNTER — OFFICE VISIT (OUTPATIENT)
Dept: INTERNAL MEDICINE CLINIC | Age: 72
End: 2022-01-04
Payer: COMMERCIAL

## 2022-01-04 VITALS
HEIGHT: 62 IN | TEMPERATURE: 96.8 F | WEIGHT: 170.8 LBS | HEART RATE: 77 BPM | BODY MASS INDEX: 31.43 KG/M2 | OXYGEN SATURATION: 97 % | SYSTOLIC BLOOD PRESSURE: 118 MMHG | DIASTOLIC BLOOD PRESSURE: 64 MMHG

## 2022-01-04 DIAGNOSIS — E78.5 HYPERLIPIDEMIA, UNSPECIFIED HYPERLIPIDEMIA TYPE: ICD-10-CM

## 2022-01-04 DIAGNOSIS — I65.23 BILATERAL CAROTID ARTERY STENOSIS: ICD-10-CM

## 2022-01-04 DIAGNOSIS — E11.9 TYPE 2 DIABETES MELLITUS WITHOUT COMPLICATION, WITHOUT LONG-TERM CURRENT USE OF INSULIN (HCC): Primary | ICD-10-CM

## 2022-01-04 DIAGNOSIS — I10 ESSENTIAL HYPERTENSION: ICD-10-CM

## 2022-01-04 DIAGNOSIS — K44.9 HIATAL HERNIA: ICD-10-CM

## 2022-01-04 LAB — HBA1C MFR BLD: 7.8 %

## 2022-01-04 PROCEDURE — 99214 OFFICE O/P EST MOD 30 MIN: CPT | Performed by: FAMILY MEDICINE

## 2022-01-04 PROCEDURE — 3051F HG A1C>EQUAL 7.0%<8.0%: CPT | Performed by: FAMILY MEDICINE

## 2022-01-04 PROCEDURE — 83036 HEMOGLOBIN GLYCOSYLATED A1C: CPT | Performed by: FAMILY MEDICINE

## 2022-01-04 RX ORDER — DULAGLUTIDE 1.5 MG/.5ML
1.5 INJECTION, SOLUTION SUBCUTANEOUS WEEKLY
Qty: 4 PEN | Refills: 3 | Status: SHIPPED | OUTPATIENT
Start: 2022-01-04 | End: 2022-04-07 | Stop reason: SDUPTHER

## 2022-01-04 ASSESSMENT — ENCOUNTER SYMPTOMS
ABDOMINAL PAIN: 0
BACK PAIN: 0
NAUSEA: 0
SHORTNESS OF BREATH: 0
COUGH: 0

## 2022-01-04 NOTE — PROGRESS NOTES
Ernestine Torres (:  1950) is a 66 y.o. female,Established patient, here for evaluation of the following chief complaint(s):  Diabetes, 3 Month Follow-Up, and Hypertension         ASSESSMENT/PLAN:  1. Type 2 diabetes mellitus without complication, without long-term current use of insulin (HCC), chronic  Increase Trulicity  -     POCT glycosylated hemoglobin (Hb A1C)  -     Diabetic Foot Exam  -     Hemoglobin A1C; Future  -     Dulaglutide (TRULICITY) 1.5 NS/6.5BI SOPN; Inject 1.5 mg into the skin once a week, Disp-4 pen, H-5F/R Trulicity [id number]  2. Essential hypertension, chronic  Continue medications  3. Hyperlipidemia, unspecified hyperlipidemia type  -     Lipid Panel; Future  Continue medications  The patient is asked to make an attempt to improve diet and exercise patterns   4. Bilateral carotid artery stenosis  Keep f/u with cardiology  5. Hiatal hernia  Pt to see Dr. Narayan Gray for possible surgery  Obtain  eye exam  Refused mammogram  On this date 2022 I have spent 30 minutes reviewing previous notes, test results and face to face with the patient discussing the diagnosis and importance of compliance with the treatment plan as well as documenting on the day of the visit. Return in about 4 months (around 2022) for Diabetes.       Lab Results   Component Value Date    LABA1C 7.8 2022     Lab Results   Component Value Date    .2 2021         Lab Results   Component Value Date    WBC 7.8 12/15/2021    HGB 12.1 (L) 12/15/2021    HCT 40.1 12/15/2021    MCV 83.2 12/15/2021     12/15/2021     Lab Results   Component Value Date     12/15/2021    K 5.2 12/15/2021     12/15/2021    CO2 25 12/15/2021    BUN 21 12/15/2021    CREATININE 1.2 12/15/2021    GLUCOSE 144 12/15/2021    CALCIUM 9.6 12/15/2021      Lab Results   Component Value Date    CHOL 218 (H) 2021    CHOL 234 2021    CHOL 219 2020     Lab Results   Component Value Date    TRIG 227 (H) 06/01/2021    TRIG 154 01/18/2021    TRIG 160 09/25/2020     Lab Results   Component Value Date    HDL 37 (L) 06/01/2021    HDL 39 01/18/2021    HDL 36 09/25/2020     Lab Results   Component Value Date    LDLCALC 136 (H) 06/01/2021    LDLCALC 164 (A) 01/18/2021    LDLCALC 151 09/25/2020     Lab Results   Component Value Date    LABVLDL 45 06/01/2021    VLDL 31 01/18/2021    VLDL 32 09/25/2020    VLDL 46 (H) 03/08/2018     No results found for: CHOLHDLRATIO        Subjective   SUBJECTIVE/OBJECTIVE:  HPI: This  65 yo F here for the following  Patient Active Problem List    Diagnosis Date Noted    Acute hypoxemic respiratory failure due to COVID-19 ([facility] 08/18/2021    Esophageal stricture     Hiatal hernia     Iron deficiency anemia     Iron deficiency anemia secondary to blood loss (chronic) 04/14/2020    Other forms of chronic ischemic heart disease 04/14/2020    Diaphragmatic hernia without obstruction or gangrene 04/14/2020    Personal history of other diseases of the digestive system 04/14/2020    Angular cheilitis 01/05/2020    Microalbuminuria 05/16/2017    PVD (peripheral vascular disease) ([facility] 04/11/2017    Anemia 09/20/2016    Vitamin D deficiency 09/20/2016    Type 2 diabetes mellitus without complication, without long-term current use of insulin ([facility] [address] 09/20/2016    Coronary artery disease involving native heart without angina pectoris 09/20/2016    Essential hypertension 09/20/2016    Mixed hyperlipidemia 09/20/2016     DM II- not controlled. On Trulicity, Januvia, Farxiga, Glipizide. HTN- stable on medications  HLD- On Crestor  Carotid stenosis B/L. R 75%, L 50%- Pt to f/u with vascular  Hiatal hernia- Pt to see Dr. Jossy Herndon for possible surgery    Review of Systems   Constitutional: Negative for diaphoresis and fever. Respiratory: Negative for cough and shortness of breath. Cardiovascular: Negative for chest pain and palpitations.    Gastrointestinal: Negative for abdominal pain and nausea. Genitourinary: Negative for dysuria. Musculoskeletal: Negative for back pain. Neurological: Negative for dizziness and headaches. Psychiatric/Behavioral: Negative for dysphoric mood. Allergies   Allergen Reactions    Tetanus Toxoids Swelling and Rash     fever     Current Outpatient Medications   Medication Sig Dispense Refill    Dulaglutide (TRULICITY) 1.5 HJ/2.1LY SOPN Inject 1.5 mg into the skin once a week 4 pen 3    glipiZIDE (GLUCOTROL) 5 MG tablet TAKE 1 TABLET BY MOUTH TWICE DAILY WITH MEALS 180 tablet 0    FARXIGA 5 MG tablet TAKE 1 TABLET BY MOUTH EVERY MORNING 30 tablet 1    sucralfate (CARAFATE) 1 GM tablet Take 1 tablet by mouth 2 times daily      SITagliptin (JANUVIA) 50 MG tablet TAKE 1 TABLET BY MOUTH DAILY 90 tablet 1    lisinopril (PRINIVIL;ZESTRIL) 5 MG tablet Take 1 tablet by mouth daily 90 tablet 1    rosuvastatin (CRESTOR) 10 MG tablet Take 1 tablet by mouth nightly 90 tablet 1    omeprazole (PRILOSEC) 40 MG delayed release capsule TK 1 C PO QD 30 MIN B PREMA      carvedilol (COREG) 6.25 MG tablet Take 1 tablet by mouth 2 times daily (with meals) 60 tablet 3    amLODIPine (NORVASC) 5 MG tablet Take 1 tablet by mouth daily 30 tablet 3    amiodarone (CORDARONE) 200 MG tablet Take 200 mg by mouth daily      Cholecalciferol (VITAMIN D) 2000 UNITS CAPS capsule Take 2,000 Units by mouth daily Then weekly 50,000      clopidogrel (PLAVIX) 75 MG tablet Take 1 tablet by mouth daily 30 tablet 3    acetaminophen (TYLENOL) 500 MG tablet Take 1,000 mg by mouth every 6 hours as needed for Pain      aspirin 81 MG EC tablet Take 81 mg by mouth daily. No current facility-administered medications for this visit.           Vitals:    01/04/22 0852   BP: 118/64   Site: Left Upper Arm   Position: Sitting   Cuff Size: Medium Adult   Pulse: 77   Temp: 96.8 °F (36 °C)   SpO2: 97%   Weight: 170 lb 12.8 oz (77.5 kg)   Height: 5' 2\" (1.575 m)     Objective   Physical Exam  Vitals reviewed. Constitutional:       General: She is not in acute distress. Eyes:      Extraocular Movements: Extraocular movements intact. Conjunctiva/sclera: Conjunctivae normal.   Neck:      Vascular: Carotid bruit present. Cardiovascular:      Rate and Rhythm: Normal rate and regular rhythm. Pulmonary:      Effort: Pulmonary effort is normal. No respiratory distress. Breath sounds: Normal breath sounds. Abdominal:      General: Bowel sounds are normal.      Palpations: Abdomen is soft. Tenderness: There is no abdominal tenderness. Musculoskeletal:      Cervical back: Neck supple. Right lower leg: No edema. Left lower leg: No edema. Neurological:      Mental Status: She is alert and oriented to person, place, and time. Psychiatric:         Mood and Affect: Mood normal.                An electronic signature was used to authenticate this note.     --Janet Dugan, DO

## 2022-01-13 ENCOUNTER — OFFICE VISIT (OUTPATIENT)
Dept: SURGERY | Age: 72
End: 2022-01-13
Payer: COMMERCIAL

## 2022-01-13 VITALS
WEIGHT: 170.8 LBS | HEIGHT: 62 IN | OXYGEN SATURATION: 97 % | DIASTOLIC BLOOD PRESSURE: 70 MMHG | SYSTOLIC BLOOD PRESSURE: 110 MMHG | BODY MASS INDEX: 31.43 KG/M2 | HEART RATE: 84 BPM

## 2022-01-13 DIAGNOSIS — K21.9 HIATAL HERNIA WITH GERD: Primary | ICD-10-CM

## 2022-01-13 DIAGNOSIS — K44.9 HIATAL HERNIA WITH GERD: Primary | ICD-10-CM

## 2022-01-13 PROCEDURE — 99204 OFFICE O/P NEW MOD 45 MIN: CPT | Performed by: SURGERY

## 2022-01-13 ASSESSMENT — PATIENT HEALTH QUESTIONNAIRE - PHQ9
SUM OF ALL RESPONSES TO PHQ QUESTIONS 1-9: 0
1. LITTLE INTEREST OR PLEASURE IN DOING THINGS: 0
SUM OF ALL RESPONSES TO PHQ9 QUESTIONS 1 & 2: 0
SUM OF ALL RESPONSES TO PHQ QUESTIONS 1-9: 0
2. FEELING DOWN, DEPRESSED OR HOPELESS: 0

## 2022-01-13 NOTE — PROGRESS NOTES
Chief Complaint   Patient presents with    New Patient     consult nissen       SUBJECTIVE:  HPI: Patient complainsof GERD symptoms failing to respond to treatment. Symptoms have been present for approximately several years. Symptoms include abdominal bloating, belching, bilious reflux, chest pain, choking on food and deep pressure at base of neck. The patient denies cough. Symptoms appear to be worsened by lying down after eating. Risk factors present for GERD include tobacco abuse and obesity. Studies performed so far include upper endoscopy, result: positive for hiatal hernia. Treatments tried so far include proton pump inhibitor. Results of treatment: no change in severity. Currently, the symptoms are moderate and occur approximately several times per day. I havereviewed the patient's(pertinent information to this visit) medical history, family history(scanned in  the Media tab under \"patient questioner\"), social history and review of systems with the patient today in the office.           Past Surgical History:   Procedure Laterality Date    BALLOON ANGIOPLASTY, ARTERY Right 08/19/2015    RIght SFA 90%->10%, Right Popliteal 1000%->10%    CARDIAC CATHETERIZATION  08/02/2012    CARDIAC SURGERY      open heart surgery 8/2012    CHOLECYSTECTOMY  15+ yrs ago    COLONOSCOPY  2017    CORONARY ARTERY BYPASS GRAFT  08/02/2012    4V CABG- LIMA-LAD, SVG-CX, SVG-PDA, SVG-RCA    ENDOSCOPY, COLON, DIAGNOSTIC  10/10/2017    esophageal stricture, hiatal hernia, candidiasis    ESOPHAGEAL DILATATION      Dr. Kirsten Vega      biltateral- \"total of 9 surgeries- all scopes\"    TRANSLUMINAL ANGIOPLASTY Left 09/23/2015 9/23/2015-Left mid and distal SFA and Left Popliteal    TUBAL LIGATION  1978     Past Medical History:   Diagnosis Date    Anemia     Managed by Dr. Pierre Multani CAD (coronary artery disease)     follows with Dr Pritesh Anaya COVID-19 08/18/2021    Diabetes mellitus type 2, uncontrolled ([facility]     \"dx 3988    Diastolic dysfunction 59/3087    [facility]    Esophageal stricture     dilation per GI    Hiatal hernia     History of blood transfusion     \"had blood transfusion with 4th child- have hx also of iron infusions for anemia 2017    Hyperlipidemia     Hypertension     IBS (irritable bowel syndrome)     with diarrhea    Iron deficiency anemia     Iron Infusions- follows with Julia Araiza and Jose G Cardenas    LVH (left ventricular hypertrophy) 09/2012    [facility]    Multiple gastric polyps 2004    Dr Lisa Guadarrama    Obesity     PAD (peripheral artery disease) ([facility]     S/P CABG x 4 08/06/2012    4V CABG- LIMA-LAD, SVG-CX, SVG-PDA, SVG-RCA- Follows with Dr Rox Ortiz Sinus tachycardia     follows with [facility]    SVT [facility]supra[facility]ricular tachycardia) [facility])     old chart gives hx of SVT in the past    Vitamin D deficiency      Family History   Problem Relation Age of Onset    Kidney Disease Mother         Dialysis    Diabetes Mother     High Blood Pressure Mother     Coronary Art Dis Mother         MI    Cancer Father         pancreatic cancer (Smoking)    Coronary Art Dis Father 43        MI early onset    Diabetes Other      Social History     Socioeconomic History    Marital status:      Spouse name: Vy Morris Number of children: 11    Years of education: Not on file    Highest education level: Not on file   Occupational History    Not on file   Tobacco Use    Smoking status: Never Smoker    Smokeless tobacco: Never Used   Vaping Use    Vaping Use: Never used   Substance and Sexual Activity    Alcohol use: No    Drug use: No    Sexual activity: Not Currently     Partners: Male   Other Topics Concern    Not on file   Social History Narrative    Not on file     Social Determinants of Health     Financial Resource Strain: Low Risk     Difficulty of Paying Living Expenses: Not hard at all   Food Insecurity: No Food Insecurity    Worried About Running Out of Food in the Last Year: Never true    Ran Out of Food in the Last Year: Never true   Transportation Needs:     Lack of Transportation (Medical): Not on file    Lack of Transportation (Non-Medical):  Not on file   Physical Activity:     Days of Exercise per Week: Not on file    Minutes of Exercise per Session: Not on file   Stress:     Feeling of Stress : Not on file   Social Connections:     Frequency of Communication with Friends and Family: Not on file    Frequency of Social Gatherings with Friends and Family: Not on file    Attends Rastafari Services: Not on file    Active Member of [address] [place] [address] [employer] or Organizations: Not on file    Attends Club or Organization Meetings: Not on file    Marital Status: Not on file   Intimate Partner Violence:     Fear of Current or Ex-Partner: Not on file    Emotionally Abused: Not on file    Physically Abused: Not on file    Sexually Abused: Not on file   Housing Stability:     Unable to Pay for Housing in the Last Year: Not on file    Number of [place] in the Last Year: Not on file    Unstable Housing in the Last Year: Not on file       Current Outpatient Medications   Medication Sig Dispense Refill    Dulaglutide (TRULICITY) 1.5 YX/2.1BR SOPN Inject 1.5 mg into the skin once a week 4 pen 3    glipiZIDE (GLUCOTROL) 5 MG tablet TAKE 1 TABLET BY MOUTH TWICE DAILY WITH MEALS 180 tablet 0    FARXIGA 5 MG tablet TAKE 1 TABLET BY MOUTH EVERY MORNING 30 tablet 1    sucralfate (CARAFATE) 1 GM tablet Take 1 tablet by mouth 2 times daily      SITagliptin (JANUVIA) 50 MG tablet TAKE 1 TABLET BY MOUTH DAILY 90 tablet 1    lisinopril (PRINIVIL;ZESTRIL) 5 MG tablet Take 1 tablet by mouth daily 90 tablet 1    rosuvastatin (CRESTOR) 10 MG tablet Take 1 tablet by mouth nightly 90 tablet 1    omeprazole (PRILOSEC) 40 MG delayed release capsule TK 1 C PO QD 30 MIN B PREMA      carvedilol (COREG) 6.25 MG tablet Take 1 tablet by mouth 2 times daily (with meals) 60 tablet 3    amLODIPine (NORVASC) 5 MG tablet Take 1 tablet by mouth daily 30 tablet 3    amiodarone (CORDARONE) 200 MG tablet Take 200 mg by mouth daily      Cholecalciferol (VITAMIN D) 2000 UNITS CAPS capsule Take 2,000 Units by mouth daily Then weekly 50,000      clopidogrel (PLAVIX) 75 MG tablet Take 1 tablet by mouth daily 30 tablet 3    acetaminophen (TYLENOL) 500 MG tablet Take 1,000 mg by mouth every 6 hours as needed for Pain      aspirin 81 MG EC tablet Take 81 mg by mouth daily. No current facility-administered medications for this visit. Allergies   Allergen Reactions    Tetanus Toxoids Swelling and Rash     fever       Review of Systems:    Review of Systems   Constitutional: Negative for chills and fever. HENT: Positive for trouble swallowing. Negative for ear pain, mouth sores, sore throat and tinnitus. Eyes: Negative for photophobia, redness and itching. Respiratory: Negative for apnea, choking and stridor. Cardiovascular: Negative for chest pain and palpitations. Gastrointestinal: Negative for anal bleeding, constipation and rectal pain. Endocrine: Negative for polydipsia. Genitourinary: Negative for enuresis, flank pain and hematuria. Musculoskeletal: Negative for back pain, joint swelling and myalgias. Skin: Negative for color change and pallor. Allergic/Immunologic: Negative for environmental allergies. Neurological: Negative for syncope and speech difficulty. Psychiatric/Behavioral: Negative for confusion and hallucinations. OBJECTIVE:  Physical Exam:    /70 (Site: Right Upper Arm, Position: Sitting, Cuff Size: Medium Adult)   Pulse 84   Ht 5' 2\" (1.575 m)   Wt 170 lb 12.8 oz (77.5 kg)   LMP 01/19/2002   SpO2 97%   BMI 31.24 kg/m²      Physical Exam  Constitutional:       Appearance: She is well-developed. HENT:      Head: Normocephalic.    Eyes:      Pupils: Pupils are equal, round, and reactive to light. Cardiovascular:      Rate and Rhythm: Normal rate. Pulmonary:      Effort: Pulmonary effort is normal.   Abdominal:      General: There is no distension. Palpations: Abdomen is soft. There is no mass. Tenderness: There is no abdominal tenderness. There is no guarding or rebound. Musculoskeletal:         General: Normal range of motion. Cervical back: Normal range of motion and neck supple. Skin:     General: Skin is warm. Neurological:      Mental Status: She is alert and oriented to person, place, and time. ASSESSMENT:  1. Hiatal hernia with GERD          PLAN:  Treatment: We will obtain esophagram to further evaluate. She may need occasional EGDs with dilation along with hiatal hernia repair and Nissen fundoplication. Patient counseled on risks, benefits, and alternatives of treatment plan atlength. Patient states an understanding and willingness to proceed with plan. Orders Placed This Encounter   Procedures    [place] ESOPHAGRAM        No orders of the defined types were placed in this encounter. Follow Up:  No follow-ups on file.       Farhat Chappell MD

## 2022-01-16 ASSESSMENT — ENCOUNTER SYMPTOMS
ANAL BLEEDING: 0
PHOTOPHOBIA: 0
STRIDOR: 0
RECTAL PAIN: 0
CONSTIPATION: 0
COLOR CHANGE: 0
BACK PAIN: 0
EYE REDNESS: 0
CHOKING: 0
EYE ITCHING: 0
TROUBLE SWALLOWING: 1
SORE THROAT: 0
APNEA: 0

## 2022-01-19 ENCOUNTER — HOSPITAL ENCOUNTER (OUTPATIENT)
Dept: GENERAL RADIOLOGY | Age: 72
Discharge: HOME | End: 2022-01-19
Payer: COMMERCIAL

## 2022-01-19 DIAGNOSIS — K21.9 HIATAL HERNIA WITH GERD: ICD-10-CM

## 2022-01-19 DIAGNOSIS — K44.9 HIATAL HERNIA WITH GERD: ICD-10-CM

## 2022-01-19 PROCEDURE — 74220 X-RAY XM ESOPHAGUS 1CNTRST: CPT

## 2022-01-21 RX ORDER — DAPAGLIFLOZIN 5 MG/1
TABLET, FILM COATED ORAL
Qty: 30 TABLET | Refills: 3 | Status: SHIPPED | OUTPATIENT
Start: 2022-01-21 | End: 2022-04-07 | Stop reason: SDUPTHER

## 2022-01-24 ENCOUNTER — OFFICE VISIT (OUTPATIENT)
Dept: SURGERY | Age: 72
End: 2022-01-24
Payer: COMMERCIAL

## 2022-01-24 VITALS
BODY MASS INDEX: 31.58 KG/M2 | WEIGHT: 171.6 LBS | HEIGHT: 62 IN | DIASTOLIC BLOOD PRESSURE: 80 MMHG | HEART RATE: 85 BPM | SYSTOLIC BLOOD PRESSURE: 132 MMHG | OXYGEN SATURATION: 97 %

## 2022-01-24 DIAGNOSIS — K21.9 HIATAL HERNIA WITH GERD: ICD-10-CM

## 2022-01-24 DIAGNOSIS — Z01.818 PRE-OP TESTING: Primary | ICD-10-CM

## 2022-01-24 DIAGNOSIS — K44.9 HIATAL HERNIA WITH GERD: ICD-10-CM

## 2022-01-24 PROCEDURE — 99214 OFFICE O/P EST MOD 30 MIN: CPT | Performed by: SURGERY

## 2022-01-24 ASSESSMENT — PATIENT HEALTH QUESTIONNAIRE - PHQ9
5. POOR APPETITE OR OVEREATING: 0
10. IF YOU CHECKED OFF ANY PROBLEMS, HOW DIFFICULT HAVE THESE PROBLEMS MADE IT FOR YOU TO DO YOUR WORK, TAKE CARE OF THINGS AT HOME, OR GET ALONG WITH OTHER PEOPLE: 0
8. MOVING OR SPEAKING SO SLOWLY THAT OTHER PEOPLE COULD HAVE NOTICED. OR THE OPPOSITE, BEING SO FIGETY OR RESTLESS THAT YOU HAVE BEEN MOVING AROUND A LOT MORE THAN USUAL: 0
SUM OF ALL RESPONSES TO PHQ9 QUESTIONS 1 & 2: 4
2. FEELING DOWN, DEPRESSED OR HOPELESS: 1
6. FEELING BAD ABOUT YOURSELF - OR THAT YOU ARE A FAILURE OR HAVE LET YOURSELF OR YOUR FAMILY DOWN: 0
SUM OF ALL RESPONSES TO PHQ QUESTIONS 1-9: 7
1. LITTLE INTEREST OR PLEASURE IN DOING THINGS: 3
3. TROUBLE FALLING OR STAYING ASLEEP: 0
4. FEELING TIRED OR HAVING LITTLE ENERGY: 3
SUM OF ALL RESPONSES TO PHQ QUESTIONS 1-9: 7
9. THOUGHTS THAT YOU WOULD BE BETTER OFF DEAD, OR OF HURTING YOURSELF: 0
7. TROUBLE CONCENTRATING ON THINGS, SUCH AS READING THE NEWSPAPER OR WATCHING TELEVISION: 0
SUM OF ALL RESPONSES TO PHQ QUESTIONS 1-9: 7
SUM OF ALL RESPONSES TO PHQ QUESTIONS 1-9: 7

## 2022-01-24 ASSESSMENT — ENCOUNTER SYMPTOMS
PHOTOPHOBIA: 0
CHOKING: 0
STRIDOR: 0
ANAL BLEEDING: 0
TROUBLE SWALLOWING: 1
APNEA: 0
EYE REDNESS: 0
SORE THROAT: 0
CONSTIPATION: 0
EYE ITCHING: 0
RECTAL PAIN: 0
COLOR CHANGE: 0
BACK PAIN: 0

## 2022-01-25 NOTE — PROGRESS NOTES
Chief Complaint   Patient presents with    Follow-up     Esophagram 01/20/22        SUBJECTIVE:  HPI: Patient complainsof GERD symptoms failing to respond to treatment. Symptoms have been present for approximately several years. Symptoms include abdominal bloating, belching, bilious reflux, chest pain, choking on food and deep pressure at base of neck. The patient denies cough. Symptoms appear to be worsened by lying down after eating. Risk factors present for GERD include tobacco abuse and obesity. Studies performed so far include upper endoscopy, result: positive for hiatal hernia. Treatments tried so far include proton pump inhibitor. Results of treatment: no change in severity. Currently, the symptoms are moderate and occur approximately several times per day. I havereviewed the patient's(pertinent information to this visit) medical history, family history(scanned in  the Media tab under \"patient questioner\"), social history and review of systems with the patient today in the office.           Past Surgical History:   Procedure Laterality Date    BALLOON ANGIOPLASTY, ARTERY Right 08/19/2015    RIght SFA 90%->10%, Right Popliteal 1000%->10%    CARDIAC CATHETERIZATION  08/02/2012    CARDIAC SURGERY      open heart surgery 8/2012    CHOLECYSTECTOMY  15+ yrs ago    COLONOSCOPY  2017    CORONARY ARTERY BYPASS GRAFT  08/02/2012    4V CABG- LIMA-LAD, SVG-CX, SVG-PDA, SVG-RCA    ENDOSCOPY, COLON, DIAGNOSTIC  10/10/2017    esophageal stricture, hiatal hernia, candidiasis    ESOPHAGEAL DILATATION      Dr. Sascha Perry      biltateral- \"total of 9 surgeries- all scopes\"    TRANSLUMINAL ANGIOPLASTY Left 09/23/2015 9/23/2015-Left mid and distal SFA and Left Popliteal    TUBAL LIGATION  1978     Past Medical History:   Diagnosis Date    Anemia     Managed by Dr. Reagan Barraza CAD (coronary artery disease)     follows with Dr Kingston JOHNSON-19 08/18/2021    Diabetes mellitus type 2, uncontrolled ([facility]     \"dx 7627    Diastolic dysfunction 94/5637    [facility]    Esophageal stricture     dilation per GI    Hiatal hernia     History of blood transfusion     \"had blood transfusion with 4th child- have hx also of iron infusions for anemia 2017    Hyperlipidemia     Hypertension     IBS (irritable bowel syndrome)     with diarrhea    Iron deficiency anemia     Iron Infusions- follows with Julia Araiza and Jose G Cardenas    LVH (left ventricular hypertrophy) 09/2012    [facility]    Multiple gastric polyps 2004    Dr Anni Hernandez    Obesity     PAD (peripheral artery disease) ([facility]     S/P CABG x 4 08/06/2012    4V CABG- LIMA-LAD, SVG-CX, SVG-PDA, SVG-RCA- Follows with Dr Charlene Bernstein Sinus tachycardia     [facility]    SVT [facility]supraventricular tachycardia) [facility])     old chart gives hx of SVT in the past    Vitamin D deficiency      Family History   Problem Relation Age of Onset    Kidney Disease Mother         Dialysis    Diabetes Mother     High Blood Pressure Mother     Coronary Art Dis Mother         MI    Cancer Father         pancreatic cancer (Smoking)    Coronary Art Dis Father 43        MI early onset    Diabetes Other      Social History     Socioeconomic History    Marital status:      Spouse name: Mary Hansen Number of children: 11    Years of education: Not on file    Highest education level: Not on file   Occupational History    Not on file   Tobacco Use    Smoking status: Never Smoker    Smokeless tobacco: Never Used   Vaping Use    Vaping Use: Never used   Substance and Sexual Activity    Alcohol use: No    Drug use: No    Sexual activity: Not Currently     Partners: Male   Other Topics Concern    Not on file   Social History Narrative    Not on file     Social Determinants of Health     Financial Resource Strain: Low Risk     Difficulty of Paying Living Expenses: Not hard at all   Food Insecurity: No Food Insecurity    Worried About Running Out of Food in the Last Year: Never true    Ran Out of Food in the Last Year: Never true   Transportation Needs:     Lack of Transportation (Medical): Not on file    Lack of Transportation (Non-Medical):  Not on file   Physical Activity:     Days of Exercise per Week: Not on file    Minutes of Exercise per Session: Not on file   Stress:     Feeling of Stress : Not on file   Social Connections:     Frequency of Communication with Friends and Family: Not on file    Frequency of Social Gatherings with Friends and Family: Not on file    Attends Buddhism Services: Not on file    Active Member of [address] [place] [address] [employer] or Organizations: Not on file    Attends Club or Organization Meetings: Not on file    Marital Status: Not on file   Intimate Partner Violence:     Fear of Current or Ex-Partner: Not on file    Emotionally Abused: Not on file    Physically Abused: Not on file    Sexually Abused: Not on file   Housing Stability:     Unable to Pay for Housing in the Last Year: Not on file    Number of [place] in the Last Year: Not on file    Unstable Housing in the Last Year: Not on file       Current Outpatient Medications   Medication Sig Dispense Refill    FARXIGA 5 MG tablet TAKE 1 TABLET BY MOUTH EVERY MORNING 30 tablet 3    Dulaglutide (TRULICITY) 1.5 PU/3.5OZ SOPN Inject 1.5 mg into the skin once a week 4 pen 3    glipiZIDE (GLUCOTROL) 5 MG tablet TAKE 1 TABLET BY MOUTH TWICE DAILY WITH MEALS 180 tablet 0    sucralfate (CARAFATE) 1 GM tablet Take 1 tablet by mouth 2 times daily      SITagliptin (JANUVIA) 50 MG tablet TAKE 1 TABLET BY MOUTH DAILY 90 tablet 1    lisinopril (PRINIVIL;ZESTRIL) 5 MG tablet Take 1 tablet by mouth daily 90 tablet 1    rosuvastatin (CRESTOR) 10 MG tablet Take 1 tablet by mouth nightly 90 tablet 1    omeprazole (PRILOSEC) 40 MG delayed release capsule TK 1 C PO QD 30 MIN B PREMA      carvedilol (COREG) 6.25 MG tablet Take 1 tablet by mouth 2 times daily (with meals) 60 tablet 3    amLODIPine (NORVASC) 5 MG tablet Take 1 tablet by mouth daily 30 tablet 3    amiodarone (CORDARONE) 200 MG tablet Take 200 mg by mouth daily      Cholecalciferol (VITAMIN D) 2000 UNITS CAPS capsule Take 2,000 Units by mouth daily Then weekly 50,000      clopidogrel (PLAVIX) 75 MG tablet Take 1 tablet by mouth daily 30 tablet 3    acetaminophen (TYLENOL) 500 MG tablet Take 1,000 mg by mouth every 6 hours as needed for Pain      aspirin 81 MG EC tablet Take 81 mg by mouth daily. No current facility-administered medications for this visit. Allergies   Allergen Reactions    Tetanus Toxoids Swelling and Rash     fever       Review of Systems:    Review of Systems   Constitutional: Negative for chills and fever. HENT: Positive for trouble swallowing. Negative for ear pain, mouth sores, sore throat and tinnitus. Eyes: Negative for photophobia, redness and itching. Respiratory: Negative for apnea, choking and stridor. Cardiovascular: Negative for chest pain and palpitations. Gastrointestinal: Negative for anal bleeding, constipation and rectal pain. Endocrine: Negative for polydipsia. Genitourinary: Negative for enuresis, flank pain and hematuria. Musculoskeletal: Negative for back pain, joint swelling and myalgias. Skin: Negative for color change and pallor. Allergic/Immunologic: Negative for environmental allergies. Neurological: Negative for syncope and speech difficulty. Psychiatric/Behavioral: Negative for confusion and hallucinations. OBJECTIVE:  Physical Exam:    /80   Pulse 85   Ht 5' 2\" (1.575 m)   Wt 171 lb 9.6 oz (77.8 kg)   LMP 01/19/2002   SpO2 97%   BMI 31.39 kg/m²      Physical Exam  Constitutional:       Appearance: She is well-developed. HENT:      Head: Normocephalic. Eyes:      Pupils: Pupils are equal, round, and reactive to light.    Cardiovascular:      Rate and Rhythm: Normal rate.   Pulmonary:      Effort: Pulmonary effort is normal.   Abdominal:      General: There is no distension. Palpations: Abdomen is soft. There is no mass. Tenderness: There is no abdominal tenderness. There is no guarding or rebound. Musculoskeletal:         General: Normal range of motion. Cervical back: Normal range of motion and neck supple. Skin:     General: Skin is warm. Neurological:      Mental Status: She is alert and oriented to person, place, and time. ASSESSMENT:  1. Pre-op testing    2. Hiatal hernia with GERD          PLAN:  Treatment: Patient is ready to proceed with Nissen fundoplication and hiatal hernia repair robotically. We will obtain cardiac clearance. The pre and postop dietary instructions were given. Patient understands. We also will hold Plavix 5 days preoperatively. Patient counseled on risks, benefits, and alternatives of treatment plan atlength. Patient states an understanding and willingness to proceed with plan. Orders Placed This Encounter   Procedures    External Referral To Cardiology        No orders of the defined types were placed in this encounter. Follow Up:  No follow-ups on file.       Joelle Rudd MD

## 2022-01-26 PROBLEM — I65.23 CAROTID STENOSIS, BILATERAL: Status: ACTIVE | Noted: 2022-01-26

## 2022-02-03 NOTE — PROGRESS NOTES
Patient to come in 2/4/22 for covid test, labs, EKG and CXR. Surgery 2/11/22 you will be called 2/10/22 with times               1. Do not eat or drink anything after midnight - unless instructed by your doctor prior to surgery. This includes                   no water, chewing gum or mints. 2. Follow your directions as prescribed by the doctor for your procedure and medications. Take Amiodarone, Amlodipine, Coreg,                  Omeprazole & Carafate in am with a sip. 3. Check with your Doctor regarding stopping vitamins, supplements, blood thinners (Plavix, Coumadin, Lovenox, Effient, Pradaxa, Xarelto, Fragmin or                   other blood thinners) and follow their instructions. Last dose of aspirin & Plavix: 2/5/22   4. Do not smoke, and do not drink any alcoholic beverages 24 hours prior to surgery. This includes NA Beer. 5. You may brush your teeth and gargle the morning of surgery. DO NOT SWALLOW WATER   6. You MUST make arrangements for a responsible adult to take you home after your surgery and be able to check on you every couple                   hours for the day. You will not be allowed to leave alone or drive yourself home. It is strongly suggested someone stay with you the first 24                   hrs. Your surgery will be cancelled if you do not have a ride home. 7. Please wear simple, loose fitting clothing to the hospital.  Telmaann Cure not bring valuables (money, credit cards, checkbooks, etc.) Do not wear any                   makeup (including no eye makeup) or nail polish on your fingers or toes. 8. DO NOT wear any jewelry or piercings on day of surgery. All body piercing jewelry must be removed. 9. If you have dentures, they will be removed before going to the OR; we will provide you a container. If you wear contact lenses or glasses,                  they will be removed; please bring a case for them.            10. If you  have a Living Will and Durable Power of [place], please bring in a copy. 11. Please bring picture ID,  insurance card, paperwork from the doctors office    (H & P, Consent, & card for implantable devices). 12. Take a shower the night before or morning of your procedure, do not apply any make-up, deodorant, lotion, oil or powder. 13. Wear a mask covering your nose & mouth when entering the hospital. Have your covid-19 test performed within 2-7 days of your                  Surgery-scheduled 2/4/22. Quarantine yourself after the test until after your surgery.

## 2022-02-04 ENCOUNTER — HOSPITAL ENCOUNTER (OUTPATIENT)
Age: 72
Discharge: HOME | End: 2022-02-04
Payer: COMMERCIAL

## 2022-02-04 ENCOUNTER — HOSPITAL ENCOUNTER (OUTPATIENT)
Dept: GENERAL RADIOLOGY | Age: 72
Discharge: HOME | End: 2022-02-04
Payer: COMMERCIAL

## 2022-02-04 ENCOUNTER — HOSPITAL ENCOUNTER (OUTPATIENT)
Dept: LAB | Age: 72
Discharge: HOME | End: 2022-02-04
Payer: COMMERCIAL

## 2022-02-04 DIAGNOSIS — Z01.818 PRE-OP TESTING: ICD-10-CM

## 2022-02-04 LAB
ABO/RH: NORMAL
ANION GAP SERPL CALCULATED.3IONS-SCNC: 13 MMOL/L (ref 4–16)
ANTIBODY SCREEN: NEGATIVE
APTT: 26.8 SECONDS (ref 25.1–37.1)
BASOPHILS ABSOLUTE: 0.1 K/CU MM
BASOPHILS RELATIVE PERCENT: 0.7 % (ref 0–1)
BUN BLDV-MCNC: 25 MG/DL (ref 6–23)
CALCIUM SERPL-MCNC: 9.3 MG/DL (ref 8.3–10.6)
CHLORIDE BLD-SCNC: 103 MMOL/L (ref 99–110)
CO2: 23 MMOL/L (ref 21–32)
COMMENT: NORMAL
CREAT SERPL-MCNC: 1.5 MG/DL (ref 0.6–1.1)
DIFFERENTIAL TYPE: ABNORMAL
EKG ATRIAL RATE: 78 BPM
EKG DIAGNOSIS: NORMAL
EKG P AXIS: 28 DEGREES
EKG P-R INTERVAL: 148 MS
EKG Q-T INTERVAL: 406 MS
EKG QRS DURATION: 98 MS
EKG QTC CALCULATION (BAZETT): 462 MS
EKG R AXIS: 8 DEGREES
EKG T AXIS: 50 DEGREES
EKG VENTRICULAR RATE: 78 BPM
EOSINOPHILS ABSOLUTE: 0.3 K/CU MM
EOSINOPHILS RELATIVE PERCENT: 3.4 % (ref 0–3)
GFR AFRICAN AMERICAN: 41 ML/MIN/1.73M2
GFR NON-AFRICAN AMERICAN: 34 ML/MIN/1.73M2
GLUCOSE BLD-MCNC: 163 MG/DL (ref 70–99)
HCT VFR BLD CALC: 40.4 % (ref 37–47)
HEMOGLOBIN: 12 GM/DL (ref 12.5–16)
IMMATURE NEUTROPHIL %: 0.4 % (ref 0–0.43)
INR BLD: 0.85 INDEX
LYMPHOCYTES ABSOLUTE: 2 K/CU MM
LYMPHOCYTES RELATIVE PERCENT: 20.9 % (ref 24–44)
MCH RBC QN AUTO: 24.7 PG (ref 27–31)
MCHC RBC AUTO-ENTMCNC: 29.7 % (ref 32–36)
MCV RBC AUTO: 83.3 FL (ref 78–100)
MONOCYTES ABSOLUTE: 0.6 K/CU MM
MONOCYTES RELATIVE PERCENT: 6.4 % (ref 0–4)
NUCLEATED RBC %: 0 %
PDW BLD-RTO: 15.7 % (ref 11.7–14.9)
PLATELET # BLD: 349 K/CU MM (ref 140–440)
PMV BLD AUTO: 11.5 FL (ref 7.5–11.1)
POTASSIUM SERPL-SCNC: 5.2 MMOL/L (ref 3.5–5.1)
PROTHROMBIN TIME: 10.9 SECONDS (ref 11.7–14.5)
RBC # BLD: 4.85 M/CU MM (ref 4.2–5.4)
SARS-COV-2: NOT DETECTED
SEGMENTED NEUTROPHILS ABSOLUTE COUNT: 6.6 K/CU MM
SEGMENTED NEUTROPHILS RELATIVE PERCENT: 68.2 % (ref 36–66)
SODIUM BLD-SCNC: 139 MMOL/L (ref 135–145)
SOURCE: NORMAL
TOTAL IMMATURE NEUTOROPHIL: 0.04 K/CU MM
TOTAL NUCLEATED RBC: 0 K/CU MM
WBC # BLD: 9.7 K/CU MM (ref 4–10.5)

## 2022-02-04 PROCEDURE — 86850 RBC ANTIBODY SCREEN: CPT

## 2022-02-04 PROCEDURE — 71046 X-RAY EXAM CHEST 2 VIEWS: CPT

## 2022-02-04 PROCEDURE — 85730 THROMBOPLASTIN TIME PARTIAL: CPT

## 2022-02-04 PROCEDURE — 93010 ELECTROCARDIOGRAM REPORT: CPT | Performed by: INTERNAL MEDICINE

## 2022-02-04 PROCEDURE — 85025 COMPLETE CBC W/AUTO DIFF WBC: CPT

## 2022-02-04 PROCEDURE — U0005 INFEC AGEN DETEC AMPLI PROBE: HCPCS

## 2022-02-04 PROCEDURE — 86900 BLOOD TYPING SEROLOGIC ABO: CPT

## 2022-02-04 PROCEDURE — 93005 ELECTROCARDIOGRAM TRACING: CPT | Performed by: SURGERY

## 2022-02-04 PROCEDURE — 85610 PROTHROMBIN TIME: CPT

## 2022-02-04 PROCEDURE — 80048 BASIC METABOLIC PNL TOTAL CA: CPT

## 2022-02-04 PROCEDURE — 86901 BLOOD TYPING SEROLOGIC RH(D): CPT

## 2022-02-04 PROCEDURE — U0003 INFECTIOUS AGENT DETECTION BY NUCLEIC ACID (DNA OR RNA); SEVERE ACUTE RESPIRATORY SYNDROME CORONAVIRUS 2 (SARS-COV-2) (CORONAVIRUS DISEASE [COVID-19]), AMPLIFIED PROBE TECHNIQUE, MAKING USE OF HIGH THROUGHPUT TECHNOLOGIES AS DESCRIBED BY CMS-2020-01-R: HCPCS

## 2022-02-09 RX ORDER — ROSUVASTATIN CALCIUM 10 MG/1
TABLET, COATED ORAL
Qty: 90 TABLET | Refills: 1 | Status: SHIPPED | OUTPATIENT
Start: 2022-02-09 | End: 2022-10-13

## 2022-02-10 DIAGNOSIS — I10 ESSENTIAL HYPERTENSION: ICD-10-CM

## 2022-02-10 RX ORDER — LISINOPRIL 5 MG/1
5 TABLET ORAL DAILY
Qty: 90 TABLET | Refills: 1 | OUTPATIENT
Start: 2022-02-10

## 2022-02-10 RX ORDER — LISINOPRIL 5 MG/1
5 TABLET ORAL DAILY
Qty: 90 TABLET | Refills: 1 | Status: SHIPPED | OUTPATIENT
Start: 2022-02-10 | End: 2022-08-02

## 2022-02-14 NOTE — PROGRESS NOTES
Surgery 2/18/22 you will be called 2/17/22 with times                1. Do not eat or drink anything after midnight - unless instructed by your doctor prior to surgery. This includes                   no water, chewing gum or mints. 2. Follow your directions as prescribed by the doctor for your procedure and medications. Take Amiodarone, Amlodipine, Coreg,                  Omeprazole & Carafate in am with a sip. 3. Check with your Doctor regarding stopping vitamins, supplements, blood thinners (Plavix, Coumadin, Lovenox, Effient, Pradaxa, Xarelto, Fragmin or                   other blood thinners) and follow their instructions. Last dose of aspirin & Plavix: 2/12/22              4. Do not smoke, and do not drink any alcoholic beverages 24 hours prior to surgery. This includes NA Beer. 5. You may brush your teeth and gargle the morning of surgery. DO NOT SWALLOW WATER              6. You MUST make arrangements for a responsible adult to take you home after your surgery and be able to check on you every couple                   hours for the day. You will not be allowed to leave alone or drive yourself home. It is strongly suggested someone stay with you the first 24                   hrs. Your surgery will be cancelled if you do not have a ride home. 7. Please wear simple, loose fitting clothing to the hospital.  Rolando Fontana not bring valuables (money, credit cards, checkbooks, etc.) Do not wear any                   makeup (including no eye makeup) or nail polish on your fingers or toes. 8. DO NOT wear any jewelry or piercings on day of surgery. All body piercing jewelry must be removed. 9. If you have dentures, they will be removed before going to the OR; we will provide you a container. If you wear contact lenses or glasses,                  they will be removed; please bring a case for them.            10. If you  have a Living Will and Durable Power of  for Healthcare, please bring in a copy. 11. Please bring picture ID,  insurance card, paperwork from the doctors office    (H & P, Consent, & card for implantable devices). 12. Take a shower the night before or morning of your procedure, do not apply any make-up, deodorant, lotion, oil or powder. 13. Wear a mask covering your nose & mouth when entering the hospital. Have your covid-19 test performed within 2-7 days of your                  Surgery-scheduled 2/14-2/16/22.  Quarantine yourself after the test until after your surgery.

## 2022-02-15 ENCOUNTER — HOSPITAL ENCOUNTER (OUTPATIENT)
Age: 72
Discharge: HOME | DRG: 039 | End: 2022-02-15
Payer: MEDICARE

## 2022-02-15 LAB
SARS-COV-2: NOT DETECTED
SOURCE: NORMAL

## 2022-02-15 PROCEDURE — U0005 INFEC AGEN DETEC AMPLI PROBE: HCPCS

## 2022-02-15 PROCEDURE — U0003 INFECTIOUS AGENT DETECTION BY NUCLEIC ACID (DNA OR RNA); SEVERE ACUTE RESPIRATORY SYNDROME CORONAVIRUS 2 (SARS-COV-2) (CORONAVIRUS DISEASE [COVID-19]), AMPLIFIED PROBE TECHNIQUE, MAKING USE OF HIGH THROUGHPUT TECHNOLOGIES AS DESCRIBED BY CMS-2020-01-R: HCPCS

## 2022-02-17 ENCOUNTER — ANESTHESIA EVENT (OUTPATIENT)
Dept: OPERATING ROOM | Age: 72
DRG: 039 | End: 2022-02-17
Payer: MEDICARE

## 2022-02-17 NOTE — PROGRESS NOTES
2/17/22 -  LM for patient:  surgery @ [facility] on  2/18/22 @ 1000, arrival 0700. Please call with any questions.

## 2022-02-17 NOTE — ANESTHESIA PRE PROCEDURE
Department of Anesthesiology  Preprocedure Note       Name:  Louisa Rubalcava   Age:  66 y.o.  :  1950                                          MRN:  [id number]         Date:  2022      Surgeon: Shamika Lafleur):  Amarjit Cagle MD    Procedure: Procedure(s):  RIGHT CAROTID ENDARTERECTOMY    Medications prior to admission:   Prior to Admission medications    Medication Sig Start Date End Date Taking? Authorizing Provider   lisinopril (PRINIVIL;ZESTRIL) 5 MG tablet TAKE 1 TABLET BY MOUTH DAILY 2/10/22   Ceclia April, DO   rosuvastatin (CRESTOR) 10 MG tablet TAKE 1 TABLET BY MOUTH EVERY NIGHT 22, DO   FARXIGA 5 MG tablet TAKE 1 TABLET BY MOUTH EVERY MORNING 22, DO   Dulaglutide (TRULICITY) 1.5 MI/4.4NH SOPN Inject 1.5 mg into the skin once a week 22, DO   glipiZIDE (GLUCOTROL) 5 MG tablet TAKE 1 TABLET BY MOUTH TWICE DAILY WITH MEALS 21, DO   sucralfate (CARAFATE) 1 GM tablet Take 1 tablet by mouth 2 times daily 21   Historical Provider, MD   omeprazole (PRILOSEC) 40 MG delayed release capsule TK 1 C PO QD 30 MIN B PREMA 20   Historical Provider, MD   carvedilol (COREG) 6.25 MG tablet Take 1 tablet by mouth 2 times daily (with meals) 12/10/18   Meg Tobias MD   amLODIPine (NORVASC) 5 MG tablet Take 1 tablet by mouth daily 12/10/18   Meg Tobias MD   amiodarone (CORDARONE) 200 MG tablet Take 200 mg by mouth daily    Historical Provider, MD   Cholecalciferol (VITAMIN D) 2000 UNITS CAPS capsule Take 2,000 Units by mouth daily Then weekly 50,000    Historical Provider, MD   clopidogrel (PLAVIX) 75 MG tablet Take 1 tablet by mouth daily 8/20/15   Meg Tobias MD   acetaminophen (TYLENOL) 500 MG tablet Take 1,000 mg by mouth every 6 hours as needed for Pain    Historical Provider, MD   aspirin 81 MG EC tablet Take 81 mg by mouth daily.       Historical Provider, MD       Current medications:    No current facility-administered medications for this encounter. Current Outpatient Medications   Medication Sig Dispense Refill    lisinopril (PRINIVIL;ZESTRIL) 5 MG tablet TAKE 1 TABLET BY MOUTH DAILY 90 tablet 1    rosuvastatin (CRESTOR) 10 MG tablet TAKE 1 TABLET BY MOUTH EVERY NIGHT 90 tablet 1    FARXIGA 5 MG tablet TAKE 1 TABLET BY MOUTH EVERY MORNING 30 tablet 3    Dulaglutide (TRULICITY) 1.5 VH/8.9DK SOPN Inject 1.5 mg into the skin once a week 4 pen 3    glipiZIDE (GLUCOTROL) 5 MG tablet TAKE 1 TABLET BY MOUTH TWICE DAILY WITH MEALS 180 tablet 0    sucralfate (CARAFATE) 1 GM tablet Take 1 tablet by mouth 2 times daily      omeprazole (PRILOSEC) 40 MG delayed release capsule TK 1 C PO QD 30 MIN B PREMA      carvedilol (COREG) 6.25 MG tablet Take 1 tablet by mouth 2 times daily (with meals) 60 tablet 3    amLODIPine (NORVASC) 5 MG tablet Take 1 tablet by mouth daily 30 tablet 3    amiodarone (CORDARONE) 200 MG tablet Take 200 mg by mouth daily      Cholecalciferol (VITAMIN D) 2000 UNITS CAPS capsule Take 2,000 Units by mouth daily Then weekly 50,000      clopidogrel (PLAVIX) 75 MG tablet Take 1 tablet by mouth daily 30 tablet 3    acetaminophen (TYLENOL) 500 MG tablet Take 1,000 mg by mouth every 6 hours as needed for Pain      aspirin 81 MG EC tablet Take 81 mg by mouth daily. Allergies:     Allergies   Allergen Reactions    Tetanus Toxoids Swelling and Rash     fever       Problem List:    Patient Active Problem List   Diagnosis Code    Anemia D64.9    Vitamin D deficiency E55.9    Type 2 diabetes mellitus without complication, without long-term current use of insulin ([facility] [place] [address] E11.9    Coronary artery disease involving native heart without angina pectoris I25.10    Essential hypertension I10    Mixed hyperlipidemia E78.2    PVD (peripheral vascular disease) (HCC) I73.9    Microalbuminuria R80.9    Angular cheilitis K13.0    Iron deficiency anemia secondary to blood loss (chronic) D50.0    Other forms of chronic ischemic heart disease I25.89    Diaphragmatic hernia without obstruction or gangrene K44.9    Personal history of other diseases of the digestive system Z87.19    Esophageal stricture K22.2    Hiatal hernia K44.9    Iron deficiency anemia D50.9    Acute hypoxemic respiratory failure due to COVID-19 (HCC) U07.1, J96.01    Carotid stenosis, bilateral I65.23       Past Medical History:        Diagnosis Date    Anemia     Managed by Dr. Keaton Carbone CAD (coronary artery disease)     follows with Dr Casie Salazar Carotid stenosis     right    Colon polyps     COVID-19 08/18/2021    Diabetes mellitus type 2, uncontrolled ([facility] [address]     \"dx 0598    Diastolic dysfunction 78/5711    [facility]    Esophageal stricture     dilation per GI    Hiatal hernia     History of blood transfusion     \"had blood transfusion with 4th child- have hx also of iron infusions for anemia 2017    Hyperlipidemia     Hypertension     IBS (irritable bowel syndrome)     with diarrhea    Iron deficiency anemia     Iron Infusions- follows with Julia Araiza and Jose G Cardenas    LVH (left ventricular hypertrophy) 09/2012    [facility]    Multiple gastric polyps 2004    Dr Miya Stahl Obesity     PAD (peripheral artery disease) ([facility] [address]     S/P CABG x 4 08/06/2012    4V CABG- LIMA-LAD, SVG-CX, SVG-PDA, SVG-RCA- Follows with Dr Chaz Bustos Sinus tachycardia     follows with [facility]    SVT (supraventricular tachycardia) [facility])     old chart gives hx of SVT in the past    Vitamin D deficiency        Past Surgical History:        Procedure Laterality Date    BALLOON ANGIOPLASTY, ARTERY Right 08/19/2015    RIght SFA 90%->10%, Right Popliteal 1000%->10%    CARDIAC CATHETERIZATION  08/02/2012    CARDIAC SURGERY      open heart surgery 8/2012    CHOLECYSTECTOMY  15+ yrs ago    COLONOSCOPY  2017    CORONARY ARTERY BYPASS GRAFT  08/02/2012    4V CABG- LIMA-LAD, SVG-CX, SVG-PDA, SVG-RCA    ENDOSCOPY, COLON, DIAGNOSTIC  10/10/2017    esophageal stricture, hiatal hernia, candidiasis    ESOPHAGEAL DILATATION      Dr. Houston Gamez      biltateral- \"total of 9 surgeries- all scopes\"    TRANSLUMINAL ANGIOPLASTY Left 09/23/2015 9/23/2015-Left mid and distal SFA and Left Popliteal    TUBAL LIGATION  1978       Social History:    Social History     Tobacco Use    Smoking status: Never Smoker    Smokeless tobacco: Never Used   Substance Use Topics    Alcohol use: No                                Counseling given: Not Answered      Vital Signs (Current): There were no vitals filed for this visit. BP Readings from Last 3 Encounters:   01/26/22 130/78   01/24/22 132/80   01/13/22 110/70       NPO Status:                                                                                 BMI:   Wt Readings from Last 3 Encounters:   01/26/22 171 lb (77.6 kg)   01/24/22 171 lb 9.6 oz (77.8 kg)   01/13/22 170 lb 12.8 oz (77.5 kg)     There is no height or weight on file to calculate BMI.    CBC:   Lab Results   Component Value Date    WBC 9.7 02/04/2022    RBC 4.85 02/04/2022    HGB 12.0 02/04/2022    HCT 40.4 02/04/2022    MCV 83.3 02/04/2022    RDW 15.7 02/04/2022     02/04/2022       CMP:   Lab Results   Component Value Date     02/04/2022    K 5.2 02/04/2022     02/04/2022    CO2 23 02/04/2022    BUN 25 02/04/2022    CREATININE 1.5 02/04/2022    GFRAA 41 02/04/2022    AGRATIO 1.6 09/25/2020    LABGLOM 34 02/04/2022    GLUCOSE 163 02/04/2022    PROT 6.3 08/19/2021    PROT 6.6 09/25/2020    CALCIUM 9.3 02/04/2022    BILITOT 0.3 08/19/2021    ALKPHOS 74 08/19/2021    AST 14 08/19/2021    ALT 8 08/19/2021       POC Tests: No results for input(s): POCGLU, POCNA, POCK, POCCL, POCBUN, POCHEMO, POCHCT in the last 72 hours.     Coags:   Lab Results   Component Value Date    PROTIME 10.9 02/04/2022    PROTIME 10.6 11/14/2011    INR 0.85 02/04/2022 APTT 26.8 02/04/2022       HCG (If Applicable): No results found for: PREGTESTUR, PREGSERUM, HCG, HCGQUANT     ABGs:   Lab Results   Component Value Date    PO2ART 71 08/06/2012    BEART 7 08/06/2012        Type & Screen (If Applicable):  No results found for: LABABO, LABRH    Drug/Infectious Status (If Applicable):  No results found for: HIV, HEPCAB    COVID-19 Screening (If Applicable):   Lab Results   Component Value Date    COVID19 NOT DETECTED 02/15/2022           Anesthesia Evaluation  Patient summary reviewed  Airway: Mallampati: II  TM distance: >3 FB   Neck ROM: full  Mouth opening: > = 3 FB Dental:    (+) edentulous      Pulmonary:normal exam                              ROS comment: SOB/CP when supine   Cardiovascular:    (+) hypertension:, CAD:, CABG/stent:, dysrhythmias: SVT, hyperlipidemia                Cleared by cardiology     Beta Blocker:  Dose within 24 Hrs      ROS comment: Summary   Left ventricular systolic function is normal.   Ejection fraction is visually estimated at 50-55%. Mild left ventricular hypertrophy. Grade II diastolic dysfunction. No significant valvular disease noted. No evidence of any pericardial effusion. Signature      ------------------------------------------------------------------   Electronically signed by Alex Hernandes MD (Interpreting   physician) on 08/20/2021 at 12:40 PM    Sinus rhythm with frequent premature ventricular complexes   Otherwise normal ECG   When compared with ECG of 18-AUG-2021 14:20,   premature ventricular complexes are now present   T wave inversion no longer evident in Inferior leads   Confirmed by MILTON Cedeno ([id number]) on 2/4/2022 12:51:49 PM      Neuro/Psych:               GI/Hepatic/Renal:   (+) hiatal hernia, GERD:,           Endo/Other:    (+) DiabetesType II DM, , blood dyscrasia: anticoagulation therapy and anemia, arthritis:., .                 Abdominal:             Vascular:   + PVD, aortic or cerebral, . Other Findings:           Anesthesia Plan      general     ASA 3     (Chart review 2/17/22  )  Induction: intravenous. arterial line  MIPS: Postoperative opioids intended. Anesthetic plan and risks discussed with patient. Plan discussed with CRNA.     Attending anesthesiologist reviewed and agrees with Pre Eval content          JOSE RAMON Uriarte - CRNA   2/17/2022

## 2022-02-18 ENCOUNTER — HOSPITAL ENCOUNTER (INPATIENT)
Age: 72
LOS: 1 days | Discharge: HOME | DRG: 039 | End: 2022-02-19
Attending: SURGERY | Admitting: SURGERY
Payer: MEDICARE

## 2022-02-18 ENCOUNTER — ANESTHESIA (OUTPATIENT)
Dept: OPERATING ROOM | Age: 72
DRG: 039 | End: 2022-02-18
Payer: MEDICARE

## 2022-02-18 VITALS
RESPIRATION RATE: 1 BRPM | TEMPERATURE: 96.4 F | SYSTOLIC BLOOD PRESSURE: 135 MMHG | OXYGEN SATURATION: 97 % | DIASTOLIC BLOOD PRESSURE: 71 MMHG

## 2022-02-18 DIAGNOSIS — Z01.818 PRE-OP TESTING: Primary | ICD-10-CM

## 2022-02-18 DIAGNOSIS — I65.23 BILATERAL CAROTID ARTERY STENOSIS: ICD-10-CM

## 2022-02-18 PROBLEM — I65.21 CAROTID STENOSIS, RIGHT: Status: ACTIVE | Noted: 2022-02-18

## 2022-02-18 LAB
GLUCOSE BLD-MCNC: 140 MG/DL (ref 70–99)
GLUCOSE BLD-MCNC: 169 MG/DL (ref 70–99)
GLUCOSE BLD-MCNC: 171 MG/DL (ref 70–99)
GLUCOSE BLD-MCNC: 267 MG/DL (ref 70–99)

## 2022-02-18 PROCEDURE — 2500000003 HC RX 250 WO HCPCS

## 2022-02-18 PROCEDURE — 3700000000 HC ANESTHESIA ATTENDED CARE: Performed by: SURGERY

## 2022-02-18 PROCEDURE — 7100000001 HC PACU RECOVERY - ADDTL 15 MIN: Performed by: SURGERY

## 2022-02-18 PROCEDURE — 2580000003 HC RX 258: Performed by: SURGERY

## 2022-02-18 PROCEDURE — 35301 RECHANNELING OF ARTERY: CPT | Performed by: SURGERY

## 2022-02-18 PROCEDURE — 2000000000 HC ICU R&B

## 2022-02-18 PROCEDURE — 03UM0JZ SUPPLEMENT RIGHT EXTERNAL CAROTID ARTERY WITH SYNTHETIC SUBSTITUTE, OPEN APPROACH: ICD-10-PCS | Performed by: SURGERY

## 2022-02-18 PROCEDURE — 2780000010 HC IMPLANT OTHER: Performed by: SURGERY

## 2022-02-18 PROCEDURE — 2580000003 HC RX 258: Performed by: ANESTHESIOLOGY

## 2022-02-18 PROCEDURE — 6360000002 HC RX W HCPCS: Performed by: SURGERY

## 2022-02-18 PROCEDURE — 6370000000 HC RX 637 (ALT 250 FOR IP): Performed by: SURGERY

## 2022-02-18 PROCEDURE — A4217 STERILE WATER/SALINE, 500 ML: HCPCS | Performed by: SURGERY

## 2022-02-18 PROCEDURE — 6360000002 HC RX W HCPCS

## 2022-02-18 PROCEDURE — 88300 SURGICAL PATH GROSS: CPT

## 2022-02-18 PROCEDURE — 3600000004 HC SURGERY LEVEL 4 BASE: Performed by: SURGERY

## 2022-02-18 PROCEDURE — 3600000014 HC SURGERY LEVEL 4 ADDTL 15MIN: Performed by: SURGERY

## 2022-02-18 PROCEDURE — 03CK0ZZ EXTIRPATION OF MATTER FROM RIGHT INTERNAL CAROTID ARTERY, OPEN APPROACH: ICD-10-PCS | Performed by: SURGERY

## 2022-02-18 PROCEDURE — 82962 GLUCOSE BLOOD TEST: CPT

## 2022-02-18 PROCEDURE — 7100000000 HC PACU RECOVERY - FIRST 15 MIN: Performed by: SURGERY

## 2022-02-18 PROCEDURE — 3700000001 HC ADD 15 MINUTES (ANESTHESIA): Performed by: SURGERY

## 2022-02-18 PROCEDURE — 2500000003 HC RX 250 WO HCPCS: Performed by: SURGERY

## 2022-02-18 PROCEDURE — 2709999900 HC NON-CHARGEABLE SUPPLY: Performed by: SURGERY

## 2022-02-18 PROCEDURE — 6370000000 HC RX 637 (ALT 250 FOR IP)

## 2022-02-18 PROCEDURE — 6360000002 HC RX W HCPCS: Performed by: ANESTHESIOLOGY

## 2022-02-18 PROCEDURE — 03CM0ZZ EXTIRPATION OF MATTER FROM RIGHT EXTERNAL CAROTID ARTERY, OPEN APPROACH: ICD-10-PCS | Performed by: SURGERY

## 2022-02-18 PROCEDURE — 03UK0JZ SUPPLEMENT RIGHT INTERNAL CAROTID ARTERY WITH SYNTHETIC SUBSTITUTE, OPEN APPROACH: ICD-10-PCS | Performed by: SURGERY

## 2022-02-18 PROCEDURE — C1768 GRAFT, VASCULAR: HCPCS | Performed by: SURGERY

## 2022-02-18 PROCEDURE — 2720000010 HC SURG SUPPLY STERILE: Performed by: SURGERY

## 2022-02-18 DEVICE — PATCH CAR TISS REP CORMATRIX 1 X 10 CM: Type: IMPLANTABLE DEVICE | Site: NECK | Status: FUNCTIONAL

## 2022-02-18 RX ORDER — POTASSIUM CHLORIDE 7.45 MG/ML
10 INJECTION INTRAVENOUS PRN
Status: DISCONTINUED | OUTPATIENT
Start: 2022-02-18 | End: 2022-02-19 | Stop reason: HOSPADM

## 2022-02-18 RX ORDER — SODIUM CHLORIDE, SODIUM LACTATE, POTASSIUM CHLORIDE, CALCIUM CHLORIDE 600; 310; 30; 20 MG/100ML; MG/100ML; MG/100ML; MG/100ML
INJECTION, SOLUTION INTRAVENOUS CONTINUOUS
Status: DISCONTINUED | OUTPATIENT
Start: 2022-02-18 | End: 2022-02-18

## 2022-02-18 RX ORDER — SODIUM CHLORIDE 450 MG/100ML
INJECTION, SOLUTION INTRAVENOUS CONTINUOUS
Status: DISCONTINUED | OUTPATIENT
Start: 2022-02-18 | End: 2022-02-19 | Stop reason: HOSPADM

## 2022-02-18 RX ORDER — SODIUM CHLORIDE 0.9 % (FLUSH) 0.9 %
5-40 SYRINGE (ML) INJECTION PRN
Status: DISCONTINUED | OUTPATIENT
Start: 2022-02-18 | End: 2022-02-19 | Stop reason: HOSPADM

## 2022-02-18 RX ORDER — LIDOCAINE HYDROCHLORIDE 10 MG/ML
INJECTION, SOLUTION EPIDURAL; INFILTRATION; INTRACAUDAL; PERINEURAL
Status: COMPLETED | OUTPATIENT
Start: 2022-02-18 | End: 2022-02-18

## 2022-02-18 RX ORDER — SUCRALFATE 1 G/1
1 TABLET ORAL 2 TIMES DAILY
Status: DISCONTINUED | OUTPATIENT
Start: 2022-02-18 | End: 2022-02-19 | Stop reason: HOSPADM

## 2022-02-18 RX ORDER — TRAMADOL HYDROCHLORIDE 50 MG/1
50 TABLET ORAL EVERY 4 HOURS PRN
Status: DISCONTINUED | OUTPATIENT
Start: 2022-02-18 | End: 2022-02-19 | Stop reason: HOSPADM

## 2022-02-18 RX ORDER — AMIODARONE HYDROCHLORIDE 200 MG/1
100 TABLET ORAL DAILY
Status: DISCONTINUED | OUTPATIENT
Start: 2022-02-18 | End: 2022-02-19 | Stop reason: HOSPADM

## 2022-02-18 RX ORDER — SODIUM CHLORIDE 9 MG/ML
25 INJECTION, SOLUTION INTRAVENOUS PRN
Status: DISCONTINUED | OUTPATIENT
Start: 2022-02-18 | End: 2022-02-18 | Stop reason: HOSPADM

## 2022-02-18 RX ORDER — DEXAMETHASONE SODIUM PHOSPHATE 4 MG/ML
INJECTION, SOLUTION INTRA-ARTICULAR; INTRALESIONAL; INTRAMUSCULAR; INTRAVENOUS; SOFT TISSUE PRN
Status: DISCONTINUED | OUTPATIENT
Start: 2022-02-18 | End: 2022-02-18 | Stop reason: SDUPTHER

## 2022-02-18 RX ORDER — SODIUM CHLORIDE 0.9 % (FLUSH) 0.9 %
5-40 SYRINGE (ML) INJECTION EVERY 12 HOURS SCHEDULED
Status: DISCONTINUED | OUTPATIENT
Start: 2022-02-18 | End: 2022-02-19 | Stop reason: HOSPADM

## 2022-02-18 RX ORDER — MAGNESIUM SULFATE 1 G/100ML
1000 INJECTION INTRAVENOUS PRN
Status: DISCONTINUED | OUTPATIENT
Start: 2022-02-18 | End: 2022-02-19 | Stop reason: HOSPADM

## 2022-02-18 RX ORDER — FENTANYL CITRATE 50 UG/ML
50 INJECTION, SOLUTION INTRAMUSCULAR; INTRAVENOUS EVERY 5 MIN PRN
Status: DISCONTINUED | OUTPATIENT
Start: 2022-02-18 | End: 2022-02-18 | Stop reason: HOSPADM

## 2022-02-18 RX ORDER — HYDRALAZINE HYDROCHLORIDE 20 MG/ML
10 INJECTION INTRAMUSCULAR; INTRAVENOUS
Status: DISCONTINUED | OUTPATIENT
Start: 2022-02-18 | End: 2022-02-19 | Stop reason: HOSPADM

## 2022-02-18 RX ORDER — ATORVASTATIN CALCIUM 20 MG/1
20 TABLET, FILM COATED ORAL NIGHTLY
Status: DISCONTINUED | OUTPATIENT
Start: 2022-02-19 | End: 2022-02-19 | Stop reason: HOSPADM

## 2022-02-18 RX ORDER — GLIPIZIDE 5 MG/1
5 TABLET ORAL
Status: DISCONTINUED | OUTPATIENT
Start: 2022-02-19 | End: 2022-02-19 | Stop reason: HOSPADM

## 2022-02-18 RX ORDER — ONDANSETRON 2 MG/ML
4 INJECTION INTRAMUSCULAR; INTRAVENOUS EVERY 6 HOURS PRN
Status: DISCONTINUED | OUTPATIENT
Start: 2022-02-18 | End: 2022-02-19 | Stop reason: HOSPADM

## 2022-02-18 RX ORDER — DIPHENHYDRAMINE HYDROCHLORIDE 50 MG/ML
12.5 INJECTION INTRAMUSCULAR; INTRAVENOUS
Status: DISCONTINUED | OUTPATIENT
Start: 2022-02-18 | End: 2022-02-18 | Stop reason: HOSPADM

## 2022-02-18 RX ORDER — PROPOFOL 10 MG/ML
INJECTION, EMULSION INTRAVENOUS PRN
Status: DISCONTINUED | OUTPATIENT
Start: 2022-02-18 | End: 2022-02-18 | Stop reason: SDUPTHER

## 2022-02-18 RX ORDER — PHENYLEPHRINE HYDROCHLORIDE 10 MG/ML
INJECTION INTRAVENOUS PRN
Status: DISCONTINUED | OUTPATIENT
Start: 2022-02-18 | End: 2022-02-18 | Stop reason: SDUPTHER

## 2022-02-18 RX ORDER — POTASSIUM CHLORIDE 20 MEQ/1
40 TABLET, EXTENDED RELEASE ORAL PRN
Status: DISCONTINUED | OUTPATIENT
Start: 2022-02-18 | End: 2022-02-19 | Stop reason: HOSPADM

## 2022-02-18 RX ORDER — HEPARIN SODIUM 1000 [USP'U]/ML
INJECTION, SOLUTION INTRAVENOUS; SUBCUTANEOUS PRN
Status: DISCONTINUED | OUTPATIENT
Start: 2022-02-18 | End: 2022-02-18 | Stop reason: SDUPTHER

## 2022-02-18 RX ORDER — LIDOCAINE HYDROCHLORIDE 20 MG/ML
INJECTION, SOLUTION INTRAVENOUS PRN
Status: DISCONTINUED | OUTPATIENT
Start: 2022-02-18 | End: 2022-02-18 | Stop reason: SDUPTHER

## 2022-02-18 RX ORDER — ONDANSETRON 2 MG/ML
4 INJECTION INTRAMUSCULAR; INTRAVENOUS
Status: DISCONTINUED | OUTPATIENT
Start: 2022-02-18 | End: 2022-02-18 | Stop reason: HOSPADM

## 2022-02-18 RX ORDER — SODIUM CHLORIDE 0.9 % (FLUSH) 0.9 %
5-40 SYRINGE (ML) INJECTION PRN
Status: DISCONTINUED | OUTPATIENT
Start: 2022-02-18 | End: 2022-02-18 | Stop reason: HOSPADM

## 2022-02-18 RX ORDER — ROCURONIUM BROMIDE 10 MG/ML
INJECTION, SOLUTION INTRAVENOUS PRN
Status: DISCONTINUED | OUTPATIENT
Start: 2022-02-18 | End: 2022-02-18 | Stop reason: SDUPTHER

## 2022-02-18 RX ORDER — ASPIRIN 81 MG/1
81 TABLET ORAL DAILY
Status: DISCONTINUED | OUTPATIENT
Start: 2022-02-18 | End: 2022-02-19 | Stop reason: HOSPADM

## 2022-02-18 RX ORDER — AMIODARONE HYDROCHLORIDE 200 MG/1
200 TABLET ORAL DAILY
Status: DISCONTINUED | OUTPATIENT
Start: 2022-02-18 | End: 2022-02-18 | Stop reason: CLARIF

## 2022-02-18 RX ORDER — LABETALOL HYDROCHLORIDE 5 MG/ML
10 INJECTION, SOLUTION INTRAVENOUS
Status: DISCONTINUED | OUTPATIENT
Start: 2022-02-18 | End: 2022-02-19 | Stop reason: HOSPADM

## 2022-02-18 RX ORDER — AMIODARONE HYDROCHLORIDE 200 MG/1
200 TABLET ORAL DAILY
Status: DISCONTINUED | OUTPATIENT
Start: 2022-02-18 | End: 2022-02-18

## 2022-02-18 RX ORDER — SODIUM CHLORIDE 9 MG/ML
25 INJECTION, SOLUTION INTRAVENOUS PRN
Status: DISCONTINUED | OUTPATIENT
Start: 2022-02-18 | End: 2022-02-19 | Stop reason: HOSPADM

## 2022-02-18 RX ORDER — SODIUM CHLORIDE 0.9 % (FLUSH) 0.9 %
5-40 SYRINGE (ML) INJECTION EVERY 12 HOURS SCHEDULED
Status: DISCONTINUED | OUTPATIENT
Start: 2022-02-18 | End: 2022-02-18 | Stop reason: HOSPADM

## 2022-02-18 RX ORDER — NICOTINE POLACRILEX 4 MG
15 LOZENGE BUCCAL PRN
Status: DISCONTINUED | OUTPATIENT
Start: 2022-02-18 | End: 2022-02-19 | Stop reason: HOSPADM

## 2022-02-18 RX ORDER — DEXTROSE MONOHYDRATE 50 MG/ML
100 INJECTION, SOLUTION INTRAVENOUS PRN
Status: DISCONTINUED | OUTPATIENT
Start: 2022-02-18 | End: 2022-02-19 | Stop reason: HOSPADM

## 2022-02-18 RX ORDER — DEXTROSE MONOHYDRATE 25 G/50ML
12.5 INJECTION, SOLUTION INTRAVENOUS PRN
Status: DISCONTINUED | OUTPATIENT
Start: 2022-02-18 | End: 2022-02-19 | Stop reason: HOSPADM

## 2022-02-18 RX ORDER — ACETAMINOPHEN 325 MG/1
650 TABLET ORAL EVERY 4 HOURS PRN
Status: DISCONTINUED | OUTPATIENT
Start: 2022-02-18 | End: 2022-02-19 | Stop reason: HOSPADM

## 2022-02-18 RX ORDER — ONDANSETRON 2 MG/ML
INJECTION INTRAMUSCULAR; INTRAVENOUS PRN
Status: DISCONTINUED | OUTPATIENT
Start: 2022-02-18 | End: 2022-02-18 | Stop reason: SDUPTHER

## 2022-02-18 RX ORDER — AMLODIPINE BESYLATE 5 MG/1
5 TABLET ORAL DAILY
Status: DISCONTINUED | OUTPATIENT
Start: 2022-02-18 | End: 2022-02-19 | Stop reason: HOSPADM

## 2022-02-18 RX ORDER — PROTAMINE SULFATE 10 MG/ML
INJECTION, SOLUTION INTRAVENOUS PRN
Status: DISCONTINUED | OUTPATIENT
Start: 2022-02-18 | End: 2022-02-18 | Stop reason: SDUPTHER

## 2022-02-18 RX ORDER — CARVEDILOL 6.25 MG/1
6.25 TABLET ORAL 2 TIMES DAILY WITH MEALS
Status: DISCONTINUED | OUTPATIENT
Start: 2022-02-18 | End: 2022-02-19 | Stop reason: HOSPADM

## 2022-02-18 RX ORDER — FENTANYL CITRATE 50 UG/ML
INJECTION, SOLUTION INTRAMUSCULAR; INTRAVENOUS PRN
Status: DISCONTINUED | OUTPATIENT
Start: 2022-02-18 | End: 2022-02-18 | Stop reason: SDUPTHER

## 2022-02-18 RX ORDER — MEPERIDINE HYDROCHLORIDE 25 MG/ML
12.5 INJECTION INTRAMUSCULAR; INTRAVENOUS; SUBCUTANEOUS EVERY 5 MIN PRN
Status: DISCONTINUED | OUTPATIENT
Start: 2022-02-18 | End: 2022-02-18 | Stop reason: HOSPADM

## 2022-02-18 RX ADMIN — PROTAMINE SULFATE 25 MG: 10 INJECTION, SOLUTION INTRAVENOUS at 12:27

## 2022-02-18 RX ADMIN — ROCURONIUM BROMIDE 50 MG: 50 INJECTION, SOLUTION INTRAVENOUS at 11:09

## 2022-02-18 RX ADMIN — FENTANYL CITRATE 50 MCG: 50 INJECTION, SOLUTION INTRAMUSCULAR; INTRAVENOUS at 11:52

## 2022-02-18 RX ADMIN — LIDOCAINE HYDROCHLORIDE 80 MG: 20 INJECTION, SOLUTION INTRAVENOUS at 11:09

## 2022-02-18 RX ADMIN — PHENYLEPHRINE HYDROCHLORIDE 100 MCG: 10 INJECTION INTRAVENOUS at 12:08

## 2022-02-18 RX ADMIN — SODIUM CHLORIDE: 4.5 INJECTION, SOLUTION INTRAVENOUS at 13:52

## 2022-02-18 RX ADMIN — SODIUM CHLORIDE, PRESERVATIVE FREE 10 ML: 5 INJECTION INTRAVENOUS at 20:54

## 2022-02-18 RX ADMIN — PHENYLEPHRINE HYDROCHLORIDE 100 MCG: 10 INJECTION INTRAVENOUS at 11:21

## 2022-02-18 RX ADMIN — DEXAMETHASONE SODIUM PHOSPHATE 4 MG: 4 INJECTION, SOLUTION INTRAMUSCULAR; INTRAVENOUS at 11:11

## 2022-02-18 RX ADMIN — SUCRALFATE 1 G: 1 TABLET ORAL at 20:53

## 2022-02-18 RX ADMIN — SUGAMMADEX 200 MG: 100 INJECTION, SOLUTION INTRAVENOUS at 12:37

## 2022-02-18 RX ADMIN — PHENYLEPHRINE HYDROCHLORIDE 100 MCG: 10 INJECTION INTRAVENOUS at 12:19

## 2022-02-18 RX ADMIN — PROPOFOL 120 MG: 10 INJECTION, EMULSION INTRAVENOUS at 11:09

## 2022-02-18 RX ADMIN — HEPARIN SODIUM 5000 UNITS: 1000 INJECTION, SOLUTION INTRAVENOUS; SUBCUTANEOUS at 11:46

## 2022-02-18 RX ADMIN — CARVEDILOL 6.25 MG: 6.25 TABLET, FILM COATED ORAL at 15:58

## 2022-02-18 RX ADMIN — PHENYLEPHRINE HYDROCHLORIDE 50 MCG: 10 INJECTION INTRAVENOUS at 12:14

## 2022-02-18 RX ADMIN — AMIODARONE HYDROCHLORIDE 100 MG: 200 TABLET ORAL at 19:57

## 2022-02-18 RX ADMIN — SODIUM CHLORIDE, POTASSIUM CHLORIDE, SODIUM LACTATE AND CALCIUM CHLORIDE: 600; 310; 30; 20 INJECTION, SOLUTION INTRAVENOUS at 08:25

## 2022-02-18 RX ADMIN — CEFAZOLIN 1000 MG: 1 INJECTION, POWDER, FOR SOLUTION INTRAMUSCULAR; INTRAVENOUS; PARENTERAL at 11:11

## 2022-02-18 RX ADMIN — FENTANYL CITRATE 50 MCG: 50 INJECTION, SOLUTION INTRAMUSCULAR; INTRAVENOUS at 12:02

## 2022-02-18 RX ADMIN — FENTANYL CITRATE 25 MCG: 50 INJECTION, SOLUTION INTRAMUSCULAR; INTRAVENOUS at 13:00

## 2022-02-18 RX ADMIN — PHENYLEPHRINE HYDROCHLORIDE 50 MCG: 10 INJECTION INTRAVENOUS at 12:11

## 2022-02-18 RX ADMIN — FENTANYL CITRATE 50 MCG: 50 INJECTION, SOLUTION INTRAMUSCULAR; INTRAVENOUS at 13:27

## 2022-02-18 RX ADMIN — ONDANSETRON 4 MG: 2 INJECTION INTRAMUSCULAR; INTRAVENOUS at 12:33

## 2022-02-18 RX ADMIN — PHENYLEPHRINE HYDROCHLORIDE 50 MCG: 10 INJECTION INTRAVENOUS at 12:17

## 2022-02-18 RX ADMIN — FENTANYL CITRATE 25 MCG: 50 INJECTION, SOLUTION INTRAMUSCULAR; INTRAVENOUS at 12:44

## 2022-02-18 RX ADMIN — ASPIRIN 81 MG: 81 TABLET, COATED ORAL at 15:58

## 2022-02-18 RX ADMIN — FENTANYL CITRATE 50 MCG: 50 INJECTION, SOLUTION INTRAMUSCULAR; INTRAVENOUS at 11:07

## 2022-02-18 ASSESSMENT — PULMONARY FUNCTION TESTS
PIF_VALUE: 0
PIF_VALUE: 21
PIF_VALUE: 1
PIF_VALUE: 1
PIF_VALUE: 20
PIF_VALUE: 20
PIF_VALUE: 21
PIF_VALUE: 19
PIF_VALUE: 20
PIF_VALUE: 19
PIF_VALUE: 1
PIF_VALUE: 21
PIF_VALUE: 0
PIF_VALUE: 21
PIF_VALUE: 20
PIF_VALUE: 0
PIF_VALUE: 20
PIF_VALUE: 21
PIF_VALUE: 2
PIF_VALUE: 20
PIF_VALUE: 20
PIF_VALUE: 0
PIF_VALUE: 19
PIF_VALUE: 20
PIF_VALUE: 2
PIF_VALUE: 0
PIF_VALUE: 20
PIF_VALUE: 20
PIF_VALUE: 21
PIF_VALUE: 1
PIF_VALUE: 21
PIF_VALUE: 1
PIF_VALUE: 0
PIF_VALUE: 20
PIF_VALUE: 4
PIF_VALUE: 0
PIF_VALUE: 26
PIF_VALUE: 20
PIF_VALUE: 20
PIF_VALUE: 19
PIF_VALUE: 15
PIF_VALUE: 1
PIF_VALUE: 21
PIF_VALUE: 21
PIF_VALUE: 4
PIF_VALUE: 0
PIF_VALUE: 0
PIF_VALUE: 21
PIF_VALUE: 0
PIF_VALUE: 20
PIF_VALUE: 18
PIF_VALUE: 20
PIF_VALUE: 2
PIF_VALUE: 21
PIF_VALUE: 20
PIF_VALUE: 21
PIF_VALUE: 2
PIF_VALUE: 20
PIF_VALUE: 20
PIF_VALUE: 19
PIF_VALUE: 2
PIF_VALUE: 0
PIF_VALUE: 21
PIF_VALUE: 20
PIF_VALUE: 21
PIF_VALUE: 20
PIF_VALUE: 0
PIF_VALUE: 33
PIF_VALUE: 19
PIF_VALUE: 20
PIF_VALUE: 20
PIF_VALUE: 19
PIF_VALUE: 38
PIF_VALUE: 1
PIF_VALUE: 20
PIF_VALUE: 19
PIF_VALUE: 19
PIF_VALUE: 21
PIF_VALUE: 21
PIF_VALUE: 0
PIF_VALUE: 20
PIF_VALUE: 21
PIF_VALUE: 20
PIF_VALUE: 20
PIF_VALUE: 22
PIF_VALUE: 17
PIF_VALUE: 20
PIF_VALUE: 20
PIF_VALUE: 21
PIF_VALUE: 21
PIF_VALUE: 20
PIF_VALUE: 21
PIF_VALUE: 20
PIF_VALUE: 21
PIF_VALUE: 0
PIF_VALUE: 4
PIF_VALUE: 0
PIF_VALUE: 21
PIF_VALUE: 21
PIF_VALUE: 20
PIF_VALUE: 0
PIF_VALUE: 20
PIF_VALUE: 21
PIF_VALUE: 20

## 2022-02-18 ASSESSMENT — PAIN DESCRIPTION - PAIN TYPE
TYPE: SURGICAL PAIN
TYPE: SURGICAL PAIN

## 2022-02-18 ASSESSMENT — PAIN DESCRIPTION - DESCRIPTORS
DESCRIPTORS: NAGGING
DESCRIPTORS: ACHING

## 2022-02-18 ASSESSMENT — PAIN DESCRIPTION - LOCATION
LOCATION: NECK
LOCATION: NECK

## 2022-02-18 ASSESSMENT — PAIN - FUNCTIONAL ASSESSMENT: PAIN_FUNCTIONAL_ASSESSMENT: 0-10

## 2022-02-18 ASSESSMENT — PAIN SCALES - GENERAL
PAINLEVEL_OUTOF10: 2
PAINLEVEL_OUTOF10: 9

## 2022-02-18 ASSESSMENT — PAIN DESCRIPTION - FREQUENCY: FREQUENCY: CONTINUOUS

## 2022-02-18 ASSESSMENT — PAIN DESCRIPTION - ORIENTATION: ORIENTATION: RIGHT

## 2022-02-18 NOTE — ANESTHESIA POSTPROCEDURE EVALUATION
Department of Anesthesiology  Postprocedure Note    Patient: Valera Soulier  MRN: [id number]  YOB: 1950  Date of evaluation: 2/18/2022  Time:  4:54 PM     Procedure Summary     Date: 02/18/22 Room / Location: [place] [address] / [facility]    Anesthesia Start: 1056 Anesthesia Stop: 6013    Procedure: RIGHT CAROTID ENDARTERECTOMY (Right Neck) Diagnosis:       Bilateral carotid artery stenosis      (Bilateral carotid artery stenosis [I65.23])    Surgeons: Meseret Davis MD Responsible Provider: Liz Wise MD    Anesthesia Type: general ASA Status: 3          Anesthesia Type: general    Naila Phase I: Naila Score: 9    Naila Phase II:      Last vitals: Reviewed and per EMR flowsheets.        Anesthesia Post Evaluation

## 2022-02-18 NOTE — ANESTHESIA PROCEDURE NOTES
Arterial Line:    An arterial line was placed using surface landmarks, in the OR for the following indication(s): continuous blood pressure monitoring and blood sampling needed. A 20 gauge (size), 1 and 3/4 inch (length), Arrow (type) catheter was placed, into the left radial artery, secured by Tegaderm and tape. Anesthesia type: Local  Local infiltration: Injection    Events:  patient tolerated procedure well with no complications and EBL < 5mL.   2/18/2022 11:05 AM2/18/2022 11:10 AM  Anesthesiologist: Nakia Sexton MD  Resident/CRNA: JOSE RAMON Lozada CRNA  Performed: Resident/CRNA   Preanesthetic Checklist  Completed: patient identified, IV checked, site marked, risks and benefits discussed, surgical consent, monitors and equipment checked, pre-op evaluation, timeout performed, anesthesia consent given, oxygen available and patient being monitored

## 2022-02-18 NOTE — PROGRESS NOTES
1257 patient received from the OR monitor placed and alarms on. Report received from [facility]. 1350 resting quietly denies any needs at this time   1409 Transferred to room 2115 via bed with belongings. Report given to Maria Esther Garcia RN at bedside.  Patient granddaughter aware of room number

## 2022-02-18 NOTE — OP NOTE
Operative Note      Patient: Simona Mei  YOB: 1950  MRN: [id number]    Date of Procedure: 2/18/2022    Date of Procedure:   02/18/22    Surgeon: Quincy Camarillo MD/Dirk Garnica MD    Preoperative Diagnosis: Bilateral carotid artery stenosis [facility]  Active Hospital Problems    Diagnosis Date Noted    Pre-op testing [Z01.818]         Severe Carotid Stenosis     Postoperative Diagnosis: Bilateral carotid artery stenosis [I65.23]  Active Hospital Problems    Diagnosis Date Noted    Pre-op testing [Z01.818]        Operation: Right Carotid Endarterectomy Cormatrix patch      Anesthesia: General    Findings: Heavily calcific, ulcerated stenosis on the takeoff of the internal carotid artery    Procedure:  Patient was brought to the operating room after preoperative discussion with patient and family and review of the investigations. Preoperative arterial line and transcranial oxygen saturation monitoring was established. Time out per check list confirmed  Under general anesthesia right neck was prepared and draped. Incision was made along the anterior border of the sternomastoid muscle. The common carotid was dissected out circumferentially and dissected distally to the bifurcation. The internal carotid was dissected beyond the lesion. After heparinization clamps were placed on the internal followed by external and common carotid arteries in that order. Arteriotomy was made in the common carotid and carried through the plaque, into the internal beyond the lesion. The plaque was  shortly from the common carotid and removed smoothly from the internal and the external carotid. All the loose plaques were removed. The arteriotomy was closed using a CorMatrix patch with a continuous prolene suture. Blood flow was established to the external followed by internal carotid arteries. Protamine was given to reverse the Heparin. Hemostasis was secured and confirmed throughout surgery. Wound was irrigated with antibiotic solution and closed in layers. Blood loss was minimal. Sponge and instrument count was correct before closure. Assistant  Surgeon helped with exposure and facilitated key portions of the procedure. Patient tolerated the procedure well. She was fully alert and awake without neurologic and cardiologic problems at the end of the procedure. We're optimistic that he will make a good recovery. Complications: None    Specimens:   ID Type Source Tests Collected by Time Destination   A : RIGHT CAROTID PLAQUE Tissue Tissue SURGICAL PATHOLOGY Concepcion Mcdaniel MD 2/18/2022 1133        Implants:  Implant Name Type Inv.  Item Serial No.  Lot No. LRB No. Used Action   PATCH CAR TISS REP [employer] 1 X 10 CM - [id number]  PATCH CAR TISS REP [employer] 1 X 10 CM  [employer]- [id number] Implanted         Drains: * No LDAs found *    Electronically signed by Concepcion Mcdaniel MD on 2/18/2022 at 12:49 PM

## 2022-02-19 VITALS
HEIGHT: 62 IN | DIASTOLIC BLOOD PRESSURE: 58 MMHG | TEMPERATURE: 97.7 F | RESPIRATION RATE: 14 BRPM | BODY MASS INDEX: 31.1 KG/M2 | HEART RATE: 76 BPM | OXYGEN SATURATION: 95 % | SYSTOLIC BLOOD PRESSURE: 119 MMHG | WEIGHT: 169 LBS

## 2022-02-19 LAB
GLUCOSE BLD-MCNC: 151 MG/DL (ref 70–99)
HCT VFR BLD CALC: 39 % (ref 37–47)
HEMOGLOBIN: 11.8 GM/DL (ref 12.5–16)
MCH RBC QN AUTO: 24.8 PG (ref 27–31)
MCHC RBC AUTO-ENTMCNC: 30.3 % (ref 32–36)
MCV RBC AUTO: 82.1 FL (ref 78–100)
PDW BLD-RTO: 14.6 % (ref 11.7–14.9)
PLATELET # BLD: 375 K/CU MM (ref 140–440)
PMV BLD AUTO: 10.4 FL (ref 7.5–11.1)
RBC # BLD: 4.75 M/CU MM (ref 4.2–5.4)
WBC # BLD: 17.3 K/CU MM (ref 4–10.5)

## 2022-02-19 PROCEDURE — 82962 GLUCOSE BLOOD TEST: CPT

## 2022-02-19 PROCEDURE — 2580000003 HC RX 258: Performed by: SURGERY

## 2022-02-19 PROCEDURE — 6370000000 HC RX 637 (ALT 250 FOR IP): Performed by: SURGERY

## 2022-02-19 PROCEDURE — 94761 N-INVAS EAR/PLS OXIMETRY MLT: CPT

## 2022-02-19 PROCEDURE — 85027 COMPLETE CBC AUTOMATED: CPT

## 2022-02-19 RX ADMIN — CARVEDILOL 6.25 MG: 6.25 TABLET, FILM COATED ORAL at 08:04

## 2022-02-19 RX ADMIN — ACETAMINOPHEN 650 MG: 325 TABLET ORAL at 00:02

## 2022-02-19 RX ADMIN — SUCRALFATE 1 G: 1 TABLET ORAL at 06:34

## 2022-02-19 RX ADMIN — GLIPIZIDE 5 MG: 5 TABLET ORAL at 06:34

## 2022-02-19 RX ADMIN — AMIODARONE HYDROCHLORIDE 100 MG: 200 TABLET ORAL at 08:04

## 2022-02-19 RX ADMIN — ASPIRIN 81 MG: 81 TABLET, COATED ORAL at 08:04

## 2022-02-19 RX ADMIN — AMLODIPINE BESYLATE 5 MG: 5 TABLET ORAL at 08:04

## 2022-02-19 RX ADMIN — SODIUM CHLORIDE, PRESERVATIVE FREE 10 ML: 5 INJECTION INTRAVENOUS at 09:41

## 2022-02-19 ASSESSMENT — PAIN SCALES - GENERAL
PAINLEVEL_OUTOF10: 3
PAINLEVEL_OUTOF10: 0
PAINLEVEL_OUTOF10: 6
PAINLEVEL_OUTOF10: 0
PAINLEVEL_OUTOF10: 0

## 2022-02-19 NOTE — PROGRESS NOTES
Pt reports taking amiodarone 200mg QD, but I called her [facility] pharmacy and there is no recent fill history (last filled in 2018)    Spoke with Mattie Newsome MD, ok to give pt 100 mg QD

## 2022-02-19 NOTE — PROGRESS NOTES
Discharge instructions reviewed with patient. Pt expressed understanding and had no question. Patient able to dress self independently. PIV's removed intact. Pt transported in wheelchair to  waiting in car. Patient discharged in stable condition.

## 2022-02-19 NOTE — DISCHARGE SUMMARY
Discharge Summary     Patient ID  Hemanth Jade   1950  [id number]      Primary Care Doctor:  Marianne Kemp DO    Admit date: 2/18/2022   Discharge date: 2/19/2022      Admitting Physician: Chino Christy MD   Discharge Physician: Levy Zhang MD MD    Discharge Diagnoses: Active Hospital Problems    Diagnosis Date Noted    Carotid stenosis, right [I65.21] 02/18/2022    Pre-op testing [Z01.818]      Discharged Condition: stable. Hospital Course:  Upon admission underwent surgery of RCE after discussion and preparation. Tolerated surgery well   Post op ; monitored rhythm, O2 sat, I/O, Labs, CXRs serially  Neuro status , BP and vital signs monitored  Started on diet and activity.   Uneventful recovery  At discharge, pt ambulating  Tolerating diet  Wounds clean  Had Bm  Lungs clear   NSR  VS at discharge   Vitals:    02/19/22 0804   BP: (!) 145/64   Pulse:    Resp:    Temp:    SpO2:          Disposition: home    Patient Instructions:      Medication List      CONTINUE taking these medications    acetaminophen 500 MG tablet  Commonly known as: TYLENOL     amiodarone 200 MG tablet  Commonly known as: CORDARONE     amLODIPine 5 MG tablet  Commonly known as: Norvasc  Take 1 tablet by mouth daily     aspirin 81 MG EC tablet     carvedilol 6.25 MG tablet  Commonly known as: Coreg  Take 1 tablet by mouth 2 times daily (with meals)     clopidogrel 75 MG tablet  Commonly known as: PLAVIX  Take 1 tablet by mouth daily     Farxiga 5 MG tablet  Generic drug: dapagliflozin  TAKE 1 TABLET BY MOUTH EVERY MORNING     glipiZIDE 5 MG tablet  Commonly known as: GLUCOTROL  TAKE 1 TABLET BY MOUTH TWICE DAILY WITH MEALS     lisinopril 5 MG tablet  Commonly known as: PRINIVIL;ZESTRIL  TAKE 1 TABLET BY MOUTH DAILY     omeprazole 40 MG delayed release capsule  Commonly known as: PRILOSEC     rosuvastatin 10 MG tablet  Commonly known as: CRESTOR  TAKE 1 TABLET BY MOUTH EVERY NIGHT     sucralfate 1 GM tablet  Commonly

## 2022-03-07 ENCOUNTER — TELEPHONE (OUTPATIENT)
Dept: SURGERY | Age: 72
End: 2022-03-07

## 2022-03-07 DIAGNOSIS — E11.9 TYPE 2 DIABETES MELLITUS WITHOUT COMPLICATION, WITHOUT LONG-TERM CURRENT USE OF INSULIN (HCC): ICD-10-CM

## 2022-03-08 DIAGNOSIS — E11.9 TYPE 2 DIABETES MELLITUS WITHOUT COMPLICATION, WITHOUT LONG-TERM CURRENT USE OF INSULIN (HCC): ICD-10-CM

## 2022-03-08 RX ORDER — GLIPIZIDE 5 MG/1
TABLET ORAL
Qty: 180 TABLET | Refills: 0 | OUTPATIENT
Start: 2022-03-08

## 2022-03-08 RX ORDER — GLIPIZIDE 5 MG/1
5 TABLET ORAL
Qty: 180 TABLET | Refills: 1 | Status: SHIPPED | OUTPATIENT
Start: 2022-03-08

## 2022-03-09 ENCOUNTER — TELEPHONE (OUTPATIENT)
Dept: SURGERY | Age: 72
End: 2022-03-09

## 2022-03-09 NOTE — TELEPHONE ENCOUNTER
SPOKE TO FOR Zoila Alvarado REGARDING SURGERY (ROBOTIC NISSEN FUNDOPLICATION W/ HIATAL HERNIA REPAIR) SCHEDULED @ [address] [place] [address].  NOTIFIED OF DATES, TIMES AND LOCATION    PHONE ASSESSMENT   SURGERY [email or web address]   P/O [email or web address]    NPO AFTER MIDNIGHT  HOLD BLOOD THINNERS PLAVIX

## 2022-03-29 NOTE — PROGRESS NOTES
3/29/22 - . LM concerning  surgery @ [facility] on  4/1/22. Please call the PAT Nurse for a phone assessment and surgery instructions.

## 2022-03-30 ENCOUNTER — HOSPITAL ENCOUNTER (OUTPATIENT)
Age: 72
Discharge: HOME | End: 2022-03-30
Payer: COMMERCIAL

## 2022-03-30 DIAGNOSIS — Z01.818 PRE-OP TESTING: ICD-10-CM

## 2022-03-30 LAB
ANION GAP SERPL CALCULATED.3IONS-SCNC: 12 MMOL/L (ref 4–16)
BASOPHILS ABSOLUTE: 0.1 K/CU MM
BASOPHILS RELATIVE PERCENT: 1 % (ref 0–1)
BUN BLDV-MCNC: 29 MG/DL (ref 6–23)
CALCIUM SERPL-MCNC: 9.7 MG/DL (ref 8.3–10.6)
CHLORIDE BLD-SCNC: 99 MMOL/L (ref 99–110)
CO2: 25 MMOL/L (ref 21–32)
CREAT SERPL-MCNC: 1.5 MG/DL (ref 0.6–1.1)
DIFFERENTIAL TYPE: ABNORMAL
EOSINOPHILS ABSOLUTE: 0.4 K/CU MM
EOSINOPHILS RELATIVE PERCENT: 4.4 % (ref 0–3)
GFR AFRICAN AMERICAN: 41 ML/MIN/1.73M2
GFR NON-AFRICAN AMERICAN: 34 ML/MIN/1.73M2
GLUCOSE BLD-MCNC: 158 MG/DL (ref 70–99)
HCT VFR BLD CALC: 41.8 % (ref 37–47)
HEMOGLOBIN: 12.6 GM/DL (ref 12.5–16)
IMMATURE NEUTROPHIL %: 0.4 % (ref 0–0.43)
LYMPHOCYTES ABSOLUTE: 2.6 K/CU MM
LYMPHOCYTES RELATIVE PERCENT: 28.3 % (ref 24–44)
MCH RBC QN AUTO: 25.4 PG (ref 27–31)
MCHC RBC AUTO-ENTMCNC: 30.1 % (ref 32–36)
MCV RBC AUTO: 84.3 FL (ref 78–100)
MONOCYTES ABSOLUTE: 0.7 K/CU MM
MONOCYTES RELATIVE PERCENT: 8 % (ref 0–4)
NUCLEATED RBC %: 0 %
PDW BLD-RTO: 14.6 % (ref 11.7–14.9)
PLATELET # BLD: 401 K/CU MM (ref 140–440)
PMV BLD AUTO: 10.7 FL (ref 7.5–11.1)
POTASSIUM SERPL-SCNC: 5.6 MMOL/L (ref 3.5–5.1)
RBC # BLD: 4.96 M/CU MM (ref 4.2–5.4)
SEGMENTED NEUTROPHILS ABSOLUTE COUNT: 5.3 K/CU MM
SEGMENTED NEUTROPHILS RELATIVE PERCENT: 57.9 % (ref 36–66)
SODIUM BLD-SCNC: 136 MMOL/L (ref 135–145)
TOTAL IMMATURE NEUTOROPHIL: 0.04 K/CU MM
TOTAL NUCLEATED RBC: 0 K/CU MM
WBC # BLD: 9.1 K/CU MM (ref 4–10.5)

## 2022-03-30 PROCEDURE — 85025 COMPLETE CBC W/AUTO DIFF WBC: CPT

## 2022-03-30 PROCEDURE — 36415 COLL VENOUS BLD VENIPUNCTURE: CPT

## 2022-03-30 PROCEDURE — 80048 BASIC METABOLIC PNL TOTAL CA: CPT

## 2022-03-31 ENCOUNTER — ANESTHESIA EVENT (OUTPATIENT)
Dept: OPERATING ROOM | Age: 72
End: 2022-03-31
Payer: COMMERCIAL

## 2022-03-31 NOTE — PROGRESS NOTES
3/31/22 - Notified surgery @ [facility] on 4/1/22 @ 0730, arrival 0600. Understanding verbalized.

## 2022-03-31 NOTE — ANESTHESIA PRE PROCEDURE
Department of Anesthesiology  Preprocedure Note       Name:  Liam Herron   Age:  66 y.o.  :  1950                                          MRN:  [id number]         Date:  3/31/2022      Surgeon: Bety Valero):  Charlie Turner MD    Procedure: Procedure(s):  NISSEN FUNDOPLICATION HIATAL HERNIA REPAIR LAPAROSCOPIC ROBOTIC    Medications prior to admission:   Prior to Admission medications    Medication Sig Start Date End Date Taking? Authorizing Provider   glipiZIDE (GLUCOTROL) 5 MG tablet Take 1 tablet by mouth 2 times daily (before meals) 3/8/22   Radha Kearns DO   lisinopril (PRINIVIL;ZESTRIL) 5 MG tablet TAKE 1 TABLET BY MOUTH DAILY 2/10/22   Radha Kearns DO   rosuvastatin (CRESTOR) 10 MG tablet TAKE 1 TABLET BY MOUTH EVERY NIGHT 22   Radha Kearns DO   FARXIGA 5 MG tablet TAKE 1 TABLET BY MOUTH EVERY MORNING 22   Radha Kearns DO   Dulaglutide (TRULICITY) 1.5 GG/0.0EG SOPN Inject 1.5 mg into the skin once a week 22   Radha Kearns DO   sucralfate (CARAFATE) 1 GM tablet Take 1 tablet by mouth 2 times daily 21   Historical Provider, MD   omeprazole (PRILOSEC) 40 MG delayed release capsule TK 1 C PO QD 30 MIN B PREMA 20   Historical Provider, MD   carvedilol (COREG) 6.25 MG tablet Take 1 tablet by mouth 2 times daily (with meals) 12/10/18   Sulaiman Tobar MD   amLODIPine (NORVASC) 5 MG tablet Take 1 tablet by mouth daily 12/10/18   Sulaiman Tobar MD   amiodarone (CORDARONE) 200 MG tablet Take 200 mg by mouth daily    Historical Provider, MD   Cholecalciferol (VITAMIN D) 2000 UNITS CAPS capsule Take 2,000 Units by mouth daily Then weekly 50,000    Historical Provider, MD   clopidogrel (PLAVIX) 75 MG tablet Take 1 tablet by mouth daily 8/20/15   Sulaiman Tobar MD   acetaminophen (TYLENOL) 500 MG tablet Take 1,000 mg by mouth every 6 hours as needed for Pain    Historical Provider, MD   aspirin 81 MG EC tablet Take 81 mg by mouth daily.       Historical Provider, MD       Current medications: Current Outpatient Medications   Medication Sig Dispense Refill    glipiZIDE (GLUCOTROL) 5 MG tablet Take 1 tablet by mouth 2 times daily (before meals) 180 tablet 1    lisinopril (PRINIVIL;ZESTRIL) 5 MG tablet TAKE 1 TABLET BY MOUTH DAILY 90 tablet 1    rosuvastatin (CRESTOR) 10 MG tablet TAKE 1 TABLET BY MOUTH EVERY NIGHT 90 tablet 1    FARXIGA 5 MG tablet TAKE 1 TABLET BY MOUTH EVERY MORNING 30 tablet 3    Dulaglutide (TRULICITY) 1.5 FU/1.0JC SOPN Inject 1.5 mg into the skin once a week 4 pen 3    sucralfate (CARAFATE) 1 GM tablet Take 1 tablet by mouth 2 times daily      omeprazole (PRILOSEC) 40 MG delayed release capsule TK 1 C PO QD 30 MIN B PREMA      carvedilol (COREG) 6.25 MG tablet Take 1 tablet by mouth 2 times daily (with meals) 60 tablet 3    amLODIPine (NORVASC) 5 MG tablet Take 1 tablet by mouth daily 30 tablet 3    amiodarone (CORDARONE) 200 MG tablet Take 200 mg by mouth daily      Cholecalciferol (VITAMIN D) 2000 UNITS CAPS capsule Take 2,000 Units by mouth daily Then weekly 50,000      clopidogrel (PLAVIX) 75 MG tablet Take 1 tablet by mouth daily 30 tablet 3    acetaminophen (TYLENOL) 500 MG tablet Take 1,000 mg by mouth every 6 hours as needed for Pain      aspirin 81 MG EC tablet Take 81 mg by mouth daily. No current facility-administered medications for this visit. Allergies:     Allergies   Allergen Reactions    Tetanus Toxoids Swelling and Rash     fever       Problem List:    Patient Active Problem List   Diagnosis Code    Anemia D64.9    Vitamin D deficiency E55.9    Type 2 diabetes mellitus without complication, without long-term current use of insulin ([facility] [address] E11.9    Coronary artery disease involving native heart without angina pectoris I25.10    Essential hypertension I10    Mixed hyperlipidemia E78.2    PVD (peripheral vascular disease) (HCC) I73.9    Microalbuminuria R80.9    Angular cheilitis K13.0    Iron deficiency anemia secondary to blood loss (chronic) D50.0    Other forms of chronic ischemic heart disease I25.89    Diaphragmatic hernia without obstruction or gangrene K44.9    Personal history of other diseases of the digestive system Z87.19    Esophageal stricture K22.2    Hiatal hernia K44.9    Iron deficiency anemia D50.9    Acute hypoxemic respiratory failure due to COVID-19 (HCC) U07.1, J96.01    Carotid stenosis, bilateral I65.23    Carotid stenosis, right I65.21       Past Medical History:        Diagnosis Date    Anemia     Managed by Dr. Venu Shaffer CAD (coronary artery disease)     follows with Dr Micaela Michael Carotid stenosis     right    Colon polyps     COVID-19 08/18/2021    Diabetes mellitus type 2, uncontrolled ([facility] [address]     \"dx 8640    Diastolic dysfunction 88/2655    [facility]    Esophageal stricture     dilation per GI    Hiatal hernia     History of blood transfusion     \"had blood transfusion with 4th child- have hx also of iron infusions for anemia 2017    Hyperlipidemia     Hypertension     IBS (irritable bowel syndrome)     with diarrhea    Iron deficiency anemia     Iron Infusions- follows with Julia Araiza and Jose G Cardenas    LVH (left ventricular hypertrophy) 09/2012    [facility]    Multiple gastric polyps 2004    Dr Marimar River Obesity     PAD (peripheral artery disease) ([facility] [address]     S/P CABG x 4 08/06/2012    4V CABG- LIMA-LAD, SVG-CX, SVG-PDA, SVG-RCA- Follows with Dr Montrell Dangelo Sinus tachycardia     follows with [facility]    SVT (supraventricular tachycardia) [facility])     old chart gives hx of SVT in the past    Vitamin D deficiency        Past Surgical History:        Procedure Laterality Date    BALLOON ANGIOPLASTY, ARTERY Right 08/19/2015    RIght SFA 90%->10%, Right Popliteal 1000%->10%    CARDIAC CATHETERIZATION  08/02/2012    CARDIAC SURGERY      open heart surgery 8/2012    CAROTID ENDARTERECTOMY Right 2/18/2022    RIGHT CAROTID ENDARTERECTOMY performed by Du Fu MD at [id number]  15+ yrs ago    COLONOSCOPY  2017   [address] GRAFT  08/02/2012    4V CABG- LIMA-LAD, SVG-CX, SVG-PDA, SVG-RCA    ENDOSCOPY, COLON, DIAGNOSTIC  10/10/2017    esophageal stricture, hiatal hernia, candidiasis    ESOPHAGEAL DILATATION      Dr. Sidra Da Silva      biltateral- \"total of 9 surgeries- all scopes\"    TRANSLUMINAL ANGIOPLASTY Left 09/23/2015 9/23/2015-Left mid and distal SFA and Left Popliteal    TUBAL LIGATION  1978       Social History:    Social History     Tobacco Use    Smoking status: Never Smoker    Smokeless tobacco: Never Used   Substance Use Topics    Alcohol use: No                                Counseling given: Not Answered      Vital Signs (Current): There were no vitals filed for this visit.                                            BP Readings from Last 3 Encounters:   03/03/22 116/72   02/19/22 (!) 119/58   02/18/22 135/71       NPO Status:                                                                                 BMI:   Wt Readings from Last 3 Encounters:   03/03/22 169 lb (76.7 kg)   02/18/22 169 lb (76.7 kg)   01/26/22 171 lb (77.6 kg)     There is no height or weight on file to calculate BMI.    CBC:   Lab Results   Component Value Date    WBC 9.1 03/30/2022    RBC 4.96 03/30/2022    HGB 12.6 03/30/2022    HCT 41.8 03/30/2022    MCV 84.3 03/30/2022    RDW 14.6 03/30/2022     03/30/2022       CMP:   Lab Results   Component Value Date     03/30/2022    K 5.6 03/30/2022    CL 99 03/30/2022    CO2 25 03/30/2022    BUN 29 03/30/2022    CREATININE 1.5 03/30/2022    GFRAA 41 03/30/2022    AGRATIO 1.6 09/25/2020    LABGLOM 34 03/30/2022    GLUCOSE 158 03/30/2022    PROT 6.3 08/19/2021    PROT 6.6 09/25/2020    CALCIUM 9.7 03/30/2022    BILITOT 0.3 08/19/2021    ALKPHOS 74 08/19/2021    AST 14 08/19/2021    ALT 8 08/19/2021       POC Tests: No results for input(s): POCGLU, POCNA, BENEDICTO, POCCL, POCBUN, POCHEMO, POCHCT in the last 72 hours. Coags:   Lab Results   Component Value Date    PROTIME 10.9 02/04/2022    PROTIME 10.6 11/14/2011    INR 0.85 02/04/2022    APTT 26.8 02/04/2022       HCG (If Applicable): No results found for: PREGTESTUR, PREGSERUM, HCG, HCGQUANT     ABGs:   Lab Results   Component Value Date    PO2ART 71 08/06/2012    BEART 7 08/06/2012        Type & Screen (If Applicable):  No results found for: LABABO, LABRH    Drug/Infectious Status (If Applicable):  No results found for: HIV, HEPCAB    COVID-19 Screening (If Applicable):   Lab Results   Component Value Date    COVID19 NOT DETECTED 02/15/2022           Anesthesia Evaluation  Patient summary reviewed  Airway: Mallampati: II  TM distance: >3 FB   Neck ROM: full  Mouth opening: > = 3 FB Dental:    (+) edentulous      Pulmonary:normal exam                              ROS comment: SOB/CP when supine   Cardiovascular:    (+) hypertension:, CAD:, CABG/stent ( CABG x 4 4V CABG- LIMA-LAD, SVG-CX, SVG-PDA, SVG-RCA- Follows with Dr Augie Vargas ):, dysrhythmias: SVT, hyperlipidemia      ECG reviewed      Echocardiogram reviewed    Cleared by cardiology     Beta Blocker:  Dose within 24 Hrs      ROS comment: Summary   Left ventricular systolic function is normal.   Ejection fraction is visually estimated at 50-55%. Mild left ventricular hypertrophy. Grade II diastolic dysfunction. No significant valvular disease noted. No evidence of any pericardial effusion.       Signature      ------------------------------------------------------------------   Electronically signed by Grace Reinoso MD (Interpreting   physician) on 08/20/2021 at 12:40 PM    Sinus rhythm with frequent premature ventricular complexes   Otherwise normal ECG   When compared with ECG of 18-AUG-2021 14:20,   premature ventricular complexes are now present   T wave inversion no longer evident in Inferior leads   Confirmed by Michael Kirkpatrick 63 ([id number]) on 2/4/2022 12:51:49 PM      Neuro/Psych:               GI/Hepatic/Renal:   (+) hiatal hernia, GERD:,           Endo/Other:    (+) DiabetesType II DM, , blood dyscrasia: anticoagulation therapy and anemia, arthritis:., .                 Abdominal:             Vascular:   + PVD, aortic or cerebral, . ROS comment: Right cea 2021. Other Findings:             Anesthesia Plan      general     ASA 3     (Chart review 3/31/22)  Induction: intravenous. MIPS: Postoperative opioids intended and Prophylactic antiemetics administered. JOSE RAMON Oviedo - CRNA   3/31/2022  Pre Anesthesia Evaluation complete. Anesthesia plan, risks, benefits, alternatives, and personnel discussed with patient and/or legal guardian. Patient and/or legal guardian verbalized an understanding and agreed to proceed. Anesthesia plan discussed with care team members and agreed upon.   JOSE RAMON Whelan - CRNA  4/1/2022

## 2022-03-31 NOTE — H&P
General Surgery - H&P  Dr. Kellie Leal PA-C      SUBJECTIVE:  HPI: Patient complains of GERD symptoms failing to respond to treatment. Symptoms have been present for approximately several years. Symptoms include abdominal bloating, belching, bilious reflux, chest pain, choking on food and deep pressure at base of neck. The patient denies cough. Symptoms appear to be worsened by lying down after eating. Risk factors present for GERD include tobacco abuse and obesity. Studies performed so far include upper endoscopy, result: positive for hiatal hernia. Treatments tried so far include proton pump inhibitor. Results of treatment: no change in severity.  Currently, the symptoms are moderate and occur approximately several times per day.     I havereviewed the patient's(pertinent information to this visit) medical history, family history(scanned in  the Media tab under \"patient questioner\"), social history and review of systems with the patient in the office.           Past Surgical History         Past Surgical History:   Procedure Laterality Date    BALLOON ANGIOPLASTY, ARTERY Right 08/19/2015     RIght SFA 90%->10%, Right Popliteal 1000%->10%    CARDIAC CATHETERIZATION   08/02/2012    CARDIAC SURGERY         open heart surgery 8/2012    CHOLECYSTECTOMY   15+ yrs ago    COLONOSCOPY   2017    CORONARY ARTERY BYPASS GRAFT   08/02/2012     4V CABG- LIMA-LAD, SVG-CX, SVG-PDA, SVG-RCA    ENDOSCOPY, COLON, DIAGNOSTIC   10/10/2017     esophageal stricture, hiatal hernia, candidiasis    ESOPHAGEAL DILATATION         Dr. Mindy Leal biltateral- \"total of 9 surgeries- all scopes\"    TRANSLUMINAL ANGIOPLASTY Left 09/23/2015 9/23/2015-Left mid and distal SFA and Left Popliteal    TUBAL LIGATION   1978         Past Medical History        Past Medical History:   Diagnosis Date    Anemia       Managed by Dr. Cooper Ybarra CAD (coronary artery disease)       follows with Dr Slmie Means      COVID-19 08/18/2021    Diabetes mellitus type 2, uncontrolled ([facility]       \"dx 6018    Diastolic dysfunction 03/2547     [facility]    Esophageal stricture       dilation per GI    Hiatal hernia      History of blood transfusion       \"had blood transfusion with 4th child- have hx also of iron infusions for anemia 2017    Hyperlipidemia      Hypertension      IBS (irritable bowel syndrome)       with diarrhea    Iron deficiency anemia       Iron Infusions- follows with Julia Araiza and Jose G Cardenas    LVH (left ventricular hypertrophy) 09/2012     [facility]    Multiple gastric polyps 2004     Dr Steven Davis Obesity      [facility]per[facility]) ([facility]      S/P CABG x 4 08/06/2012     4V CABG- LIMA-LAD, SVG-CX, SVG-PDA, SVG-RCA- Follows with Dr Bhargavi Szymanski Sinus tachycardia       follows with [facility]    SVT (supraventricular tachycardia) ([facility]       old chart gives hx of SVT in the past    Vitamin D deficiency           Family History         Family History   Problem Relation Age of Onset    Kidney Disease Mother           Dialysis    Diabetes Mother      High Blood Pressure Mother      Coronary Art Dis Mother           MI   [facility] Cancer Father           pancreatic cancer (Smoking)    Coronary Art Dis Father 43         MI early onset   [facility] Diabetes Other           Social History               Socioeconomic History    Marital status:        Spouse name: Sherine Cook Number of children: 11    Years of education: Not on file    Highest education level: Not on file   Occupational History    Not on file   Tobacco Use    Smoking status: Never Smoker    Smokeless tobacco: Never Used   Vaping Use    Vaping Use: Never used   Substance and Sexual Activity    Alcohol use: No    Drug use: No    Sexual activity: Not Currently       Partners: Male   Other Topics Concern    Not on file   Social History Narrative    Not on file    Social Determinants of Health          Financial Resource Strain: Low Risk     Difficulty of Paying Living Expenses: Not hard at all   Food Insecurity: No Food Insecurity    Worried About Running Out of Food in the Last Year: Never true    Aram of Food in the Last Year: Never true   Transportation Needs:     Lack of Transportation (Medical): Not on file    Lack of Transportation (Non-Medical):  Not on file   Physical Activity:     Days of Exercise per Week: Not on file    Minutes of Exercise per Session: Not on file   Stress:     Feeling of Stress : Not on file   Social Connections:     Frequency of Communication with Friends and Family: Not on file    Frequency of Social Gatherings with Friends and Family: Not on file    Attends Anabaptist Services: Not on file    Active Member of [address] [place] [address] or Organizations: Not on file    Attends Club or Organization Meetings: Not on file    Marital Status: Not on file   Intimate Partner Violence:     Fear of Current or Ex-Partner: Not on file    Emotionally Abused: Not on file    Physically Abused: Not on file    Sexually Abused: Not on file   Housing Stability:     Unable to Pay for Housing in the Last Year: Not on file    Number of [place] in the Last Year: Not on file    Unstable Housing in the Last Year: Not on file            Current Facility-Administered Medications          Current Outpatient Medications   Medication Sig Dispense Refill    FARXIGA 5 MG tablet TAKE 1 TABLET BY MOUTH EVERY MORNING 30 tablet 3    Dulaglutide (TRULICITY) 1.5 YL/4.0BZ SOPN Inject 1.5 mg into the skin once a week 4 pen 3    glipiZIDE (GLUCOTROL) 5 MG tablet TAKE 1 TABLET BY MOUTH TWICE DAILY WITH MEALS 180 tablet 0    sucralfate (CARAFATE) 1 GM tablet Take 1 tablet by mouth 2 times daily        SITagliptin (JANUVIA) 50 MG tablet TAKE 1 TABLET BY MOUTH DAILY 90 tablet 1    lisinopril (PRINIVIL;ZESTRIL) 5 MG tablet Take 1 tablet by mouth daily 90 tablet 1    rosuvastatin (CRESTOR) 10 MG tablet Take 1 tablet by mouth nightly 90 tablet 1    omeprazole (PRILOSEC) 40 MG delayed release capsule TK 1 C PO QD 30 MIN B PREMA        carvedilol (COREG) 6.25 MG tablet Take 1 tablet by mouth 2 times daily (with meals) 60 tablet 3    amLODIPine (NORVASC) 5 MG tablet Take 1 tablet by mouth daily 30 tablet 3    amiodarone (CORDARONE) 200 MG tablet Take 200 mg by mouth daily        Cholecalciferol (VITAMIN D) 2000 UNITS CAPS capsule Take 2,000 Units by mouth daily Then weekly 50,000        clopidogrel (PLAVIX) 75 MG tablet Take 1 tablet by mouth daily 30 tablet 3    acetaminophen (TYLENOL) 500 MG tablet Take 1,000 mg by mouth every 6 hours as needed for Pain        aspirin 81 MG EC tablet Take 81 mg by mouth daily.            No current facility-administered medications for this visit. Allergies   Allergen Reactions    Tetanus Toxoids Swelling and Rash       fever         Review of Systems:     Review of Systems   Constitutional: Negative for chills and fever. HENT: Positive for trouble swallowing. Negative for ear pain, mouth sores, sore throat and tinnitus. Eyes: Negative for photophobia, redness and itching. Respiratory: Negative for apnea, choking and stridor. Cardiovascular: Negative for chest pain and palpitations. Gastrointestinal: Negative for anal bleeding, constipation and rectal pain. Endocrine: Negative for polydipsia. Genitourinary: Negative for enuresis, flank pain and hematuria. Musculoskeletal: Negative for back pain, joint swelling and myalgias. Skin: Negative for color change and pallor. Allergic/Immunologic: Negative for environmental allergies. Neurological: Negative for syncope and speech difficulty.    Psychiatric/Behavioral: Negative for confusion and hallucinations.            OBJECTIVE:  Physical Exam:    /80   Pulse 85   Ht 5' 2\" (1.575 m)   Wt 171 lb 9.6 oz (77.8 kg)   LMP 01/19/2002   SpO2 97%   BMI

## 2022-03-31 NOTE — PROGRESS NOTES
3/31/22 Amilcar Fuller in Dr. Joel Osorio office notified patient's potassium is 5.6, Pee Ortega will update Dr. Gail Núñez. Anesthesia will be updated during morning huddle today 3/31/22.

## 2022-04-01 ENCOUNTER — ANESTHESIA (OUTPATIENT)
Dept: OPERATING ROOM | Age: 72
End: 2022-04-01
Payer: COMMERCIAL

## 2022-04-01 ENCOUNTER — HOSPITAL ENCOUNTER (OUTPATIENT)
Age: 72
Setting detail: OBSERVATION
Discharge: HOME | End: 2022-04-02
Attending: SURGERY | Admitting: SURGERY
Payer: COMMERCIAL

## 2022-04-01 VITALS
OXYGEN SATURATION: 100 % | SYSTOLIC BLOOD PRESSURE: 108 MMHG | DIASTOLIC BLOOD PRESSURE: 44 MMHG | RESPIRATION RATE: 2 BRPM | TEMPERATURE: 96.1 F

## 2022-04-01 DIAGNOSIS — E87.5 HYPERKALEMIA: ICD-10-CM

## 2022-04-01 DIAGNOSIS — K44.9 HIATAL HERNIA WITH GERD: Primary | ICD-10-CM

## 2022-04-01 DIAGNOSIS — Z01.818 PRE-OP TESTING: ICD-10-CM

## 2022-04-01 DIAGNOSIS — K21.9 HIATAL HERNIA WITH GERD: Primary | ICD-10-CM

## 2022-04-01 LAB
ANION GAP SERPL CALCULATED.3IONS-SCNC: 16 MMOL/L (ref 4–16)
BUN BLDV-MCNC: 29 MG/DL (ref 6–23)
CALCIUM SERPL-MCNC: 9.2 MG/DL (ref 8.3–10.6)
CHLORIDE BLD-SCNC: 100 MMOL/L (ref 99–110)
CO2: 20 MMOL/L (ref 21–32)
CREAT SERPL-MCNC: 1.5 MG/DL (ref 0.6–1.1)
GFR AFRICAN AMERICAN: 41 ML/MIN/1.73M2
GFR NON-AFRICAN AMERICAN: 34 ML/MIN/1.73M2
GLUCOSE BLD-MCNC: 148 MG/DL (ref 70–99)
GLUCOSE BLD-MCNC: 176 MG/DL (ref 70–99)
GLUCOSE BLD-MCNC: 184 MG/DL (ref 70–99)
GLUCOSE BLD-MCNC: 223 MG/DL (ref 70–99)
POTASSIUM SERPL-SCNC: 5.1 MMOL/L (ref 3.5–5.1)
SODIUM BLD-SCNC: 136 MMOL/L (ref 135–145)

## 2022-04-01 PROCEDURE — 2500000003 HC RX 250 WO HCPCS: Performed by: NURSE ANESTHETIST, CERTIFIED REGISTERED

## 2022-04-01 PROCEDURE — 3600000019 HC SURGERY ROBOT ADDTL 15MIN: Performed by: SURGERY

## 2022-04-01 PROCEDURE — 94761 N-INVAS EAR/PLS OXIMETRY MLT: CPT

## 2022-04-01 PROCEDURE — 2580000003 HC RX 258: Performed by: ANESTHESIOLOGY

## 2022-04-01 PROCEDURE — 6360000002 HC RX W HCPCS: Performed by: PHYSICIAN ASSISTANT

## 2022-04-01 PROCEDURE — 2780000010 HC IMPLANT OTHER: Performed by: SURGERY

## 2022-04-01 PROCEDURE — 2709999900 HC NON-CHARGEABLE SUPPLY: Performed by: SURGERY

## 2022-04-01 PROCEDURE — 3700000001 HC ADD 15 MINUTES (ANESTHESIA): Performed by: SURGERY

## 2022-04-01 PROCEDURE — 3600000009 HC SURGERY ROBOT BASE: Performed by: SURGERY

## 2022-04-01 PROCEDURE — APPNB180 APP NON BILLABLE TIME > 60 MINS: Performed by: PHYSICIAN ASSISTANT

## 2022-04-01 PROCEDURE — G0378 HOSPITAL OBSERVATION PER HR: HCPCS

## 2022-04-01 PROCEDURE — 2580000003 HC RX 258: Performed by: PHYSICIAN ASSISTANT

## 2022-04-01 PROCEDURE — 43282 LAP PARAESOPH HER RPR W/MESH: CPT | Performed by: PHYSICIAN ASSISTANT

## 2022-04-01 PROCEDURE — 2580000003 HC RX 258: Performed by: NURSE ANESTHETIST, CERTIFIED REGISTERED

## 2022-04-01 PROCEDURE — 2720000010 HC SURG SUPPLY STERILE: Performed by: SURGERY

## 2022-04-01 PROCEDURE — 88302 TISSUE EXAM BY PATHOLOGIST: CPT

## 2022-04-01 PROCEDURE — 7100000001 HC PACU RECOVERY - ADDTL 15 MIN: Performed by: SURGERY

## 2022-04-01 PROCEDURE — 80048 BASIC METABOLIC PNL TOTAL CA: CPT

## 2022-04-01 PROCEDURE — 2500000003 HC RX 250 WO HCPCS: Performed by: SURGERY

## 2022-04-01 PROCEDURE — 6370000000 HC RX 637 (ALT 250 FOR IP): Performed by: ANESTHESIOLOGY

## 2022-04-01 PROCEDURE — C1781 MESH (IMPLANTABLE): HCPCS | Performed by: SURGERY

## 2022-04-01 PROCEDURE — 3700000000 HC ANESTHESIA ATTENDED CARE: Performed by: SURGERY

## 2022-04-01 PROCEDURE — 43282 LAP PARAESOPH HER RPR W/MESH: CPT | Performed by: SURGERY

## 2022-04-01 PROCEDURE — 6360000002 HC RX W HCPCS: Performed by: ANESTHESIOLOGY

## 2022-04-01 PROCEDURE — S2900 ROBOTIC SURGICAL SYSTEM: HCPCS | Performed by: SURGERY

## 2022-04-01 PROCEDURE — 82962 GLUCOSE BLOOD TEST: CPT

## 2022-04-01 PROCEDURE — 36415 COLL VENOUS BLD VENIPUNCTURE: CPT

## 2022-04-01 PROCEDURE — 6360000002 HC RX W HCPCS: Performed by: NURSE ANESTHETIST, CERTIFIED REGISTERED

## 2022-04-01 PROCEDURE — 7100000000 HC PACU RECOVERY - FIRST 15 MIN: Performed by: SURGERY

## 2022-04-01 PROCEDURE — 6370000000 HC RX 637 (ALT 250 FOR IP): Performed by: PHYSICIAN ASSISTANT

## 2022-04-01 DEVICE — MESH SURG W7XL10CM SEPRA TECHNOLOGY RECT PHASIX: Type: IMPLANTABLE DEVICE | Site: ABDOMEN | Status: FUNCTIONAL

## 2022-04-01 RX ORDER — CARVEDILOL 6.25 MG/1
6.25 TABLET ORAL 2 TIMES DAILY WITH MEALS
Status: DISCONTINUED | OUTPATIENT
Start: 2022-04-01 | End: 2022-04-02 | Stop reason: HOSPADM

## 2022-04-01 RX ORDER — ONDANSETRON 2 MG/ML
4 INJECTION INTRAMUSCULAR; INTRAVENOUS EVERY 6 HOURS PRN
Status: DISCONTINUED | OUTPATIENT
Start: 2022-04-01 | End: 2022-04-02 | Stop reason: HOSPADM

## 2022-04-01 RX ORDER — SODIUM CHLORIDE 0.9 % (FLUSH) 0.9 %
5-40 SYRINGE (ML) INJECTION PRN
Status: DISCONTINUED | OUTPATIENT
Start: 2022-04-01 | End: 2022-04-01 | Stop reason: HOSPADM

## 2022-04-01 RX ORDER — PROPOFOL 10 MG/ML
INJECTION, EMULSION INTRAVENOUS PRN
Status: DISCONTINUED | OUTPATIENT
Start: 2022-04-01 | End: 2022-04-01 | Stop reason: SDUPTHER

## 2022-04-01 RX ORDER — SODIUM CHLORIDE, SODIUM LACTATE, POTASSIUM CHLORIDE, CALCIUM CHLORIDE 600; 310; 30; 20 MG/100ML; MG/100ML; MG/100ML; MG/100ML
INJECTION, SOLUTION INTRAVENOUS CONTINUOUS
Status: DISCONTINUED | OUTPATIENT
Start: 2022-04-01 | End: 2022-04-01

## 2022-04-01 RX ORDER — LISINOPRIL 5 MG/1
5 TABLET ORAL DAILY
Status: DISCONTINUED | OUTPATIENT
Start: 2022-04-01 | End: 2022-04-02

## 2022-04-01 RX ORDER — DIPHENHYDRAMINE HYDROCHLORIDE 50 MG/ML
12.5 INJECTION INTRAMUSCULAR; INTRAVENOUS
Status: DISCONTINUED | OUTPATIENT
Start: 2022-04-01 | End: 2022-04-01 | Stop reason: HOSPADM

## 2022-04-01 RX ORDER — ONDANSETRON 4 MG/1
4 TABLET, ORALLY DISINTEGRATING ORAL EVERY 8 HOURS PRN
Status: DISCONTINUED | OUTPATIENT
Start: 2022-04-01 | End: 2022-04-02 | Stop reason: HOSPADM

## 2022-04-01 RX ORDER — SIMETHICONE 80 MG
80 TABLET,CHEWABLE ORAL EVERY 6 HOURS PRN
Status: DISCONTINUED | OUTPATIENT
Start: 2022-04-01 | End: 2022-04-02 | Stop reason: HOSPADM

## 2022-04-01 RX ORDER — DEXTROSE MONOHYDRATE 50 MG/ML
100 INJECTION, SOLUTION INTRAVENOUS PRN
Status: DISCONTINUED | OUTPATIENT
Start: 2022-04-01 | End: 2022-04-02 | Stop reason: HOSPADM

## 2022-04-01 RX ORDER — MEPERIDINE HYDROCHLORIDE 25 MG/ML
12.5 INJECTION INTRAMUSCULAR; INTRAVENOUS; SUBCUTANEOUS EVERY 5 MIN PRN
Status: DISCONTINUED | OUTPATIENT
Start: 2022-04-01 | End: 2022-04-01 | Stop reason: HOSPADM

## 2022-04-01 RX ORDER — LIDOCAINE HYDROCHLORIDE 20 MG/ML
INJECTION, SOLUTION INTRAVENOUS PRN
Status: DISCONTINUED | OUTPATIENT
Start: 2022-04-01 | End: 2022-04-01 | Stop reason: SDUPTHER

## 2022-04-01 RX ORDER — SODIUM CHLORIDE 9 MG/ML
INJECTION, SOLUTION INTRAVENOUS CONTINUOUS
Status: DISCONTINUED | OUTPATIENT
Start: 2022-04-01 | End: 2022-04-02 | Stop reason: HOSPADM

## 2022-04-01 RX ORDER — PHENYLEPHRINE HCL IN 0.9% NACL 1 MG/10 ML
SYRINGE (ML) INTRAVENOUS PRN
Status: DISCONTINUED | OUTPATIENT
Start: 2022-04-01 | End: 2022-04-01 | Stop reason: SDUPTHER

## 2022-04-01 RX ORDER — HYDRALAZINE HYDROCHLORIDE 20 MG/ML
10 INJECTION INTRAMUSCULAR; INTRAVENOUS
Status: DISCONTINUED | OUTPATIENT
Start: 2022-04-01 | End: 2022-04-01 | Stop reason: HOSPADM

## 2022-04-01 RX ORDER — BUPIVACAINE HYDROCHLORIDE 5 MG/ML
INJECTION, SOLUTION EPIDURAL; INTRACAUDAL
Status: COMPLETED | OUTPATIENT
Start: 2022-04-01 | End: 2022-04-01

## 2022-04-01 RX ORDER — ONDANSETRON 2 MG/ML
INJECTION INTRAMUSCULAR; INTRAVENOUS PRN
Status: DISCONTINUED | OUTPATIENT
Start: 2022-04-01 | End: 2022-04-01 | Stop reason: SDUPTHER

## 2022-04-01 RX ORDER — SODIUM CHLORIDE 9 MG/ML
25 INJECTION, SOLUTION INTRAVENOUS PRN
Status: DISCONTINUED | OUTPATIENT
Start: 2022-04-01 | End: 2022-04-02 | Stop reason: HOSPADM

## 2022-04-01 RX ORDER — SODIUM CHLORIDE 0.9 % (FLUSH) 0.9 %
10 SYRINGE (ML) INJECTION EVERY 12 HOURS SCHEDULED
Status: DISCONTINUED | OUTPATIENT
Start: 2022-04-01 | End: 2022-04-02 | Stop reason: HOSPADM

## 2022-04-01 RX ORDER — METOCLOPRAMIDE HYDROCHLORIDE 5 MG/ML
10 INJECTION INTRAMUSCULAR; INTRAVENOUS
Status: DISCONTINUED | OUTPATIENT
Start: 2022-04-01 | End: 2022-04-01 | Stop reason: HOSPADM

## 2022-04-01 RX ORDER — AMLODIPINE BESYLATE 5 MG/1
5 TABLET ORAL DAILY
Status: DISCONTINUED | OUTPATIENT
Start: 2022-04-02 | End: 2022-04-02 | Stop reason: HOSPADM

## 2022-04-01 RX ORDER — SODIUM CHLORIDE 0.9 % (FLUSH) 0.9 %
10 SYRINGE (ML) INJECTION PRN
Status: DISCONTINUED | OUTPATIENT
Start: 2022-04-01 | End: 2022-04-02 | Stop reason: HOSPADM

## 2022-04-01 RX ORDER — ROCURONIUM BROMIDE 10 MG/ML
INJECTION, SOLUTION INTRAVENOUS PRN
Status: DISCONTINUED | OUTPATIENT
Start: 2022-04-01 | End: 2022-04-01 | Stop reason: SDUPTHER

## 2022-04-01 RX ORDER — IPRATROPIUM BROMIDE AND ALBUTEROL SULFATE 2.5; .5 MG/3ML; MG/3ML
1 SOLUTION RESPIRATORY (INHALATION)
Status: DISCONTINUED | OUTPATIENT
Start: 2022-04-01 | End: 2022-04-01 | Stop reason: HOSPADM

## 2022-04-01 RX ORDER — SODIUM CHLORIDE 9 MG/ML
25 INJECTION, SOLUTION INTRAVENOUS PRN
Status: DISCONTINUED | OUTPATIENT
Start: 2022-04-01 | End: 2022-04-01 | Stop reason: HOSPADM

## 2022-04-01 RX ORDER — FENTANYL CITRATE 50 UG/ML
INJECTION, SOLUTION INTRAMUSCULAR; INTRAVENOUS PRN
Status: DISCONTINUED | OUTPATIENT
Start: 2022-04-01 | End: 2022-04-01 | Stop reason: SDUPTHER

## 2022-04-01 RX ORDER — FENTANYL CITRATE 50 UG/ML
50 INJECTION, SOLUTION INTRAMUSCULAR; INTRAVENOUS EVERY 5 MIN PRN
Status: COMPLETED | OUTPATIENT
Start: 2022-04-01 | End: 2022-04-01

## 2022-04-01 RX ORDER — MORPHINE SULFATE 2 MG/ML
2 INJECTION, SOLUTION INTRAMUSCULAR; INTRAVENOUS
Status: DISCONTINUED | OUTPATIENT
Start: 2022-04-01 | End: 2022-04-02 | Stop reason: HOSPADM

## 2022-04-01 RX ORDER — CEFAZOLIN SODIUM 2 G/100ML
2000 INJECTION, SOLUTION INTRAVENOUS
Status: COMPLETED | OUTPATIENT
Start: 2022-04-01 | End: 2022-04-01

## 2022-04-01 RX ORDER — DEXAMETHASONE SODIUM PHOSPHATE 4 MG/ML
INJECTION, SOLUTION INTRA-ARTICULAR; INTRALESIONAL; INTRAMUSCULAR; INTRAVENOUS; SOFT TISSUE PRN
Status: DISCONTINUED | OUTPATIENT
Start: 2022-04-01 | End: 2022-04-01 | Stop reason: SDUPTHER

## 2022-04-01 RX ORDER — AMIODARONE HYDROCHLORIDE 200 MG/1
200 TABLET ORAL DAILY
Status: DISCONTINUED | OUTPATIENT
Start: 2022-04-01 | End: 2022-04-02 | Stop reason: HOSPADM

## 2022-04-01 RX ORDER — SODIUM CHLORIDE 0.9 % (FLUSH) 0.9 %
5-40 SYRINGE (ML) INJECTION EVERY 12 HOURS SCHEDULED
Status: DISCONTINUED | OUTPATIENT
Start: 2022-04-01 | End: 2022-04-01 | Stop reason: HOSPADM

## 2022-04-01 RX ORDER — OXYCODONE HYDROCHLORIDE 5 MG/1
5 TABLET ORAL EVERY 4 HOURS PRN
Status: DISCONTINUED | OUTPATIENT
Start: 2022-04-01 | End: 2022-04-02 | Stop reason: HOSPADM

## 2022-04-01 RX ORDER — SODIUM CHLORIDE, SODIUM LACTATE, POTASSIUM CHLORIDE, CALCIUM CHLORIDE 600; 310; 30; 20 MG/100ML; MG/100ML; MG/100ML; MG/100ML
INJECTION, SOLUTION INTRAVENOUS CONTINUOUS PRN
Status: DISCONTINUED | OUTPATIENT
Start: 2022-04-01 | End: 2022-04-01 | Stop reason: SDUPTHER

## 2022-04-01 RX ORDER — DEXTROSE MONOHYDRATE 25 G/50ML
12.5 INJECTION, SOLUTION INTRAVENOUS PRN
Status: DISCONTINUED | OUTPATIENT
Start: 2022-04-01 | End: 2022-04-01 | Stop reason: SDUPTHER

## 2022-04-01 RX ORDER — NICOTINE POLACRILEX 4 MG
15 LOZENGE BUCCAL PRN
Status: DISCONTINUED | OUTPATIENT
Start: 2022-04-01 | End: 2022-04-02 | Stop reason: HOSPADM

## 2022-04-01 RX ORDER — GLIPIZIDE 5 MG/1
5 TABLET ORAL
Status: DISCONTINUED | OUTPATIENT
Start: 2022-04-01 | End: 2022-04-02 | Stop reason: HOSPADM

## 2022-04-01 RX ORDER — LABETALOL HYDROCHLORIDE 5 MG/ML
10 INJECTION, SOLUTION INTRAVENOUS
Status: DISCONTINUED | OUTPATIENT
Start: 2022-04-01 | End: 2022-04-01 | Stop reason: HOSPADM

## 2022-04-01 RX ADMIN — OXYCODONE HYDROCHLORIDE 5 MG: 5 TABLET ORAL at 15:27

## 2022-04-01 RX ADMIN — SODIUM CHLORIDE, POTASSIUM CHLORIDE, SODIUM LACTATE AND CALCIUM CHLORIDE: 600; 310; 30; 20 INJECTION, SOLUTION INTRAVENOUS at 07:58

## 2022-04-01 RX ADMIN — PROPOFOL 130 MG: 10 INJECTION, EMULSION INTRAVENOUS at 09:17

## 2022-04-01 RX ADMIN — ROCURONIUM BROMIDE 20 MG: 50 INJECTION, SOLUTION INTRAVENOUS at 10:36

## 2022-04-01 RX ADMIN — Medication 100 MCG: at 09:43

## 2022-04-01 RX ADMIN — FENTANYL CITRATE 100 MCG: 50 INJECTION, SOLUTION INTRAMUSCULAR; INTRAVENOUS at 09:17

## 2022-04-01 RX ADMIN — AMIODARONE HYDROCHLORIDE 200 MG: 200 TABLET ORAL at 13:48

## 2022-04-01 RX ADMIN — FENTANYL CITRATE 50 MCG: 50 INJECTION INTRAMUSCULAR; INTRAVENOUS at 12:28

## 2022-04-01 RX ADMIN — SODIUM CHLORIDE: 9 INJECTION, SOLUTION INTRAVENOUS at 12:23

## 2022-04-01 RX ADMIN — CEFAZOLIN SODIUM 2000 MG: 2 INJECTION, SOLUTION INTRAVENOUS at 09:15

## 2022-04-01 RX ADMIN — SODIUM CHLORIDE, POTASSIUM CHLORIDE, SODIUM LACTATE AND CALCIUM CHLORIDE: 600; 310; 30; 20 INJECTION, SOLUTION INTRAVENOUS at 12:09

## 2022-04-01 RX ADMIN — LIDOCAINE HYDROCHLORIDE 50 MG: 20 INJECTION, SOLUTION INTRAVENOUS at 11:16

## 2022-04-01 RX ADMIN — FENTANYL CITRATE 50 MCG: 50 INJECTION INTRAMUSCULAR; INTRAVENOUS at 12:11

## 2022-04-01 RX ADMIN — ROCURONIUM BROMIDE 10 MG: 50 INJECTION, SOLUTION INTRAVENOUS at 09:55

## 2022-04-01 RX ADMIN — ROCURONIUM BROMIDE 40 MG: 50 INJECTION, SOLUTION INTRAVENOUS at 09:18

## 2022-04-01 RX ADMIN — SODIUM CHLORIDE, POTASSIUM CHLORIDE, SODIUM LACTATE AND CALCIUM CHLORIDE: 600; 310; 30; 20 INJECTION, SOLUTION INTRAVENOUS at 09:10

## 2022-04-01 RX ADMIN — SODIUM CHLORIDE, POTASSIUM CHLORIDE, SODIUM LACTATE AND CALCIUM CHLORIDE: 600; 310; 30; 20 INJECTION, SOLUTION INTRAVENOUS at 11:16

## 2022-04-01 RX ADMIN — ONDANSETRON 4 MG: 2 INJECTION INTRAMUSCULAR; INTRAVENOUS at 11:16

## 2022-04-01 RX ADMIN — GLIPIZIDE 5 MG: 5 TABLET ORAL at 16:31

## 2022-04-01 RX ADMIN — Medication 100 MCG: at 09:36

## 2022-04-01 RX ADMIN — INSULIN HUMAN 4 UNITS: 100 INJECTION, SOLUTION PARENTERAL at 12:26

## 2022-04-01 RX ADMIN — MORPHINE SULFATE 2 MG: 2 INJECTION, SOLUTION INTRAMUSCULAR; INTRAVENOUS at 13:40

## 2022-04-01 RX ADMIN — SUGAMMADEX 200 MG: 100 INJECTION, SOLUTION INTRAVENOUS at 11:22

## 2022-04-01 RX ADMIN — Medication 100 MCG: at 09:29

## 2022-04-01 RX ADMIN — CARVEDILOL 6.25 MG: 6.25 TABLET, FILM COATED ORAL at 16:31

## 2022-04-01 RX ADMIN — DEXAMETHASONE SODIUM PHOSPHATE 8 MG: 4 INJECTION, SOLUTION INTRAMUSCULAR; INTRAVENOUS at 09:25

## 2022-04-01 RX ADMIN — LIDOCAINE HYDROCHLORIDE 50 MG: 20 INJECTION, SOLUTION INTRAVENOUS at 09:17

## 2022-04-01 ASSESSMENT — PAIN DESCRIPTION - FREQUENCY
FREQUENCY: CONTINUOUS

## 2022-04-01 ASSESSMENT — PULMONARY FUNCTION TESTS
PIF_VALUE: 19
PIF_VALUE: 18
PIF_VALUE: 19
PIF_VALUE: 20
PIF_VALUE: 20
PIF_VALUE: 23
PIF_VALUE: 19
PIF_VALUE: 4
PIF_VALUE: 19
PIF_VALUE: 18
PIF_VALUE: 21
PIF_VALUE: 18
PIF_VALUE: 18
PIF_VALUE: 19
PIF_VALUE: 18
PIF_VALUE: 20
PIF_VALUE: 18
PIF_VALUE: 19
PIF_VALUE: 24
PIF_VALUE: 18
PIF_VALUE: 19
PIF_VALUE: 23
PIF_VALUE: 18
PIF_VALUE: 19
PIF_VALUE: 18
PIF_VALUE: 19
PIF_VALUE: 1
PIF_VALUE: 20
PIF_VALUE: 18
PIF_VALUE: 18
PIF_VALUE: 19
PIF_VALUE: 20
PIF_VALUE: 19
PIF_VALUE: 19
PIF_VALUE: 18
PIF_VALUE: 2
PIF_VALUE: 19
PIF_VALUE: 10
PIF_VALUE: 20
PIF_VALUE: 18
PIF_VALUE: 20
PIF_VALUE: 19
PIF_VALUE: 19
PIF_VALUE: 24
PIF_VALUE: 17
PIF_VALUE: 1
PIF_VALUE: 19
PIF_VALUE: 18
PIF_VALUE: 24
PIF_VALUE: 19
PIF_VALUE: 20
PIF_VALUE: 19
PIF_VALUE: 0
PIF_VALUE: 20
PIF_VALUE: 18
PIF_VALUE: 20
PIF_VALUE: 18
PIF_VALUE: 22
PIF_VALUE: 19
PIF_VALUE: 20
PIF_VALUE: 18
PIF_VALUE: 18
PIF_VALUE: 1
PIF_VALUE: 19
PIF_VALUE: 17
PIF_VALUE: 0
PIF_VALUE: 19
PIF_VALUE: 18
PIF_VALUE: 10
PIF_VALUE: 11
PIF_VALUE: 20
PIF_VALUE: 18
PIF_VALUE: 18
PIF_VALUE: 1
PIF_VALUE: 18
PIF_VALUE: 17
PIF_VALUE: 20
PIF_VALUE: 1
PIF_VALUE: 23
PIF_VALUE: 19
PIF_VALUE: 19
PIF_VALUE: 2
PIF_VALUE: 18
PIF_VALUE: 19
PIF_VALUE: 19
PIF_VALUE: 11
PIF_VALUE: 20
PIF_VALUE: 18
PIF_VALUE: 21
PIF_VALUE: 24
PIF_VALUE: 0
PIF_VALUE: 19
PIF_VALUE: 19
PIF_VALUE: 20
PIF_VALUE: 1
PIF_VALUE: 21
PIF_VALUE: 12
PIF_VALUE: 20
PIF_VALUE: 19
PIF_VALUE: 11
PIF_VALUE: 1
PIF_VALUE: 19
PIF_VALUE: 18
PIF_VALUE: 19
PIF_VALUE: 18
PIF_VALUE: 19
PIF_VALUE: 23
PIF_VALUE: 19
PIF_VALUE: 18
PIF_VALUE: 20
PIF_VALUE: 19
PIF_VALUE: 0
PIF_VALUE: 19
PIF_VALUE: 20
PIF_VALUE: 24
PIF_VALUE: 19
PIF_VALUE: 20
PIF_VALUE: 2
PIF_VALUE: 19
PIF_VALUE: 18
PIF_VALUE: 19
PIF_VALUE: 0
PIF_VALUE: 20
PIF_VALUE: 5
PIF_VALUE: 19
PIF_VALUE: 18
PIF_VALUE: 18
PIF_VALUE: 19
PIF_VALUE: 26
PIF_VALUE: 18
PIF_VALUE: 20
PIF_VALUE: 23
PIF_VALUE: 18
PIF_VALUE: 19
PIF_VALUE: 19
PIF_VALUE: 18
PIF_VALUE: 19

## 2022-04-01 ASSESSMENT — PAIN SCALES - GENERAL
PAINLEVEL_OUTOF10: 0
PAINLEVEL_OUTOF10: 4
PAINLEVEL_OUTOF10: 8
PAINLEVEL_OUTOF10: 8
PAINLEVEL_OUTOF10: 3
PAINLEVEL_OUTOF10: 9
PAINLEVEL_OUTOF10: 2
PAINLEVEL_OUTOF10: 7
PAINLEVEL_OUTOF10: 5

## 2022-04-01 ASSESSMENT — PAIN DESCRIPTION - LOCATION
LOCATION: ABDOMEN

## 2022-04-01 ASSESSMENT — PAIN DESCRIPTION - PAIN TYPE
TYPE: SURGICAL PAIN

## 2022-04-01 ASSESSMENT — PAIN DESCRIPTION - DESCRIPTORS
DESCRIPTORS: CRAMPING

## 2022-04-01 ASSESSMENT — PAIN - FUNCTIONAL ASSESSMENT
PAIN_FUNCTIONAL_ASSESSMENT: 0-10
PAIN_FUNCTIONAL_ASSESSMENT: 0-10

## 2022-04-01 ASSESSMENT — PAIN DESCRIPTION - ORIENTATION
ORIENTATION: UPPER

## 2022-04-01 NOTE — PROGRESS NOTES
Pt assisted to the rest room without C/O discomfort and urinated. Pt up in chair eating meal with call light in reach and chair alarm on.  Denies any further needs at this time

## 2022-04-01 NOTE — PROGRESS NOTES
1140- pt received from OR. Monitors placed and alarms on. Report received from Irena Lema. Oral airway in place. 1150- oral airway removed at this time. 1153- okay for pt to go to  [place], [place]. Mekhi Said, nursing supervisor notified. 65- Dr Josepha Goldmann notified of pt's blood glucose being 223. Verbal order received for insulin. 1211- pt medicated for complaints of pain. 1214- pt denies nausea. 1235- pt rolled and repositioned. Pt still denies nausea but does not want to try ice chips at this time. [phone]- report called to Karina Rosario RN prior to pt being transported to inpatient room. 1253-pt transported to inpatient room via transport at this time.

## 2022-04-01 NOTE — ANESTHESIA POSTPROCEDURE EVALUATION
Department of Anesthesiology  Postprocedure Note    Patient: Radha Calle  MRN: [id number]  YOB: 1950  Date of evaluation: 4/1/2022  Time:  11:49 AM     Procedure Summary     Date: 04/01/22 Room / Location: [address] [place] [address]    Anesthesia Start: 5984 Anesthesia Stop: 9488    Procedure: NISSEN FUNDOPLICATION HIATAL HERNIA REPAIR LAPAROSCOPIC ROBOTIC (N/A Abdomen) Diagnosis:       Hiatal hernia with GERD      (Hiatal hernia with GERD [K21.9, K44.9])    Surgeons: Amos Ortiz MD Responsible Provider: Eliseo Mar DO    Anesthesia Type: general ASA Status: 3          Anesthesia Type: general    Naila Phase I: Naila Score: 7    Naila Phase II:      Last vitals: Reviewed and per EMR flowsheets.        Anesthesia Post Evaluation    Patient location during evaluation: PACU  Patient participation: complete - patient participated  Level of consciousness: sleepy but conscious  Airway patency: patent  Nausea & Vomiting: no nausea  Complications: no  Cardiovascular status: hemodynamically stable  Respiratory status: acceptable  Hydration status: euvolemic

## 2022-04-01 NOTE — PLAN OF CARE
Problem: Infection - Surgical Site:  Goal: Will show no infection signs and symptoms  Description: Will show no infection signs and symptoms  Outcome: Met This Shift    Lap sites free from s/s of infection     Problem: Discharge Planning:  Goal: Discharged to appropriate level of care  Description: Discharged to appropriate level of care  Outcome: Met This Shift  initiated     Problem: Serum Glucose Level - Abnormal:  Goal: Ability to maintain appropriate glucose levels will improve  Description: Ability to maintain appropriate glucose levels will improve  Outcome: Met This Shift  Po glipizide     Problem: Sensory Perception - Impaired:  Goal: Ability to maintain a stable neurologic state will improve  Description: Ability to maintain a stable neurologic state will improve  Outcome: Met This Shift     Problem: Safety:  Goal: Ability to chew and swallow food without choking will improve  Description: Ability to chew and swallow food without choking will improve  Outcome: Met This Shift    Eating full liq dinner without difficulties

## 2022-04-02 VITALS
DIASTOLIC BLOOD PRESSURE: 51 MMHG | RESPIRATION RATE: 14 BRPM | HEART RATE: 78 BPM | WEIGHT: 164 LBS | BODY MASS INDEX: 29.06 KG/M2 | OXYGEN SATURATION: 97 % | TEMPERATURE: 97.8 F | HEIGHT: 63 IN | SYSTOLIC BLOOD PRESSURE: 108 MMHG

## 2022-04-02 LAB
ALBUMIN SERPL-MCNC: 3.8 GM/DL (ref 3.4–5)
ALP BLD-CCNC: 76 IU/L (ref 40–128)
ALT SERPL-CCNC: 50 U/L (ref 10–40)
ANION GAP SERPL CALCULATED.3IONS-SCNC: 13 MMOL/L (ref 4–16)
AST SERPL-CCNC: 53 IU/L (ref 15–37)
BASOPHILS ABSOLUTE: 0 K/CU MM
BASOPHILS RELATIVE PERCENT: 0.1 % (ref 0–1)
BILIRUB SERPL-MCNC: 0.4 MG/DL (ref 0–1)
BUN BLDV-MCNC: 31 MG/DL (ref 6–23)
CALCIUM SERPL-MCNC: 9 MG/DL (ref 8.3–10.6)
CHLORIDE BLD-SCNC: 102 MMOL/L (ref 99–110)
CO2: 21 MMOL/L (ref 21–32)
CREAT SERPL-MCNC: 1.5 MG/DL (ref 0.6–1.1)
DIFFERENTIAL TYPE: ABNORMAL
EOSINOPHILS ABSOLUTE: 0 K/CU MM
EOSINOPHILS RELATIVE PERCENT: 0 % (ref 0–3)
GFR AFRICAN AMERICAN: 41 ML/MIN/1.73M2
GFR NON-AFRICAN AMERICAN: 34 ML/MIN/1.73M2
GLUCOSE BLD-MCNC: 129 MG/DL (ref 70–99)
GLUCOSE BLD-MCNC: 175 MG/DL (ref 70–99)
GLUCOSE BLD-MCNC: 203 MG/DL (ref 70–99)
HCT VFR BLD CALC: 36.7 % (ref 37–47)
HEMOGLOBIN: 11 GM/DL (ref 12.5–16)
IMMATURE NEUTROPHIL %: 0.5 % (ref 0–0.43)
LYMPHOCYTES ABSOLUTE: 1.4 K/CU MM
LYMPHOCYTES RELATIVE PERCENT: 9 % (ref 24–44)
MCH RBC QN AUTO: 25.3 PG (ref 27–31)
MCHC RBC AUTO-ENTMCNC: 30 % (ref 32–36)
MCV RBC AUTO: 84.4 FL (ref 78–100)
MONOCYTES ABSOLUTE: 1.2 K/CU MM
MONOCYTES RELATIVE PERCENT: 7.7 % (ref 0–4)
NUCLEATED RBC %: 0 %
PDW BLD-RTO: 14.5 % (ref 11.7–14.9)
PLATELET # BLD: 360 K/CU MM (ref 140–440)
PMV BLD AUTO: 11 FL (ref 7.5–11.1)
POTASSIUM SERPL-SCNC: 5.6 MMOL/L (ref 3.5–5.1)
RBC # BLD: 4.35 M/CU MM (ref 4.2–5.4)
SEGMENTED NEUTROPHILS ABSOLUTE COUNT: 12.5 K/CU MM
SEGMENTED NEUTROPHILS RELATIVE PERCENT: 82.7 % (ref 36–66)
SODIUM BLD-SCNC: 136 MMOL/L (ref 135–145)
TOTAL IMMATURE NEUTOROPHIL: 0.08 K/CU MM
TOTAL NUCLEATED RBC: 0 K/CU MM
TOTAL PROTEIN: 6.1 GM/DL (ref 6.4–8.2)
WBC # BLD: 15.1 K/CU MM (ref 4–10.5)

## 2022-04-02 PROCEDURE — 82962 GLUCOSE BLOOD TEST: CPT

## 2022-04-02 PROCEDURE — 99024 POSTOP FOLLOW-UP VISIT: CPT | Performed by: PHYSICIAN ASSISTANT

## 2022-04-02 PROCEDURE — 6370000000 HC RX 637 (ALT 250 FOR IP): Performed by: PHYSICIAN ASSISTANT

## 2022-04-02 PROCEDURE — 6360000002 HC RX W HCPCS: Performed by: INTERNAL MEDICINE

## 2022-04-02 PROCEDURE — 36415 COLL VENOUS BLD VENIPUNCTURE: CPT

## 2022-04-02 PROCEDURE — 6370000000 HC RX 637 (ALT 250 FOR IP): Performed by: INTERNAL MEDICINE

## 2022-04-02 PROCEDURE — 2580000003 HC RX 258: Performed by: PHYSICIAN ASSISTANT

## 2022-04-02 PROCEDURE — APPNB30 APP NON BILLABLE TIME 0-30 MINS: Performed by: PHYSICIAN ASSISTANT

## 2022-04-02 PROCEDURE — 85025 COMPLETE CBC W/AUTO DIFF WBC: CPT

## 2022-04-02 PROCEDURE — 6360000002 HC RX W HCPCS: Performed by: PHYSICIAN ASSISTANT

## 2022-04-02 PROCEDURE — G0378 HOSPITAL OBSERVATION PER HR: HCPCS

## 2022-04-02 PROCEDURE — 80053 COMPREHEN METABOLIC PANEL: CPT

## 2022-04-02 PROCEDURE — 96374 THER/PROPH/DIAG INJ IV PUSH: CPT

## 2022-04-02 RX ORDER — HYDROCODONE BITARTRATE AND ACETAMINOPHEN 5; 325 MG/1; MG/1
1 TABLET ORAL EVERY 6 HOURS PRN
Qty: 10 TABLET | Refills: 0 | Status: SHIPPED | OUTPATIENT
Start: 2022-04-02 | End: 2022-04-05

## 2022-04-02 RX ORDER — FUROSEMIDE 10 MG/ML
80 INJECTION INTRAMUSCULAR; INTRAVENOUS ONCE
Status: COMPLETED | OUTPATIENT
Start: 2022-04-02 | End: 2022-04-02

## 2022-04-02 RX ADMIN — CARVEDILOL 6.25 MG: 6.25 TABLET, FILM COATED ORAL at 09:33

## 2022-04-02 RX ADMIN — SODIUM CHLORIDE, PRESERVATIVE FREE 10 ML: 5 INJECTION INTRAVENOUS at 09:28

## 2022-04-02 RX ADMIN — ENOXAPARIN SODIUM 40 MG: 100 INJECTION SUBCUTANEOUS at 09:17

## 2022-04-02 RX ADMIN — GLIPIZIDE 5 MG: 5 TABLET ORAL at 06:11

## 2022-04-02 RX ADMIN — FUROSEMIDE 80 MG: 10 INJECTION, SOLUTION INTRAMUSCULAR; INTRAVENOUS at 09:17

## 2022-04-02 RX ADMIN — AMIODARONE HYDROCHLORIDE 200 MG: 200 TABLET ORAL at 09:17

## 2022-04-02 RX ADMIN — SODIUM ZIRCONIUM CYCLOSILICATE 10 G: 10 POWDER, FOR SUSPENSION ORAL at 11:34

## 2022-04-02 ASSESSMENT — ENCOUNTER SYMPTOMS
ABDOMINAL PAIN: 1
SORE THROAT: 0
RECTAL PAIN: 0
EYE ITCHING: 0
ANAL BLEEDING: 0
COLOR CHANGE: 0
EYE REDNESS: 0
BACK PAIN: 0
VOMITING: 0
CHOKING: 0
STRIDOR: 0
CONSTIPATION: 0
PHOTOPHOBIA: 0
APNEA: 0
NAUSEA: 0

## 2022-04-02 ASSESSMENT — PAIN SCALES - GENERAL
PAINLEVEL_OUTOF10: 0
PAINLEVEL_OUTOF10: 0

## 2022-04-02 NOTE — DISCHARGE SUMMARY
Physician Discharge Summary     Patient ID:  Thony Melgar  [id number]  8 y.o.  1950    Admit date: 4/1/2022    Discharge date: 04/02/22    Admitting Physician: Morenita Flynn MD     Discharge Physician:same    Admission Diagnoses: Hiatal hernia with GERD [K21.9, K44.9]    Discharge Diagnoses: same and Hyperkalemia    Admission Condition:good    Discharged Condition: Good    Indication for Admission: Elective surgery to repair hiatal hernia    Hospital Course:  Patient had an uneventful hospital course. Patient was able to tolerate diet, ambulate and have regular bowel function present discharge. Pt was noted to have hyperkalemia on POD #1. Nephrology was consulted and pt was given [address] and instructed to get labs on Monday with Nephrology f/u in 2 weeks. Consults: nephrology    Outstanding Order Results     No orders found for last 30 day(s). Treatments: surgery: Robotic assisted hiatal hernia repair with nissen fundoplication    Discharge Exam:  Physical Exam  Constitutional:       Appearance: She is well-developed. HENT:      Head: Normocephalic. Eyes:      Pupils: Pupils are equal, round, and reactive to light. Cardiovascular:      Rate and Rhythm: Normal rate. Pulmonary:      Effort: Pulmonary effort is normal.   Abdominal:      General: There is no distension. Palpations: Abdomen is soft. There is no mass. Tenderness: There is abdominal tenderness. There is no guarding or rebound. Comments: Lap incisions well approximated with glue intact. Musculoskeletal:         General: Normal range of motion. Cervical back: Normal range of motion and neck supple. Skin:     General: Skin is warm. Neurological:      Mental Status: She is alert and oriented to person, place, and time.          Disposition: home    Patient Instructions:      Medication List      START taking these medications    HYDROcodone-acetaminophen 5-325 MG per tablet  Commonly known as: Norco  Take 1 tablet by mouth every 6 hours as needed for Pain for up to 3 days. Intended supply: 3 days. Take lowest dose possible to manage pain        CONTINUE taking these medications    acetaminophen 500 MG tablet  Commonly known as: TYLENOL     amiodarone 200 MG tablet  Commonly known as: CORDARONE     amLODIPine 5 MG tablet  Commonly known as: Norvasc  Take 1 tablet by mouth daily     aspirin 81 MG EC tablet     carvedilol 6.25 MG tablet  Commonly known as: Coreg  Take 1 tablet by mouth 2 times daily (with meals)     clopidogrel 75 MG tablet  Commonly known as: PLAVIX  Take 1 tablet by mouth daily     Farxiga 5 MG tablet  Generic drug: dapagliflozin  TAKE 1 TABLET BY MOUTH EVERY MORNING     glipiZIDE 5 MG tablet  Commonly known as: GLUCOTROL  Take 1 tablet by mouth 2 times daily (before meals)     lisinopril 5 MG tablet  Commonly known as: PRINIVIL;ZESTRIL  TAKE 1 TABLET BY MOUTH DAILY     rosuvastatin 10 MG tablet  Commonly known as: CRESTOR  TAKE 1 TABLET BY MOUTH EVERY NIGHT     Trulicity 1.5 MT/2.5WE Sopn  Generic drug: Dulaglutide  Inject 1.5 mg into the skin once a week     vitamin D 50 MCG (2000 UT) Caps capsule        STOP taking these medications    omeprazole 40 MG delayed release capsule  Commonly known as: PRILOSEC     sucralfate 1 GM tablet  Commonly known as: CARAFATE           Where to Get Your Medications      These medications were sent to [facility] [address] [place] [id number]    Phone: [phone]   · HYDROcodone-acetaminophen 5-325 MG per tablet         Activity: activity as tolerated and no driving while on analgesics    Diet: Full liquid diet    Wound Care: keep wound clean and dry    Follow-up with Dr Dexter Retsrepo or Diamond Mix PA-C by calling [phone]. The Office staff will determine follow up time per protocol.     Signed:  Diamond Mix PA-C

## 2022-04-02 NOTE — PROGRESS NOTES
GENERAL SURGERY PROGRESS NOTE    Tiana Vaughn is a 66 y.o. female with 1 Day Post-Op Robotic assisted Nissen fundolication. Subjective:    Pain: 0/10 - Does report soreness at the left lateral incision with movement   Diet: ADULT DIET; Full Liquid  Activity: Tolerating well. Pt denies any concerns today. Pain is minimal except some soreness to the left lateral incision, especially with movement. Pt tolerating FLD well. Has not swallowed pills. Denies N/V or acid reflux. Review of Systems   Constitutional: Negative for chills and fever. HENT: Negative for ear pain, mouth sores, sore throat and tinnitus. Eyes: Negative for photophobia, redness and itching. Respiratory: Negative for apnea, choking and stridor. Cardiovascular: Negative for chest pain and palpitations. Gastrointestinal: Positive for abdominal pain (minimal - mostly incisional). Negative for anal bleeding, constipation, nausea, rectal pain and vomiting. Endocrine: Negative for polydipsia. Genitourinary: Negative for enuresis, flank pain and hematuria. Musculoskeletal: Negative for back pain, joint swelling and myalgias. Skin: Negative for color change and pallor. Allergic/Immunologic: Negative for environmental allergies. Neurological: Negative for syncope and speech difficulty. Psychiatric/Behavioral: Negative for confusion and hallucinations. Objective:    Vitals: VITALS:  BP (!) 108/51   Pulse 78   Temp 97.8 °F (36.6 °C) (Oral)   Resp 14   Ht 5' 3\" (1.6 m)   Wt 164 lb (74.4 kg)   LMP 2002   SpO2 97%   BMI 29.05 kg/m²   TEMPERATURE:  Current - Temp: 97.8 °F (36.6 °C);  Max - Temp  Av.2 °F (35.7 °C)  Min: 95.6 °F (35.3 °C)  Max: 98 °F (36.7 °C)    I/O:  0701 -  0700  In: 1075 [I.V.:1075]  Out: 310 [Urine:300]    Labs/Imaging Results:   Lab Results   Component Value Date    WBC 15.1 (H) 2022    HGB 11.0 (L) 2022    HCT 36.7 (L) 2022    MCV 84.4 04/02/2022     04/02/2022     Lab Results   Component Value Date     04/02/2022    K 5.6 (HH) 04/02/2022     04/02/2022    CO2 21 04/02/2022    BUN 31 (H) 04/02/2022    CREATININE 1.5 (H) 04/02/2022    GLUCOSE 175 (H) 04/02/2022    CALCIUM 9.0 04/02/2022    PROT 6.1 (L) 04/02/2022    LABALBU 3.8 04/02/2022    BILITOT 0.4 04/02/2022    ALKPHOS 76 04/02/2022    AST 53 (H) 04/02/2022    ALT 50 (H) 04/02/2022    LABGLOM 34 (L) 04/02/2022    GFRAA 41 (L) 04/02/2022    AGRATIO 1.6 09/25/2020    GLOB 2.5 09/25/2020       Scheduled Meds:   sodium bicarbonate, 50 mEq, IntraVENous, Once    amiodarone, 200 mg, Oral, Daily    amLODIPine, 5 mg, Oral, Daily    carvedilol, 6.25 mg, Oral, BID WC    dapagliflozin, 5 mg, Oral, QAM    glipiZIDE, 5 mg, Oral, BID AC    lisinopril, 5 mg, Oral, Daily    sodium chloride flush, 10 mL, IntraVENous, 2 times per day    enoxaparin, 40 mg, SubCUTAneous, Daily    Physical Exam:  Physical Exam  Constitutional:       Appearance: She is well-developed. HENT:      Head: Normocephalic. Eyes:      Pupils: Pupils are equal, round, and reactive to light. Cardiovascular:      Rate and Rhythm: Normal rate. Pulmonary:      Effort: Pulmonary effort is normal.   Abdominal:      General: There is no distension. Palpations: Abdomen is soft. There is no mass. Tenderness: There is abdominal tenderness. There is no guarding or rebound. Comments: Lap incisions well approximated with glue intact. Musculoskeletal:         General: Normal range of motion. Cervical back: Normal range of motion and neck supple. Skin:     General: Skin is warm. Neurological:      Mental Status: She is alert and oriented to person, place, and time.            Assessment and Plan:    Patient Active Problem List:     Anemia     Vitamin D deficiency     Type 2 diabetes mellitus without complication, without long-term current use of insulin (HCC)     Coronary artery disease involving native heart without angina pectoris     Essential hypertension     Mixed hyperlipidemia     PVD (peripheral vascular disease) (HCC)     Microalbuminuria     Angular cheilitis     Iron deficiency anemia secondary to blood loss (chronic)     Other forms of chronic ischemic heart disease     Hiatal hernia with GERD     Personal history of other diseases of the digestive system     Esophageal stricture     Hiatal hernia     Iron deficiency anemia     Acute hypoxemic respiratory failure due to COVID-19 [facility])     Carotid stenosis, bilateral     Carotid stenosis, right     Pre-op testing      Diet: Continue FLD - will d/c on this for 2 weeks until reevaluated in the office. Wound care: Ok to shower  Activity: As tolerated.    Med changes: None  Labs/Imaging: Reviewed - Nephrology consulted for hyperkalemia  Disposition: Pending nephrology evaluation and recommendations      Xavi Reed PA-C

## 2022-04-02 NOTE — CONSULTS
Patient:   Ankur Ivan    Date:  22  :  1950, 66 y.o. Nephrologist: Mykel Correa MD  Provider: Nick Dahl DO    Reason for Consult: Hyperkalemia    Consult requested by : Dr Nico Vora PA-C    Chief Complaint:   Elective surgery for hiatal hernia repair and Nissen fundoplication for gastroesophageal disorder which was refractory to medical therapy    HISTORY OF PRESENT ILLNESS:   Ms. Mercy Soler is a 74-year-old female, who came for elective surgery yesterday. She had longstanding gastroesophageal reflux disorder, not controlled with maximum medical therapy. Along with that she also had some hiatal hernia, she came for repair of that along with Nissen fundoplication. The procedure went uneventfully yesterday. She has had intermittent hyperkalemia, potassium is running in the vicinity of 5.2 to 5.6 mEq/L range. She was also on ACE inhibitor therapy which is on hold this morning. I was consulted for high potassium. I was able to review her renal data. Her creatinine was 0.8 mg/dL in , it did fluctuate between 1.0-1.1 range up until 2021, she suffered from COVID-19 and had an acute kidney injury with a creatinine of 1.4. Although initially creatinine did go down to 1.2 but since February of this year, it is running in 1.5 range. Her prior urine did not show any proteinuria        PMH :   1.  Probable CKD stage IIIa A1  2. Atherosclerotic cardiovascular disease-she has coronary artery bypass surgery x4 vessels back in -relatively preserved left ventricular ejection fraction but has diastolic dysfunction-also had recently carotid endarterectomy done on the right side  3. COVID-19 back in 2021-she is successfully recovered  4. Hypertension requiring ACE inhibitor therapy  5. Diabetes mellitus type 2 diagnosed roughly 2017-managed on oral medication-including SGLT2 inhibitors and GLP-1 receptor agonist as well as sulfonylurea  6. Dyslipidemia  7. Gastroesophageal reflux or heartburn hernia  8. History of atrial fibrillation        PSH :  1.  Hiatal hernia repair with Nissen fundoplication  2. Status post coronary artery bypass surgery x4 vessel back in   3. Right carotid endarterectomy in 2022  4. Several knee surgery, also gallbladder removal      OB GYN Hx:   5 para 5  No history of eclampsia, preeclampsia, gestational diabetes or hypertension    Habits :   No history of smoking, alcohol or illicit drug abuse    Soc Hx: She was born and raised in [facility]  She had been  for more than 40 years  She has of course 5 children  She is a retired mail person.   Eventually retired in   Currently residing with her  here in town, she is otherwise very active    [address]   Her mother had end-stage kidney disease was on dialysis for 10 years  Also diabetes runs in the family        REVIEW OF SYSTEMS:     All pertinent ROS neg except   No shortness of breath  No urinary symptoms    Current Facility-Administered Medications   Medication Dose Route Frequency Provider Last Rate Last Admin    sodium zirconium cyclosilicate (LOKELMA) oral suspension 10 g  10 g Oral Once Ena Davalos MD        amiodarone (CORDARONE) tablet 200 mg  200 mg Oral Daily Domonique Terry PA-C   200 mg at 22 0917    amLODIPine (NORVASC) tablet 5 mg  5 mg Oral Daily Domonique Terry PA-C        carvedilol (COREG) tablet 6.25 mg  6.25 mg Oral BID WC Molly Duff PA-C   6.25 mg at 22 0933    dapagliflozin (FARXIGA) tablet 5 mg (PATIENT SUPPLIED)  5 mg Oral QAM Molly Duff PA-C   5 mg at 22 2106    glipiZIDE (GLUCOTROL) tablet 5 mg  5 mg Oral BID AC Molly Duff PA-C   5 mg at 22 9717    oxyCODONE (ROXICODONE) immediate release tablet 5 mg  5 mg Oral Q4H PRN Domonique Terry PA-C   5 mg at 22 1527    morphine (PF) injection 2 mg  2 mg IntraVENous Q3H PRN Domonique Terry PA-C   2 mg at 22 1340    0.9 % sodium chloride infusion   IntraVENous Continuous Nico Older, PA-C 75 mL/hr at 04/01/22 1223 New Bag at 04/01/22 1223    sodium chloride flush 0.9 % injection 10 mL  10 mL IntraVENous 2 times per day Nico Older, PA-C   10 mL at 04/02/22 5151    sodium chloride flush 0.9 % injection 10 mL  10 mL IntraVENous PRN Nico Older, PA-C        0.9 % sodium chloride infusion  25 mL IntraVENous PRN Nico Older, PA-C        ondansetron (ZOFRAN-ODT) disintegrating tablet 4 mg  4 mg Oral Q8H PRN Nico Older, PA-C        Or    ondansetron (ZOFRAN) injection 4 mg  4 mg IntraVENous Q6H PRN Nico Older, PA-C        magnesium hydroxide (MILK OF MAGNESIA) 400 MG/5ML suspension 30 mL  30 mL Oral Daily PRN Nico Older, PA-C        enoxaparin (LOVENOX) injection 40 mg  40 mg SubCUTAneous Daily Nico Older, PA-C   40 mg at 04/02/22 0917    simethicone (MYLICON) chewable tablet 80 mg  80 mg Oral Q6H PRN Nico Older, PA-C        glucose (GLUTOSE) 40 % oral gel 15 g  15 g Oral PRN Nico Older, PA-C        glucagon (rDNA) injection 1 mg  1 mg IntraMUSCular PRN Nico Older, PA-C        dextrose 5 % solution  100 mL/hr IntraVENous PRN Nico Older, PA-ARLIN        dextrose bolus (hypoglycemia) 10% 125 mL  125 mL IntraVENous PRN Nico Older, PA-ARLIN        Or    dextrose bolus (hypoglycemia) 10% 250 mL  250 mL IntraVENous PRN Molly Duff PA-C           BP (!) 108/51   Pulse 78   Temp 97.8 °F (36.6 °C) (Oral)   Resp 14   Ht 5' 3\" (1.6 m)   Wt 164 lb (74.4 kg)   LMP 01/19/2002   SpO2 97%   BMI 29.05 kg/m²     PHYSICAL EXAM:  General appearance: Alert, awake and oriented  HEENT: 2+ conjunctival pallor  Neck: Supple  Heart: Regular rate and rhythm, well-healed incision from previous sternotomy  LUNGS: Coarse breath sound but no crackles  Abdomen: Soft, evidence of recent surgery  Extremities: 1+ edema    LABS:  CBC:   Lab Results   Component Value Date    WBC 15.1 04/02/2022    WBC 9.1 03/30/2022    WBC 17.3 02/19/2022    HGB 11.0 04/02/2022    HGB 12.6 03/30/2022    HGB 11.8 02/19/2022     04/02/2022     03/30/2022     02/19/2022     Renal Panel:   Lab Results   Component Value Date     04/02/2022     04/01/2022     03/30/2022    K 5.6 04/02/2022    K 5.1 04/01/2022    K 5.6 03/30/2022     04/02/2022     04/01/2022    CL 99 03/30/2022    CO2 21 04/02/2022    CO2 20 04/01/2022    CO2 25 03/30/2022    BUN 31 04/02/2022    BUN 29 04/01/2022    BUN 29 03/30/2022    CREATININE 1.5 04/02/2022    CREATININE 1.5 04/01/2022    CREATININE 1.5 03/30/2022    GFRAA 41 04/02/2022    GFRAA 41 04/01/2022    GFRAA 41 03/30/2022    LABGLOM 34 04/02/2022    LABGLOM 34 04/01/2022    LABGLOM 34 03/30/2022    LABALBU 3.8 04/02/2022    LABALBU 3.3 08/19/2021    LABALBU 2.9 08/18/2021         Calcium:    Lab Results   Component Value Date    CALCIUM 9.0 04/02/2022     Phosphorus:  No results found for: PHOS    U/A:    Lab Results   Component Value Date    PROTEINU Negative 11/01/2021    NITRU Negative 11/01/2021    COLORU YELLOW 11/01/2021    PHUR 5.5 11/01/2021    WBCUA 31 11/01/2021    RBCUA 3 11/01/2021    RBCUA <1 08/19/2021    MUCUS RARE 08/19/2021    TRICHOMONAS NONE SEEN 08/19/2021    BACTERIA RARE 11/01/2021    CLARITYU CLOUDY 11/01/2021    SPECGRAV 1.027 11/01/2021    UROBILINOGEN 0.2 11/01/2021    BILIRUBINUR Negative 11/01/2021    BLOODU Negative 11/01/2021    GLUCOSEU >=1000 11/01/2021    KETUA Negative 11/01/2021           IMPRESSION:  1. Hyperkalemia-likely from combination of CKD, ACE inhibitors and recent surgery and general anesthesia-should be manageable with oral binders and avoiding ACE inhibitors  2. Underlying coronary artery disease as well as atherosclerotic cardiovascular disease and diabetes-no proteinuria before  3. Recent hiatal hernia repair and Nissen fundoplication for refractory gastroesophageal reflux disorder symptoms    PLAN:    UA and urinary indicis  P.o.  Lokelma x1  Avoid ACE inhibitors for now  May discharge from my standpoint  Her granddaughter is a nurse here, she is very knowledgeable, and I explained to her.   So I feel comfortable sending her home  She will need a CMP, magnesium, phosphorus next Monday  I can see her in my office in 2 weeks  Low-salt, DASH diet-in good glycemic control at home  No NSAID, low potassium diet, until lab next Monday

## 2022-04-02 NOTE — OP NOTE
Procedure Note:    Patient ID:  Ryan Hernandez  [id number]  34 y.o.  1950        Pre-operative Diagnosis: Gastroesophageal reflux disease, large hiatal hernia     Post-operative Diagnosis: same    Procedure: Robotic-Assisted Laparoscopic Hiatal hernia repair with mesh  and Nissen Fundoplication     Surgeon: Carmela Houser MD    First Assistant: Alejandra Mcfadden PA-C  The use of a first assistant was necessary for the proper positioning, prepping, and draping of the patient, intraoperative retraction, passing sutures and implants(like mesh), stapling bowel and vessels using  devises when necessary, and suction using laparoscopic instruments, exchanging DaVinci robotic instruments, passing sutures and closure of skin and subcutaneous tissues. Anesthesia: General endotracheal anesthesia    ASA Class: 2    Findings:  Large sliding hiatal hernia with fundus in the mediastinum    Estimated Blood Loss:  Minimal           Drains: none           Total IV Fluids: 1000 ml           Specimens: None           Implants: Phasix mesh           Complications:  None; patient tolerated the procedure well. Disposition: PACU - hemodynamically stable. Condition: stable     Procedure Details   The patient was seen in the Holding Room. The risks, benefits, complications, treatment options, and expected outcomes were discussed with the patient. The possibilities of reaction to medication, pulmonary aspiration, perforation of viscus, bleeding, recurrent infection, the need for additional procedures, failure to diagnose a condition, and creating a complication requiring transfusion or operation were discussed with the patient. The patient concurred with the proposed plan, giving informed consent. The site of surgery properly noted/marked. The patient was taken to the Operating Room, identified as Ryan Hernandez and the procedure verified as Robotic Nissen Fundoplication.      Indications: Patient with large hiatal hernia causing respiratory symptoms of shortness of air when lying down and  Gastroesophageal reflux disease refractory to medical therapy. Procedure Description:  Time Out was held and the above information confirmed. The patient was taken to the Operating Room and placed in the supine position. After induction of anesthesia, patient was placed in a supine position with both arms tucked in. The abdomen was then sterilely prepped and draped in the standard fashion. 1% buffered lidocaine was infiltrated in the right midepigastrium, and a 12 mm Optiview port was placed under direct vision and the abdomen was insufflated to 15 mm of pressure. We placed our 8 mm ports in the standard fashion including a right flank port used for the liver retractor which was used to elevate the left lobe of the liver. The robot was docked successfully after the pt was in reverse trendelenburg. We began our dissection by dividing the pars flaccida preserving the hepatic branch of the anterior vagus nerve and then mobilizing the esophagus at the right marco antonio identifying the junction with the left marco antonio posterior to the esophagus. The phrenoesophageal membrane was divided  anteriorly and turned my attention to the greater curvature of the stomach. The short gastric vessels along the greater curvature through the splenic hilum up to the level of the left marco antonio were divided. The esophagus was freed from the left marco antonio. The esophagus was now circumferentially mobilized at the hiatus. The gastroesophageal fat pad which was mobilized from lateral to medial off the gastroesophageal junction creating a retroesophageal window inside both the anterior and posterior vagus nerves. Both the anterior and posterior vagus nerves were well visualized and preserved. The crura was then closed posterior to the esophagus with interrupted 0 Ethibond Suture within about 5 mm of the esophagus. The hernia repair was reinforced using Phasix mesh.  A notch was removed from the mesh and placed retroesophageal covering the crura repair. I performed a full wrap of the cardia through the retroesophageal window. This was a 3 cm long wrap performed using 2-0 ethibond suture and incorporating a portion of the wall of the esophagus. There was 2 cm of Intraabdominal esophagus above the fundoplication. The liver retractor was removed. The right midepigastric port was removed from the fascia and closed with 0 Vicryl. All ports and insufflation were removed. The subcutaneous tissue was irrigated, and the skin was closed with 4-0 vicryl. Instrument, sponge, and needle counts were correct prior to abdominal closure and at the conclusion of the case.      Conchita Yusuf MD

## 2022-04-04 ENCOUNTER — TELEPHONE (OUTPATIENT)
Dept: INTERNAL MEDICINE CLINIC | Age: 72
End: 2022-04-04

## 2022-04-04 NOTE — TELEPHONE ENCOUNTER
[facility] Initial Follow Up Call    Outreach made within 2 business days of discharge: Yes    Patient: Aarti Anderson Patient : 1950   MRN: [id number]  Reason for Admission: There are no discharge diagnoses documented for the most recent discharge. Discharge Date: 22       Spoke with: Patient     Discharge department/facility: [facility] -[facility], DX Hiatal Hernia with GERD, Hyperkalemia    TCM Interactive Patient Contact:  Was patient able to fill all prescriptions: Yes  Was patient instructed to bring all medications to the follow-up visit: Yes  Is patient taking all medications as directed in the discharge summary?  Yes  Does patient understand their discharge instructions: Yes  Does patient have questions or concerns that need addressed prior to 7-14 day follow up office visit: no    Scheduled appointment with PCP within 7-14 days    Pt scheduled for     Follow Up  Future Appointments   Date Time Provider Jodi Baca   2022 10:00 AM John Merino MD [facility]   2022 12:00 PM Sg Rebolledo MD [facility]   2022 10:00 AM DO DAYNA Ramirez MA

## 2022-04-06 ENCOUNTER — TELEPHONE (OUTPATIENT)
Dept: INTERNAL MEDICINE CLINIC | Age: 72
End: 2022-04-06

## 2022-04-07 ENCOUNTER — OFFICE VISIT (OUTPATIENT)
Dept: INTERNAL MEDICINE CLINIC | Age: 72
End: 2022-04-07
Payer: COMMERCIAL

## 2022-04-07 VITALS
DIASTOLIC BLOOD PRESSURE: 64 MMHG | WEIGHT: 161.4 LBS | SYSTOLIC BLOOD PRESSURE: 114 MMHG | HEART RATE: 62 BPM | RESPIRATION RATE: 20 BRPM | OXYGEN SATURATION: 98 % | BODY MASS INDEX: 28.59 KG/M2

## 2022-04-07 DIAGNOSIS — I10 ESSENTIAL HYPERTENSION: ICD-10-CM

## 2022-04-07 DIAGNOSIS — Z87.19 HISTORY OF REPAIR OF HIATAL HERNIA: ICD-10-CM

## 2022-04-07 DIAGNOSIS — E11.9 TYPE 2 DIABETES MELLITUS WITHOUT COMPLICATION, WITHOUT LONG-TERM CURRENT USE OF INSULIN (HCC): Primary | ICD-10-CM

## 2022-04-07 DIAGNOSIS — D64.9 ANEMIA, UNSPECIFIED TYPE: ICD-10-CM

## 2022-04-07 DIAGNOSIS — E87.5 HYPERKALEMIA: ICD-10-CM

## 2022-04-07 DIAGNOSIS — N18.32 STAGE 3B CHRONIC KIDNEY DISEASE (HCC): ICD-10-CM

## 2022-04-07 DIAGNOSIS — Z98.890 HISTORY OF REPAIR OF HIATAL HERNIA: ICD-10-CM

## 2022-04-07 LAB
A/G RATIO: 1.2 (ref 1.1–2.2)
ALBUMIN SERPL-MCNC: 3.5 G/DL (ref 3.4–5)
ALP BLD-CCNC: 97 U/L (ref 40–129)
ALT SERPL-CCNC: 12 U/L (ref 10–40)
ANION GAP SERPL CALCULATED.3IONS-SCNC: 17 MMOL/L (ref 3–16)
AST SERPL-CCNC: 12 U/L (ref 15–37)
BILIRUB SERPL-MCNC: 0.6 MG/DL (ref 0–1)
BUN BLDV-MCNC: 30 MG/DL (ref 7–20)
CALCIUM SERPL-MCNC: 9 MG/DL (ref 8.3–10.6)
CHLORIDE BLD-SCNC: 97 MMOL/L (ref 99–110)
CO2: 23 MMOL/L (ref 21–32)
CREAT SERPL-MCNC: 1.3 MG/DL (ref 0.6–1.2)
GFR AFRICAN AMERICAN: 49
GFR NON-AFRICAN AMERICAN: 40
GLUCOSE BLD-MCNC: 190 MG/DL (ref 70–99)
MAGNESIUM: 2.4 MG/DL (ref 1.8–2.4)
PHOSPHORUS: 3.3 MG/DL (ref 2.5–4.9)
POTASSIUM SERPL-SCNC: 4.7 MMOL/L (ref 3.5–5.1)
SODIUM BLD-SCNC: 137 MMOL/L (ref 136–145)
TOTAL PROTEIN: 6.4 G/DL (ref 6.4–8.2)

## 2022-04-07 PROCEDURE — 1111F DSCHRG MED/CURRENT MED MERGE: CPT | Performed by: FAMILY MEDICINE

## 2022-04-07 PROCEDURE — 99495 TRANSJ CARE MGMT MOD F2F 14D: CPT | Performed by: FAMILY MEDICINE

## 2022-04-07 RX ORDER — DULAGLUTIDE 1.5 MG/.5ML
1.5 INJECTION, SOLUTION SUBCUTANEOUS WEEKLY
Qty: 4 PEN | Refills: 3 | Status: SHIPPED | OUTPATIENT
Start: 2022-04-07 | End: 2022-04-19

## 2022-04-07 NOTE — PROGRESS NOTES
Post-Discharge Transitional Care  Follow Up      Anisa Boone   YOB: 1950    Date of Office Visit:  4/7/2022  Date of Hospital Admission: 4/1/22  Date of Hospital Discharge: 4/2/22  Risk of hospital readmission (high >=14%. Medium >=10%) :Readmission Risk Score: 9.2 ( )      Care management risk score Rising risk (score 2-5) and Complex Care (Scores >=6): 1     Non face to face  following discharge, date last encounter closed (first attempt may have been earlier): 4/4/2022  4:09 PM    Call initiated 2 business days of discharge: Yes    ASSESSMENT/PLAN:   Type 2 diabetes mellitus without complication, without long-term current use of insulin (HCC), chronic  -     dapagliflozin (FARXIGA) 5 MG tablet; TAKE 1 TABLET BY MOUTH EVERY MORNING, Disp-30 tablet, R-3Normal  -     Dulaglutide (TRULICITY) 1.5 ZB/1.8GL SOPN; Inject 1.5 mg into the skin once a week, Disp-4 pen, S-2P/Y Trulicity 2.53EQZZOW  -     AK DISCHARGE MEDS RECONCILED W/ CURRENT OUTPATIENT MED LIST  Stage 3b chronic kidney disease ([facility] [place] [address] chronic  History of repair of hiatal hernia  Essential hypertension, chronic  Continue medications  Hyperkalemia-improved with Lokelma  Anemia, unspecified type    Keep f/u with nephrology, surgery and other specialists  Medical Decision Making: moderate complexity  Return in about 4 months (around 8/7/2022) for Diabetes, HLD. On this date 4/7/2022 I have spent 30 minutes reviewing previous notes, test results and face to face with the patient discussing the diagnosis and importance of compliance with the treatment plan as well as documenting on the day of the visit. Subjective:   HPI:  Follow up of Hospital problems/diagnosis(es): Hiatal hernia  with GERD. S/P repair, Hyperkalemia    S/P hiatal  hernia repair. Off on Omeprazole and sucralfate now  Hyperkalemia- Pt was treated with Lokelma. + CKD 3.  Pt to follow up with nephrology- Dr. Michela Fernandez  DM II- On Farxiga, Trulicity and glipizide  HTN- Off of Amlodipine per patient. On Lisinopril 5 mg  Anemia    Inpatient course: Discharge summary reviewed- see chart. Interval history/Current status: stable    Patient Active Problem List   Diagnosis    Anemia    Vitamin D deficiency    Type 2 diabetes mellitus without complication, without long-term current use of insulin ([facility] [address]    Coronary artery disease involving native heart without angina pectoris    Essential hypertension    Mixed hyperlipidemia    PVD (peripheral vascular disease) (HCC)    Microalbuminuria    Angular cheilitis    Iron deficiency anemia secondary to blood loss (chronic)    Other forms of chronic ischemic heart disease    Hiatal hernia with GERD    Personal history of other diseases of the digestive system    Esophageal stricture    Hiatal hernia    Iron deficiency anemia    Acute hypoxemic respiratory failure due to COVID-19 [facility])    Carotid stenosis, bilateral    Carotid stenosis, right    Pre-op testing       Medications listed as ordered at the time of discharge from hospital     Medication List          Accurate as of April 7, 2022 11:59 PM. If you have any questions, ask your nurse or doctor.             CONTINUE taking these medications    acetaminophen 500 MG tablet  Commonly known as: TYLENOL     amiodarone 200 MG tablet  Commonly known as: CORDARONE     aspirin 81 MG EC tablet     carvedilol 6.25 MG tablet  Commonly known as: Coreg  Take 1 tablet by mouth 2 times daily (with meals)     clopidogrel 75 MG tablet  Commonly known as: PLAVIX  Take 1 tablet by mouth daily     dapagliflozin 5 MG tablet  Commonly known as: Farxiga  TAKE 1 TABLET BY MOUTH EVERY MORNING     glipiZIDE 5 MG tablet  Commonly known as: GLUCOTROL  Take 1 tablet by mouth 2 times daily (before meals)     lisinopril 5 MG tablet  Commonly known as: PRINIVIL;ZESTRIL  TAKE 1 TABLET BY MOUTH DAILY     rosuvastatin 10 MG tablet  Commonly known as: CRESTOR  TAKE 1 TABLET BY MOUTH EVERY NIGHT     Trulicity 1.5 MG/0.5ML Sopn  Generic drug: Dulaglutide  Inject 1.5 mg into the skin once a week     vitamin D 50 MCG (2000 UT) Caps capsule           Where to Get Your Medications      These medications were sent to [address] [address]    Phone: [phone]   · dapagliflozin 5 MG tablet  · Trulicity 1.5 EV/7.3KG Sopn           Medications marked \"taking\" at this time  Outpatient Medications Marked as Taking for the 4/7/22 encounter (Office Visit) with Daniel Newsome, DO   Medication Sig Dispense Refill    dapagliflozin (FARXIGA) 5 MG tablet TAKE 1 TABLET BY MOUTH EVERY MORNING 30 tablet 3    Dulaglutide (TRULICITY) 1.5 TQ/4.3DU SOPN Inject 1.5 mg into the skin once a week 4 pen 3    glipiZIDE (GLUCOTROL) 5 MG tablet Take 1 tablet by mouth 2 times daily (before meals) 180 tablet 1    lisinopril (PRINIVIL;ZESTRIL) 5 MG tablet TAKE 1 TABLET BY MOUTH DAILY 90 tablet 1    rosuvastatin (CRESTOR) 10 MG tablet TAKE 1 TABLET BY MOUTH EVERY NIGHT 90 tablet 1    carvedilol (COREG) 6.25 MG tablet Take 1 tablet by mouth 2 times daily (with meals) 60 tablet 3    amiodarone (CORDARONE) 200 MG tablet Take 200 mg by mouth daily      Cholecalciferol (VITAMIN D) 2000 UNITS CAPS capsule Take 2,000 Units by mouth daily Then weekly 50,000      clopidogrel (PLAVIX) 75 MG tablet Take 1 tablet by mouth daily 30 tablet 3    acetaminophen (TYLENOL) 500 MG tablet Take 1,000 mg by mouth every 6 hours as needed for Pain      aspirin 81 MG EC tablet Take 81 mg by mouth daily. Medications patient taking as of now reconciled against medications ordered at time of hospital discharge: Yes    A comprehensive review of systems was negative except for what was noted in the HPI.      Lab Results   Component Value Date    WBC 15.1 (H) 04/02/2022    HGB 11.0 (L) 04/02/2022    HCT 36.7 (L) 04/02/2022    MCV 84.4 04/02/2022  04/02/2022     Lab Results   Component Value Date     04/07/2022    K 4.7 04/07/2022    CL 97 (L) 04/07/2022    CO2 23 04/07/2022    BUN 30 (H) 04/07/2022    CREATININE 1.3 (H) 04/07/2022    GLUCOSE 190 (H) 04/07/2022    CALCIUM 9.0 04/07/2022    PROT 6.4 04/07/2022    LABALBU 3.5 04/07/2022    BILITOT 0.6 04/07/2022    ALKPHOS 97 04/07/2022    AST 12 (L) 04/07/2022    ALT 12 04/07/2022    LABGLOM 40 (A) 04/07/2022    GFRAA 49 (A) 04/07/2022    AGRATIO 1.2 04/07/2022    GLOB 2.5 09/25/2020       Lab Results   Component Value Date    MG 2.40 04/07/2022     Lab Results   Component Value Date    CALCIUM 9.0 04/07/2022    PHOS 3.3 04/07/2022 4/1/22 Surgical Pathology  Final Pathologic Diagnosis:   Mature fibroadipose tissue with small lymph node, clinically   from hiatal hernia:          Mild sinus histiocytosis, lymph node. Adipose tissue with features of lipoma. Objective:    /64   Pulse 62   Resp 20   Wt 161 lb 6.4 oz (73.2 kg)   LMP 01/19/2002   SpO2 98%   BMI 28.59 kg/m²   General Appearance: alert and oriented to person, place and time, well developed and well- nourished, in no acute distress  Head: normocephalic and atraumatic  Eyes: extraocular eye movements intact, conjunctivae normal  ENT: tympanic membrane, external ear and ear canal normal bilaterally  Neck: supple and non-tender, no cervical lymphadenopathy  Pulmonary/Chest: clear to auscultation bilaterally- no wheezes, rales or rhonchi, normal air movement, no respiratory distress  Cardiovascular: normal rate, regular rhythm, normal S1 and S2,no carotid bruits  Abdomen: soft,  non-distended, normal bowel sounds,+ bruising to incision sites  Extremities: no edema, non tender  Musculoskeletal: normal range of motion, no joint swelling  Neurologic: no cranial nerve deficit, gait, coordination and speech normal      An electronic signature was used to authenticate this note.   --Maggi Ore, DO

## 2022-04-14 ENCOUNTER — OFFICE VISIT (OUTPATIENT)
Dept: SURGERY | Age: 72
End: 2022-04-14

## 2022-04-14 VITALS
WEIGHT: 161 LBS | HEART RATE: 85 BPM | BODY MASS INDEX: 28.53 KG/M2 | SYSTOLIC BLOOD PRESSURE: 122 MMHG | DIASTOLIC BLOOD PRESSURE: 74 MMHG | HEIGHT: 63 IN

## 2022-04-14 DIAGNOSIS — Z09 POSTOPERATIVE EXAMINATION: Primary | ICD-10-CM

## 2022-04-14 PROCEDURE — 99024 POSTOP FOLLOW-UP VISIT: CPT | Performed by: SURGERY

## 2022-04-19 DIAGNOSIS — E11.9 TYPE 2 DIABETES MELLITUS WITHOUT COMPLICATION, WITHOUT LONG-TERM CURRENT USE OF INSULIN (HCC): ICD-10-CM

## 2022-04-19 RX ORDER — DULAGLUTIDE 1.5 MG/.5ML
INJECTION, SOLUTION SUBCUTANEOUS
Qty: 2 ML | Refills: 3 | Status: SHIPPED | OUTPATIENT
Start: 2022-04-19

## 2022-04-28 ENCOUNTER — OFFICE VISIT (OUTPATIENT)
Dept: SURGERY | Age: 72
End: 2022-04-28

## 2022-04-28 VITALS
DIASTOLIC BLOOD PRESSURE: 60 MMHG | WEIGHT: 155.3 LBS | SYSTOLIC BLOOD PRESSURE: 118 MMHG | BODY MASS INDEX: 27.51 KG/M2 | HEART RATE: 63 BPM | OXYGEN SATURATION: 96 %

## 2022-04-28 DIAGNOSIS — Z09 POSTOPERATIVE EXAMINATION: Primary | ICD-10-CM

## 2022-04-28 PROCEDURE — APPNB30 APP NON BILLABLE TIME 0-30 MINS: Performed by: PHYSICIAN ASSISTANT

## 2022-04-28 PROCEDURE — 99024 POSTOP FOLLOW-UP VISIT: CPT | Performed by: PHYSICIAN ASSISTANT

## 2022-04-28 NOTE — PROGRESS NOTES
Chief Complaint   Patient presents with    Post-Op Check     1st P/O Rob Nissen/ HHR, 4/1/22         SUBJECTIVE:  Patient here for post op visit. Pain is minimal.  Wounds: minbruising and no discharge. Past Surgical History:   Procedure Laterality Date    BALLOON ANGIOPLASTY, ARTERY Right 08/19/2015    RIght SFA 90%->10%, Right Popliteal 1000%->10%    CARDIAC CATHETERIZATION  08/02/2012    CARDIAC SURGERY      open heart surgery 8/2012    CAROTID ENDARTERECTOMY Right 2/18/2022    RIGHT CAROTID ENDARTERECTOMY performed by Kai Lester MD at [id number]  15+ yrs ago    COLONOSCOPY  2017   [address] GRAFT  08/02/2012    4V CABG- LIMA-LAD, SVG-CX, SVG-PDA, SVG-RCA    ENDOSCOPY, COLON, DIAGNOSTIC  10/10/2017    esophageal stricture, hiatal hernia, candidiasis    ESOPHAGEAL DILATATION      Dr. Vasu Torres GASTRIC FUNDOPLICATION N/A 1/4/4713    NISSEN FUNDOPLICATION HIATAL HERNIA REPAIR LAPAROSCOPIC ROBOTIC performed by Ramses Abbott MD at [facility]      biltateral- \"total of 9 surgeries- all scopes\"    TRANSLUMINAL ANGIOPLASTY Left 09/23/2015 9/23/2015-Left mid and distal SFA and Left Popliteal    TUBAL LIGATION  1978     Past Medical History:   Diagnosis Date    Anemia     Managed by Dr. Kylee Santiago CAD (coronary artery disease)     follows with Dr Neha Garcia Carotid stenosis     right    Colon polyps     COVID-19 08/18/2021    Diabetes mellitus type 2, uncontrolled ([facility] [address]     \"dx 0252    Diastolic dysfunction 07/9919    [facility]    Esophageal stricture     dilation per GI    Hiatal hernia     History of blood transfusion     \"had blood transfusion with 4th child- have hx also of iron infusions for anemia 2017    Hyperlipidemia     Hypertension     IBS (irritable bowel syndrome)     with diarrhea    Iron deficiency anemia     Iron Infusions- follows with Julia Araiza and Jose G Cardenas    LVH (left ventricular hypertrophy) 09/2012    [facility]    Multiple gastric polyps 2004    Dr Michael Grey Obesity     PAD (peripheral artery disease) ([facility] [address]     S/P CABG x 4 08/06/2012    4V CABG- LIMA-LAD, SVG-CX, SVG-PDA, SVG-RCA- Follows with Dr Adonis Simpson Sinus tachycardia     follows with [facility]    SVT (supraventricular tachycardia) [facility])     old chart gives hx of SVT in the past    Vitamin D deficiency      Family History   Problem Relation Age of Onset    Kidney Disease Mother         Dialysis    Diabetes Mother     High Blood Pressure Mother     Coronary Art Dis Mother         MI    Cancer Father         pancreatic cancer (Smoking)    Coronary Art Dis Father 43        MI early onset    Diabetes Other      Social History     Socioeconomic History    Marital status:      Spouse name: Yenni Officer Number of children: 11    Years of education: Not on file    Highest education level: Not on file   Occupational History    Not on file   Tobacco Use    Smoking status: Never Smoker    Smokeless tobacco: Never Used   Vaping Use    Vaping Use: Never used   Substance and Sexual Activity    Alcohol use: No    Drug use: No    Sexual activity: Not Currently     Partners: Male   Other Topics Concern    Not on file   Social History Narrative    Not on file     Social Determinants of Health     Financial Resource Strain: Low Risk     Difficulty of Paying Living Expenses: Not hard at all   Food Insecurity: No Food Insecurity    Worried About Running Out of Food in the Last Year: Never true    Aram of Food in the Last Year: Never true   Transportation Needs:     Lack of Transportation (Medical): Not on file    Lack of Transportation (Non-Medical):  Not on file   Physical Activity:     Days of Exercise per Week: Not on file    Minutes of Exercise per Session: Not on file   Stress:     Feeling of Stress : Not on file   Social Connections:     Frequency of Communication with Friends and Family: Not on file    Frequency of Social Gatherings with Friends and Family: Not on file    Attends Scientologist Services: Not on file    Active Member of Clubs or Organizations: Not on file    Attends Club or Organization Meetings: Not on file    Marital Status: Not on file   Intimate Partner Violence:     Fear of Current or Ex-Partner: Not on file    Emotionally Abused: Not on file    Physically Abused: Not on file    Sexually Abused: Not on file   Housing Stability:     Unable to Pay for Housing in the Last Year: Not on file    Number of [place] in the Last Year: Not on file    Unstable Housing in the Last Year: Not on file       OBJECTIVE:   Physical Exam    Wound well healed without signs of active infection. Suture line intact. Abdomen soft, nontender, nondistended. ASSESSMENT:  Patient doing well on this post operative check. Wounds well healed. No diagnosis found. PLAN:    Continue same  Increase activity as tolerated        No orders of the defined types were placed in this encounter. No orders of the defined types were placed in this encounter. Follow Up: No follow-ups on file.     Monica Grider MD

## 2022-05-19 ENCOUNTER — OFFICE VISIT (OUTPATIENT)
Dept: SURGERY | Age: 72
End: 2022-05-19

## 2022-05-19 VITALS
BODY MASS INDEX: 27.46 KG/M2 | HEART RATE: 76 BPM | DIASTOLIC BLOOD PRESSURE: 78 MMHG | SYSTOLIC BLOOD PRESSURE: 110 MMHG | HEIGHT: 63 IN | WEIGHT: 155 LBS | OXYGEN SATURATION: 100 %

## 2022-05-19 DIAGNOSIS — Z09 POSTOPERATIVE EXAMINATION: Primary | ICD-10-CM

## 2022-05-19 PROCEDURE — APPNB30 APP NON BILLABLE TIME 0-30 MINS: Performed by: PHYSICIAN ASSISTANT

## 2022-05-19 PROCEDURE — 99024 POSTOP FOLLOW-UP VISIT: CPT | Performed by: PHYSICIAN ASSISTANT

## 2022-05-19 NOTE — PROGRESS NOTES
Chief Complaint   Patient presents with    Post-Op Check      3rd po nissen 4/1/22          SUBJECTIVE:  Patient presents today for her 3rd post op visit following Robotic Nissen Fundoplication. Pt reports that pain is none. Pt is  tolerating a regular diet - Does have some chest pain after eating if she does certain activities soon after - likely esophageal spasm. Recently almost went to the ED, but felt better after vomiting. BMs are WNL, except for diarrhea after introducing new foods into her diet. Incisions: well healed.      Past Surgical History:   Procedure Laterality Date    BALLOON ANGIOPLASTY, ARTERY Right 08/19/2015    RIght SFA 90%->10%, Right Popliteal 1000%->10%    CARDIAC CATHETERIZATION  08/02/2012    CARDIAC SURGERY      open heart surgery 8/2012    CAROTID ENDARTERECTOMY Right 2/18/2022    RIGHT CAROTID ENDARTERECTOMY performed by Holger Torrez MD at [id number]  15+ yrs ago    COLONOSCOPY  2017   [facility] GRAFT  08/02/2012    4V CABG- LIMA-LAD, SVG-CX, SVG-PDA, SVG-RCA    ENDOSCOPY, COLON, DIAGNOSTIC  10/10/2017    esophageal stricture, hiatal hernia, candidiasis    ESOPHAGEAL DILATATION      Dr. Shy Michael GASTRIC FUNDOPLICATION N/A 4/9/2177    NISSEN FUNDOPLICATION HIATAL HERNIA REPAIR LAPAROSCOPIC ROBOTIC performed by Yang Ellis MD at [facility]      biltateral- \"total of 9 surgeries- all scopes\"    TRANSLUMINAL ANGIOPLASTY Left 09/23/2015 9/23/2015-Left mid and distal SFA and Left Popliteal    TUBAL LIGATION  1978     Past Medical History:   Diagnosis Date    Anemia     Managed by Dr. Zafar Atkins CAD (coronary artery disease)     follows with Dr Jeremiah Leung Carotid stenosis     right    Colon polyps     COVID-19 08/18/2021    Diabetes mellitus type 2, uncontrolled ([facility] [address]     \"dx 8822    Diastolic dysfunction 20/6257    [facility]    Esophageal stricture     dilation per GI    Hiatal hernia     History of blood transfusion     \"had blood transfusion with 4th child- have hx also of iron infusions for anemia 2017    Hyperlipidemia     Hypertension     IBS (irritable bowel syndrome)     with diarrhea    Iron deficiency anemia     Iron Infusions- follows with Julia Araiza and Jose G Cardenas    LVH (left ventricular hypertrophy) 09/2012    [facility]    Multiple gastric polyps 2004    Dr Gela Sevilla    Obesity     PAD ([facility]ipheral artery disease) ([facility] [address]     S/P CABG x 4 08/06/2012    4V CABG- LIMA-LAD, SVG-CX, SVG-PDA, SVG-RCA- Follows with Dr Lesvia Hill Sinus tachycardia     follows with [facility]    [facility]     old chart gives hx of SVT in the past    Vitamin D deficiency      Family History   Problem Relation Age of Onset    Kidney Disease Mother         Dialysis    Diabetes Mother     High Blood Pressure Mother     Coronary Art Dis Mother         MI    Cancer Father         pancreatic cancer (Smoking)    Coronary Art Dis Father 43        MI early onset    Diabetes Other      Social History     Socioeconomic History    Marital status:      Spouse name: Mahalia Mcburney Number of children: 11    Years of education: Not on file    Highest education level: Not on file   Occupational History    Not on file   Tobacco Use    Smoking status: Never Smoker    Smokeless tobacco: Never Used   Vaping Use    Vaping Use: Never used   Substance and Sexual Activity    Alcohol use: No    Drug use: No    Sexual activity: Not Currently     Partners: Male   Other Topics Concern    Not on file   Social History Narrative    Not on file     Social Determinants of Health     Financial Resource Strain: Low Risk     Difficulty of Paying Living Expenses: Not hard at all   Food Insecurity: No Food Insecurity    Worried About Running Out of Food in the Last Year: Never true    Aram of Food in the Last Year: Never true   Transportation Needs:     Lack of Transportation (Medical): Not on file    Lack of Transportation (Non-Medical): Not on file   Physical Activity:     Days of Exercise per Week: Not on file    Minutes of Exercise per Session: Not on file   Stress:     Feeling of Stress : Not on file   Social Connections:     Frequency of Communication with Friends and Family: Not on file    Frequency of Social Gatherings with Friends and Family: Not on file    Attends Druze Services: Not on file    Active Member of [address] [place] [address] or Organizations: Not on file    Attends Club or Organization Meetings: Not on file    Marital Status: Not on file   Intimate Partner Violence:     Fear of Current or Ex-Partner: Not on file    Emotionally Abused: Not on file    Physically Abused: Not on file    Sexually Abused: Not on file   Housing Stability:     Unable to Pay for Housing in the Last Year: Not on file    Number of [place] in the Last Year: Not on file    Unstable Housing in the Last Year: Not on file       OBJECTIVE:  Physical Exam  Constitutional:       Appearance: She is well-developed. HENT:      Head: Normocephalic. Eyes:      Pupils: Pupils are equal, round, and reactive to light. Cardiovascular:      Rate and Rhythm: Normal rate. Pulmonary:      Effort: Pulmonary effort is normal.   Abdominal:      General: There is no distension. Palpations: Abdomen is soft. There is no mass. Tenderness: There is no abdominal tenderness. There is no guarding or rebound. Comments: Lap incisions well-healed   Musculoskeletal:         General: Normal range of motion. Cervical back: Normal range of motion and neck supple. Skin:     General: Skin is warm. Neurological:      Mental Status: She is alert and oriented to person, place, and time. ASSESSMENT:  Patient doing well on this post operative check. Wounds well healed.     1. Postoperative examination        PLAN:  Pt likely with esophageal spasm causing chest pain intermittently. Pt is planning to see her cardiologist soon, which is a good idea. Recommend small meals, chewing completely, sitting upright for a period after eating before activity. Imodium Prn for diarrhea. Taking prior to introduction of new foods may help prevent or lessen symptoms. Pt is to increase activities as tolerated. No orders of the defined types were placed in this encounter. No orders of the defined types were placed in this encounter. Follow Up: No follow-ups on file.     Neli English PA-C

## 2022-06-23 ENCOUNTER — OFFICE VISIT (OUTPATIENT)
Dept: SURGERY | Age: 72
End: 2022-06-23

## 2022-06-23 VITALS — OXYGEN SATURATION: 97 % | DIASTOLIC BLOOD PRESSURE: 70 MMHG | SYSTOLIC BLOOD PRESSURE: 130 MMHG | HEART RATE: 75 BPM

## 2022-06-23 DIAGNOSIS — R11.2 NAUSEA AND VOMITING, INTRACTABILITY OF VOMITING NOT SPECIFIED, UNSPECIFIED VOMITING TYPE: ICD-10-CM

## 2022-06-23 DIAGNOSIS — Z09 POSTOPERATIVE EXAMINATION: Primary | ICD-10-CM

## 2022-06-23 DIAGNOSIS — R13.10 DYSPHAGIA, UNSPECIFIED TYPE: ICD-10-CM

## 2022-06-23 DIAGNOSIS — Z98.890 S/P NISSEN FUNDOPLICATION (WITHOUT GASTROSTOMY TUBE) PROCEDURE: ICD-10-CM

## 2022-06-23 PROCEDURE — APPNB30 APP NON BILLABLE TIME 0-30 MINS: Performed by: PHYSICIAN ASSISTANT

## 2022-06-23 PROCEDURE — 99024 POSTOP FOLLOW-UP VISIT: CPT | Performed by: PHYSICIAN ASSISTANT

## 2022-06-23 ASSESSMENT — PATIENT HEALTH QUESTIONNAIRE - PHQ9
SUM OF ALL RESPONSES TO PHQ QUESTIONS 1-9: 0
2. FEELING DOWN, DEPRESSED OR HOPELESS: 0
SUM OF ALL RESPONSES TO PHQ9 QUESTIONS 1 & 2: 0
1. LITTLE INTEREST OR PLEASURE IN DOING THINGS: 0
SUM OF ALL RESPONSES TO PHQ QUESTIONS 1-9: 0

## 2022-06-23 NOTE — PROGRESS NOTES
Chief Complaint   Patient presents with    Post-Op Check     4TH PO- SHONNA NISSEN/HHR @ [address] 4/1/22 PATH IN CHART         SUBJECTIVE:  Patient presents today for her 4th post op visit following robotic assisted Nissen. Pt reports that pain is none. Pt is not tolerating a regular diet. Is having choking, N/V with any solid food intake. Is able to ingest liquids, smoothies and some soups. This has been present for about 3 weeks now. BMs are WNL. Incisions: well healed.      Past Surgical History:   Procedure Laterality Date    BALLOON ANGIOPLASTY, ARTERY Right 08/19/2015    RIght SFA 90%->10%, Right Popliteal 1000%->10%    CARDIAC CATHETERIZATION  08/02/2012    CARDIAC SURGERY      open heart surgery 8/2012    CAROTID ENDARTERECTOMY Right 2/18/2022    RIGHT CAROTID ENDARTERECTOMY performed by Cherry Cormier MD at [id number] [place] [address]  15+ yrs ago    [facility]  [facility]  08/02/2012    4V CABG- LIMA-LAD, SVG-CX, SVG-PDA, SVG-RCA    ENDOSCOPY, COLON, DIAGNOSTIC  10/10/2017    esophageal stricture, hiatal hernia, candidiasis    ESOPHAGEAL DILATATION      Dr. Ai Knight GASTRIC FUNDOPLICATION N/A 5/7/5376    NISSEN FUNDOPLICATION HIATAL HERNIA REPAIR LAPAROSCOPIC ROBOTIC performed by Avi Argueta MD at [facility]      biltateral- \"total of 9 surgeries- all scopes\"    TRANSLUMINAL ANGIOPLASTY Left 09/23/2015 9/23/2015-Left mid and distal SFA and Left Popliteal    TUBAL LIGATION  1978     Past Medical History:   Diagnosis Date    Anemia     Managed by Dr. Sharmanie Newberry CAD (coronary artery disease)     follows with Dr Alondra Morrison Carotid stenosis     right    Colon polyps     COVID-19 08/18/2021    Diabetes mellitus type 2, uncontrolled ([facility] [address]     \"dx 2975    Diastolic dysfunction 50/5961    [facility]    Esophageal stricture     dilation per GI    Hiatal hernia     History of blood transfusion     \"had blood transfusion with 4th child- have hx also of iron infusions for anemia 2017    Hyperlipidemia     Hypertension     IBS (irritable bowel syndrome)     with diarrhea    Iron deficiency anemia     Iron Infusions- follows with Julia Araiza and Jose G Cardenas    LVH (left ventricular hypertrophy) 09/2012    [facility]    Multiple gastric polyps 2004    Dr Juliann Reed    Obesity     PAD (peripheral artery disease) ([facility] [address]     S/P CABG x 4 08/06/2012    4V CABG- LIMA-LAD, SVG-CX, SVG-PDA, SVG-RCA- Follows with Dr Kasey Marie Sinus tachycardia     follows with [facility]    SVT [facility]ra[facility]ricular [facility])     old chart gives hx of SVT in the past    Vitamin D deficiency      Family History   Problem Relation Age of Onset    Kidney Disease Mother         Dialysis    Diabetes Mother     High Blood Pressure Mother     Coronary Art Dis Mother         MI    Cancer Father         pancreatic cancer (Smoking)    Coronary Art Dis Father 43        MI early onset    Diabetes Other      Social History     Socioeconomic History    Marital status:      Spouse name: Rosa Elena Umana Number of children: 11    Years of education: Not on file    Highest education level: Not on file   Occupational History    Not on file   Tobacco Use    Smoking status: Never Smoker    Smokeless tobacco: Never Used   Vaping Use    Vaping Use: Never used   Substance and Sexual Activity    Alcohol use: No    Drug use: No    Sexual activity: Not Currently     Partners: Male   Other Topics Concern    Not on file   Social History Narrative    Not on file     Social Determinants of Health     Financial Resource Strain: Low Risk     Difficulty of Paying Living Expenses: Not hard at all   Food Insecurity: No Food Insecurity    Worried About Running Out of Food in the Last Year: Never true    Aram of Food in the Last Year: Never true   Transportation Needs:     Lack of Transportation (Medical):  Not on file    Lack of Transportation (Non-Medical): Not on file   Physical Activity:     Days of Exercise per Week: Not on file    Minutes of Exercise per Session: Not on file   Stress:     Feeling of Stress : Not on file   Social Connections:     Frequency of Communication with Friends and Family: Not on file    Frequency of Social Gatherings with Friends and Family: Not on file    Attends Mandaen Services: Not on file    Active Member of [address] [place] [address] or Organizations: Not on file    Attends Club or Organization Meetings: Not on file    Marital Status: Not on file   Intimate Partner Violence:     Fear of Current or Ex-Partner: Not on file    Emotionally Abused: Not on file    Physically Abused: Not on file    Sexually Abused: Not on file   Housing Stability:     Unable to Pay for Housing in the Last Year: Not on file    Number of [place] in the Last Year: Not on file    Unstable Housing in the Last Year: Not on file       OBJECTIVE:  Physical Exam  Constitutional:       Appearance: She is well-developed. HENT:      Head: Normocephalic. Eyes:      Pupils: Pupils are equal, round, and reactive to light. Cardiovascular:      Rate and Rhythm: Normal rate. Pulmonary:      Effort: Pulmonary effort is normal.   Abdominal:      General: There is no distension. Palpations: Abdomen is soft. There is no mass. Tenderness: There is no abdominal tenderness. There is no guarding or rebound. Comments: Lap incisions well-healed   Musculoskeletal:         General: Normal range of motion. Cervical back: Normal range of motion and neck supple. Skin:     General: Skin is warm. Neurological:      Mental Status: She is alert and oriented to person, place, and time. ASSESSMENT:  Patient with post-prandial N/V and dysphagia with any solid food intake. 1. Postoperative examination    2. Nausea and vomiting, intractability of vomiting not specified, unspecified vomiting type    3. Dysphagia, unspecified type    4. S/P Nissen fundoplication (without gastrostomy tube) procedure        PLAN:  Will get Stat UGI. Normal activities with no limitations. Orders Placed This Encounter   Procedures    FL UGI        No orders of the defined types were placed in this encounter. Follow Up: Return for [place] f/u.     Waleska Barba PA-C

## 2022-07-05 ENCOUNTER — HOSPITAL ENCOUNTER (OUTPATIENT)
Dept: GENERAL RADIOLOGY | Age: 72
Discharge: HOME | End: 2022-07-05
Payer: COMMERCIAL

## 2022-07-05 DIAGNOSIS — R11.2 NAUSEA AND VOMITING, INTRACTABILITY OF VOMITING NOT SPECIFIED, UNSPECIFIED VOMITING TYPE: ICD-10-CM

## 2022-07-05 DIAGNOSIS — R13.10 DYSPHAGIA, UNSPECIFIED TYPE: ICD-10-CM

## 2022-07-05 DIAGNOSIS — Z98.890 S/P NISSEN FUNDOPLICATION (WITHOUT GASTROSTOMY TUBE) PROCEDURE: ICD-10-CM

## 2022-07-05 PROCEDURE — 74240 X-RAY XM UPR GI TRC 1CNTRST: CPT

## 2022-07-07 ENCOUNTER — TELEPHONE (OUTPATIENT)
Dept: SURGERY | Age: 72
End: 2022-07-07

## 2022-07-07 ENCOUNTER — OFFICE VISIT (OUTPATIENT)
Dept: SURGERY | Age: 72
End: 2022-07-07
Payer: COMMERCIAL

## 2022-07-07 VITALS
WEIGHT: 135.3 LBS | HEART RATE: 94 BPM | SYSTOLIC BLOOD PRESSURE: 116 MMHG | DIASTOLIC BLOOD PRESSURE: 78 MMHG | HEIGHT: 63 IN | BODY MASS INDEX: 23.97 KG/M2 | OXYGEN SATURATION: 98 %

## 2022-07-07 DIAGNOSIS — R13.10 DYSPHAGIA, UNSPECIFIED TYPE: Primary | ICD-10-CM

## 2022-07-07 DIAGNOSIS — R11.2 NAUSEA AND VOMITING, INTRACTABILITY OF VOMITING NOT SPECIFIED, UNSPECIFIED VOMITING TYPE: ICD-10-CM

## 2022-07-07 PROCEDURE — 3017F COLORECTAL CA SCREEN DOC REV: CPT | Performed by: PHYSICIAN ASSISTANT

## 2022-07-07 PROCEDURE — 1090F PRES/ABSN URINE INCON ASSESS: CPT | Performed by: PHYSICIAN ASSISTANT

## 2022-07-07 PROCEDURE — G8427 DOCREV CUR MEDS BY ELIG CLIN: HCPCS | Performed by: PHYSICIAN ASSISTANT

## 2022-07-07 PROCEDURE — 1036F TOBACCO NON-USER: CPT | Performed by: PHYSICIAN ASSISTANT

## 2022-07-07 PROCEDURE — G8400 PT W/DXA NO RESULTS DOC: HCPCS | Performed by: PHYSICIAN ASSISTANT

## 2022-07-07 PROCEDURE — 1123F ACP DISCUSS/DSCN MKR DOCD: CPT | Performed by: PHYSICIAN ASSISTANT

## 2022-07-07 PROCEDURE — G8420 CALC BMI NORM PARAMETERS: HCPCS | Performed by: PHYSICIAN ASSISTANT

## 2022-07-07 PROCEDURE — 99213 OFFICE O/P EST LOW 20 MIN: CPT | Performed by: PHYSICIAN ASSISTANT

## 2022-07-07 ASSESSMENT — PATIENT HEALTH QUESTIONNAIRE - PHQ9
SUM OF ALL RESPONSES TO PHQ QUESTIONS 1-9: 0
1. LITTLE INTEREST OR PLEASURE IN DOING THINGS: 0
SUM OF ALL RESPONSES TO PHQ9 QUESTIONS 1 & 2: 0
SUM OF ALL RESPONSES TO PHQ QUESTIONS 1-9: 0
SUM OF ALL RESPONSES TO PHQ QUESTIONS 1-9: 0
2. FEELING DOWN, DEPRESSED OR HOPELESS: 0
SUM OF ALL RESPONSES TO PHQ QUESTIONS 1-9: 0

## 2022-07-07 NOTE — PROGRESS NOTES
Chief Complaint   Patient presents with    Follow-up     upper gi          SUBJECTIVE:  HPI: Patient presents today for f/u after UGI. Pt continues to c/o dysphagia, regurgitation of undigested food. Is not able to tolerate any solid PO intake or full liquids. Has been drinking some water and popsicles, and that is all she has been able to tolerate. I have reviewed the patient's (pertinent information to this visit) medical history, family history (scanned in  the Media tab under \"patient questioner\"), social history and review of systems with the patient today in the office.             Past Surgical History:   Procedure Laterality Date    BALLOON ANGIOPLASTY, ARTERY Right 08/19/2015    RIght SFA 90%->10%, Right Popliteal 1000%->10%    CARDIAC CATHETERIZATION  08/02/2012    CARDIAC SURGERY      open heart surgery 8/2012    CAROTID ENDARTERECTOMY Right 2/18/2022    RIGHT CAROTID ENDARTERECTOMY performed by Hossein Guerrero MD at [id number] [place] [address]  15+ yrs ago    [facility]ONOS[facility]  [facility]   [facility]  08/02/2012    4V CABG- LIMA-LAD, SVG-CX, SVG-PDA, SVG-RCA    ENDOSCOPY, COLON, DIAGNOSTIC  10/10/2017    esophageal stricture, hiatal hernia, candidiasis    ESOPHAGEAL DILATATION      Dr. Warden Guzman GASTRIC FUNDOPLICATION N/A 6/6/7156    NISSEN FUNDOPLICATION HIATAL HERNIA REPAIR LAPAROSCOPIC ROBOTIC performed by Morenita Flynn MD at [facility]      biltateral- \"total of 9 surgeries- all scopes\"    TRANSLUMINAL ANGIOPLASTY Left 09/23/2015 9/23/2015-Left mid and distal SFA and Left Popliteal    TUBAL LIGATION  1978     Past Medical History:   Diagnosis Date    Anemia     Managed by Dr. Lucien Mccallum CAD (coronary artery disease)     follows with Dr Marisa Houser Carotid stenosis     right    Colon polyps     COVID-19 08/18/2021    Diabetes mellitus type 2, uncontrolled ([address]     \"dx 6681    Diastolic dysfunction 15/8489    [facility]    Esophageal stricture     dilation per GI    Hiatal hernia     History of blood transfusion     \"had blood transfusion with 4th child- have hx also of iron infusions for anemia 2017    Hyperlipidemia     Hypertension     IBS (irritable bowel syndrome)     with diarrhea    Iron deficiency anemia     Iron Infusions- follows with Julia Araiza and Jose G Cardenas    LVH (left ventricular hypertrophy) 09/2012    [facility]    Multiple gastric polyps 2004    Dr Augustina Marquez    Obesity     PAD (peripheral artery disease) ([facility] [address]     S/P CABG x 4 08/06/2012    4V CABG- LIMA-LAD, SVG-CX, SVG-PDA, SVG-RCA- Follows with Dr Marques Deleon Sinus tachycardia     follows with [facility]    SVT (supraventricular tachycardia) [facility])     old chart gives hx of SVT in the past    Vitamin D deficiency      Family History   Problem Relation Age of Onset    Kidney Disease Mother         Dialysis    Diabetes Mother     High Blood Pressure Mother     Coronary Art Dis Mother         MI    Cancer Father         pancreatic cancer (Smoking)    Coronary Art Dis Father 43        MI early onset    Diabetes Other      Social History     Socioeconomic History    Marital status:      Spouse name: Lolita Santoyo Number of children: 11    Years of education: Not on file    Highest education level: Not on file   Occupational History    Not on file   Tobacco Use    Smoking status: Never Smoker    Smokeless tobacco: Never Used   Vaping Use    Vaping Use: Never used   Substance and Sexual Activity    Alcohol use: No    Drug use: No    Sexual activity: Not Currently     Partners: Male   Other Topics Concern    Not on file   Social History Narrative    Not on file     Social Determinants of Health     Financial Resource Strain: Low Risk     Difficulty of Paying Living Expenses: Not hard at all   Food Insecurity: No Food Insecurity    Worried About Running Out of Food in the Last Year: Never true    [address] in the Last Year: Never true   Transportation Needs:     Lack of Transportation (Medical): Not on file    Lack of Transportation (Non-Medical): Not on file   Physical Activity:     Days of Exercise per Week: Not on file    Minutes of Exercise per Session: Not on file   Stress:     Feeling of Stress : Not on file   Social Connections:     Frequency of Communication with Friends and Family: Not on file    Frequency of Social Gatherings with Friends and Family: Not on file    Attends Jewish Services: Not on file    Active Member of [address] [place] [address] [employer] or Organizations: Not on file    Attends Club or Organization Meetings: Not on file    Marital Status: Not on file   Intimate Partner Violence:     Fear of Current or Ex-Partner: Not on file    Emotionally Abused: Not on file    Physically Abused: Not on file    Sexually Abused: Not on file   Housing Stability:     Unable to Pay for Housing in the Last Year: Not on file    Number of [place] in the Last Year: Not on file    Unstable Housing in the Last Year: Not on file       No current facility-administered medications for this visit. No current outpatient medications on file. Facility-Administered Medications Ordered in Other Visits   Medication Dose Route Frequency Provider Last Rate Last Admin    lactated ringers infusion   IntraVENous Continuous Corinne Dennis, MD 50 mL/hr at 07/11/22 0643 New Bag at 07/11/22 6128      Allergies   Allergen Reactions    Tetanus Toxoids Swelling and Rash     fever       Review of Systems:       Review of Systems   Constitutional: Negative for chills and fever. HENT: Positive for trouble swallowing. Negative for ear pain, mouth sores, sore throat and tinnitus. Eyes: Negative for photophobia, redness and itching. Respiratory: Negative for apnea, choking and stridor. Cardiovascular: Negative for chest pain and palpitations. Gastrointestinal: Positive for abdominal pain, nausea and vomiting.  Negative for anal bleeding, constipation and rectal pain. Endocrine: Negative for polydipsia. Genitourinary: Negative for enuresis, flank pain and hematuria. Musculoskeletal: Negative for back pain, joint swelling and myalgias. Skin: Negative for color change and pallor. Allergic/Immunologic: Negative for environmental allergies. Neurological: Negative for syncope and speech difficulty. Psychiatric/Behavioral: Negative for confusion and hallucinations. OBJECTIVE:  Physical Exam:    /78 (Site: Right Upper Arm, Position: Sitting, Cuff Size: Medium Adult)   Pulse 94   Ht 5' 3\" (1.6 m)   Wt 135 lb 4.8 oz (61.4 kg)   LMP 01/19/2002   SpO2 98%   BMI 23.97 kg/m²      Physical Exam  Constitutional:       Appearance: She is well-developed. She is ill-appearing. HENT:      Head: Normocephalic. Eyes:      Pupils: Pupils are equal, round, and reactive to light. Cardiovascular:      Rate and Rhythm: Normal rate. Pulmonary:      Effort: Pulmonary effort is normal.   Abdominal:      General: There is no distension. Palpations: Abdomen is soft. There is no mass. Tenderness: There is no abdominal tenderness. There is no guarding or rebound. Musculoskeletal:         General: Normal range of motion. Cervical back: Normal range of motion and neck supple. Skin:     General: Skin is warm. Neurological:      Mental Status: She is alert and oriented to person, place, and time. ASSESSMENT:  1. Dysphagia, unspecified type    2. Nausea and vomiting, intractability of vomiting not specified, unspecified vomiting type          PLAN:  Treatment:  Will proceed with EGD and possible dilation soon. Patient counseled on risks, benefits, and alternatives of treatment plan at length. Patient states an understanding and willingness to proceed with plan. No orders of the defined types were placed in this encounter. No orders of the defined types were placed in this encounter.        Follow Up:  No follow-ups on file.       Ya Gibson PA-C

## 2022-07-07 NOTE — TELEPHONE ENCOUNTER
SPOKE TO  [address] (EGD W/ POSS DILATION) SCHEDULED @ [facility].  NOTIFIED OF DATES, TIMES AND LOCATION    PHONE ASSESSMENT   SURGERY - 7/11/22 @ 800  P/O - 7/18/22 @ 1015    NPO AFTER MIDNIGHT  HOLD BLOOD THINNERS - PLAVIX HOLD 5 DAYS PRIOR, 81MG ASA DO NOT HOLD   SENT

## 2022-07-08 ENCOUNTER — ANESTHESIA EVENT (OUTPATIENT)
Dept: ENDOSCOPY | Age: 72
DRG: 391 | End: 2022-07-08
Payer: MEDICARE

## 2022-07-08 NOTE — ANESTHESIA PRE PROCEDURE
Department of Anesthesiology  Preprocedure Note       Name:  Ke Augustin   Age:  66 y.o.  :  1950                                          MRN:  [id number]         Date:  2022      Surgeon: Lonna Frankel):  Nazario Mills MD    Procedure: Procedure(s):  EGD ESOPHAGOGASTRODUODENOSCOPY POSS DILATION    Medications prior to admission:   Prior to Admission medications    Medication Sig Start Date End Date Taking? Authorizing Provider   TRULICITY 1.5 NN/3.9IE SOPN ADMINISTER 1.5 MG UNDER THE SKIN 1 TIME A WEEK 22   Chalmer Yoav, DO   dapagliflozin (FARXIGA) 5 MG tablet TAKE 1 TABLET BY MOUTH EVERY MORNING 22   Chalmer Yoav, DO   glipiZIDE (GLUCOTROL) 5 MG tablet Take 1 tablet by mouth 2 times daily (before meals) 3/8/22   Rumamer Yoav, DO   lisinopril (PRINIVIL;ZESTRIL) 5 MG tablet TAKE 1 TABLET BY MOUTH DAILY 2/10/22   Guillermo Mosquedaangelist, DO   rosuvastatin (CRESTOR) 10 MG tablet TAKE 1 TABLET BY MOUTH EVERY NIGHT 22   Guillermo Newbylist, DO   carvedilol (COREG) 6.25 MG tablet Take 1 tablet by mouth 2 times daily (with meals) 12/10/18   Chauncey Guo MD   amiodarone (CORDARONE) 200 MG tablet Take 200 mg by mouth daily    Historical Provider, MD   clopidogrel (PLAVIX) 75 MG tablet Take 1 tablet by mouth daily 8/20/15   Chauncey Guo MD   acetaminophen (TYLENOL) 500 MG tablet Take 1,000 mg by mouth every 6 hours as needed for Pain  Patient not taking: Reported on 2022    Historical Provider, MD   aspirin 81 MG EC tablet Take 81 mg by mouth daily. Historical Provider, MD       Current medications:    No current facility-administered medications for this encounter.      Current Outpatient Medications   Medication Sig Dispense Refill    TRULICITY 1.5 DU/2.9KA SOPN ADMINISTER 1.5 MG UNDER THE SKIN 1 TIME A WEEK 2 mL 3    dapagliflozin (FARXIGA) 5 MG tablet TAKE 1 TABLET BY MOUTH EVERY MORNING 30 tablet 3    glipiZIDE (GLUCOTROL) 5 MG tablet Take 1 tablet by mouth 2 times daily (before meals) 180 tablet 1  lisinopril (PRINIVIL;ZESTRIL) 5 MG tablet TAKE 1 TABLET BY MOUTH DAILY 90 tablet 1    rosuvastatin (CRESTOR) 10 MG tablet TAKE 1 TABLET BY MOUTH EVERY NIGHT 90 tablet 1    carvedilol (COREG) 6.25 MG tablet Take 1 tablet by mouth 2 times daily (with meals) 60 tablet 3    amiodarone (CORDARONE) 200 MG tablet Take 200 mg by mouth daily      clopidogrel (PLAVIX) 75 MG tablet Take 1 tablet by mouth daily 30 tablet 3    acetaminophen (TYLENOL) 500 MG tablet Take 1,000 mg by mouth every 6 hours as needed for Pain (Patient not taking: Reported on 7/7/2022)      aspirin 81 MG EC tablet Take 81 mg by mouth daily. Allergies:     Allergies   Allergen Reactions    Tetanus Toxoids Swelling and Rash     fever       Problem List:    Patient Active Problem List   Diagnosis Code    Anemia D64.9    Vitamin D deficiency E55.9    Type 2 diabetes mellitus without complication, without long-term current use of insulin ([facility] [address] E11.9    Coronary artery disease involving native heart without angina pectoris I25.10    Essential hypertension I10    Mixed hyperlipidemia E78.2    PVD (peripheral vascular disease) (formerly [facility]) I73.9    Microalbuminuria R80.9    Angular cheilitis K13.0    Iron deficiency anemia secondary to blood loss (chronic) D50.0    Other forms of chronic ischemic heart disease I25.89    Hiatal hernia with GERD K21.9, K44.9    Personal history of other diseases of the digestive system Z87.19    Esophageal stricture K22.2    Hiatal hernia K44.9    Iron deficiency anemia D50.9    Acute hypoxemic respiratory failure due to COVID-19 (formerly [facility]) U07.1, J96.01    Carotid stenosis, bilateral I65.23    Carotid stenosis, right I65.21       Past Medical History:        Diagnosis Date    Anemia     Managed by Dr. Grey Ruby CAD (coronary artery disease)     follows with Dr Pal Christian Carotid stenosis     right    Colon polyps     COVID-19 08/18/2021    Diabetes mellitus type 2, uncontrolled ([address]     \"dx 6412    Diastolic dysfunction 41/7794    [facility]    Esophageal stricture     dilation per GI    Hiatal hernia     History of blood transfusion     \"had blood transfusion with 4th child- have hx also of iron infusions for anemia 2017    Hyperlipidemia     Hypertension     IBS (irritable bowel syndrome)     with diarrhea    Iron deficiency anemia     Iron Infusions- follows with Julia Araiza and Jose G Cardenas    LVH (left ventricular hypertrophy) 09/2012    [facility]    Multiple gastric polyps 2004    Dr Becky Saucedo Obesity     PAD (peripheral artery disease) ([facility] [address]     S/P CABG x 4 08/06/2012    4V CABG- LIMA-LAD, SVG-CX, SVG-PDA, SVG-RCA- Follows with Dr Yolanda Albright Sinus tachycardia     follows with [facility]    SVT (supraventricular tachycardia) [facility])     old chart gives hx of SVT in the past    Vitamin D deficiency        Past Surgical History:        Procedure Laterality Date    BALLOON ANGIOPLASTY, ARTERY Right 08/19/2015    RIght SFA 90%->10%, Right Popliteal 1000%->10%    CARDIAC CATHETERIZATION  08/02/2012    CARDIAC SURGERY      open heart surgery 8/2012    CAROTID ENDARTERECTOMY Right 2/18/2022    RIGHT CAROTID ENDARTERECTOMY performed by Patsy Dockery MD at [id number]  15+ yrs ago    COLONOSCOPY  2017   [address] GRAFT  08/02/2012    4V CABG- LIMA-LAD, SVG-CX, SVG-PDA, SVG-RCA    ENDOSCOPY, COLON, DIAGNOSTIC  10/10/2017    esophageal stricture, hiatal hernia, candidiasis    ESOPHAGEAL DILATATION      Dr. Rosario Oliveros GASTRIC FUNDOPLICATION N/A 5/4/9171    NISSEN FUNDOPLICATION HIATAL HERNIA REPAIR LAPAROSCOPIC ROBOTIC performed by Jose Smith MD at [facility]      biltateral- \"total of 9 surgeries- all scopes\"    TRANSLUMINAL ANGIOPLASTY Left 09/23/2015 9/23/2015-Left mid and distal SFA and Left Popliteal    TUBAL LIGATION  1978       Social History:    Social History     Tobacco Use    Smoking status: Never Smoker    Smokeless tobacco: Never Used   Substance Use Topics    Alcohol use: No                                Counseling given: Not Answered      Vital Signs (Current): There were no vitals filed for this visit. BP Readings from Last 3 Encounters:   07/07/22 116/78   06/23/22 130/70   05/19/22 110/78       NPO Status:                                                                                 BMI:   Wt Readings from Last 3 Encounters:   07/07/22 135 lb 4.8 oz (61.4 kg)   05/19/22 155 lb (70.3 kg)   04/28/22 155 lb 4.8 oz (70.4 kg)     There is no height or weight on file to calculate BMI.    CBC:   Lab Results   Component Value Date/Time    WBC 15.1 04/02/2022 01:56 AM    RBC 4.35 04/02/2022 01:56 AM    HGB 11.0 04/02/2022 01:56 AM    HCT 36.7 04/02/2022 01:56 AM    MCV 84.4 04/02/2022 01:56 AM    RDW 14.5 04/02/2022 01:56 AM     04/02/2022 01:56 AM       CMP:   Lab Results   Component Value Date/Time     04/07/2022 08:48 AM    K 4.7 04/07/2022 08:48 AM    CL 97 04/07/2022 08:48 AM    CO2 23 04/07/2022 08:48 AM    BUN 30 04/07/2022 08:48 AM    CREATININE 1.3 04/07/2022 08:48 AM    GFRAA 49 04/07/2022 08:48 AM    AGRATIO 1.2 04/07/2022 08:48 AM    LABGLOM 40 04/07/2022 08:48 AM    GLUCOSE 190 04/07/2022 08:48 AM    PROT 6.4 04/07/2022 08:48 AM    PROT 6.6 09/25/2020 12:00 AM    CALCIUM 9.0 04/07/2022 08:48 AM    BILITOT 0.6 04/07/2022 08:48 AM    ALKPHOS 97 04/07/2022 08:48 AM    AST 12 04/07/2022 08:48 AM    ALT 12 04/07/2022 08:48 AM       POC Tests: No results for input(s): POCGLU, POCNA, POCK, POCCL, POCBUN, POCHEMO, POCHCT in the last 72 hours.     Coags:   Lab Results   Component Value Date/Time    PROTIME 10.9 02/04/2022 10:58 AM    PROTIME 10.6 11/14/2011 10:25 AM    INR 0.85 02/04/2022 10:58 AM    APTT 26.8 02/04/2022 10:58 AM       HCG (If Applicable): No results found for: PREGTESTUR, PREGSERUM, HCG, HCGQUANT     ABGs: Lab Results   Component Value Date/Time    PO2ART 71 08/06/2012 03:31 PM    BEART 7 08/06/2012 03:31 PM        Type & Screen (If Applicable):  No results found for: LABABO, LABRH    Drug/Infectious Status (If Applicable):  No results found for: HIV, HEPCAB    COVID-19 Screening (If Applicable):   Lab Results   Component Value Date/Time    COVID19 NOT DETECTED 02/15/2022 08:40 AM           Anesthesia Evaluation  Patient summary reviewed no history of anesthetic complications:   Airway: Mallampati: II  TM distance: <3 FB   Neck ROM: full  Mouth opening: < 3 FB   Dental:    (+) upper dentures, lower dentures and edentulous      Pulmonary:                              Cardiovascular:    (+) hypertension:, CAD:, CABG/stent:,       ECG reviewed               Beta Blocker:  Dose within 24 Hrs      ROS comment: Summary   Left ventricular systolic function is normal.   Ejection fraction is visually estimated at 50-55%. Mild left ventricular hypertrophy. Grade II diastolic dysfunction. No significant valvular disease noted. No evidence of any pericardial effusion. Neuro/Psych:   (+) neuromuscular disease:,             GI/Hepatic/Renal:   (+) hiatal hernia, GERD:,           Endo/Other:    (+) DiabetesType II DM, well controlled, , .                 Abdominal:       Abdomen: soft. Vascular:   + PVD, aortic or cerebral, . Other Findings:           Anesthesia Plan      MAC     ASA 3     (Last plavix dose on Wednesday, 07/06/22 per patient and Aspirin 81 mg yesterday 07/10/22)  Induction: intravenous. Anesthetic plan and risks discussed with patient. Plan discussed with CRNA.                     JOSE RAMON Benjamin - CRNA   7/8/2022

## 2022-07-11 ENCOUNTER — APPOINTMENT (OUTPATIENT)
Dept: CT IMAGING | Age: 72
DRG: 391 | End: 2022-07-11
Attending: SURGERY
Payer: MEDICARE

## 2022-07-11 ENCOUNTER — ANESTHESIA (OUTPATIENT)
Dept: ENDOSCOPY | Age: 72
DRG: 391 | End: 2022-07-11
Payer: MEDICARE

## 2022-07-11 ENCOUNTER — HOSPITAL ENCOUNTER (INPATIENT)
Age: 72
LOS: 3 days | Discharge: HOME HEALTH | DRG: 391 | End: 2022-07-17
Attending: SURGERY | Admitting: SURGERY
Payer: MEDICARE

## 2022-07-11 LAB
ANION GAP SERPL CALCULATED.3IONS-SCNC: 19 MMOL/L (ref 4–16)
BASOPHILS ABSOLUTE: 0.1 K/CU MM
BASOPHILS RELATIVE PERCENT: 0.3 % (ref 0–1)
BUN BLDV-MCNC: 19 MG/DL (ref 6–23)
CALCIUM SERPL-MCNC: 9.1 MG/DL (ref 8.3–10.6)
CHLORIDE BLD-SCNC: 100 MMOL/L (ref 99–110)
CO2: 21 MMOL/L (ref 21–32)
CREAT SERPL-MCNC: 1 MG/DL (ref 0.6–1.1)
DIFFERENTIAL TYPE: ABNORMAL
EOSINOPHILS ABSOLUTE: 0 K/CU MM
EOSINOPHILS RELATIVE PERCENT: 0 % (ref 0–3)
GFR AFRICAN AMERICAN: >60 ML/MIN/1.73M2
GFR NON-AFRICAN AMERICAN: 55 ML/MIN/1.73M2
GLUCOSE BLD-MCNC: 133 MG/DL (ref 70–99)
GLUCOSE BLD-MCNC: 199 MG/DL (ref 70–99)
GLUCOSE BLD-MCNC: 216 MG/DL (ref 70–99)
GLUCOSE BLD-MCNC: 321 MG/DL (ref 70–99)
GLUCOSE BLD-MCNC: 364 MG/DL (ref 70–99)
HCT VFR BLD CALC: 34.6 % (ref 37–47)
HCT VFR BLD CALC: 39.3 % (ref 37–47)
HEMOGLOBIN: 10.2 GM/DL (ref 12.5–16)
HEMOGLOBIN: 11.9 GM/DL (ref 12.5–16)
IMMATURE NEUTROPHIL %: 0.6 % (ref 0–0.43)
LACTIC ACID, SEPSIS: 2.4 MMOL/L (ref 0.5–1.9)
LACTIC ACID, SEPSIS: 3.2 MMOL/L (ref 0.5–1.9)
LYMPHOCYTES ABSOLUTE: 1 K/CU MM
LYMPHOCYTES RELATIVE PERCENT: 5.5 % (ref 24–44)
MAGNESIUM: 1.2 MG/DL (ref 1.8–2.4)
MCH RBC QN AUTO: 24.1 PG (ref 27–31)
MCHC RBC AUTO-ENTMCNC: 30.3 % (ref 32–36)
MCV RBC AUTO: 79.6 FL (ref 78–100)
MONOCYTES ABSOLUTE: 1.3 K/CU MM
MONOCYTES RELATIVE PERCENT: 7.2 % (ref 0–4)
NUCLEATED RBC %: 0 %
PDW BLD-RTO: 16 % (ref 11.7–14.9)
PLATELET # BLD: 278 K/CU MM (ref 140–440)
PMV BLD AUTO: 12.1 FL (ref 7.5–11.1)
POTASSIUM SERPL-SCNC: 3.1 MMOL/L (ref 3.5–5.1)
POTASSIUM SERPL-SCNC: 3.9 MMOL/L (ref 3.5–5.1)
RBC # BLD: 4.94 M/CU MM (ref 4.2–5.4)
SEGMENTED NEUTROPHILS ABSOLUTE COUNT: 15.4 K/CU MM
SEGMENTED NEUTROPHILS RELATIVE PERCENT: 86.4 % (ref 36–66)
SODIUM BLD-SCNC: 140 MMOL/L (ref 135–145)
TOTAL IMMATURE NEUTOROPHIL: 0.1 K/CU MM
TOTAL NUCLEATED RBC: 0 K/CU MM
WBC # BLD: 17.8 K/CU MM (ref 4–10.5)

## 2022-07-11 PROCEDURE — 3700000001 HC ADD 15 MINUTES (ANESTHESIA): Performed by: SURGERY

## 2022-07-11 PROCEDURE — 6360000002 HC RX W HCPCS: Performed by: SURGERY

## 2022-07-11 PROCEDURE — 7100000010 HC PHASE II RECOVERY - FIRST 15 MIN: Performed by: SURGERY

## 2022-07-11 PROCEDURE — C1726 CATH, BAL DIL, NON-VASCULAR: HCPCS | Performed by: SURGERY

## 2022-07-11 PROCEDURE — 2500000003 HC RX 250 WO HCPCS: Performed by: INTERNAL MEDICINE

## 2022-07-11 PROCEDURE — 82962 GLUCOSE BLOOD TEST: CPT

## 2022-07-11 PROCEDURE — 36592 COLLECT BLOOD FROM PICC: CPT

## 2022-07-11 PROCEDURE — 6360000002 HC RX W HCPCS: Performed by: HOSPITALIST

## 2022-07-11 PROCEDURE — 2580000003 HC RX 258: Performed by: SURGERY

## 2022-07-11 PROCEDURE — 3609017700 HC EGD DILATION GASTRIC/DUODENAL STRICTURE: Performed by: SURGERY

## 2022-07-11 PROCEDURE — G0378 HOSPITAL OBSERVATION PER HR: HCPCS

## 2022-07-11 PROCEDURE — 74176 CT ABD & PELVIS W/O CONTRAST: CPT

## 2022-07-11 PROCEDURE — 6360000002 HC RX W HCPCS: Performed by: INTERNAL MEDICINE

## 2022-07-11 PROCEDURE — 6360000002 HC RX W HCPCS: Performed by: STUDENT IN AN ORGANIZED HEALTH CARE EDUCATION/TRAINING PROGRAM

## 2022-07-11 PROCEDURE — 94761 N-INVAS EAR/PLS OXIMETRY MLT: CPT

## 2022-07-11 PROCEDURE — 96375 TX/PRO/DX INJ NEW DRUG ADDON: CPT

## 2022-07-11 PROCEDURE — 2500000003 HC RX 250 WO HCPCS

## 2022-07-11 PROCEDURE — 80048 BASIC METABOLIC PNL TOTAL CA: CPT

## 2022-07-11 PROCEDURE — 83605 ASSAY OF LACTIC ACID: CPT

## 2022-07-11 PROCEDURE — 85018 HEMOGLOBIN: CPT

## 2022-07-11 PROCEDURE — 80069 RENAL FUNCTION PANEL: CPT

## 2022-07-11 PROCEDURE — C9113 INJ PANTOPRAZOLE SODIUM, VIA: HCPCS | Performed by: STUDENT IN AN ORGANIZED HEALTH CARE EDUCATION/TRAINING PROGRAM

## 2022-07-11 PROCEDURE — 93005 ELECTROCARDIOGRAM TRACING: CPT | Performed by: STUDENT IN AN ORGANIZED HEALTH CARE EDUCATION/TRAINING PROGRAM

## 2022-07-11 PROCEDURE — 6360000002 HC RX W HCPCS

## 2022-07-11 PROCEDURE — 96365 THER/PROPH/DIAG IV INF INIT: CPT

## 2022-07-11 PROCEDURE — G0378 HOSPITAL OBSERVATION PER HR: HCPCS | Performed by: INTERNAL MEDICINE

## 2022-07-11 PROCEDURE — 85025 COMPLETE CBC W/AUTO DIFF WBC: CPT

## 2022-07-11 PROCEDURE — 2000000000 HC ICU R&B

## 2022-07-11 PROCEDURE — 96376 TX/PRO/DX INJ SAME DRUG ADON: CPT

## 2022-07-11 PROCEDURE — 96366 THER/PROPH/DIAG IV INF ADDON: CPT

## 2022-07-11 PROCEDURE — 6360000002 HC RX W HCPCS: Performed by: NURSE PRACTITIONER

## 2022-07-11 PROCEDURE — 84132 ASSAY OF SERUM POTASSIUM: CPT

## 2022-07-11 PROCEDURE — 2580000003 HC RX 258: Performed by: ANESTHESIOLOGY

## 2022-07-11 PROCEDURE — 85014 HEMATOCRIT: CPT

## 2022-07-11 PROCEDURE — 71250 CT THORAX DX C-: CPT

## 2022-07-11 PROCEDURE — 43249 ESOPH EGD DILATION <30 MM: CPT | Performed by: SURGERY

## 2022-07-11 PROCEDURE — 83735 ASSAY OF MAGNESIUM: CPT

## 2022-07-11 PROCEDURE — 0D758ZZ DILATION OF ESOPHAGUS, VIA NATURAL OR ARTIFICIAL OPENING ENDOSCOPIC: ICD-10-PCS | Performed by: SURGERY

## 2022-07-11 PROCEDURE — 2709999900 HC NON-CHARGEABLE SUPPLY: Performed by: SURGERY

## 2022-07-11 PROCEDURE — 6370000000 HC RX 637 (ALT 250 FOR IP): Performed by: STUDENT IN AN ORGANIZED HEALTH CARE EDUCATION/TRAINING PROGRAM

## 2022-07-11 PROCEDURE — 7100000011 HC PHASE II RECOVERY - ADDTL 15 MIN: Performed by: SURGERY

## 2022-07-11 PROCEDURE — 3700000000 HC ANESTHESIA ATTENDED CARE: Performed by: SURGERY

## 2022-07-11 PROCEDURE — 96361 HYDRATE IV INFUSION ADD-ON: CPT

## 2022-07-11 RX ORDER — POTASSIUM CHLORIDE 7.45 MG/ML
10 INJECTION INTRAVENOUS
Status: COMPLETED | OUTPATIENT
Start: 2022-07-11 | End: 2022-07-11

## 2022-07-11 RX ORDER — SODIUM CHLORIDE 9 MG/ML
INJECTION, SOLUTION INTRAVENOUS PRN
Status: DISCONTINUED | OUTPATIENT
Start: 2022-07-11 | End: 2022-07-13

## 2022-07-11 RX ORDER — MORPHINE SULFATE 2 MG/ML
1 INJECTION, SOLUTION INTRAMUSCULAR; INTRAVENOUS EVERY 4 HOURS PRN
Status: DISCONTINUED | OUTPATIENT
Start: 2022-07-11 | End: 2022-07-17 | Stop reason: HOSPADM

## 2022-07-11 RX ORDER — INSULIN LISPRO 100 [IU]/ML
0-6 INJECTION, SOLUTION INTRAVENOUS; SUBCUTANEOUS EVERY 6 HOURS
Status: DISCONTINUED | OUTPATIENT
Start: 2022-07-11 | End: 2022-07-15

## 2022-07-11 RX ORDER — ESMOLOL HYDROCHLORIDE 10 MG/ML
INJECTION INTRAVENOUS PRN
Status: DISCONTINUED | OUTPATIENT
Start: 2022-07-11 | End: 2022-07-11 | Stop reason: SDUPTHER

## 2022-07-11 RX ORDER — PROMETHAZINE HYDROCHLORIDE 25 MG/ML
12.5 INJECTION, SOLUTION INTRAMUSCULAR; INTRAVENOUS ONCE
Status: DISCONTINUED | OUTPATIENT
Start: 2022-07-11 | End: 2022-07-17 | Stop reason: HOSPADM

## 2022-07-11 RX ORDER — SODIUM CHLORIDE 0.9 % (FLUSH) 0.9 %
5-40 SYRINGE (ML) INJECTION PRN
Status: DISCONTINUED | OUTPATIENT
Start: 2022-07-11 | End: 2022-07-17 | Stop reason: HOSPADM

## 2022-07-11 RX ORDER — ONDANSETRON 2 MG/ML
4 INJECTION INTRAMUSCULAR; INTRAVENOUS EVERY 6 HOURS PRN
Status: DISCONTINUED | OUTPATIENT
Start: 2022-07-11 | End: 2022-07-17 | Stop reason: HOSPADM

## 2022-07-11 RX ORDER — INSULIN LISPRO 100 [IU]/ML
0-3 INJECTION, SOLUTION INTRAVENOUS; SUBCUTANEOUS NIGHTLY
Status: DISCONTINUED | OUTPATIENT
Start: 2022-07-11 | End: 2022-07-17 | Stop reason: HOSPADM

## 2022-07-11 RX ORDER — ONDANSETRON 2 MG/ML
4 INJECTION INTRAMUSCULAR; INTRAVENOUS ONCE
Status: COMPLETED | OUTPATIENT
Start: 2022-07-11 | End: 2022-07-11

## 2022-07-11 RX ORDER — PROMETHAZINE HYDROCHLORIDE 25 MG/ML
12.5 INJECTION, SOLUTION INTRAMUSCULAR; INTRAVENOUS EVERY 6 HOURS PRN
Status: DISCONTINUED | OUTPATIENT
Start: 2022-07-11 | End: 2022-07-17 | Stop reason: HOSPADM

## 2022-07-11 RX ORDER — ONDANSETRON 2 MG/ML
4 INJECTION INTRAMUSCULAR; INTRAVENOUS ONCE
Status: DISCONTINUED | OUTPATIENT
Start: 2022-07-11 | End: 2022-07-11 | Stop reason: HOSPADM

## 2022-07-11 RX ORDER — ONDANSETRON 4 MG/1
4 TABLET, ORALLY DISINTEGRATING ORAL EVERY 8 HOURS PRN
Status: DISCONTINUED | OUTPATIENT
Start: 2022-07-11 | End: 2022-07-17 | Stop reason: HOSPADM

## 2022-07-11 RX ORDER — LIDOCAINE HYDROCHLORIDE 20 MG/ML
INJECTION, SOLUTION EPIDURAL; INFILTRATION; INTRACAUDAL; PERINEURAL PRN
Status: DISCONTINUED | OUTPATIENT
Start: 2022-07-11 | End: 2022-07-11 | Stop reason: SDUPTHER

## 2022-07-11 RX ORDER — SODIUM CHLORIDE 9 MG/ML
INJECTION, SOLUTION INTRAVENOUS CONTINUOUS
Status: DISCONTINUED | OUTPATIENT
Start: 2022-07-11 | End: 2022-07-11

## 2022-07-11 RX ORDER — SODIUM CHLORIDE 9 MG/ML
INJECTION, SOLUTION INTRAVENOUS PRN
Status: DISCONTINUED | OUTPATIENT
Start: 2022-07-11 | End: 2022-07-17 | Stop reason: HOSPADM

## 2022-07-11 RX ORDER — METHOCARBAMOL 100 MG/ML
500 INJECTION, SOLUTION INTRAMUSCULAR; INTRAVENOUS ONCE
Status: COMPLETED | OUTPATIENT
Start: 2022-07-11 | End: 2022-07-11

## 2022-07-11 RX ORDER — PANTOPRAZOLE SODIUM 40 MG/10ML
40 INJECTION, POWDER, LYOPHILIZED, FOR SOLUTION INTRAVENOUS 2 TIMES DAILY
Status: DISCONTINUED | OUTPATIENT
Start: 2022-07-11 | End: 2022-07-17 | Stop reason: HOSPADM

## 2022-07-11 RX ORDER — SODIUM CHLORIDE, SODIUM LACTATE, POTASSIUM CHLORIDE, CALCIUM CHLORIDE 600; 310; 30; 20 MG/100ML; MG/100ML; MG/100ML; MG/100ML
INJECTION, SOLUTION INTRAVENOUS CONTINUOUS
Status: DISCONTINUED | OUTPATIENT
Start: 2022-07-11 | End: 2022-07-14

## 2022-07-11 RX ORDER — INSULIN GLARGINE 100 [IU]/ML
5 INJECTION, SOLUTION SUBCUTANEOUS NIGHTLY
Status: DISCONTINUED | OUTPATIENT
Start: 2022-07-11 | End: 2022-07-17 | Stop reason: HOSPADM

## 2022-07-11 RX ORDER — DEXTROSE MONOHYDRATE 50 MG/ML
100 INJECTION, SOLUTION INTRAVENOUS PRN
Status: DISCONTINUED | OUTPATIENT
Start: 2022-07-11 | End: 2022-07-17 | Stop reason: HOSPADM

## 2022-07-11 RX ORDER — ACETAMINOPHEN 325 MG/1
650 TABLET ORAL EVERY 6 HOURS PRN
Status: DISCONTINUED | OUTPATIENT
Start: 2022-07-11 | End: 2022-07-17 | Stop reason: HOSPADM

## 2022-07-11 RX ORDER — PROPOFOL 10 MG/ML
INJECTION, EMULSION INTRAVENOUS PRN
Status: DISCONTINUED | OUTPATIENT
Start: 2022-07-11 | End: 2022-07-11 | Stop reason: SDUPTHER

## 2022-07-11 RX ORDER — ACETAMINOPHEN 650 MG/1
650 SUPPOSITORY RECTAL EVERY 6 HOURS PRN
Status: DISCONTINUED | OUTPATIENT
Start: 2022-07-11 | End: 2022-07-17 | Stop reason: HOSPADM

## 2022-07-11 RX ORDER — POLYETHYLENE GLYCOL 3350 17 G/17G
17 POWDER, FOR SOLUTION ORAL DAILY PRN
Status: DISCONTINUED | OUTPATIENT
Start: 2022-07-11 | End: 2022-07-17 | Stop reason: HOSPADM

## 2022-07-11 RX ORDER — SODIUM CHLORIDE 0.9 % (FLUSH) 0.9 %
5-40 SYRINGE (ML) INJECTION EVERY 12 HOURS SCHEDULED
Status: DISCONTINUED | OUTPATIENT
Start: 2022-07-11 | End: 2022-07-17 | Stop reason: HOSPADM

## 2022-07-11 RX ORDER — HYDRALAZINE HYDROCHLORIDE 20 MG/ML
5 INJECTION INTRAMUSCULAR; INTRAVENOUS EVERY 4 HOURS PRN
Status: DISCONTINUED | OUTPATIENT
Start: 2022-07-11 | End: 2022-07-17 | Stop reason: HOSPADM

## 2022-07-11 RX ADMIN — SODIUM CHLORIDE, PRESERVATIVE FREE 10 ML: 5 INJECTION INTRAVENOUS at 13:26

## 2022-07-11 RX ADMIN — SODIUM CHLORIDE: 9 INJECTION, SOLUTION INTRAVENOUS at 13:35

## 2022-07-11 RX ADMIN — METHOCARBAMOL 500 MG: 1000 INJECTION, SOLUTION INTRAMUSCULAR; INTRAVENOUS at 11:50

## 2022-07-11 RX ADMIN — SODIUM CHLORIDE, POTASSIUM CHLORIDE, SODIUM LACTATE AND CALCIUM CHLORIDE: 600; 310; 30; 20 INJECTION, SOLUTION INTRAVENOUS at 06:43

## 2022-07-11 RX ADMIN — PROMETHAZINE HYDROCHLORIDE 12.5 MG: 25 INJECTION INTRAMUSCULAR; INTRAVENOUS at 18:53

## 2022-07-11 RX ADMIN — POTASSIUM CHLORIDE 10 MEQ: 7.45 INJECTION INTRAVENOUS at 18:54

## 2022-07-11 RX ADMIN — PROPOFOL 50 MG: 10 INJECTION, EMULSION INTRAVENOUS at 08:47

## 2022-07-11 RX ADMIN — PROPOFOL 200 MG: 10 INJECTION, EMULSION INTRAVENOUS at 08:41

## 2022-07-11 RX ADMIN — INSULIN GLARGINE 5 UNITS: 100 INJECTION, SOLUTION SUBCUTANEOUS at 21:09

## 2022-07-11 RX ADMIN — POTASSIUM CHLORIDE 10 MEQ: 7.45 INJECTION INTRAVENOUS at 20:12

## 2022-07-11 RX ADMIN — ESMOLOL HYDROCHLORIDE 30 MG: 10 INJECTION, SOLUTION INTRAVENOUS at 08:57

## 2022-07-11 RX ADMIN — PANTOPRAZOLE SODIUM 40 MG: 40 INJECTION, POWDER, LYOPHILIZED, FOR SOLUTION INTRAVENOUS at 13:35

## 2022-07-11 RX ADMIN — MORPHINE SULFATE 1 MG: 2 INJECTION, SOLUTION INTRAMUSCULAR; INTRAVENOUS at 13:25

## 2022-07-11 RX ADMIN — ONDANSETRON 4 MG: 2 INJECTION INTRAMUSCULAR; INTRAVENOUS at 09:59

## 2022-07-11 RX ADMIN — DEXTROSE MONOHYDRATE 5 MG/HR: 50 INJECTION, SOLUTION INTRAVENOUS at 16:03

## 2022-07-11 RX ADMIN — HYDROMORPHONE HYDROCHLORIDE 1 MG: 1 INJECTION, SOLUTION INTRAMUSCULAR; INTRAVENOUS; SUBCUTANEOUS at 15:34

## 2022-07-11 RX ADMIN — INSULIN LISPRO 5 UNITS: 100 INJECTION, SOLUTION INTRAVENOUS; SUBCUTANEOUS at 16:25

## 2022-07-11 RX ADMIN — PROPOFOL 50 MG: 10 INJECTION, EMULSION INTRAVENOUS at 08:52

## 2022-07-11 RX ADMIN — SODIUM CHLORIDE, PRESERVATIVE FREE 10 ML: 5 INJECTION INTRAVENOUS at 21:23

## 2022-07-11 RX ADMIN — INSULIN LISPRO 1 UNITS: 100 INJECTION, SOLUTION INTRAVENOUS; SUBCUTANEOUS at 21:00

## 2022-07-11 RX ADMIN — SODIUM CHLORIDE, POTASSIUM CHLORIDE, SODIUM LACTATE AND CALCIUM CHLORIDE: 600; 310; 30; 20 INJECTION, SOLUTION INTRAVENOUS at 16:37

## 2022-07-11 RX ADMIN — HYDRALAZINE HYDROCHLORIDE 5 MG: 20 INJECTION INTRAMUSCULAR; INTRAVENOUS at 14:12

## 2022-07-11 RX ADMIN — PANTOPRAZOLE SODIUM 40 MG: 40 INJECTION, POWDER, LYOPHILIZED, FOR SOLUTION INTRAVENOUS at 21:02

## 2022-07-11 RX ADMIN — ONDANSETRON 4 MG: 2 INJECTION INTRAMUSCULAR; INTRAVENOUS at 16:05

## 2022-07-11 RX ADMIN — POTASSIUM CHLORIDE 10 MEQ: 7.45 INJECTION INTRAVENOUS at 17:39

## 2022-07-11 RX ADMIN — POTASSIUM CHLORIDE 10 MEQ: 7.45 INJECTION INTRAVENOUS at 16:38

## 2022-07-11 RX ADMIN — LIDOCAINE HYDROCHLORIDE 5 ML: 20 INJECTION, SOLUTION EPIDURAL; INFILTRATION; INTRACAUDAL; PERINEURAL at 08:41

## 2022-07-11 ASSESSMENT — ENCOUNTER SYMPTOMS
EYE REDNESS: 0
COLOR CHANGE: 0
TROUBLE SWALLOWING: 1
EYE ITCHING: 0
SORE THROAT: 0
ABDOMINAL PAIN: 1
ANAL BLEEDING: 0
APNEA: 0
BACK PAIN: 0
CONSTIPATION: 0
PHOTOPHOBIA: 0
ANAL BLEEDING: 0
ABDOMINAL PAIN: 0
CHOKING: 0
EYE ITCHING: 0
COLOR CHANGE: 0
RECTAL PAIN: 0
CONSTIPATION: 0
RECTAL PAIN: 0
VOMITING: 1
CHOKING: 0
SORE THROAT: 0
APNEA: 0
TROUBLE SWALLOWING: 1
NAUSEA: 1
STRIDOR: 0
EYE REDNESS: 0
NAUSEA: 1
VOMITING: 1
PHOTOPHOBIA: 0
BACK PAIN: 0
STRIDOR: 0

## 2022-07-11 ASSESSMENT — PAIN DESCRIPTION - LOCATION
LOCATION: CHEST;ABDOMEN
LOCATION: ABDOMEN;CHEST

## 2022-07-11 ASSESSMENT — PAIN SCALES - GENERAL
PAINLEVEL_OUTOF10: 0
PAINLEVEL_OUTOF10: 10
PAINLEVEL_OUTOF10: 0
PAINLEVEL_OUTOF10: 10

## 2022-07-11 ASSESSMENT — PAIN - FUNCTIONAL ASSESSMENT: PAIN_FUNCTIONAL_ASSESSMENT: 0-10

## 2022-07-11 NOTE — CONSULTS
[address] [place] [address], [address] [place] [address]                                  CONSULTATION    PATIENT NAME: Kevin Lucio                    :        1950  MED REC NO:   [id number]                          ROOM:       2105  ACCOUNT NO:   [de-identified]                           ADMIT DATE: 2022  PROVIDER:     Reba Kim MD    CONSULT DATE:  2022    INDICATION:  Chest pain. HISTORY OF PRESENT ILLNESS:  This is a 39-year-old female patient. The  patient follows in the office. The patient is known to have coronary  artery disease. The patient had a CABG done, four-vessel back in . LIMA to LAD, SVG to circ, SVG to PDA, SVG to RCA. The patient also has  a history of having peripheral vascular disease present, underwent  peripheral intervention of both legs. The patient also has a history of  carotid endarterectomy performed in 2022 by Dr. Dre Rehman,  right-sided. The patient had a surgery done by Dr. Bruce Bhagat on 2022. It is a Nissen fundoplication and hiatal hernia repair laparoscopic. This was done on . The patient came in today as an outpatient for  having an EGD done. Post EGD, she had hemoptysis present. The patient  was sent into the CAT scan. In the CAT scan, she started complaining of  chest pain. Therefore, cardiology was consulted. The patient is  tachycardic. Heart rate in the 120s to 150s. Blood pressure is also  elevated. She has hemoptysis present. Spoke with Dr. Gina Tang the  hospitalist.  I spoke with Dr. Bruce Bhagat also. I spoke to the ER doctor. The patient was transferred to ER for initial evaluation, but the  patient is being admitted, therefore she is being transferred to ICU. The patient has been waiting on _____ ICU to be transported. PAST MEDICAL HISTORY:  The patient has a history of coronary artery  disease. She is status post CABG done four vessels in . History of  having peripheral vascular disease in bilateral legs and interventions  done. History of carotid endarterectomy performed. CABG was done in  2012 of LIMA to LAD, SVG to circ, SVG to PDA, SVG to RCA. She has  diabetes, esophageal strictures present. She had EGD done. The patient  is having solid food dysphagia present. History of renal insufficiency,  sees Dr. Fernando Russo for that. Hypertension, hyperlipidemia. PAST SURGICAL HISTORY:  CABG done four vessels, right carotid  endarterectomy performed. Gallbladder surgery done. Nissen gastric  fundoplication surgery done in 04/2022. EGD done today. SOCIAL HISTORY:  Does not smoke. Does not drink. ALLERGIES:  TETANUS. HOME MEDICATIONS:  She is on Farxiga, Glucotrol, lisinopril, Crestor,  Coreg, amiodarone, Plavix and aspirin. PHYSICAL EXAMINATION:  GENERAL:  The patient is awake, alert, and answering questions. VITAL SIGNS:  Temperature afebrile. Pulse is 150s. Blood pressure  180s. HEENT:  Head is normocephalic and atraumatic. Pupils are equal and  reactive. CHEST:  Equal expansion. HEART:  Tachycardic. ABDOMEN:  Soft and nontender. Bowel sounds are present. No  hepatosplenomegaly or guarding appreciated. EXTREMITIES:  No cyanosis or clubbing present. LABORATORY DATA:  Her last hemoglobin was back in April. IMPRESSION:  A 74-year-old female patient with a history of coronary  artery disease, peripheral vascular disease, carotid surgery also. She  was here to have an EGD done today for strictures and for solid food  dysphagia. She has hemoptysis present. She is being admitted to the  ICU. We will follow the patient along. I will place her on IV Cardizem  drip at this time for rate control and we will make further  recommendations based on hospital course. I will obtain an echo on her  also. Her last echo was done in 04/2021. Echo at that time, LV  function was preserved.         Ada Patel MD    D: 07/11/2022 13:03:25       T: 07/11/2022 15:14:02     [email or web address]  Job#: [id number]     Doc#: [id number]    CC:

## 2022-07-11 NOTE — CONSULTS
V2.0  [facility] Consult Note      Name:  Jordi Abbasi /Age/Sex: 1950  (66 y.o. female)   MRN & CSN:  [id number] & [id number] Encounter Date/Time: 2022 11:25 AM EDT   Location:  ENDO/NONE PCP: Yordan Dubose DO     Attending:Caitlin Bauer MD  Consulting Provider: Fatmata Canela MD      Hospital Day: 1    Assessment and Recommendations   Jordi Abbasi is a 66 y.o. female with pmh of dysphagia, CAD status post CABG, hypertension, diastolic heart failure who presents with Esophageal stricture    Hospital Problems           Last Modified POA    * (Principal) Esophageal stricture 2022 Yes    Overview Signed 10/10/2020  1:47 PM by Yordan Dubose DO     dilation per GI               Recommendations:    Hematemesis  -Status post esophageal dilation. MOVE TO STEPDOWN UNIT  -We will get an extremity CT scan chest abdomen pelvis without contrast to look for free air.  -I will medicate her with Phenergan for Zofran has been ineffective thus far for her nausea. Will check for QTC on EKG  -Repeat her hemoglobin every 8 hours. We will hydrate her with 100 cc normal saline per hour.  -Keep n.p.o. for now    Type 2 diabetes  -Hold home meds. -Start on Lantus 5 units nightly. We will check glucose every 6 hours and treat with low-dose sliding scale for now as she is n.p.o.. We will put her on Premeal lispro when patient can tolerate meals. CKD III  -follows with nephrology. -will get labs. Hypertension  -Hold home meds. Elevated blood pressure right now likely secondary to distress from nausea and hematemesis  -With hydration for now and treat blood pressure more than 180 with 5 mg of labetalol every 6 as needed as she is not able to tolerate p.o. . CAD status post CABG  -Hold aspirin. Hyperlipidemia  -Hold statin.      Diet NPO   DVT Prophylaxis [] Lovenox, []  Heparin, [] SCDs, [] Ambulation,  [] Eliquis, [] Xarelto, none due to GI BLEED   Code Status FULL CODE   Surrogate Decision Maker/ AUBREEKARSTEN  Shaistadeb Mora (granddaughter who is a nurse at the [employer])   Hi  History Obtained From:    patient, family member -granddaughter    History of Present Illness:     Chief Complaint: Esophageal stricture    Ta Fu is a 66 y.o. female who is seen today by the hospitalist team at the request of Dr. Farrah Lopez, with a Chief Complaint of hematemesis. Patient underwent an EGD with dilation today and subsequently had hematemesis and also chest soreness. Patient has had EGDs with dilations in the past and has never had this issue. Patient has had progressively worsening dysphagia over the last few months and has only been elevated tolerating liquids. On my exam patient emesis of red blood blood into a container provided and reports feeling uncomfortable and nauseous. Denies any shortness of breath, headache, dizziness, lightheadedness, abdominal pain,, numbness or tingling in extremities. Review of Systems:    Review of Systems   Constitutional: Negative for chills and fever. HENT: Negative for sore throat. Eyes: Negative for visual disturbance. Cardiovascular: Positive for chest pain. Negative for palpitations and leg swelling. Gastrointestinal: Positive for nausea and vomiting. Negative for abdominal pain. Endocrine: Negative for polyuria. Genitourinary: Negative for dysuria. Skin: Negative for wound. Neurological: Negative for weakness. Psychiatric/Behavioral: Negative for confusion. Objective:        Intake/Output Summary (Last 24 hours) at 7/11/2022 1125  Last data filed at 7/11/2022 0857  Gross per 24 hour   Intake 181.67 ml   Output --   Net 181.67 ml      Vitals:   Vitals:    07/11/22 0618 07/11/22 0911 07/11/22 0945 07/11/22 1018   BP: (!) 164/80 (!) 148/74 (!) 232/107 (!) 202/91   Pulse: 84 81 (!) 132 (!) 123   Resp: 16 16 18    Temp: 97.3 °F (36.3 °C) 97.3 °F (36.3 °C)     TempSrc: Temporal Temporal     SpO2: 100% 100%  100%   Weight: Height:           Medications Prior to Admission     Prior to Admission medications    Medication Sig Start Date End Date Taking? Authorizing Provider   TRULICITY 1.5 PT/8.9GS SOPN ADMINISTER 1.5 MG UNDER THE SKIN 1 TIME A WEEK 4/19/22   Severa Dc, DO   dapagliflozin (FARXIGA) 5 MG tablet TAKE 1 TABLET BY MOUTH EVERY MORNING 4/7/22   Severa Dc, DO   glipiZIDE (GLUCOTROL) 5 MG tablet Take 1 tablet by mouth 2 times daily (before meals) 3/8/22   Severa Dc, DO   lisinopril (PRINIVIL;ZESTRIL) 5 MG tablet TAKE 1 TABLET BY MOUTH DAILY 2/10/22   Severa Dc, DO   rosuvastatin (CRESTOR) 10 MG tablet TAKE 1 TABLET BY MOUTH EVERY NIGHT 2/9/22   Severa Dc, DO   carvedilol (COREG) 6.25 MG tablet Take 1 tablet by mouth 2 times daily (with meals) 12/10/18   Sravani Walsh MD   amiodarone (CORDARONE) 200 MG tablet Take 200 mg by mouth daily    Historical Provider, MD   clopidogrel (PLAVIX) 75 MG tablet Take 1 tablet by mouth daily 8/20/15   Sravani Walsh MD   acetaminophen (TYLENOL) 500 MG tablet Take 1,000 mg by mouth every 6 hours as needed for Pain  Patient not taking: Reported on 7/7/2022    Historical Provider, MD   aspirin 81 MG EC tablet Take 81 mg by mouth daily. Historical Provider, MD       Physical Exam: Need 8 Elements   General: NAD  Eyes: EOMI  ENT: neck supple  Cardiovascular: Regular rate. Respiratory: Clear to auscultation  Gastrointestinal: Soft, non tender  Genitourinary: no suprapubic tenderness  Musculoskeletal: No edema  Skin: warm, dry  Neuro: Alert. Psych: Mood appropriate.        Past Medical History:   PMHx   Past Medical History:   Diagnosis Date    Anemia     Managed by Dr. Jeevan Garcia CAD (coronary artery disease)     follows with Dr Alex Alejandro Carotid stenosis     right    Colon polyps     COVID-19 08/18/2021    Diabetes mellitus type 2, uncontrolled ([facility] [address]     \"dx 1683    Diastolic dysfunction 21/9705    [facility]    Esophageal stricture     dilation per GI    Hiatal hernia     History of blood transfusion     \"had blood transfusion with 4th child- have hx also of iron infusions for anemia 2017    Hyperlipidemia     Hypertension     IBS (irritable bowel syndrome)     with diarrhea    Iron deficiency anemia     Iron Infusions- follows with Julia Araiza and Jose G Cardenas    LVH (left ventricular hypertrophy) 09/2012    [facility]    Multiple gastric polyps 2004    Dr Yanet Kramer Obesity     PAD (peripheral artery disease) ([facility] [address]     S/P CABG x 4 08/06/2012    4V CABG- LIMA-LAD, SVG-CX, SVG-PDA, SVG-RCA- Follows with Dr Petra Avalos Sinus tachycardia     follows with [facility]    SVT (supraventricular tachycardia) [facility])     old chart gives hx of SVT in the past    Vitamin D deficiency      PSHX:  has a past surgical history that includes knee surgery; Tubal ligation (1978); Coronary artery bypass graft (08/02/2012); Balloon angioplasty, artery (Right, 08/19/2015); transluminal angioplasty (Left, 09/23/2015); Cardiac surgery; Cardiac catheterization (08/02/2012); Cholecystectomy (15+ yrs ago); Colonoscopy (2017); Endoscopy, colon, diagnostic (10/10/2017); Esophagus dilation; Carotid endarterectomy (Right, 2/18/2022); and Gastric fundoplication (N/A, 3/0/3752). Allergies: Allergies   Allergen Reactions    Tetanus Toxoids Swelling and Rash     fever     Fam HX: family history includes Cancer in her father; Coronary Art Dis in her mother; Coronary Art Dis (age of onset: 43) in her father; Diabetes in her mother and another family member; High Blood Pressure in her mother; Kidney Disease in her mother.   Soc HX:   Social History     Socioeconomic History    Marital status:      Spouse name: Virgin Negus Number of children: 11    Years of education: None    Highest education level: None   Occupational History    None   Tobacco Use    Smoking status: Never Smoker    Smokeless tobacco: Never Used   Vaping Use    Vaping Use: Never used   Substance and Sexual Activity    Alcohol use: No    Drug use: No    Sexual activity: Not Currently     Partners: Male   Other Topics Concern    None   Social History Narrative    None     Social Determinants of Health     Financial Resource Strain: Low Risk     Difficulty of Paying Living Expenses: Not hard at all   Food Insecurity: No Food Insecurity    Worried About Running Out of Food in the Last Year: Never true    Aram of Food in the Last Year: Never true   Transportation Needs:     Lack of Transportation (Medical): Not on file    Lack of Transportation (Non-Medical):  Not on file   Physical Activity:     Days of Exercise per Week: Not on file    Minutes of Exercise per Session: Not on file   Stress:     Feeling of Stress : Not on file   Social Connections:     Frequency of Communication with Friends and Family: Not on file    Frequency of Social Gatherings with Friends and Family: Not on file    Attends Hindu Services: Not on file    Active Member of [address] [place] [address] or Organizations: Not on file    Attends Club or Organization Meetings: Not on file    Marital Status: Not on file   Intimate Partner Violence:     Fear of Current or Ex-Partner: Not on file    Emotionally Abused: Not on file    Physically Abused: Not on file    Sexually Abused: Not on file   Housing Stability:     Unable to Pay for Housing in the Last Year: Not on file    Number of [place] in the Last Year: Not on file    Unstable Housing in the Last Year: Not on file       Medications:   Medications:    ondansetron  4 mg IntraVENous Once    methocarbamol  500 mg IntraVENous Once    promethazine  12.5 mg IntraMUSCular Once    pantoprazole  40 mg IntraVENous BID      Infusions:    lactated ringers 150 mL/hr at 07/11/22 0833     PRN Meds: labetalol (TRANDATE) IVPB, 5 mg, Q4H PRN  morphine, 1 mg, Q4H PRN        Labs and Imaging   FL UGI    Result Date: 7/10/2022  EXAMINATION: DOUBLE CONTRAST UPPER GI SERIES 7/5/2022 TECHNIQUE: Double contrast upper GI series was performed with barium and air contrast. FLUOROSCOPY DOSE AND TYPE OR TIME AND EXPOSURES: Fluoro time: 1.8 minutes Dose area product: 753.71 uGy*m2 Entrance dose: 26.20 mGy COMPARISON: Esophagram performed 01/19/2022. HISTORY: ORDERING SYSTEM PROVIDED HISTORY: Nausea and vomiting, intractability of vomiting not specified, unspecified vomiting type TECHNOLOGIST PROVIDED HISTORY: Reason for Exam: Nausea and vomiting, intractability of vomiting not specified, unspecified vomiting type, Dysphagia, unspecified type, S/P Nissen fundoplication (without gastrostomy tube) procedure FINDINGS: Patient could only tolerate small swallows. Esophagus is normal in course and caliber. Suboptimal evaluation of the esophageal mucosa due to incomplete esophageal distension. There is retropulsion and stasis and contrast within the esophagus with swallow. Evaluation for reflux is limited due to residual contrast throughout the esophagus. There are postsurgical changes compatible with recent Nissen fundoplication. No evidence of recurrent hernia. The duodenal bulb and sweep are normal.     1. Esophageal dysmotility. 2. Postsurgical changes of Nissen fundoplication without evidence of recurrent hernia.        Lipids:   Lab Results   Component Value Date/Time    CHOL 218 06/01/2021 09:05 AM    HDL 37 06/01/2021 09:05 AM    TRIG 227 06/01/2021 09:05 AM     Hemoglobin A1C:   Lab Results   Component Value Date/Time    LABA1C 7.8 01/04/2022 09:53 AM     TSH:   Lab Results   Component Value Date/Time    TSH 1.690 03/08/2018 12:00 AM     Troponin:   Lab Results   Component Value Date/Time    TROPONINT <0.010 08/18/2021 02:22 PM     UA:  Lab Results   Component Value Date/Time    NITRU Negative 11/01/2021 10:50 AM    COLORU YELLOW 11/01/2021 10:50 AM    PHUR 5.5 11/01/2021 10:50 AM    WBCUA 31 11/01/2021 10:50 AM    RBCUA 3 11/01/2021 10:50 AM    RBCUA <1 08/19/2021 06:36 PM    MUCUS RARE 08/19/2021 06:36 PM    TRICHOMONAS NONE SEEN 08/19/2021 06:36 PM    BACTERIA RARE 11/01/2021 10:50 AM    CLARITYU CLOUDY 11/01/2021 10:50 AM    SPECGRAV 1.027 11/01/2021 10:50 AM    LEUKOCYTESUR MODERATE 11/01/2021 10:50 AM    UROBILINOGEN 0.2 11/01/2021 10:50 AM    BILIRUBINUR Negative 11/01/2021 10:50 AM    BLOODU Negative 11/01/2021 10:50 AM    GLUCOSEU >=1000 11/01/2021 10:50 AM    KETUA Negative 11/01/2021 10:50 AM       Personally reviewed Lab Studies, Imaging, and discussed with Patient, granddaughter.      Electronically signed by Yobani Massey MD on 7/11/2022 at 11:25 AM

## 2022-07-11 NOTE — H&P
Bailey Lucio MD      General Surgery       Subjective:     Patient is a 66 y.o.  female scheduled for EGD and dilation . Indications for procedure are solid food dysphagia. Discussed Blood/Blood Products: yes    Patient Active Problem List    Diagnosis Date Noted    Carotid stenosis, right 02/18/2022    Carotid stenosis, bilateral 01/26/2022    Acute hypoxemic respiratory failure due to COVID-19 [facility]) 08/18/2021    Esophageal stricture     Hiatal hernia     Iron deficiency anemia     Iron deficiency anemia secondary to blood loss (chronic) 04/14/2020    Other forms of chronic ischemic heart disease 04/14/2020    Hiatal hernia with GERD 04/14/2020    Personal history of other diseases of the digestive system 04/14/2020    Angular cheilitis 01/05/2020    Microalbuminuria 05/16/2017    PVD (peripheral vascular disease) ([facility] [address] 04/11/2017    Anemia 09/20/2016    Vitamin D deficiency 09/20/2016    Type 2 diabetes mellitus without complication, without long-term current use of insulin ([address] 09/20/2016    Coronary artery disease involving native heart without angina pectoris 09/20/2016    Essential hypertension 09/20/2016    Mixed hyperlipidemia 09/20/2016     Past Medical History:   Diagnosis Date    Anemia     Managed by Dr. Yanet Alvarado CAD (coronary artery disease)     follows with Dr Manuel Morataya Carotid stenosis     right    Colon polyps     COVID-19 08/18/2021    Diabetes mellitus type 2, uncontrolled ([address]     \"dx 4724    Diastolic dysfunction 86/9595    [facility]    Esophageal stricture     dilation per GI    Hiatal hernia     History of blood transfusion     \"had blood transfusion with 4th child- have hx also of iron infusions for anemia 2017    Hyperlipidemia     Hypertension     IBS (irritable bowel syndrome)     with diarrhea    Iron deficiency anemia     Iron Infusions- follows with Julia Araiza and Jose G Cardenas    LVH (left ventricular hypertrophy) 09/2012    [facility]    Multiple gastric polyps 2004    Dr Ofe Clifton Obesity     PAD (peripheral artery disease) ([facility] [address]     S/P CABG x 4 08/06/2012    4V CABG- LIMA-LAD, SVG-CX, SVG-PDA, SVG-RCA- Follows with Dr Olivia Frausto Sinus tachycardia     follows with [facility]    SVT (supraventricular tachycardia) [facility])     old chart gives hx of SVT in the past    Vitamin D deficiency       Past Surgical History:   Procedure Laterality Date    BALLOON ANGIOPLASTY, ARTERY Right 08/19/2015    RIght SFA 90%->10%, Right Popliteal 1000%->10%    CARDIAC CATHETERIZATION  08/02/2012    CARDIAC SURGERY      open heart surgery 8/2012    CAROTID ENDARTERECTOMY Right 2/18/2022    RIGHT CAROTID ENDARTERECTOMY performed by Andrea Marte MD at [id number] [place] [address]  15+ yrs ago    [facility]  08/02/2012    4V CABG- LIMA-LAD, SVG-CX, SVG-PDA, SVG-RCA    ENDOSCOPY, COLON, DIAGNOSTIC  10/10/2017    esophageal stricture, hiatal hernia, candidiasis    ESOPHAGEAL DILATATION      Dr. Kelley Hedge GASTRIC FUNDOPLICATION N/A 4/6/4668    NISSEN FUNDOPLICATION HIATAL HERNIA REPAIR LAPAROSCOPIC ROBOTIC performed by Jo Ann Mejias MD at [facility]      biltateral- \"total of 9 surgeries- all scopes\"    TRANSLUMINAL ANGIOPLASTY Left 09/23/2015 9/23/2015-Left mid and distal SFA and Left Popliteal    TUBAL LIGATION  1978      Prior to Admission medications    Medication Sig Start Date End Date Taking?  Authorizing Provider   TRULICITY 1.5 XF/4.8BK SOPN ADMINISTER 1.5 MG UNDER THE SKIN 1 TIME A WEEK 4/19/22   Nick Dahl, DO   dapagliflozin (FARXIGA) 5 MG tablet TAKE 1 TABLET BY MOUTH EVERY MORNING 4/7/22   Nick Dahl,    glipiZIDE (GLUCOTROL) 5 MG tablet Take 1 tablet by mouth 2 times daily (before meals) 3/8/22   Nick Dahl,    lisinopril (PRINIVIL;ZESTRIL) 5 MG tablet TAKE 1 TABLET BY MOUTH DAILY 2/10/22   Nick Dahl, DO rosuvastatin (CRESTOR) 10 MG tablet TAKE 1 TABLET BY MOUTH EVERY NIGHT 2/9/22   [facility],    carvedilol (COREG) 6.25 MG tablet Take 1 tablet by mouth 2 times daily (with meals) 12/10/18   Anny Schofield MD   amiodarone (CORDARONE) 200 MG tablet Take 200 mg by mouth daily    Historical Provider, MD   clopidogrel (PLAVIX) 75 MG tablet Take 1 tablet by mouth daily 8/20/15   Anny Schofield MD   acetaminophen (TYLENOL) 500 MG tablet Take 1,000 mg by mouth every 6 hours as needed for Pain  Patient not taking: Reported on 7/7/2022    Historical Provider, MD   aspirin 81 MG EC tablet Take 81 mg by mouth daily. Historical Provider, MD     Allergies   Allergen Reactions    Tetanus Toxoids Swelling and Rash     fever      Social History     Tobacco Use    Smoking status: Never Smoker    Smokeless tobacco: Never Used   Substance Use Topics    Alcohol use: No      Family History   Problem Relation Age of Onset    Kidney Disease Mother         Dialysis    Diabetes Mother     High Blood Pressure Mother     Coronary Art Dis Mother         MI    Cancer Father         pancreatic cancer (Smoking)    Coronary Art Dis Father 43        MI early onset    Diabetes Other           Review of Systems  Review of Systems   Constitutional: Negative for chills and fever. HENT: Positive for trouble swallowing. Negative for ear pain, mouth sores, sore throat and tinnitus. Eyes: Negative for photophobia, redness and itching. Respiratory: Negative for apnea, choking and stridor. Cardiovascular: Negative for chest pain and palpitations. Gastrointestinal: Negative for anal bleeding, constipation and rectal pain. Endocrine: Negative for polydipsia. Genitourinary: Negative for enuresis, flank pain and hematuria. Musculoskeletal: Negative for back pain, joint swelling and myalgias. Skin: Negative for color change and pallor. Allergic/Immunologic: Negative for environmental allergies.    Neurological: Negative for syncope and speech difficulty. Psychiatric/Behavioral: Negative for confusion and hallucinations. Objective:     Patient Vitals for the past 8 hrs:   BP Temp Temp src Pulse Resp SpO2   07/11/22 0618 (!) 164/80 97.3 °F (36.3 °C) Temporal 84 16 100 %     Physical Exam  Constitutional:       Appearance: She is well-developed. HENT:      Head: Normocephalic. Eyes:      Pupils: Pupils are equal, round, and reactive to light. Cardiovascular:      Rate and Rhythm: Normal rate. Pulmonary:      Effort: Pulmonary effort is normal.   Abdominal:      General: There is no distension. Palpations: Abdomen is soft. There is no mass. Tenderness: There is no abdominal tenderness. There is no guarding or rebound. Musculoskeletal:         General: Normal range of motion. Cervical back: Normal range of motion and neck supple. Skin:     General: Skin is warm. Neurological:      Mental Status: She is alert and oriented to person, place, and time. Data Review  CBC:   Lab Results   Component Value Date/Time    WBC 15.1 04/02/2022 01:56 AM    RBC 4.35 04/02/2022 01:56 AM     BMP:   Lab Results   Component Value Date/Time    GLUCOSE 190 04/07/2022 08:48 AM    CO2 23 04/07/2022 08:48 AM    BUN 30 04/07/2022 08:48 AM    CREATININE 1.3 04/07/2022 08:48 AM    CALCIUM 9.0 04/07/2022 08:48 AM       Assessment:     dysphagia    Plan:      Will plan for egd and dilation    Rolando Martinez MD

## 2022-07-11 NOTE — PROGRESS NOTES
RECEIVED  REPORT FROM MONICA SANTANA PRIOR TO TRANSFER TO ENDO UNIT. SHE IS ALERT AND ORIENTED AND HAS BEEN NPO SINCE MIDNIGHT. SHE TOOK HER BETA BLOCKER YESTERDAY AND LAST TOOK HER PLAVIX July 6.  CONSENTS ARE SIGNED

## 2022-07-11 NOTE — CONSULTS
Dictated -[id number]  Hx of CAD and CABG   Hx of PAD   Hx of RCEA  Had EGD today and s/p stricture dilatation now with hemoptysis  CT Is pending  Check labs  Check echo  Use cardizem for now for rate and BP control  Will follow    Electronically signed by Fili Pierce MD on 7/11/22 at 1:04 PM EDT

## 2022-07-11 NOTE — PROGRESS NOTES
7453 Patient arrived back to [facility]. Report given to this nurse from [facility]. Patient A&O with complaints of chest discomfort, continuous coughing, and spitting up blood. VS are stable. Doctor is aware of blood. Ice and water given to patient. Call light in reach and bed in lowest position. 36 messaged dr. William Carpenter due to patient now vomiting blood. 9771 Doctor came to see patient. Per Dr. William Carpenter give 4 mg Zofran to help with vomiting. Checked patients vs and made doctor aware that bp and heart rate are now elevated. 1037 room assigned calling report to [place] [address] consult complete to hospitalist.  [phone] hospitalist in with patient. 8404 hospitalist request us to hold IV robaxin and give IM phenergan. [address] [facility] [address] went in to give phenergan and Dr William Carpenter said hold phenergan and give robaxin. 1150 Robaxin given. 1200 patient bp is 181/77  And vomiting has slowed. Transport is here to take patient to CT.

## 2022-07-11 NOTE — ANESTHESIA POSTPROCEDURE EVALUATION
Department of Anesthesiology  Postprocedure Note    Patient: Jimy Raygoza  MRN: [id number]  YOB: 1950  Date of evaluation: 7/11/2022      Procedure Summary     Date: 07/11/22 Room / Location: [address]    Anesthesia Start: 9147 Anesthesia Stop: 6897    Procedure: EGD DILATION BALLOON 18 (N/A ) Diagnosis:       Dysphagia, unspecified type      (Dysphagia, unspecified type [R13.10])    Surgeons: Carla Pagan MD Responsible Provider: Kedar Jhaveri MD    Anesthesia Type: MAC ASA Status: 3          Anesthesia Type: MAC    Naila Phase I:  8    Naila Phase II:        Anesthesia Post Evaluation    Patient location during evaluation: bedside  Patient participation: complete - patient participated  Level of consciousness: awake and alert  Pain score: 0  Airway patency: patent  Nausea & Vomiting: no nausea and no vomiting  Complications: no  Cardiovascular status: blood pressure returned to baseline  Respiratory status: acceptable  Hydration status: euvolemic

## 2022-07-11 NOTE — CONSENT
Informed Consent for Blood Component Transfusion Note    I have discussed with the patient the rationale for blood component transfusion; its benefits in treating or preventing fatigue, organ damage, or death; and its risk which includes mild transfusion reactions, rare risk of blood borne infection, or more serious but rare reactions. I have discussed the alternatives to transfusion, including the risk and consequences of not receiving transfusion. The patient had an opportunity to ask questions and had agreed to proceed with transfusion of blood components.     Electronically signed by Shauna Booker MD on 7/11/22 at 12:54 PM EDT

## 2022-07-12 LAB
ALBUMIN SERPL-MCNC: 2.9 GM/DL (ref 3.4–5)
ANION GAP SERPL CALCULATED.3IONS-SCNC: 14 MMOL/L (ref 4–16)
BUN BLDV-MCNC: 21 MG/DL (ref 6–23)
CALCIUM SERPL-MCNC: 8.2 MG/DL (ref 8.3–10.6)
CHLORIDE BLD-SCNC: 100 MMOL/L (ref 99–110)
CO2: 22 MMOL/L (ref 21–32)
CREAT SERPL-MCNC: 0.7 MG/DL (ref 0.6–1.1)
EKG ATRIAL RATE: 143 BPM
EKG DIAGNOSIS: NORMAL
EKG P-R INTERVAL: 96 MS
EKG Q-T INTERVAL: 364 MS
EKG QRS DURATION: 92 MS
EKG QTC CALCULATION (BAZETT): 561 MS
EKG R AXIS: 5 DEGREES
EKG T AXIS: 50 DEGREES
EKG VENTRICULAR RATE: 143 BPM
GFR AFRICAN AMERICAN: >60 ML/MIN/1.73M2
GFR NON-AFRICAN AMERICAN: >60 ML/MIN/1.73M2
GLUCOSE BLD-MCNC: 139 MG/DL (ref 70–99)
GLUCOSE BLD-MCNC: 161 MG/DL (ref 70–99)
GLUCOSE BLD-MCNC: 162 MG/DL (ref 70–99)
GLUCOSE BLD-MCNC: 177 MG/DL (ref 70–99)
GLUCOSE BLD-MCNC: 192 MG/DL (ref 70–99)
GLUCOSE BLD-MCNC: 195 MG/DL (ref 70–99)
HCT VFR BLD CALC: 33.7 % (ref 37–47)
HCT VFR BLD CALC: 35.1 % (ref 37–47)
HEMOGLOBIN: 10.5 GM/DL (ref 12.5–16)
HEMOGLOBIN: 9.9 GM/DL (ref 12.5–16)
LV EF: 53 %
LVEF MODALITY: NORMAL
MAGNESIUM: 3 MG/DL (ref 1.8–2.4)
PHOSPHORUS: 2.6 MG/DL (ref 2.5–4.9)
POTASSIUM SERPL-SCNC: 3.7 MMOL/L (ref 3.5–5.1)
SODIUM BLD-SCNC: 136 MMOL/L (ref 135–145)

## 2022-07-12 PROCEDURE — 93010 ELECTROCARDIOGRAM REPORT: CPT | Performed by: INTERNAL MEDICINE

## 2022-07-12 PROCEDURE — 85014 HEMATOCRIT: CPT

## 2022-07-12 PROCEDURE — 94761 N-INVAS EAR/PLS OXIMETRY MLT: CPT

## 2022-07-12 PROCEDURE — 96366 THER/PROPH/DIAG IV INF ADDON: CPT

## 2022-07-12 PROCEDURE — 83735 ASSAY OF MAGNESIUM: CPT

## 2022-07-12 PROCEDURE — G0378 HOSPITAL OBSERVATION PER HR: HCPCS

## 2022-07-12 PROCEDURE — 82962 GLUCOSE BLOOD TEST: CPT

## 2022-07-12 PROCEDURE — 2500000003 HC RX 250 WO HCPCS: Performed by: INTERNAL MEDICINE

## 2022-07-12 PROCEDURE — 85018 HEMOGLOBIN: CPT

## 2022-07-12 PROCEDURE — 93306 TTE W/DOPPLER COMPLETE: CPT

## 2022-07-12 PROCEDURE — 94664 DEMO&/EVAL PT USE INHALER: CPT

## 2022-07-12 PROCEDURE — 94150 VITAL CAPACITY TEST: CPT

## 2022-07-12 PROCEDURE — 96376 TX/PRO/DX INJ SAME DRUG ADON: CPT

## 2022-07-12 PROCEDURE — 2580000003 HC RX 258: Performed by: SURGERY

## 2022-07-12 PROCEDURE — 2000000000 HC ICU R&B

## 2022-07-12 PROCEDURE — 6370000000 HC RX 637 (ALT 250 FOR IP): Performed by: STUDENT IN AN ORGANIZED HEALTH CARE EDUCATION/TRAINING PROGRAM

## 2022-07-12 PROCEDURE — C9113 INJ PANTOPRAZOLE SODIUM, VIA: HCPCS | Performed by: STUDENT IN AN ORGANIZED HEALTH CARE EDUCATION/TRAINING PROGRAM

## 2022-07-12 PROCEDURE — 80069 RENAL FUNCTION PANEL: CPT

## 2022-07-12 PROCEDURE — 6360000002 HC RX W HCPCS: Performed by: STUDENT IN AN ORGANIZED HEALTH CARE EDUCATION/TRAINING PROGRAM

## 2022-07-12 RX ADMIN — PANTOPRAZOLE SODIUM 40 MG: 40 INJECTION, POWDER, LYOPHILIZED, FOR SOLUTION INTRAVENOUS at 20:10

## 2022-07-12 RX ADMIN — DEXTROSE MONOHYDRATE 5 MG/HR: 50 INJECTION, SOLUTION INTRAVENOUS at 20:11

## 2022-07-12 RX ADMIN — DEXTROSE MONOHYDRATE 5 MG/HR: 50 INJECTION, SOLUTION INTRAVENOUS at 04:52

## 2022-07-12 RX ADMIN — INSULIN LISPRO 1 UNITS: 100 INJECTION, SOLUTION INTRAVENOUS; SUBCUTANEOUS at 14:15

## 2022-07-12 RX ADMIN — INSULIN GLARGINE 5 UNITS: 100 INJECTION, SOLUTION SUBCUTANEOUS at 21:40

## 2022-07-12 RX ADMIN — PANTOPRAZOLE SODIUM 40 MG: 40 INJECTION, POWDER, LYOPHILIZED, FOR SOLUTION INTRAVENOUS at 08:21

## 2022-07-12 RX ADMIN — SODIUM CHLORIDE, PRESERVATIVE FREE 10 ML: 5 INJECTION INTRAVENOUS at 08:21

## 2022-07-12 RX ADMIN — SODIUM CHLORIDE, POTASSIUM CHLORIDE, SODIUM LACTATE AND CALCIUM CHLORIDE: 600; 310; 30; 20 INJECTION, SOLUTION INTRAVENOUS at 12:44

## 2022-07-12 ASSESSMENT — PAIN SCALES - GENERAL: PAINLEVEL_OUTOF10: 0

## 2022-07-12 NOTE — CARE COORDINATION
CM met with pt to initiate discharge planning. Role of CM explained. Pt has insurance and is able to afford medication. Pt has PCP. She has food in her home. She is from home with her . No DME. She is independent and drives. Her vehicle is reliable. No needs at this time. Plan home. CM contact information given to pt. CM team available as needs arise.

## 2022-07-12 NOTE — PROGRESS NOTES
Hospitalist Progress Note      Name:  Raya Chapin /Age/Sex: 1950  (66 y.o. female)   MRN & CSN:  [id number] & [id number] Admission Date/Time: 2022  6:07 AM   Location:  -A PCP: Carlotta Angel 57 Day: 2    Assessment and Plan:       Acute hematemesis status post esophageal dilation for esophageal stricture  CT abdomen pelvis showed distended thoracic esophagus with intraluminal enteric content or blood product  Continue to monitor hemoglobin  Hemoglobin currently stable at 9.9  Continue on PPI   General surgery on board  Continue on antiemetic medication    A. fib with RVR: Currently on Cardizem drip  Continue on telemetry      Type 2 diabetes continue on Lantus and sliding scale correction every 6 hour  Currently patient n.p.o. Check blood sugar every 6 hour      Hypertension: Blood pressure in the lower side on Cardizem drip  Continue to monitor in the ICU    Acute blood loss anemia secondary to hematemesis after esophageal dilation:  Continue to monitor hemoglobin transfuse for hemoglobin less than 7          Diet Diet NPO   DVT Prophylaxis [] Lovenox, []  Heparin, [x] SCDs, [] Ambulation   GI Prophylaxis [x] PPI,  [] H2 Blocker,  [] Carafate,  [] Diet/Tube Feeds   Code Status Full Code   Disposition Patient requires continued admission due to    MDM [] Low, [] Moderate,[]  High  Patient's risk as above due to      History of Present Illness:   Patient was seen and examined at the bedside  Patient had small amount hematemesis today morning  Patient states hematemesis is much better than yesterday  As per patient plan for PEG tube placement today  Still on Cardizem drip  Hb stable at 9.9    Objective:      Intake/Output Summary (Last 24 hours) at 2022 0908  Last data filed at 2022 0821  Gross per 24 hour   Intake 1681.17 ml   Output 200 ml   Net 1481.17 ml      Vitals:   Vitals:    22 0800   BP: 97/60   Pulse: 81   Resp: 17   Temp: 98.5 °F (36.9 °C) SpO2: 98%     Physical Exam:   GEN Awake.  Alert , not in respiratory distress, not in pain  HEENT: PEERLA, , supple neck,   Chest: air entry equal bilaterally, no wheezing or crepitation  Heart: S1 and S2 heard, no murmur, no gallop or rub, regular rate  Abdomen: soft, ND , Nt, +BS  Extremities: no cyanosis, tenderness or erythema, peripheral pulses audible  Neurology: alert, oriented x3, able to move 4 limbs    Medications:   Medications:    sodium chloride flush  5-40 mL IntraVENous 2 times per day    promethazine  12.5 mg IntraMUSCular Once    pantoprazole  40 mg IntraVENous BID    insulin glargine  5 Units SubCUTAneous Nightly    insulin lispro  0-6 Units SubCUTAneous Q6H    insulin lispro  0-3 Units SubCUTAneous Nightly    magnesium sulfate  6,000 mg IntraVENous Once      Infusions:    lactated ringers 50 mL/hr at 07/11/22 1830    sodium chloride      dextrose      sodium chloride      dilTIAZem (CARDIZEM) 100 mg in dextrose 5% 100 mL ([facility]) 5 mg/hr (07/12/22 0452)     PRN Meds: sodium chloride flush, 5-40 mL, PRN  sodium chloride, , PRN  ondansetron, 4 mg, Q8H PRN   Or  ondansetron, 4 mg, Q6H PRN  polyethylene glycol, 17 g, Daily PRN  acetaminophen, 650 mg, Q6H PRN   Or  acetaminophen, 650 mg, Q6H PRN  morphine, 1 mg, Q4H PRN  glucose, 4 tablet, PRN  dextrose bolus, 125 mL, PRN   Or  dextrose bolus, 250 mL, PRN  glucagon (rDNA), 1 mg, PRN  dextrose, 100 mL/hr, PRN  sodium chloride, , PRN  hydrALAZINE, 5 mg, Q4H PRN  HYDROmorphone, 1 mg, Q3H PRN  promethazine, 12.5 mg, Q6H PRN          Electronically signed by Carlos Alberto Bolivar MD on 7/12/2022 at 9:08 AM

## 2022-07-12 NOTE — PLAN OF CARE
Problem: Pain  Goal: Verbalizes/displays adequate comfort level or baseline comfort level  Flowsheets (Taken 7/12/2022 0703)  Verbalizes/displays adequate comfort level or baseline comfort level:   Encourage patient to monitor pain and request assistance   Administer analgesics based on type and severity of pain and evaluate response   Assess pain using appropriate pain scale     Problem: Safety - Adult  Goal: Free from fall injury  Flowsheets (Taken 7/12/2022 0703)  Free From Fall Injury: Based on caregiver fall risk screen, instruct family/caregiver to ask for assistance with transferring infant if caregiver noted to have fall risk factors

## 2022-07-12 NOTE — PROGRESS NOTES
Daily Progress Note  Subjective:    Pt. Awake, alert and feeling better  Slight CP with coughing this am but otherwise resolved  SOB improved     Impression and Plan:     Chest pain due to esophageal issues  Likely d/t vomiting and Hematemesis    Hx of CAD s/p CABG in the remote past, also Hx of PAD s/p PTA and carotid disease s/p Rt. CEA    Vitals stable and symptoms improving as vomiting improves    Holding blood thinners currently d/t hematemesis     Will monitor-echo pending for today     Keep on cardizem drip for now  To control heart rate and BP and she is NPO'  Hx of CAD and CABG has normal EF   H/H is stable   Supportive care   No hx of afib -sinus tachycardia noted -resume betablockers once stable       Tachycardia and HTN    ON diltiazem drip this am    Would decrease to 2.5 mg/hr    Monitor vitals and if stable may be able to stop later today and start PO meds if ok with surgery    Hematemesis    Related to EGD with dilation    Improved with phenergan now    General surgery following    Will follow    PAST MEDICAL HISTORY:  The patient has a history of coronary artery  disease. She is status post CABG done four vessels in 2012. History of  having peripheral vascular disease in bilateral legs and interventions  done. History of carotid endarterectomy performed. CABG was done in  2012 of LIMA to LAD, SVG to circ, SVG to PDA, SVG to RCA. She has  diabetes, esophageal strictures present. She had EGD done. The patient  is having solid food dysphagia present. History of renal insufficiency,  sees Dr. Brett Chin for that. Hypertension, hyperlipidemia.     PAST SURGICAL HISTORY:  CABG done four vessels, right carotid  endarterectomy performed. Gallbladder surgery done. Nissen gastric  fundoplication surgery done in 04/2022. EGD done today.     SOCIAL HISTORY:  Does not smoke.   Does not drink.     ALLERGIES:  TETANUS.     HOME MEDICATIONS:  She is on Farxiga, Glucotrol, lisinopril, Crestor,  Coreg, amiodarone, Plavix and aspirin. Most Recent Echo  8/20/21  Summary   Left ventricular systolic function is normal.   Ejection fraction is visually estimated at 50-55%. Mild left ventricular hypertrophy. Grade II diastolic dysfunction. No significant valvular disease noted. No evidence of any pericardial effusion. Most Recent Stress test  11/15/2021  EF 48%, ESV 43,  Mild-to-moderate reversible anterior and apical wall perfusion defect(s): Artifacts decrease sensitivity of this finding. Patient has a large hiatal hernia. .      Most Recent Heart Cath    08/03/2012  Cath (EF.60, 90% Ostial LM, 50% Proximal RI, 0% OM1, 70% Ostial OM2, 70% Proximal RCA, 80% Distal RCA, 70% Proximal PDA, 90% Ostial PLB, 80-90% diffuse stenosis of CX. Plan: Pt. referred for bypass surgery. ) - 8/3/2012    Radiology  CT [id number]  Impression   1. Distended thoracic esophagus with intraluminal enteric contents or blood   products. 2. Mild edema or blood products in the posterior mediastinum in the region of   the distal esophagus.  No pneumomediastinum.  No discrete hematoma. 3. No acute pulmonary abnormality.  Otherwise no acute abnormality in the   abdomen or pelvis on the noncontrast CT.      Objective:   BP 97/60   Pulse 81   Temp 98.5 °F (36.9 °C) (Oral)   Resp 17   Ht 5' 3\" (1.6 m)   Wt 138 lb 14.2 oz (63 kg)   LMP 01/19/2002   SpO2 98%   BMI 24.60 kg/m²     Intake/Output Summary (Last 24 hours) at 7/12/2022 0900  Last data filed at 7/12/2022 9467  Gross per 24 hour   Intake 1681.17 ml   Output 200 ml   Net 1481.17 ml       Medications:   Scheduled Meds:   sodium chloride flush  5-40 mL IntraVENous 2 times per day    promethazine  12.5 mg IntraMUSCular Once    pantoprazole  40 mg IntraVENous BID    insulin glargine  5 Units SubCUTAneous Nightly    insulin lispro  0-6 Units SubCUTAneous Q6H    insulin lispro  0-3 Units SubCUTAneous Nightly    magnesium sulfate  6,000 mg IntraVENous Once Infusions:   lactated ringers 50 mL/hr at 07/11/22 1830    sodium chloride      dextrose      sodium chloride      dilTIAZem (CARDIZEM) 100 mg in dextrose 5% 100 mL ([facility]) 5 mg/hr (07/12/22 0452)      PRN Meds:  sodium chloride flush, sodium chloride, ondansetron **OR** ondansetron, polyethylene glycol, acetaminophen **OR** acetaminophen, morphine, glucose, dextrose bolus **OR** dextrose bolus, glucagon (rDNA), dextrose, sodium chloride, hydrALAZINE, HYDROmorphone, promethazine     Physical Exam:  Vitals:    07/12/22 0800   BP: 97/60   Pulse: 81   Resp: 17   Temp: 98.5 °F (36.9 °C)   SpO2: 98%        General: AAO, NAD  Chest: Nontender  Cardiac: First and Second Heart Sounds are Normal, No Murmurs or Gallops noted  Lungs:Clear to auscultation and percussion. Abdomen: Soft, NT, ND, +BS  Extremities: No clubbing, no edema  Vascular:  Equal 2+ peripheral pulses. Lab Data:  CBC:   Recent Labs     07/11/22  1400 07/11/22  2200 07/12/22  0450   WBC 17.8*  --   --    HGB 11.9* 10.2* 9.9*   HCT 39.3 34.6* 33.7*   MCV 79.6  --   --      --   --      BMP:   Recent Labs     07/11/22  1400 07/11/22  2250 07/12/22  0450     --  136   K 3.1* 3.9 3.7     --  100   CO2 21  --  22   PHOS  --   --  2.6   BUN 19  --  21   CREATININE 1.0  --  0.7     LIVER PROFILE: No results for input(s): AST, ALT, LIPASE, BILIDIR, BILITOT, ALKPHOS in the last 72 hours. Invalid input(s): AMYLASE,  ALB  PT/INR: No results for input(s): PROTIME, INR in the last 72 hours. APTT: No results for input(s): APTT in the last 72 hours. BNP:  No results for input(s): BNP in the last 72 hours.       Assessment:  Patient Active Problem List    Diagnosis Date Noted    Carotid stenosis, right 02/18/2022    Carotid stenosis, bilateral 01/26/2022    Acute hypoxemic respiratory failure due to COVID-19 [facility]) 08/18/2021    Esophageal stricture     Hiatal hernia     Iron deficiency anemia     Iron deficiency anemia secondary to blood loss (chronic) 04/14/2020    Other forms of chronic ischemic heart disease 04/14/2020    Hiatal hernia with GERD 04/14/2020    Personal history of other diseases of the digestive system 04/14/2020    Angular cheilitis 01/05/2020    Microalbuminuria 05/16/2017    [facility] 04/11/2017    Anemia 09/20/2016    Vitamin D deficiency 09/20/2016    Type 2 diabetes mellitus without complication, without long-term current use of insulin ([facility] 09/20/2016    Coronary artery disease involving native heart without angina pectoris 09/20/2016    Essential hypertension 09/20/2016    Mixed hyperlipidemia 09/20/2016       Electronically signed by Khanh Hitchcock PA-C on 7/12/2022 at 9:00 AM     Electronically signed by Graciela Elder MD on 7/12/22 at 11:06 AM EDT

## 2022-07-13 ENCOUNTER — ANESTHESIA EVENT (OUTPATIENT)
Dept: OPERATING ROOM | Age: 72
DRG: 391 | End: 2022-07-13
Payer: MEDICARE

## 2022-07-13 LAB
ANION GAP SERPL CALCULATED.3IONS-SCNC: 8 MMOL/L (ref 4–16)
BASOPHILS ABSOLUTE: 0 K/CU MM
BASOPHILS RELATIVE PERCENT: 0.4 % (ref 0–1)
BUN BLDV-MCNC: 14 MG/DL (ref 6–23)
CALCIUM SERPL-MCNC: 8.4 MG/DL (ref 8.3–10.6)
CHLORIDE BLD-SCNC: 100 MMOL/L (ref 99–110)
CO2: 29 MMOL/L (ref 21–32)
CREAT SERPL-MCNC: 0.8 MG/DL (ref 0.6–1.1)
DIFFERENTIAL TYPE: ABNORMAL
EKG ATRIAL RATE: 63 BPM
EKG DIAGNOSIS: NORMAL
EKG P AXIS: 42 DEGREES
EKG P-R INTERVAL: 136 MS
EKG Q-T INTERVAL: 576 MS
EKG QRS DURATION: 92 MS
EKG QTC CALCULATION (BAZETT): 589 MS
EKG R AXIS: 6 DEGREES
EKG T AXIS: 50 DEGREES
EKG VENTRICULAR RATE: 63 BPM
EOSINOPHILS ABSOLUTE: 0.1 K/CU MM
EOSINOPHILS RELATIVE PERCENT: 0.9 % (ref 0–3)
GFR AFRICAN AMERICAN: >60 ML/MIN/1.73M2
GFR NON-AFRICAN AMERICAN: >60 ML/MIN/1.73M2
GLUCOSE BLD-MCNC: 104 MG/DL (ref 70–99)
GLUCOSE BLD-MCNC: 127 MG/DL (ref 70–99)
GLUCOSE BLD-MCNC: 146 MG/DL (ref 70–99)
GLUCOSE BLD-MCNC: 179 MG/DL (ref 70–99)
GLUCOSE BLD-MCNC: 224 MG/DL (ref 70–99)
HCT VFR BLD CALC: 28.1 % (ref 37–47)
HCT VFR BLD CALC: 30 % (ref 37–47)
HEMOGLOBIN: 8.3 GM/DL (ref 12.5–16)
HEMOGLOBIN: 8.8 GM/DL (ref 12.5–16)
IMMATURE NEUTROPHIL %: 0.3 % (ref 0–0.43)
LYMPHOCYTES ABSOLUTE: 1.7 K/CU MM
LYMPHOCYTES RELATIVE PERCENT: 21.8 % (ref 24–44)
MCH RBC QN AUTO: 24 PG (ref 27–31)
MCHC RBC AUTO-ENTMCNC: 29.3 % (ref 32–36)
MCV RBC AUTO: 81.7 FL (ref 78–100)
MONOCYTES ABSOLUTE: 0.6 K/CU MM
MONOCYTES RELATIVE PERCENT: 7.2 % (ref 0–4)
NUCLEATED RBC %: 0 %
PDW BLD-RTO: 17.2 % (ref 11.7–14.9)
PLATELET # BLD: 188 K/CU MM (ref 140–440)
PMV BLD AUTO: 11.4 FL (ref 7.5–11.1)
POTASSIUM SERPL-SCNC: 3.4 MMOL/L (ref 3.5–5.1)
RBC # BLD: 3.67 M/CU MM (ref 4.2–5.4)
SEGMENTED NEUTROPHILS ABSOLUTE COUNT: 5.4 K/CU MM
SEGMENTED NEUTROPHILS RELATIVE PERCENT: 69.4 % (ref 36–66)
SODIUM BLD-SCNC: 137 MMOL/L (ref 135–145)
TOTAL IMMATURE NEUTOROPHIL: 0.02 K/CU MM
TOTAL NUCLEATED RBC: 0 K/CU MM
WBC # BLD: 7.8 K/CU MM (ref 4–10.5)

## 2022-07-13 PROCEDURE — 76937 US GUIDE VASCULAR ACCESS: CPT

## 2022-07-13 PROCEDURE — 85014 HEMATOCRIT: CPT

## 2022-07-13 PROCEDURE — 44186 LAP JEJUNOSTOMY: CPT | Performed by: SURGERY

## 2022-07-13 PROCEDURE — 99233 SBSQ HOSP IP/OBS HIGH 50: CPT | Performed by: PHYSICIAN ASSISTANT

## 2022-07-13 PROCEDURE — 6360000002 HC RX W HCPCS: Performed by: STUDENT IN AN ORGANIZED HEALTH CARE EDUCATION/TRAINING PROGRAM

## 2022-07-13 PROCEDURE — 80048 BASIC METABOLIC PNL TOTAL CA: CPT

## 2022-07-13 PROCEDURE — 36415 COLL VENOUS BLD VENIPUNCTURE: CPT

## 2022-07-13 PROCEDURE — G0378 HOSPITAL OBSERVATION PER HR: HCPCS

## 2022-07-13 PROCEDURE — 85018 HEMOGLOBIN: CPT

## 2022-07-13 PROCEDURE — 2580000003 HC RX 258: Performed by: SURGERY

## 2022-07-13 PROCEDURE — 6370000000 HC RX 637 (ALT 250 FOR IP): Performed by: INTERNAL MEDICINE

## 2022-07-13 PROCEDURE — 96376 TX/PRO/DX INJ SAME DRUG ADON: CPT

## 2022-07-13 PROCEDURE — C9113 INJ PANTOPRAZOLE SODIUM, VIA: HCPCS | Performed by: STUDENT IN AN ORGANIZED HEALTH CARE EDUCATION/TRAINING PROGRAM

## 2022-07-13 PROCEDURE — 85025 COMPLETE CBC W/AUTO DIFF WBC: CPT

## 2022-07-13 PROCEDURE — 93010 ELECTROCARDIOGRAM REPORT: CPT | Performed by: INTERNAL MEDICINE

## 2022-07-13 PROCEDURE — 82962 GLUCOSE BLOOD TEST: CPT

## 2022-07-13 PROCEDURE — 93005 ELECTROCARDIOGRAM TRACING: CPT | Performed by: INTERNAL MEDICINE

## 2022-07-13 PROCEDURE — 96366 THER/PROPH/DIAG IV INF ADDON: CPT

## 2022-07-13 RX ORDER — AMIODARONE HYDROCHLORIDE 200 MG/1
200 TABLET ORAL DAILY
Status: DISCONTINUED | OUTPATIENT
Start: 2022-07-13 | End: 2022-07-17 | Stop reason: HOSPADM

## 2022-07-13 RX ORDER — CARVEDILOL 6.25 MG/1
6.25 TABLET ORAL 2 TIMES DAILY
Status: DISCONTINUED | OUTPATIENT
Start: 2022-07-13 | End: 2022-07-17 | Stop reason: HOSPADM

## 2022-07-13 RX ADMIN — CARVEDILOL 6.25 MG: 6.25 TABLET, FILM COATED ORAL at 09:29

## 2022-07-13 RX ADMIN — AMIODARONE HYDROCHLORIDE 200 MG: 200 TABLET ORAL at 09:29

## 2022-07-13 RX ADMIN — SODIUM CHLORIDE, PRESERVATIVE FREE 10 ML: 5 INJECTION INTRAVENOUS at 09:29

## 2022-07-13 RX ADMIN — POTASSIUM BICARBONATE 50 MEQ: 782 TABLET, EFFERVESCENT ORAL at 16:16

## 2022-07-13 RX ADMIN — PANTOPRAZOLE SODIUM 40 MG: 40 INJECTION, POWDER, LYOPHILIZED, FOR SOLUTION INTRAVENOUS at 09:29

## 2022-07-13 RX ADMIN — PANTOPRAZOLE SODIUM 40 MG: 40 INJECTION, POWDER, LYOPHILIZED, FOR SOLUTION INTRAVENOUS at 21:09

## 2022-07-13 RX ADMIN — SODIUM CHLORIDE, PRESERVATIVE FREE 10 ML: 5 INJECTION INTRAVENOUS at 21:09

## 2022-07-13 RX ADMIN — CARVEDILOL 6.25 MG: 6.25 TABLET, FILM COATED ORAL at 21:09

## 2022-07-13 ASSESSMENT — ENCOUNTER SYMPTOMS
BACK PAIN: 0
CHOKING: 0
SORE THROAT: 0
VOMITING: 0
EYE ITCHING: 0
PHOTOPHOBIA: 0
COLOR CHANGE: 0
CONSTIPATION: 0
APNEA: 0
STRIDOR: 0
TROUBLE SWALLOWING: 1
BLOOD IN STOOL: 0
ANAL BLEEDING: 0
EYE REDNESS: 0
ABDOMINAL PAIN: 0
DIARRHEA: 0
RECTAL PAIN: 0
NAUSEA: 0

## 2022-07-13 ASSESSMENT — PAIN SCALES - GENERAL: PAINLEVEL_OUTOF10: 0

## 2022-07-13 NOTE — PROGRESS NOTES
Hospitalist Progress Note      Name:  Lurdes Pillai /Age/Sex: 1950  (66 y.o. female)   MRN & CSN:  [id number] & [id number] Admission Date/Time: 2022  6:07 AM   Location:  [facility] PCP: Faiza De Los Santos Dr Day: 3    Assessment and Plan:       Acute hematemesis status post esophageal dilation for esophageal stricture  CT abdomen pelvis showed distended thoracic esophagus with intraluminal enteric content or blood product  Hemoglobin stable  Currently patient stopped having hemoptysis  Recheck H&H 1 more time and daily CBC thereafter  General surgery recs appreciated. Patient to undergo G-tube placement this admission, likely tomorrow. History of atrial fibrillation  Currently normal sinus rhythm. Was on diltiazem drip overnight. Stopped this morning. Resume home Coreg. Not on anticoagulation due to bleeding risk. Would resume amiodarone once patient QTC is less than 500. Prolonged QTC  Likely due to electrolyte abnormalities. Replace potassium  Keep potassium more than 4, magnesium more than 2. Insulin-dependent type 2 diabetes   continue on Lantus 5 unit nightly and low-dose sliding scale. Hypertension  Blood pressure well controlled. Acute blood loss anemia secondary to hematemesis after esophageal dilation:  Continue to monitor hemoglobin transfuse for hemoglobin less than 7          Diet ADULT DIET; Clear Liquid   DVT Prophylaxis [] Lovenox, []  Heparin, [x] SCDs, [] Ambulation   GI Prophylaxis [x] PPI,  [] H2 Blocker,  [] Carafate,  [] Diet/Tube Feeds   Code Status Full Code   Disposition Patient requires continued admission due to    MDM [] Low, [] Moderate,[]  High  Patient's risk as above due to      History of Present Illness:     Patient seen and examined on bedside. Patient is awake and alert. Reports no hemoptysis since last night. Patient has been allowed on clear liquid diet. Patient tolerating well.      Objective:   No intake or output data in the 24 hours ending 07/13/22 1358   Vitals:   Vitals:    07/13/22 0930   BP: 127/78   Pulse: 71   Resp: 15   Temp: 98.1 °F (36.7 °C)   SpO2:      Physical Exam:   GEN Awake.  Alert , not in respiratory distress, not in pain  HEENT: PEERLA, , supple neck,   Chest: air entry equal bilaterally, no wheezing or crepitation  Heart: S1 and S2 heard, no murmur, no gallop or rub, regular rate  Abdomen: soft, ND , Nt, +BS  Extremities: no cyanosis, tenderness or erythema, peripheral pulses audible  Neurology: alert, oriented x3, able to move 4 limbs    Medications:   Medications:    carvedilol  6.25 mg Oral BID    amiodarone  200 mg Oral Daily    potassium bicarbonate  50 mEq Oral Once    sodium chloride flush  5-40 mL IntraVENous 2 times per day    promethazine  12.5 mg IntraMUSCular Once    pantoprazole  40 mg IntraVENous BID    insulin glargine  5 Units SubCUTAneous Nightly    insulin lispro  0-6 Units SubCUTAneous Q6H    insulin lispro  0-3 Units SubCUTAneous Nightly      Infusions:    lactated ringers 50 mL/hr at 07/12/22 1244    sodium chloride      dextrose       PRN Meds: sodium chloride flush, 5-40 mL, PRN  sodium chloride, , PRN  ondansetron, 4 mg, Q8H PRN   Or  ondansetron, 4 mg, Q6H PRN  polyethylene glycol, 17 g, Daily PRN  acetaminophen, 650 mg, Q6H PRN   Or  acetaminophen, 650 mg, Q6H PRN  morphine, 1 mg, Q4H PRN  glucose, 4 tablet, PRN  dextrose bolus, 125 mL, PRN   Or  dextrose bolus, 250 mL, PRN  glucagon (rDNA), 1 mg, PRN  dextrose, 100 mL/hr, PRN  hydrALAZINE, 5 mg, Q4H PRN  HYDROmorphone, 1 mg, Q3H PRN  promethazine, 12.5 mg, Q6H PRN          Electronically signed by Barbara Malcolm MD on 7/13/2022 at 1:58 PM

## 2022-07-13 NOTE — PROGRESS NOTES
GENERAL SURGERY PROGRESS NOTE    Tanja Allen is a 66 y.o. female with 2 Days Post-Op EGD with balloon dilation. Subjective:    Pain: 0/10  Diet: ADULT DIET; Clear Liquid    Pt denies abd pain, N/V. Tolerating popsicles, and has been able to swallow pills. Hgb today is 8.3. Review of Systems   Constitutional: Positive for fatigue. Negative for chills and fever. HENT: Positive for trouble swallowing (tolerating PO meds currently). Negative for ear pain, mouth sores, sore throat and tinnitus. Eyes: Negative for photophobia, redness and itching. Respiratory: Negative for apnea, choking and stridor. Cardiovascular: Negative for chest pain and palpitations. Gastrointestinal: Negative for abdominal pain, anal bleeding, blood in stool, constipation, diarrhea, nausea, rectal pain and vomiting. Endocrine: Negative for polydipsia. Genitourinary: Negative for enuresis, flank pain and hematuria. Musculoskeletal: Negative for back pain, joint swelling and myalgias. Skin: Negative for color change and pallor. Allergic/Immunologic: Negative for environmental allergies. Neurological: Negative for syncope and speech difficulty. Psychiatric/Behavioral: Negative for confusion and hallucinations. Objective:    Vitals: VITALS:  /78   Pulse 71   Temp 98.1 °F (36.7 °C) (Oral)   Resp 15   Ht 5' 3\" (1.6 m)   Wt 138 lb 14.2 oz (63 kg)   LMP 2002   SpO2 98%   BMI 24.60 kg/m²   TEMPERATURE:  Current - Temp: 98.1 °F (36.7 °C);  Max - Temp  Av.5 °F (36.9 °C)  Min: 98.1 °F (36.7 °C)  Max: 99 °F (37.2 °C)    I/O:  07 -  0700  In: 10 [I.V.:10]  Out: -     Labs/Imaging Results:   Lab Results   Component Value Date    WBC 17.8 (H) 2022    HGB 8.3 (L) 2022    HCT 28.1 (L) 2022    MCV 79.6 2022     2022     Lab Results   Component Value Date     2022    K 3.7 2022     2022    CO2 22 2022 BUN 21 07/12/2022    CREATININE 0.7 07/12/2022    GLUCOSE 192 (H) 07/12/2022    CALCIUM 8.2 (L) 07/12/2022    PROT 6.4 04/07/2022    LABALBU 2.9 (L) 07/12/2022    BILITOT 0.6 04/07/2022    ALKPHOS 97 04/07/2022    AST 12 (L) 04/07/2022    ALT 12 04/07/2022    LABGLOM >60 07/12/2022    GFRAA >60 07/12/2022    AGRATIO 1.2 04/07/2022    GLOB 2.5 09/25/2020       Scheduled Meds:   carvedilol, 6.25 mg, Oral, BID    amiodarone, 200 mg, Oral, Daily    sodium chloride flush, 5-40 mL, IntraVENous, 2 times per day    promethazine, 12.5 mg, IntraMUSCular, Once    pantoprazole, 40 mg, IntraVENous, BID    insulin glargine, 5 Units, SubCUTAneous, Nightly    insulin lispro, 0-6 Units, SubCUTAneous, Q6H    insulin lispro, 0-3 Units, SubCUTAneous, Nightly    Physical Exam:  Physical Exam  Constitutional:       Appearance: She is well-developed. HENT:      Head: Normocephalic. Eyes:      Pupils: Pupils are equal, round, and reactive to light. Cardiovascular:      Rate and Rhythm: Normal rate. Pulmonary:      Effort: Pulmonary effort is normal.   Abdominal:      General: There is no distension. Palpations: Abdomen is soft. There is no mass. Tenderness: There is no abdominal tenderness. There is no guarding or rebound. Musculoskeletal:         General: Normal range of motion. Cervical back: Normal range of motion and neck supple. Skin:     General: Skin is warm. Neurological:      Mental Status: She is alert and oriented to person, place, and time.            Assessment and Plan:    Patient Active Problem List:     Anemia     Vitamin D deficiency     Type 2 diabetes mellitus without complication, without long-term current use of insulin ([address]     Coronary artery disease involving native heart without angina pectoris     Essential hypertension     Mixed hyperlipidemia     PVD (peripheral vascular disease) (HCC)     Microalbuminuria     Angular cheilitis     Iron deficiency anemia secondary to blood loss (chronic)     Other forms of chronic ischemic heart disease     Hiatal hernia with GERD     Personal history of other diseases of the digestive system     Esophageal stricture     Hiatal hernia     Iron deficiency anemia     Acute hypoxemic respiratory failure due to COVID-19 [facility])     Carotid stenosis, bilateral     Carotid stenosis, right    Pt would benefit from outpatient consult at a tertiary center such as [facility] or [facility] for possible esophageal reconstruction. Have discussed J-tube at length with the pt, and she is leaning toward placement of J-tube during this admission. Will tentatively plan for robotic assisted J-tube placement in the OR tomorrow AM.      Diet: Will start clears today. Activity: Increase as tolerated. Abx: None  Med changes: None   Labs/Imaging: Reviewed  Disposition: As above.        Micaela Up PA-C

## 2022-07-13 NOTE — PROGRESS NOTES
Comprehensive Nutrition Assessment    Type and Reason for Visit:  Positive Nutrition Screen,Initial (weight loss, reduced intake)    Nutrition Recommendations/Plan:   1. Resume liquid diet as tolerates   2. Consider peptide based EN   3. Will continue to follow up during stay, reassess nutrition status       Malnutrition Assessment:  Malnutrition Status:  Insufficient data (07/13/22 1430)    Context:  Chronic Illness       Nutrition Assessment:    Admit with hx dysphagia with solid foods, tolerating only liquids prior to admit. Now s/p EGD with dilation. Able to have clear liquid diet today but will be NPO tomorrow for J-tube. As EN starts consider peptide based formula, continuous feeding with J-tube. Initial goal rate at 50 ml/hr will meet estimated needs to provide 1440 calories, 90 g protein. Will follow at high nutrition risk at this time. Nutrition Related Findings:    not available on attempted visits today-receiving care, hx DM, CAD, HTN, hiatal hernia, nissen fundiplication    MV6.9 Wound Type: None       Current Nutrition Intake & Therapies:    Average Meal Intake: Unable to assess  Average Supplements Intake: None Ordered  ADULT DIET; Clear Liquid  Diet NPO    Anthropometric Measures:  Height: 5' 3\" (160 cm)  Ideal Body Weight (IBW): 115 lbs (52 kg)    Admission Body Weight: 138 lb 14.2 oz (63 kg)  Current Body Weight: 138 lb 14.2 oz (63 kg), 120.8 % IBW.  Weight Source: Bed Scale  Current BMI (kg/m2): 24.6  Usual Body Weight: 170 lb (77.1 kg) (per hx last June)  % Weight Change (Calculated): -18.3  Weight Adjustment For: No Adjustment                 BMI Categories: Normal Weight (BMI 22.0 to 24.9) age over 72    Estimated Daily Nutrient Needs:  Energy Requirements Based On: Kcal/kg  Weight Used for Energy Requirements: Current  Energy (kcal/day): 7964-4409 (25-30 eusebia/kg)  Weight Used for Protein Requirements: Current  Protein (g/day): 63-75 (1-1.2 g/kg)  Method Used for Fluid Requirements: 1 ml/kcal  Fluid (ml/day): 1600    Nutrition Diagnosis:   · Predicted inadequate energy intake related to altered GI function,altered GI structure as evidenced by NPO or clear liquid status due to medical condition      Nutrition Interventions:   Food and/or Nutrient Delivery: Continue Current Diet  Nutrition Education/Counseling: No recommendation at this time  Coordination of Nutrition Care: Continue to monitor while inpatient       Goals:     Goals: Meet at least 75% of estimated needs,prior to discharge       Nutrition Monitoring and Evaluation:   Behavioral-Environmental Outcomes: None Identified  Food/Nutrient Intake Outcomes: Diet Advancement/Tolerance,Enteral Nutrition Intake/Tolerance  Physical Signs/Symptoms Outcomes: Biochemical Data,[facility] [address] [facility] [address] Status,Weight,Skin    Discharge Planning:     Too soon to determine     Luiza Brumfield RD, LD  Contact: [phone]

## 2022-07-13 NOTE — PROGRESS NOTES
Patient arrived from ICU at 1400, Alert and pleasant. Family at bedside. Voices no c/o at this time.

## 2022-07-13 NOTE — PROGRESS NOTES
Pt tolerated pills crushed in water this morning. No issues, no pain.    Dinorah Crow RN   9:38 AM  7/13/2022

## 2022-07-13 NOTE — PROGRESS NOTES
Daily Progress Note    Patient is awake alert--doing better  No more hemoptysis-to tolerate oral diet  Following by surgery  Remains in sinus rhythm off Cardizem--back on oral Coreg-she is on amiodarone at home-resume  Surgery note noted--plan for J-tube placement tomorrow  Coronary disease status post CABG-stable carotid endarterectomy  Holding off on anticoagulation now-due to GI issues  Labs pending from today  Rate and blood pressure stable      Most Recent Echo  8/20/21  Summary   Left ventricular systolic function is normal.   Ejection fraction is visually estimated at 50-55%.   Mild left ventricular hypertrophy.   Grade II diastolic dysfunction.   No significant valvular disease noted.   No evidence of any pericardial effusion.     Most Recent Stress test  11/15/2021  EF 48%, ESV 43,  Mild-to-moderate reversible anterior and apical wall perfusion defect(s): Artifacts decrease sensitivity of this finding. Patient has a large hiatal hernia. .        Most Recent Heart Cath     08/03/2012  Cath (EF.60, 90% Ostial LM, 50% Proximal RI, 0% OM1, 70% Ostial OM2, 70% Proximal RCA, 80% Distal RCA, 70% Proximal PDA, 90% Ostial PLB, 80-90% diffuse stenosis of CX. Plan: Pt. referred for bypass surgery. ) - 8/3/2012        PAST MEDICAL HISTORY:  The patient has a history of coronary artery  disease.  She is status post CABG done four vessels in 2012.  History of  having peripheral vascular disease in bilateral legs and interventions  done.  History of carotid endarterectomy performed.   CABG was done in  2012 of LIMA to LAD, SVG to circ, SVG to PDA, SVG to RCA.  She has  diabetes, esophageal strictures present. Monica Cruz had EGD done.  The patient  is having solid food dysphagia present.  History of renal insufficiency,  sees Dr. Pedro Begum for that.  Hypertension, hyperlipidemia.     PAST SURGICAL HISTORY:  CABG done four vessels, right carotid  endarterectomy performed.  Gallbladder surgery done.  Nissen gastric  fundoplication surgery done in 04/2022.  EGD done today.     SOCIAL HISTORY:  Does not smoke.  Does not drink.     ALLERGIES:  TETANUS.     HOME MEDICATIONS: Luciano Yeboah is on Farxiga, Glucotrol, lisinopril, Crestor,  Coreg, amiodarone, Plavix and aspirin    Objective:   /78   Pulse 71   Temp 98.1 °F (36.7 °C) (Oral)   Resp 15   Ht 5' 3\" (1.6 m)   Wt 138 lb 14.2 oz (63 kg)   LMP 01/19/2002   SpO2 98%   BMI 24.60 kg/m²   No intake or output data in the 24 hours ending 07/13/22 1134    Medications:   Scheduled Meds:   carvedilol  6.25 mg Oral BID    amiodarone  200 mg Oral Daily    sodium chloride flush  5-40 mL IntraVENous 2 times per day    promethazine  12.5 mg IntraMUSCular Once    pantoprazole  40 mg IntraVENous BID    insulin glargine  5 Units SubCUTAneous Nightly    insulin lispro  0-6 Units SubCUTAneous Q6H    insulin lispro  0-3 Units SubCUTAneous Nightly      Infusions:   lactated ringers 50 mL/hr at 07/12/22 1244    sodium chloride      dextrose        PRN Meds:  sodium chloride flush, sodium chloride, ondansetron **OR** ondansetron, polyethylene glycol, acetaminophen **OR** acetaminophen, morphine, glucose, dextrose bolus **OR** dextrose bolus, glucagon (rDNA), dextrose, hydrALAZINE, HYDROmorphone, promethazine       Physical Exam:  Vitals:    07/13/22 0930   BP: 127/78   Pulse: 71   Resp: 15   Temp: 98.1 °F (36.7 °C)   SpO2:         General: Awake alert  Chest: Nontender  Cardiac: Sinus rhythm  Lungs:Clear to auscultation and percussion. Abdomen: Soft, NT, ND, +BS  Extremities: No edema  Vascular:  Equal 2+ peripheral pulses. Lab Data:  CBC:   Recent Labs     07/11/22  1400 07/11/22  2200 07/12/22  0450 07/12/22  1400 07/13/22  0714   WBC 17.8*  --   --   --   --    HGB 11.9*   < > 9.9* 10.5* 8.3*   HCT 39.3   < > 33.7* 35.1* 28.1*   MCV 79.6  --   --   --   --      --   --   --   --     < > = values in this interval not displayed.      BMP:   Recent Labs     07/11/22  1400 07/11/22  5508 07/12/22  0450     --  136   K 3.1* 3.9 3.7     --  100   CO2 21  --  22   PHOS  --   --  2.6   BUN 19  --  21   CREATININE 1.0  --  0.7     LIVER PROFILE: No results for input(s): AST, ALT, LIPASE, BILIDIR, BILITOT, ALKPHOS in the last 72 hours. Invalid input(s): AMYLASE,  ALB  PT/INR: No results for input(s): PROTIME, INR in the last 72 hours. APTT: No results for input(s): APTT in the last 72 hours. BNP:  No results for input(s): BNP in the last 72 hours.       Assessment:  Patient Active Problem List    Diagnosis Date Noted    Carotid stenosis, right 02/18/2022    Carotid stenosis, bilateral 01/26/2022    Acute hypoxemic respiratory failure due to COVID-19 [facility]) 08/18/2021    Esophageal stricture     Hiatal hernia     Iron deficiency anemia     Iron deficiency anemia secondary to blood loss (chronic) 04/14/2020    Other forms of chronic ischemic heart disease 04/14/2020    Hiatal hernia with GERD 04/14/2020    Personal history of other diseases of the digestive system 04/14/2020    Angular cheilitis 01/05/2020    Microalbuminuria 05/16/2017    [facility] 04/11/2017    Anemia 09/20/2016    Vitamin D deficiency 09/20/2016    Type 2 diabetes mellitus without complication, without long-term current use of insulin ([facility] 09/20/2016    Coronary artery disease involving native heart without angina pectoris 09/20/2016    Essential hypertension 09/20/2016    Mixed hyperlipidemia 09/20/2016       Javier Tapia MD, MD 7/13/2022 11:34 AM

## 2022-07-13 NOTE — PROGRESS NOTES
Patient has elected to proceed with J- Tube placement tomorrow. Will make NPO at midnight.  Surgery scheduled at 7:30am.     Nevaeh Waggoner PA-C

## 2022-07-13 NOTE — CONSULTS
Consult completed. Nexiva 20g 1.75 inch peripheral IV inserted into right forearm x 1 attempt with ultrasound guidance. Brisk blood return, flushes without resistance. Patient tolerated well.

## 2022-07-14 ENCOUNTER — ANESTHESIA (OUTPATIENT)
Dept: OPERATING ROOM | Age: 72
DRG: 391 | End: 2022-07-14
Payer: MEDICARE

## 2022-07-14 LAB
ANION GAP SERPL CALCULATED.3IONS-SCNC: 10 MMOL/L (ref 4–16)
BUN BLDV-MCNC: 14 MG/DL (ref 6–23)
CALCIUM SERPL-MCNC: 7.9 MG/DL (ref 8.3–10.6)
CHLORIDE BLD-SCNC: 102 MMOL/L (ref 99–110)
CO2: 25 MMOL/L (ref 21–32)
CREAT SERPL-MCNC: 0.9 MG/DL (ref 0.6–1.1)
EKG ATRIAL RATE: 61 BPM
EKG ATRIAL RATE: 70 BPM
EKG DIAGNOSIS: NORMAL
EKG DIAGNOSIS: NORMAL
EKG P AXIS: 39 DEGREES
EKG P AXIS: 58 DEGREES
EKG P-R INTERVAL: 130 MS
EKG P-R INTERVAL: 148 MS
EKG Q-T INTERVAL: 422 MS
EKG Q-T INTERVAL: 476 MS
EKG QRS DURATION: 90 MS
EKG QRS DURATION: 90 MS
EKG QTC CALCULATION (BAZETT): 455 MS
EKG QTC CALCULATION (BAZETT): 479 MS
EKG R AXIS: -1 DEGREES
EKG R AXIS: 8 DEGREES
EKG T AXIS: 31 DEGREES
EKG T AXIS: 88 DEGREES
EKG VENTRICULAR RATE: 61 BPM
EKG VENTRICULAR RATE: 70 BPM
GFR AFRICAN AMERICAN: >60 ML/MIN/1.73M2
GFR NON-AFRICAN AMERICAN: >60 ML/MIN/1.73M2
GLUCOSE BLD-MCNC: 126 MG/DL (ref 70–99)
GLUCOSE BLD-MCNC: 149 MG/DL (ref 70–99)
GLUCOSE BLD-MCNC: 209 MG/DL (ref 70–99)
GLUCOSE BLD-MCNC: 227 MG/DL (ref 70–99)
GLUCOSE BLD-MCNC: 229 MG/DL (ref 70–99)
GLUCOSE BLD-MCNC: 259 MG/DL (ref 70–99)
GLUCOSE BLD-MCNC: 283 MG/DL (ref 70–99)
HCT VFR BLD CALC: 25.5 % (ref 37–47)
HCT VFR BLD CALC: 26.9 % (ref 37–47)
HCT VFR BLD CALC: 28.6 % (ref 37–47)
HEMOGLOBIN: 7.3 GM/DL (ref 12.5–16)
HEMOGLOBIN: 7.9 GM/DL (ref 12.5–16)
HEMOGLOBIN: 8.3 GM/DL (ref 12.5–16)
LACTATE: 1.7 MMOL/L (ref 0.4–2)
LACTATE: 3.2 MMOL/L (ref 0.4–2)
MCH RBC QN AUTO: 24.1 PG (ref 27–31)
MCHC RBC AUTO-ENTMCNC: 29.4 % (ref 32–36)
MCV RBC AUTO: 82 FL (ref 78–100)
PDW BLD-RTO: 16.8 % (ref 11.7–14.9)
PLATELET # BLD: 243 K/CU MM (ref 140–440)
PMV BLD AUTO: 12.4 FL (ref 7.5–11.1)
POTASSIUM SERPL-SCNC: 4.4 MMOL/L (ref 3.5–5.1)
RBC # BLD: 3.28 M/CU MM (ref 4.2–5.4)
SODIUM BLD-SCNC: 137 MMOL/L (ref 135–145)
WBC # BLD: 9.5 K/CU MM (ref 4–10.5)

## 2022-07-14 PROCEDURE — 85027 COMPLETE CBC AUTOMATED: CPT

## 2022-07-14 PROCEDURE — C9113 INJ PANTOPRAZOLE SODIUM, VIA: HCPCS | Performed by: STUDENT IN AN ORGANIZED HEALTH CARE EDUCATION/TRAINING PROGRAM

## 2022-07-14 PROCEDURE — 7100000000 HC PACU RECOVERY - FIRST 15 MIN: Performed by: SURGERY

## 2022-07-14 PROCEDURE — 7100000001 HC PACU RECOVERY - ADDTL 15 MIN: Performed by: SURGERY

## 2022-07-14 PROCEDURE — 85018 HEMOGLOBIN: CPT

## 2022-07-14 PROCEDURE — 83605 ASSAY OF LACTIC ACID: CPT

## 2022-07-14 PROCEDURE — 8E0W4CZ ROBOTIC ASSISTED PROCEDURE OF TRUNK REGION, PERCUTANEOUS ENDOSCOPIC APPROACH: ICD-10-PCS | Performed by: SURGERY

## 2022-07-14 PROCEDURE — 94761 N-INVAS EAR/PLS OXIMETRY MLT: CPT

## 2022-07-14 PROCEDURE — 3700000001 HC ADD 15 MINUTES (ANESTHESIA): Performed by: SURGERY

## 2022-07-14 PROCEDURE — 80048 BASIC METABOLIC PNL TOTAL CA: CPT

## 2022-07-14 PROCEDURE — 3700000000 HC ANESTHESIA ATTENDED CARE: Performed by: SURGERY

## 2022-07-14 PROCEDURE — G0378 HOSPITAL OBSERVATION PER HR: HCPCS

## 2022-07-14 PROCEDURE — 93010 ELECTROCARDIOGRAM REPORT: CPT | Performed by: INTERNAL MEDICINE

## 2022-07-14 PROCEDURE — 6360000002 HC RX W HCPCS

## 2022-07-14 PROCEDURE — 82962 GLUCOSE BLOOD TEST: CPT

## 2022-07-14 PROCEDURE — 0D758ZZ DILATION OF ESOPHAGUS, VIA NATURAL OR ARTIFICIAL OPENING ENDOSCOPIC: ICD-10-PCS | Performed by: SURGERY

## 2022-07-14 PROCEDURE — 85014 HEMATOCRIT: CPT

## 2022-07-14 PROCEDURE — 3600000019 HC SURGERY ROBOT ADDTL 15MIN: Performed by: SURGERY

## 2022-07-14 PROCEDURE — 1200000000 HC SEMI PRIVATE

## 2022-07-14 PROCEDURE — 6360000002 HC RX W HCPCS: Performed by: STUDENT IN AN ORGANIZED HEALTH CARE EDUCATION/TRAINING PROGRAM

## 2022-07-14 PROCEDURE — 44186 LAP JEJUNOSTOMY: CPT | Performed by: PHYSICIAN ASSISTANT

## 2022-07-14 PROCEDURE — 2720000010 HC SURG SUPPLY STERILE: Performed by: SURGERY

## 2022-07-14 PROCEDURE — 2500000003 HC RX 250 WO HCPCS: Performed by: SURGERY

## 2022-07-14 PROCEDURE — S2900 ROBOTIC SURGICAL SYSTEM: HCPCS | Performed by: SURGERY

## 2022-07-14 PROCEDURE — 2140000000 HC CCU INTERMEDIATE R&B

## 2022-07-14 PROCEDURE — 36415 COLL VENOUS BLD VENIPUNCTURE: CPT

## 2022-07-14 PROCEDURE — 2580000003 HC RX 258: Performed by: INTERNAL MEDICINE

## 2022-07-14 PROCEDURE — 6370000000 HC RX 637 (ALT 250 FOR IP): Performed by: SURGERY

## 2022-07-14 PROCEDURE — APPNB180 APP NON BILLABLE TIME > 60 MINS: Performed by: PHYSICIAN ASSISTANT

## 2022-07-14 PROCEDURE — 6360000002 HC RX W HCPCS: Performed by: SURGERY

## 2022-07-14 PROCEDURE — 2500000003 HC RX 250 WO HCPCS

## 2022-07-14 PROCEDURE — 3600000009 HC SURGERY ROBOT BASE: Performed by: SURGERY

## 2022-07-14 PROCEDURE — 93005 ELECTROCARDIOGRAM TRACING: CPT | Performed by: INTERNAL MEDICINE

## 2022-07-14 PROCEDURE — 0DHA3UZ INSERTION OF FEEDING DEVICE INTO JEJUNUM, PERCUTANEOUS APPROACH: ICD-10-PCS | Performed by: SURGERY

## 2022-07-14 PROCEDURE — 2709999900 HC NON-CHARGEABLE SUPPLY: Performed by: SURGERY

## 2022-07-14 PROCEDURE — 6370000000 HC RX 637 (ALT 250 FOR IP): Performed by: STUDENT IN AN ORGANIZED HEALTH CARE EDUCATION/TRAINING PROGRAM

## 2022-07-14 RX ORDER — BUPIVACAINE HYDROCHLORIDE 2.5 MG/ML
INJECTION, SOLUTION EPIDURAL; INFILTRATION; INTRACAUDAL
Status: COMPLETED | OUTPATIENT
Start: 2022-07-14 | End: 2022-07-14

## 2022-07-14 RX ORDER — PROCHLORPERAZINE EDISYLATE 5 MG/ML
5 INJECTION INTRAMUSCULAR; INTRAVENOUS
Status: DISCONTINUED | OUTPATIENT
Start: 2022-07-14 | End: 2022-07-14 | Stop reason: HOSPADM

## 2022-07-14 RX ORDER — FENTANYL CITRATE 50 UG/ML
INJECTION, SOLUTION INTRAMUSCULAR; INTRAVENOUS PRN
Status: DISCONTINUED | OUTPATIENT
Start: 2022-07-14 | End: 2022-07-14 | Stop reason: SDUPTHER

## 2022-07-14 RX ORDER — CEFAZOLIN SODIUM 1 G/3ML
INJECTION, POWDER, FOR SOLUTION INTRAMUSCULAR; INTRAVENOUS PRN
Status: DISCONTINUED | OUTPATIENT
Start: 2022-07-14 | End: 2022-07-14 | Stop reason: SDUPTHER

## 2022-07-14 RX ORDER — DIPHENHYDRAMINE HYDROCHLORIDE 50 MG/ML
12.5 INJECTION INTRAMUSCULAR; INTRAVENOUS
Status: DISCONTINUED | OUTPATIENT
Start: 2022-07-14 | End: 2022-07-14 | Stop reason: HOSPADM

## 2022-07-14 RX ORDER — HYDRALAZINE HYDROCHLORIDE 20 MG/ML
10 INJECTION INTRAMUSCULAR; INTRAVENOUS
Status: DISCONTINUED | OUTPATIENT
Start: 2022-07-14 | End: 2022-07-14 | Stop reason: HOSPADM

## 2022-07-14 RX ORDER — ROCURONIUM BROMIDE 10 MG/ML
INJECTION, SOLUTION INTRAVENOUS PRN
Status: DISCONTINUED | OUTPATIENT
Start: 2022-07-14 | End: 2022-07-14 | Stop reason: SDUPTHER

## 2022-07-14 RX ORDER — PROPOFOL 10 MG/ML
INJECTION, EMULSION INTRAVENOUS PRN
Status: DISCONTINUED | OUTPATIENT
Start: 2022-07-14 | End: 2022-07-14 | Stop reason: SDUPTHER

## 2022-07-14 RX ORDER — IPRATROPIUM BROMIDE AND ALBUTEROL SULFATE 2.5; .5 MG/3ML; MG/3ML
1 SOLUTION RESPIRATORY (INHALATION)
Status: DISCONTINUED | OUTPATIENT
Start: 2022-07-14 | End: 2022-07-14 | Stop reason: HOSPADM

## 2022-07-14 RX ORDER — LABETALOL HYDROCHLORIDE 5 MG/ML
10 INJECTION, SOLUTION INTRAVENOUS
Status: DISCONTINUED | OUTPATIENT
Start: 2022-07-14 | End: 2022-07-14 | Stop reason: HOSPADM

## 2022-07-14 RX ORDER — FENTANYL CITRATE 50 UG/ML
50 INJECTION, SOLUTION INTRAMUSCULAR; INTRAVENOUS EVERY 5 MIN PRN
Status: DISCONTINUED | OUTPATIENT
Start: 2022-07-14 | End: 2022-07-14 | Stop reason: HOSPADM

## 2022-07-14 RX ORDER — ONDANSETRON 2 MG/ML
INJECTION INTRAMUSCULAR; INTRAVENOUS PRN
Status: DISCONTINUED | OUTPATIENT
Start: 2022-07-14 | End: 2022-07-14 | Stop reason: SDUPTHER

## 2022-07-14 RX ORDER — DEXAMETHASONE SODIUM PHOSPHATE 4 MG/ML
INJECTION, SOLUTION INTRA-ARTICULAR; INTRALESIONAL; INTRAMUSCULAR; INTRAVENOUS; SOFT TISSUE PRN
Status: DISCONTINUED | OUTPATIENT
Start: 2022-07-14 | End: 2022-07-14 | Stop reason: SDUPTHER

## 2022-07-14 RX ORDER — HALOPERIDOL 5 MG/ML
1 INJECTION INTRAMUSCULAR
Status: DISCONTINUED | OUTPATIENT
Start: 2022-07-14 | End: 2022-07-14 | Stop reason: HOSPADM

## 2022-07-14 RX ORDER — OXYCODONE HYDROCHLORIDE 5 MG/1
5 TABLET ORAL
Status: DISCONTINUED | OUTPATIENT
Start: 2022-07-14 | End: 2022-07-14 | Stop reason: HOSPADM

## 2022-07-14 RX ORDER — LIDOCAINE HYDROCHLORIDE 20 MG/ML
INJECTION, SOLUTION EPIDURAL; INFILTRATION; INTRACAUDAL; PERINEURAL PRN
Status: DISCONTINUED | OUTPATIENT
Start: 2022-07-14 | End: 2022-07-14 | Stop reason: SDUPTHER

## 2022-07-14 RX ORDER — SODIUM CHLORIDE, SODIUM LACTATE, POTASSIUM CHLORIDE, CALCIUM CHLORIDE 600; 310; 30; 20 MG/100ML; MG/100ML; MG/100ML; MG/100ML
1000 INJECTION, SOLUTION INTRAVENOUS ONCE
Status: COMPLETED | OUTPATIENT
Start: 2022-07-14 | End: 2022-07-14

## 2022-07-14 RX ORDER — SODIUM CHLORIDE 9 MG/ML
500 INJECTION, SOLUTION INTRAVENOUS
Status: COMPLETED | OUTPATIENT
Start: 2022-07-14 | End: 2022-07-14

## 2022-07-14 RX ORDER — MIDAZOLAM HYDROCHLORIDE 2 MG/2ML
2 INJECTION, SOLUTION INTRAMUSCULAR; INTRAVENOUS
Status: DISCONTINUED | OUTPATIENT
Start: 2022-07-14 | End: 2022-07-14 | Stop reason: HOSPADM

## 2022-07-14 RX ADMIN — ONDANSETRON 4 MG: 2 INJECTION INTRAMUSCULAR; INTRAVENOUS at 08:57

## 2022-07-14 RX ADMIN — ROCURONIUM BROMIDE 50 MG: 10 INJECTION INTRAVENOUS at 07:38

## 2022-07-14 RX ADMIN — FENTANYL CITRATE 50 MCG: 50 INJECTION, SOLUTION INTRAMUSCULAR; INTRAVENOUS at 08:13

## 2022-07-14 RX ADMIN — INSULIN LISPRO 2 UNITS: 100 INJECTION, SOLUTION INTRAVENOUS; SUBCUTANEOUS at 19:29

## 2022-07-14 RX ADMIN — LIDOCAINE HYDROCHLORIDE 60 MG: 20 INJECTION, SOLUTION EPIDURAL; INFILTRATION; INTRACAUDAL; PERINEURAL at 07:38

## 2022-07-14 RX ADMIN — CEFAZOLIN 2000 MG: 1 INJECTION, POWDER, FOR SOLUTION INTRAMUSCULAR; INTRAVENOUS; PARENTERAL at 07:45

## 2022-07-14 RX ADMIN — ACETAMINOPHEN 325MG 650 MG: 325 TABLET ORAL at 16:50

## 2022-07-14 RX ADMIN — HYDROMORPHONE HYDROCHLORIDE 0.5 MG: 1 INJECTION, SOLUTION INTRAMUSCULAR; INTRAVENOUS; SUBCUTANEOUS at 08:20

## 2022-07-14 RX ADMIN — SODIUM CHLORIDE, POTASSIUM CHLORIDE, SODIUM LACTATE AND CALCIUM CHLORIDE 1000 ML: 600; 310; 30; 20 INJECTION, SOLUTION INTRAVENOUS at 12:54

## 2022-07-14 RX ADMIN — PANTOPRAZOLE SODIUM 40 MG: 40 INJECTION, POWDER, LYOPHILIZED, FOR SOLUTION INTRAVENOUS at 21:09

## 2022-07-14 RX ADMIN — INSULIN LISPRO 2 UNITS: 100 INJECTION, SOLUTION INTRAVENOUS; SUBCUTANEOUS at 13:02

## 2022-07-14 RX ADMIN — DEXAMETHASONE SODIUM PHOSPHATE 4 MG: 4 INJECTION, SOLUTION INTRAMUSCULAR; INTRAVENOUS at 07:38

## 2022-07-14 RX ADMIN — SUGAMMADEX 200 MG: 100 INJECTION, SOLUTION INTRAVENOUS at 08:59

## 2022-07-14 RX ADMIN — PROPOFOL 100 MG: 10 INJECTION, EMULSION INTRAVENOUS at 07:38

## 2022-07-14 RX ADMIN — INSULIN GLARGINE 5 UNITS: 100 INJECTION, SOLUTION SUBCUTANEOUS at 21:40

## 2022-07-14 RX ADMIN — FENTANYL CITRATE 50 MCG: 50 INJECTION, SOLUTION INTRAMUSCULAR; INTRAVENOUS at 07:38

## 2022-07-14 RX ADMIN — SODIUM CHLORIDE 500 ML: 9 INJECTION, SOLUTION INTRAVENOUS at 12:50

## 2022-07-14 ASSESSMENT — PAIN DESCRIPTION - ORIENTATION: ORIENTATION: LEFT

## 2022-07-14 ASSESSMENT — PAIN SCALES - GENERAL
PAINLEVEL_OUTOF10: 3
PAINLEVEL_OUTOF10: 0

## 2022-07-14 ASSESSMENT — PAIN - FUNCTIONAL ASSESSMENT
PAIN_FUNCTIONAL_ASSESSMENT: ACTIVITIES ARE NOT PREVENTED
PAIN_FUNCTIONAL_ASSESSMENT: 0-10

## 2022-07-14 ASSESSMENT — PAIN DESCRIPTION - LOCATION: LOCATION: ABDOMEN

## 2022-07-14 ASSESSMENT — PAIN DESCRIPTION - DESCRIPTORS: DESCRIPTORS: BURNING;TEARING

## 2022-07-14 NOTE — PLAN OF CARE
Problem: Chronic Conditions and Co-morbidities  Goal: Patient's chronic conditions and co-morbidity symptoms are monitored and maintained or improved  Outcome: Progressing     Problem: Pain  Goal: Verbalizes/displays adequate comfort level or baseline comfort level  Outcome: Progressing     Problem: Safety - Adult  Goal: Free from fall injury  Outcome: Progressing     Problem: Discharge Planning  Goal: Discharge to home or other facility with appropriate resources  Outcome: Progressing     Problem: Gastrointestinal - Adult  Goal: Minimal or absence of nausea and vomiting  Outcome: Progressing  Goal: Maintains or returns to baseline bowel function  Outcome: Progressing  Goal: Maintains adequate nutritional intake  Outcome: Progressing  Goal: Establish and maintain optimal ostomy function  Outcome: Progressing     Problem: Hematologic - Adult  Goal: Maintains hematologic stability  Outcome: Progressing     Problem: ABCDS Injury Assessment  Goal: Absence of physical injury  Outcome: Progressing     Problem: Nutrition Deficit:  Goal: Optimize nutritional status  Outcome: Progressing

## 2022-07-14 NOTE — BRIEF OP NOTE
Brief Postoperative Note      Patient: Mely Mathews  YOB: 1950  MRN: [id number]    Date of Procedure: 7/14/2022    Pre-Op Diagnosis: Esophageal stricture [K22.2]  Malnutrition, unspecified type ([facility] [E46]    Post-Op Diagnosis: Same       Procedure(s):  JEJUNOSTOMY TUBE INSERTION LAPAROSCOPIC ROBOTIC XI    Surgeon(s):  Chidi Dubois MD    Assistant:  Physician Assistant: Jerald Whiteside PA-C    Anesthesia: General    Estimated Blood Loss (mL): Minimal    Complications: None    Specimens:   * No specimens in log *    Implants:  * No implants in log *      Drains:   Ileostomy/Jejunostomy LLQ Jejunostomy (Active)       Urinary Catheter Salmon (Active)       Findings: As Above.      Electronically signed by Jerald Whiteside PA-C on 7/14/2022 at 9:05 AM

## 2022-07-14 NOTE — PROGRESS NOTES
Dr Fernandez Chaparro notified of Critical lab Lactic acid 3.2. No new ordered.  Finish fluids and recheck at 1400

## 2022-07-14 NOTE — PROGRESS NOTES
4 Eyes Skin Assessment     NAME:  Craig Saucedo OF BIRTH:  1950  MEDICAL RECORD NUMBER:  [id number]    The patient is being assess for  Transfer to New Unit    I agree that 2 RN's have performed a thorough Head to Toe Skin Assessment on the patient. ALL assessment sites listed below have been assessed. Areas assessed by both nurses:    Other whole body        Does the Patient have a Wound?  No noted wound(s)       Slava Prevention initiated:  No   Wound Care Orders initiated:  No    Pressure Injury (Stage 3,4, Unstageable, DTI, NWPT, and Complex wounds) if present place referral/consult order under [de-identified] No    New and Established Ostomies if present place consult order under : No      Nurse 1 eSignature: Electronically signed by Tony Constantino LPN on 5/20/91 at 6:71 PM EDT    **SHARE this note so that the co-signing nurse is able to place an eSignature**    Nurse 2 eSignature: Electronically signed by Marceline Primrose, RN on 7/14/22 at 6:17 PM EDT

## 2022-07-14 NOTE — SIGNIFICANT EVENT
SIGNIFICANT EVENT:  RAPID RESPONSE    SUBJECTIVE:     RAPID RESPONSE was called for Mellody Reading for near syncope. As soon as patient got back from the OR and went to restroom which was assisted by nursing staff, at the end of voiding, patient stood up and felt dizzy. She was assisted back to her bed. But see seemed clammy and less responsive. Hence RRT was called. Patient seen and examined in 4025/4025-A. Fab Barrera was opening her eyes on command, following commands and appeared lethargic. Vitals were taken and patient's blood pressure was 61/47 mmHg. She was saturating 95% in room air. 1 L fluid bolus was initiated. Patient started showing immediate improvement with fluid bolus. She was left in stable condition and her repeat blood pressure was 89/40 7 mmHg. Lab works including CBC, BMP, lactic acid were drawn. Patient had lactic acidosis with lactic acid of 3.2 mmol/liter. Hemoglobin 7. 9G/DL which was slight drop from yesterday. We will continue to monitor. OBJECTIVE:      VITALS  Vitals:    07/14/22 1143 07/14/22 1144 07/14/22 1147 07/14/22 1150   BP:  (!) 81/48 (!) 88/49 (!) 89/47   Pulse: 66 67 66 70   Resp:       Temp:    97.5 °F (36.4 °C)   TempSrc:    Oral   SpO2:  98% 96% 96%   Weight:       Height:              PHYSICAL EXAM alert, oriented, following commands, answers appropriately  GEN appears lethargic  HENT grossly normal  RESP unlabored, bilateral breath sounds normal  CARDIO/VASC normal rate and rhythm, no murmurs appreciated  GI mild diffuse tenderness, no rebound and rigidity, G-tube in place  SKIN unremarkable  NEURO alert and oriented  PSYCH behavior normal    ASSESSMENT/PLAN:    Patient is critical with high probability of clinical deterioration. 25 minutes of critical care time spent. This time includes time spent at bedside interviewing and examining patient, coordinating care, review of records, discussing with patient and  family at bedside.     Presyncope due to orthostatic hypotension  Likely due to hypovolemia  IV fluid bolus  Monitor vitals  Continue to monitor hemoglobin and hematocrit every 12 hours. Transfer patient to telemetry.     Lactic acidosis  Due to hypoperfusion from hypotension  Continue to follow lactic acid every 6 hours    Electronically signed by Sarai Fox MD on 7/14/2022 at 11:59 AM

## 2022-07-14 NOTE — PROGRESS NOTES
Daily Progress Note     patient is awake alert feeling ok  S/p J tube placement this am  Remains in sinus rate is stable  Keep on betablockers   Hx of CAD and CABG  Hx of PAFIB briefly post CABG  now in sinus keep on amiodarone   S/p EGD and stricture dilatation and had hemoptysis but stable at present  Increase activity   Hx of CEA resume plavix when safe   Hx of PAD     Most Recent Echo  8/20/21  Summary   Left ventricular systolic function is normal.   Ejection fraction is visually estimated at 50-55%.   Mild left ventricular hypertrophy.   Grade II diastolic dysfunction.   No significant valvular disease noted.   No evidence of any pericardial effusion.     Most Recent Stress test  11/15/2021  EF 48%, ESV 43,  Mild-to-moderate reversible anterior and apical wall perfusion defect(s): Artifacts decrease sensitivity of this finding. Patient has a large hiatal hernia. .        Most Recent Heart Cath     08/03/2012  Cath (EF.60, 90% Ostial LM, 50% Proximal RI, 0% OM1, 70% Ostial OM2, 70% Proximal RCA, 80% Distal RCA, 70% Proximal PDA, 90% Ostial PLB, 80-90% diffuse stenosis of CX. Plan: Pt. referred for bypass surgery. ) - 8/3/2012           PAST MEDICAL HISTORY:  The patient has a history of coronary artery  disease.  She is status post CABG done four vessels in 2012.  History of  having peripheral vascular disease in bilateral legs and interventions  done.  History of carotid endarterectomy performed.  CABG was done in  2012 of LIMA to LAD, SVG to circ, SVG to PDA, SVG to RCA.  She has  diabetes, esophageal strictures present. [facility] had EGD done.  The patient  is having solid food dysphagia present.  History of renal insufficiency,  sees Dr. Juve Waldron for that.  Hypertension, hyperlipidemia.     PAST SURGICAL HISTORY:  CABG done four vessels, right carotid  endarterectomy performed.  Gallbladder surgery done.  Nissen gastric  fundoplication surgery done in 04/2022.  EGD done today.     SOCIAL HISTORY:  Does not smoke.  Does not drink.     ALLERGIES:  TETANUS.     HOME MEDICATIONS: Luciano Yeboah is on Farxiga, Glucotrol, lisinopril, Crestor,  Coreg, amiodarone, Plavix and aspirin      Objective:   BP (!) 139/54   Pulse 61   Temp 97 °F (36.1 °C) (Temporal)   Resp 16   Ht 5' 3\" (1.6 m)   Wt 139 lb 3.2 oz (63.1 kg)   LMP 01/19/2002   SpO2 97%   BMI 24.66 kg/m²     Intake/Output Summary (Last 24 hours) at 7/14/2022 1033  Last data filed at 7/14/2022 0947  Gross per 24 hour   Intake 910 ml   Output 65 ml   Net 845 ml       Medications:   Scheduled Meds:   carvedilol  6.25 mg Oral BID    amiodarone  200 mg Oral Daily    potassium bicarbonate  50 mEq Oral Once    sodium chloride flush  5-40 mL IntraVENous 2 times per day    promethazine  12.5 mg IntraMUSCular Once    pantoprazole  40 mg IntraVENous BID    insulin glargine  5 Units SubCUTAneous Nightly    insulin lispro  0-6 Units SubCUTAneous Q6H    insulin lispro  0-3 Units SubCUTAneous Nightly      Infusions:   lactated ringers Stopped (07/14/22 0923)    sodium chloride      dextrose        PRN Meds:  sodium chloride flush, sodium chloride, ondansetron **OR** ondansetron, polyethylene glycol, acetaminophen **OR** acetaminophen, morphine, glucose, dextrose bolus **OR** dextrose bolus, glucagon (rDNA), dextrose, hydrALAZINE, HYDROmorphone, promethazine       Physical Exam:  Vitals:    07/14/22 0945   BP: (!) 139/54   Pulse: 61   Resp: 16   Temp:    SpO2: 97%        General: awake alert   Chest: Nontender  Cardiac: sinus   Lungs:Clear to auscultation and percussion. Abdomen: Soft, NT, ND, +BS  Extremities: no edema   Vascular:  Equal 2+ peripheral pulses. Lab Data:  CBC:   Recent Labs     07/11/22  1400 07/11/22  2200 07/12/22  1400 07/13/22  0714 07/13/22  1236   WBC 17.8*  --   --   --  7.8   HGB 11.9*   < > 10.5* 8.3* 8.8*   HCT 39.3   < > 35.1* 28.1* 30.0*   MCV 79.6  --   --   --  81.7     --   --   --  188    < > = values in this interval not displayed. BMP:   Recent Labs     07/11/22  1400 07/11/22  1400 07/11/22  2250 07/12/22  0450 07/13/22  1236     --   --  136 137   K 3.1*   < > 3.9 3.7 3.4*     --   --  100 100   CO2 21  --   --  22 29   PHOS  --   --   --  2.6  --    BUN 19  --   --  21 14   CREATININE 1.0  --   --  0.7 0.8    < > = values in this interval not displayed. LIVER PROFILE: No results for input(s): AST, ALT, LIPASE, BILIDIR, BILITOT, ALKPHOS in the last 72 hours. Invalid input(s): AMYLASE,  ALB  PT/INR: No results for input(s): PROTIME, INR in the last 72 hours. APTT: No results for input(s): APTT in the last 72 hours. BNP:  No results for input(s): BNP in the last 72 hours.       Assessment:  Patient Active Problem List    Diagnosis Date Noted    Carotid stenosis, right 02/18/2022    Carotid stenosis, bilateral 01/26/2022    Acute hypoxemic respiratory failure due to COVID-19 [facility]) 08/18/2021    Esophageal stricture     Hiatal hernia     Iron deficiency anemia     Iron deficiency anemia secondary to blood loss (chronic) 04/14/2020    Other forms of chronic ischemic heart disease 04/14/2020    Hiatal hernia with GERD 04/14/2020    Personal history of other diseases of the digestive system 04/14/2020    Angular cheilitis 01/05/2020    Microalbuminuria 05/16/2017    PVD (peripheral vascular disease) ([facility] 04/11/2017    Anemia 09/20/2016    Vitamin D deficiency 09/20/2016    Type 2 diabetes mellitus without complication, without long-term current use of insulin ([facility] 09/20/2016    Coronary artery disease involving native heart without angina pectoris 09/20/2016    Essential hypertension 09/20/2016    Mixed hyperlipidemia 09/20/2016       Devante Shahid MD, MD 7/14/2022 10:33 AM

## 2022-07-14 NOTE — CARE COORDINATION
CM in to see pt to follow up on discharge planning. Plan remains home with her spouse. Pt would like home care at discharge, choice of [address] [place] [address] Rd. PS to Dr. Quincy Ruano to update, [facility]. order requested.      PS to Kendy/[facility] with referral.     CM following

## 2022-07-14 NOTE — PROGRESS NOTES
1669 - transferred from [address] [place] [address] on bed, monitor applied, alarms on and verified, bedside handoff provided by Henry [facility] and [facility] [place] [address] - report called to [address] [place], [place] [address]y 2  4131 - phase one care complete; awaiting transfer to inpatient unit

## 2022-07-14 NOTE — ANESTHESIA PRE PROCEDURE
Department of Anesthesiology  Preprocedure Note       Name:  Mely Mathews   Age:  66 y.o.  :  1950                                          MRN:  [id number]         Date:  2022      Surgeon: Lenny Fernández):  Chidi Dubois MD    Procedure: Procedure(s):  JEJUNOSTOMY TUBE INSERTION LAPAROSCOPIC ROBOTIC XI    Medications prior to admission:   Prior to Admission medications    Medication Sig Start Date End Date Taking? Authorizing Provider   TRULICITY 1.5 BT/9.6YV SOPN ADMINISTER 1.5 MG UNDER THE SKIN 1 TIME A WEEK 22   Ayo Ybarra, DO   dapagliflozin (FARXIGA) 5 MG tablet TAKE 1 TABLET BY MOUTH EVERY MORNING 22   Ayo Ybarra, DO   glipiZIDE (GLUCOTROL) 5 MG tablet Take 1 tablet by mouth 2 times daily (before meals) 3/8/22   Ayo Ybarra,    lisinopril (PRINIVIL;ZESTRIL) 5 MG tablet TAKE 1 TABLET BY MOUTH DAILY 2/10/22   Ayo Ybarra,    rosuvastatin (CRESTOR) 10 MG tablet TAKE 1 TABLET BY MOUTH EVERY NIGHT 22   Ayo Ybarra, DO   carvedilol (COREG) 6.25 MG tablet Take 1 tablet by mouth 2 times daily (with meals) 12/10/18   Jayson Graves MD   amiodarone (CORDARONE) 200 MG tablet Take 200 mg by mouth daily    Historical Provider, MD   clopidogrel (PLAVIX) 75 MG tablet Take 1 tablet by mouth daily 8/20/15   Jayson Graves MD   acetaminophen (TYLENOL) 500 MG tablet Take 1,000 mg by mouth every 6 hours as needed for Pain  Patient not taking: Reported on 2022    Historical Provider, MD   aspirin 81 MG EC tablet Take 81 mg by mouth daily.       Historical Provider, MD       Current medications:    Current Facility-Administered Medications   Medication Dose Route Frequency Provider Last Rate Last Admin    carvedilol (COREG) tablet 6.25 mg  6.25 mg Oral BID Berline Halsted, MD   6.25 mg at 22    amiodarone (CORDARONE) tablet 200 mg  200 mg Oral Daily Jayson Graves MD   200 mg at 22 0929    potassium bicarbonate (K-LYTE) disintegrating tablet 50 mEq  50 mEq Oral Once Lingvist Meghna Hou MD        lactated ringers infusion   IntraVENous Continuous Lio Lucio MD 50 mL/hr at 07/12/22 1244 New Bag at 07/12/22 1244    sodium chloride flush 0.9 % injection 5-40 mL  5-40 mL IntraVENous 2 times per day Lio Lucio MD   10 mL at 07/13/22 2109    sodium chloride flush 0.9 % injection 5-40 mL  5-40 mL IntraVENous PRN Caitlin Virgen MD   10 mL at 07/11/22 1326    0.9 % sodium chloride infusion   IntraVENous PRN Lio Lucio MD        ondansetron (ZOFRAN-ODT) disintegrating tablet 4 mg  4 mg Oral Q8H PRN Lio Lucio MD        Or    ondansetron (ZOFRAN) injection 4 mg  4 mg IntraVENous Q6H PRN Caitlin Virgen MD   4 mg at 07/11/22 1605    polyethylene glycol (GLYCOLAX) packet 17 g  17 g Oral Daily PRN Lio Lucio MD        acetaminophen (TYLENOL) tablet 650 mg  650 mg Oral Q6H PRN Lio Lucio MD        Or    acetaminophen (TYLENOL) suppository 650 mg  650 mg Rectal Q6H PRN Lio Lucio MD        promethazine (PHENERGAN) injection 12.5 mg  12.5 mg IntraMUSCular Once Shauna Booker MD        pantoprazole (PROTONIX) injection 40 mg  40 mg IntraVENous BID Shauna Booker MD   40 mg at 07/13/22 2109    morphine injection 1 mg  1 mg IntraVENous Q4H PRN Shauna Booker MD   1 mg at 07/11/22 1325    insulin glargine (LANTUS) injection vial 5 Units  5 Units SubCUTAneous Nightly Shauna Booker MD   5 Units at 07/12/22 2140    insulin lispro (HUMALOG) injection vial 0-6 Units  0-6 Units SubCUTAneous Q6H Shauna Booker MD   1 Units at 07/12/22 1415    insulin lispro (HUMALOG) injection vial 0-3 Units  0-3 Units SubCUTAneous Nightbethany Booker MD        glucose chewable tablet 16 g  4 tablet Oral PRN Shauna Booker MD        dextrose bolus 10% 125 mL  125 mL IntraVENous PRN Shauna Booker MD        Or    dextrose bolus 10% 250 mL  250 mL IntraVENous PRN Shauna Booker MD        glucagon (rDNA) injection 1 mg  1 mg IntraMUSCular PRN Shauna Booker MD        dextrose 5 % solution  100 mL/hr IntraVENous PRN Riley Sheehan MD        hydrALAZINE (APRESOLINE) injection 5 mg  5 mg IntraVENous Q4H PRN Alisha Faulkner MD   5 mg at 07/11/22 1412    HYDROmorphone (DILAUDID) injection 1 mg  1 mg IntraVENous Q3H PRN JOSE RAMON Addison - CNP   1 mg at 07/11/22 1534    promethazine (PHENERGAN) injection 12.5 mg  12.5 mg IntraMUSCular Q6H PRN Chari Rascon MD   12.5 mg at 07/11/22 1853       Allergies:     Allergies   Allergen Reactions    Tetanus Toxoids Swelling and Rash     fever       Problem List:    Patient Active Problem List   Diagnosis Code    Anemia D64.9    Vitamin D deficiency E55.9    Type 2 diabetes mellitus without complication, without long-term current use of insulin ([facility] E11.9    Coronary artery disease involving native heart without angina pectoris I25.10    Essential hypertension I10    Mixed hyperlipidemia E78.2    PVD (peripheral vascular disease) ([facility]) I73.9    Microalbuminuria R80.9    Angular cheilitis K13.0    Iron deficiency anemia secondary to blood loss (chronic) D50.0    Other forms of chronic ischemic heart disease I25.89    Hiatal hernia with GERD K21.9, K44.9    Personal history of other diseases of the digestive system Z87.19    Esophageal stricture K22.2    Hiatal hernia K44.9    Iron deficiency anemia D50.9    Acute hypoxemic respiratory failure due to COVID-19 ([facility]) U07.1, J96.01    Carotid stenosis, bilateral I65.23    Carotid stenosis, right I65.21       Past Medical History:        Diagnosis Date    Anemia     Managed by Dr. Cuong Espinoza CAD (coronary artery disease)     follows with Dr Wendi Medina Carotid stenosis     right    Colon polyps     COVID-19 08/18/2021    Diabetes mellitus type 2, uncontrolled ([facility]     \"dx 1971    Diastolic dysfunction 97/3595    [facility]    Esophageal stricture     dilation per GI    Hiatal hernia     History of blood transfusion     \"had blood transfusion with 4th child- have hx also of iron infusions for anemia 2017    Hyperlipidemia     Hypertension     IBS (irritable bowel syndrome)     with diarrhea    Iron deficiency anemia     Iron Infusions- follows with Julia Araiza and Jose G Cardenas    LVH (left ventricular hypertrophy) 09/2012    [facility]    Multiple gastric polyps 2004    Dr Pancho Hwang PAD (peripheral artery disease) ([facility]     S/P CABG x 4 08/06/2012    4V CABG- LIMA-LAD, SVG-CX, SVG-PDA, SVG-RCA- Follows with Dr Jaime Romero Sinus tachycardia     follows with [facility]    SVT (supraventricular tachycardia) [facility])     old chart gives hx of SVT in the past    Vitamin D deficiency        Past Surgical History:        Procedure Laterality Date    BALLOON ANGIOPLASTY, ARTERY Right 08/19/2015    RIght SFA 90%->10%, Right Popliteal 1000%->10%    CARDIAC CATHETERIZATION  08/02/2012    CARDIAC SURGERY      open heart surgery 8/2012    CAROTID ENDARTERECTOMY Right 2/18/2022    RIGHT CAROTID ENDARTERECTOMY performed by Chris Long MD at [id number]  15+ yrs ago    COLONOSCOPY  2017   [address] GRAFT  08/02/2012    4V CABG- LIMA-LAD, SVG-CX, SVG-PDA, SVG-RCA    ENDOSCOPY, COLON, DIAGNOSTIC  10/10/2017    esophageal stricture, hiatal hernia, candidiasis    ESOPHAGEAL DILATATION      Dr. Bruce Hamilton GASTRIC FUNDOPLICATION N/A 6/4/9891    NISSEN FUNDOPLICATION HIATAL HERNIA REPAIR LAPAROSCOPIC ROBOTIC performed by Kimberly Castañeda MD at [facility]      biltateral- \"total of 9 surgeries- all scopes\"    TRANSLUMINAL ANGIOPLASTY Left 09/23/2015 9/23/2015-Left mid and distal SFA and Left Popliteal    TUBAL LIGATION  1978    UPPER GASTROINTESTINAL ENDOSCOPY N/A 7/11/2022    EGD DILATION BALLOON 18 performed by Kimberly Castañeda MD at [facility] ENDOSCOPY       Social History:    Social History     Tobacco Use    Smoking status: Never Smoker    Smokeless tobacco: Never Used   Substance Use Topics    Alcohol use: No                                Counseling given: Not Answered      Vital Signs (Current):   Vitals:    07/13/22 1419 07/13/22 1535 07/13/22 1541 07/13/22 2100   BP:  (!) 108/36 (!) 116/54 123/60   Pulse:  78  67   Resp:  17  16   Temp:  36.4 °C (97.6 °F)  36.9 °C (98.5 °F)   TempSrc:  Oral  Oral   SpO2:  97%  96%   Weight:       Height: 5' 3\" (1.6 m)                                                 BP Readings from Last 3 Encounters:   07/13/22 123/60   07/07/22 116/78   06/23/22 130/70       NPO Status: Time of last liquid consumption: 0530                        Time of last solid consumption: 0800                        Date of last liquid consumption: 07/11/22                        Date of last solid food consumption: 03/31/22    BMI:   Wt Readings from Last 3 Encounters:   07/11/22 138 lb 14.2 oz (63 kg)   07/07/22 135 lb 4.8 oz (61.4 kg)   05/19/22 155 lb (70.3 kg)     Body mass index is 24.6 kg/m².     CBC:   Lab Results   Component Value Date/Time    WBC 7.8 07/13/2022 12:36 PM    RBC 3.67 07/13/2022 12:36 PM    HGB 8.8 07/13/2022 12:36 PM    HCT 30.0 07/13/2022 12:36 PM    MCV 81.7 07/13/2022 12:36 PM    RDW 17.2 07/13/2022 12:36 PM     07/13/2022 12:36 PM       CMP:   Lab Results   Component Value Date/Time     07/13/2022 12:36 PM    K 3.4 07/13/2022 12:36 PM     07/13/2022 12:36 PM    CO2 29 07/13/2022 12:36 PM    BUN 14 07/13/2022 12:36 PM    CREATININE 0.8 07/13/2022 12:36 PM    GFRAA >60 07/13/2022 12:36 PM    AGRATIO 1.2 04/07/2022 08:48 AM    LABGLOM >60 07/13/2022 12:36 PM    GLUCOSE 224 07/13/2022 12:36 PM    PROT 6.4 04/07/2022 08:48 AM    PROT 6.6 09/25/2020 12:00 AM    CALCIUM 8.4 07/13/2022 12:36 PM    BILITOT 0.6 04/07/2022 08:48 AM    ALKPHOS 97 04/07/2022 08:48 AM    AST 12 04/07/2022 08:48 AM    ALT 12 04/07/2022 08:48 AM       POC Tests:   Recent Labs     07/13/22 1948   POCGLU 146*       Coags:   Lab Results   Component Value Date/Time    PROTIME 10.9 02/04/2022 10:58 AM    PROTIME 10.6 11/14/2011 10:25 AM    INR 0.85 02/04/2022 10:58 AM    APTT 26.8 02/04/2022 10:58 AM       HCG (If Applicable): No results found for: PREGTESTUR, PREGSERUM, HCG, HCGQUANT     ABGs:   Lab Results   Component Value Date/Time    PO2ART 71 08/06/2012 03:31 PM    BEART 7 08/06/2012 03:31 PM        Type & Screen (If Applicable):  No results found for: LABABO, LABRH    Drug/Infectious Status (If Applicable):  No results found for: HIV, HEPCAB    COVID-19 Screening (If Applicable):   Lab Results   Component Value Date/Time    COVID19 NOT DETECTED 02/15/2022 08:40 AM           Anesthesia Evaluation  Patient summary reviewed history of anesthetic complications:   Airway: Mallampati: III          Dental:    (+) edentulous      Pulmonary:normal exam                               Cardiovascular:  Exercise tolerance: good (>4 METS),   (+) hypertension:, CAD:, CABG/stent:, dysrhythmias: SVT, hyperlipidemia        Rhythm: regular  Rate: normal           Beta Blocker:  Dose within 24 Hrs      ROS comment: Echo 7/11/2022:     Summary   Left ventricular systolic function is normal.   Ejection fraction is visually estimated at 50-55%. Grade I diastolic dysfunction. No evidence of any pericardial effusion. Stress test  11/15/2021  EF 48%, ESV 43,  Mild-to-moderate reversible anterior and apical wall perfusion defect(s): Artifacts decrease sensitivity of this finding. Patient has a large hiatal hernia. .     Neuro/Psych:               GI/Hepatic/Renal:   (+) hiatal hernia, GERD:,           Endo/Other:    (+) DiabetesType II DM, , blood dyscrasia: anticoagulation therapy and anemia:., .                 Abdominal:             Vascular:   + PVD, aortic or cerebral, . Other Findings:           Anesthesia Plan      general     ASA 3       Induction: intravenous. Anesthetic plan and risks discussed with patient.                         JOSE RAMON Gonzalez - SHANIQUE   7/13/2022       Pre Anesthesia Assessment complete.  Chart reviewed on 7/13/2022

## 2022-07-14 NOTE — PROGRESS NOTES
Pt came back from having Jtube placed. RN went to get vitals and pt had to go to restroom. Pt assisted to restroom. RN stayed with pt in restroom. Pt voided. Pt felt dizzy when she stood so RN had pt sit back down on commode. RN pulled emergency call light and two nurses came into the room immediately. One nurse went and got a wheelchair. The other two nurses helped pt to wheelchair. Pt began drooping forward and was cool to touch. Pt was not answering so Rapid Response was called. Pt placed in bed by the three nurses. Blood glucose was 259. Blood pressure was 61/47. Head of bed lowered to [place] and bolus started. Pt other vitals normal. Pt began speaking as blood pressure raised to 89/47. Labs drawn. Pt transported to room 3122. Report given to Lisa Monteiro RN.

## 2022-07-14 NOTE — PROGRESS NOTES
Hospitalist Progress Note      Name:  Jordi Abbasi /Age/Sex: 1950  (66 y.o. female)   MRN & CSN:  [id number] & [id number] Admission Date/Time: 2022  6:07 AM   Location:  [address] [place], [place] [address] PCP: Faiza De Los Santos Dr Day: 4    Assessment and Plan:       Presyncope likely due to orthostatic hypotension   Likely hypovolemic  Patient's initial blood pressure was 61/47 mmHg and patient was lethargic hence RRT was called  Fluid bolus was initiated and patient's showed immediate improvement in blood pressure and mentation. Stat BMP, CBC and lactic acid were drawn  Has elevated lactic acid at 3.2  Will follow lactic acid every 6 hours and H&H every 12 hours. Low suspicion for intra-abdominal blood loss as patient showed immediate improvement with fluid bolus   If lactic acid is trending up we will follow-up with CT abdomen to rule out intra-abdominal blood loss. Acute hematemesis status post esophageal dilation for esophageal stricture, resolved  Underwent G-tube insertion this morning. Acute blood loss anemia    History of atrial fibrillation  Currently normal sinus rhythm. On amiodarone. Not on anticoagulation due to bleeding risk. Prolonged QTC, resolved      Insulin-dependent type 2 diabetes   continue on Lantus 5 unit nightly and low-dose sliding scale. Diet Diet NPO   DVT Prophylaxis [] Lovenox, []  Heparin, [x] SCDs, [] Ambulation   GI Prophylaxis [x] PPI,  [] H2 Blocker,  [] Carafate,  [] Diet/Tube Feeds   Code Status Full Code   Disposition Patient requires continued admission due to    MDM [] Low, [] Moderate,[]  High  Patient's risk as above due to      History of Present Illness:     Patient seen and examined on bedside during RRT. Patient was initially lethargic however was more alert, answering her questions and following commands immediately post fluid bolus. Patient stated she was feeling better with fluids. Objective:        Intake/Output Summary (Last 24 hours) at 7/14/2022 1409  Last data filed at 7/14/2022 1105  Gross per 24 hour   Intake 1150 ml   Output 65 ml   Net 1085 ml      Vitals:   Vitals:    07/14/22 1209   BP:    Pulse:    Resp:    Temp: 97.9 °F (36.6 °C)   SpO2:      Physical Exam:   GEN Awake. Alert , not in respiratory distress, not in pain  HEENT: PEERLA, , supple neck,   Chest: air entry equal bilaterally, no wheezing or crepitation  Heart: S1 and S2 heard, no murmur, no gallop or rub, regular rate  Abdomen: soft, ND , mild diffuse tenderness, no rigidity and rebound, J-tube in place.   Extremities: no cyanosis, tenderness or erythema, peripheral pulses audible  Neurology: alert, oriented x3, able to move 4 limbs    Medications:   Medications:    [Held by provider] carvedilol  6.25 mg Oral BID    amiodarone  200 mg Oral Daily    potassium bicarbonate  50 mEq Oral Once    sodium chloride flush  5-40 mL IntraVENous 2 times per day    promethazine  12.5 mg IntraMUSCular Once    pantoprazole  40 mg IntraVENous BID    insulin glargine  5 Units SubCUTAneous Nightly    insulin lispro  0-6 Units SubCUTAneous Q6H    insulin lispro  0-3 Units SubCUTAneous Nightly      Infusions:    sodium chloride      dextrose       PRN Meds: sodium chloride flush, 5-40 mL, PRN  sodium chloride, , PRN  ondansetron, 4 mg, Q8H PRN   Or  ondansetron, 4 mg, Q6H PRN  polyethylene glycol, 17 g, Daily PRN  acetaminophen, 650 mg, Q6H PRN   Or  acetaminophen, 650 mg, Q6H PRN  morphine, 1 mg, Q4H PRN  glucose, 4 tablet, PRN  dextrose bolus, 125 mL, PRN   Or  dextrose bolus, 250 mL, PRN  glucagon (rDNA), 1 mg, PRN  dextrose, 100 mL/hr, PRN  [Held by provider] hydrALAZINE, 5 mg, Q4H PRN  HYDROmorphone, 1 mg, Q3H PRN  promethazine, 12.5 mg, Q6H PRN          Electronically signed by Wilian Tovar MD on 7/14/2022 at 2:09 PM

## 2022-07-14 NOTE — ANESTHESIA POSTPROCEDURE EVALUATION
Department of Anesthesiology  Postprocedure Note    Patient: Avi Hamilton  MRN: [id number]  YOB: 1950  Date of evaluation: 7/14/2022      Procedure Summary     Date: 07/14/22 Room / Location: [address] [place] [address]    Anesthesia Start: 0730 Anesthesia Stop: 5670    Procedure: [facility] ROBOTIC XI (N/A Abdomen) Diagnosis:       Esophageal stricture      Malnutrition, unspecified type ([address]      (Esophageal stricture [K22.2])      (Malnutrition, unspecified type ([address] [E46])    Surgeons: Sonal Jamsion MD Responsible Provider: Gerda Whiteside MD    Anesthesia Type: General ASA Status: 3          Anesthesia Type: General    Naila Phase I: Naila Score: 10    Naila Phase II: Naila Score: 8      Anesthesia Post Evaluation    Patient location during evaluation: PACU  Patient participation: complete - patient participated  Level of consciousness: awake and alert  Pain score: 2  Airway patency: patent  Nausea & Vomiting: no nausea and no vomiting  Complications: no  Cardiovascular status: blood pressure returned to baseline  Respiratory status: acceptable  Hydration status: euvolemic

## 2022-07-15 LAB
GLUCOSE BLD-MCNC: 135 MG/DL (ref 70–99)
GLUCOSE BLD-MCNC: 146 MG/DL (ref 70–99)
GLUCOSE BLD-MCNC: 169 MG/DL (ref 70–99)
GLUCOSE BLD-MCNC: 173 MG/DL (ref 70–99)
GLUCOSE BLD-MCNC: 175 MG/DL (ref 70–99)
HCT VFR BLD CALC: 22.7 % (ref 37–47)
HCT VFR BLD CALC: 23.5 % (ref 37–47)
HEMOGLOBIN: 6.7 GM/DL (ref 12.5–16)
HEMOGLOBIN: 7 GM/DL (ref 12.5–16)
LACTATE: 0.9 MMOL/L (ref 0.4–2)

## 2022-07-15 PROCEDURE — 94761 N-INVAS EAR/PLS OXIMETRY MLT: CPT

## 2022-07-15 PROCEDURE — 85018 HEMOGLOBIN: CPT

## 2022-07-15 PROCEDURE — P9016 RBC LEUKOCYTES REDUCED: HCPCS

## 2022-07-15 PROCEDURE — 93005 ELECTROCARDIOGRAM TRACING: CPT | Performed by: INTERNAL MEDICINE

## 2022-07-15 PROCEDURE — 6370000000 HC RX 637 (ALT 250 FOR IP): Performed by: INTERNAL MEDICINE

## 2022-07-15 PROCEDURE — 36430 TRANSFUSION BLD/BLD COMPNT: CPT

## 2022-07-15 PROCEDURE — 85014 HEMATOCRIT: CPT

## 2022-07-15 PROCEDURE — C9113 INJ PANTOPRAZOLE SODIUM, VIA: HCPCS | Performed by: PHYSICIAN ASSISTANT

## 2022-07-15 PROCEDURE — 2580000003 HC RX 258: Performed by: SURGERY

## 2022-07-15 PROCEDURE — 36415 COLL VENOUS BLD VENIPUNCTURE: CPT

## 2022-07-15 PROCEDURE — 83605 ASSAY OF LACTIC ACID: CPT

## 2022-07-15 PROCEDURE — 6360000002 HC RX W HCPCS: Performed by: PHYSICIAN ASSISTANT

## 2022-07-15 PROCEDURE — 82962 GLUCOSE BLOOD TEST: CPT

## 2022-07-15 PROCEDURE — 76937 US GUIDE VASCULAR ACCESS: CPT

## 2022-07-15 PROCEDURE — 6370000000 HC RX 637 (ALT 250 FOR IP): Performed by: PHYSICIAN ASSISTANT

## 2022-07-15 PROCEDURE — 1200000000 HC SEMI PRIVATE

## 2022-07-15 PROCEDURE — 6370000000 HC RX 637 (ALT 250 FOR IP): Performed by: HOSPITALIST

## 2022-07-15 PROCEDURE — 6360000002 HC RX W HCPCS: Performed by: INTERNAL MEDICINE

## 2022-07-15 PROCEDURE — 86922 COMPATIBILITY TEST ANTIGLOB: CPT

## 2022-07-15 PROCEDURE — 86900 BLOOD TYPING SEROLOGIC ABO: CPT

## 2022-07-15 PROCEDURE — 86901 BLOOD TYPING SEROLOGIC RH(D): CPT

## 2022-07-15 PROCEDURE — 2580000003 HC RX 258: Performed by: INTERNAL MEDICINE

## 2022-07-15 PROCEDURE — 6360000002 HC RX W HCPCS: Performed by: STUDENT IN AN ORGANIZED HEALTH CARE EDUCATION/TRAINING PROGRAM

## 2022-07-15 PROCEDURE — C9113 INJ PANTOPRAZOLE SODIUM, VIA: HCPCS | Performed by: STUDENT IN AN ORGANIZED HEALTH CARE EDUCATION/TRAINING PROGRAM

## 2022-07-15 PROCEDURE — 93005 ELECTROCARDIOGRAM TRACING: CPT | Performed by: HOSPITALIST

## 2022-07-15 PROCEDURE — 86850 RBC ANTIBODY SCREEN: CPT

## 2022-07-15 RX ORDER — NITROGLYCERIN 0.4 MG/1
0.4 TABLET SUBLINGUAL EVERY 5 MIN PRN
Status: DISCONTINUED | OUTPATIENT
Start: 2022-07-15 | End: 2022-07-17 | Stop reason: HOSPADM

## 2022-07-15 RX ORDER — ZOLPIDEM TARTRATE 5 MG/1
5 TABLET ORAL NIGHTLY PRN
Status: DISCONTINUED | OUTPATIENT
Start: 2022-07-15 | End: 2022-07-17 | Stop reason: HOSPADM

## 2022-07-15 RX ORDER — NITROGLYCERIN 0.4 MG/1
TABLET SUBLINGUAL
Status: DISPENSED
Start: 2022-07-15 | End: 2022-07-16

## 2022-07-15 RX ORDER — SODIUM CHLORIDE 9 MG/ML
INJECTION, SOLUTION INTRAVENOUS PRN
Status: DISCONTINUED | OUTPATIENT
Start: 2022-07-15 | End: 2022-07-17 | Stop reason: HOSPADM

## 2022-07-15 RX ADMIN — PANTOPRAZOLE SODIUM 40 MG: 40 INJECTION, POWDER, LYOPHILIZED, FOR SOLUTION INTRAVENOUS at 21:06

## 2022-07-15 RX ADMIN — NITROGLYCERIN 0.4 MG: 0.4 TABLET, ORALLY DISINTEGRATING SUBLINGUAL at 20:06

## 2022-07-15 RX ADMIN — INSULIN GLARGINE 5 UNITS: 100 INJECTION, SOLUTION SUBCUTANEOUS at 21:05

## 2022-07-15 RX ADMIN — MORPHINE SULFATE 1 MG: 2 INJECTION, SOLUTION INTRAMUSCULAR; INTRAVENOUS at 19:44

## 2022-07-15 RX ADMIN — IRON SUCROSE 300 MG: 20 INJECTION, SOLUTION INTRAVENOUS at 15:53

## 2022-07-15 RX ADMIN — SODIUM CHLORIDE, PRESERVATIVE FREE 10 ML: 5 INJECTION INTRAVENOUS at 08:17

## 2022-07-15 RX ADMIN — AMIODARONE HYDROCHLORIDE 200 MG: 200 TABLET ORAL at 08:16

## 2022-07-15 RX ADMIN — HYDROMORPHONE HYDROCHLORIDE 1 MG: 1 INJECTION, SOLUTION INTRAMUSCULAR; INTRAVENOUS; SUBCUTANEOUS at 17:55

## 2022-07-15 RX ADMIN — CARVEDILOL 6.25 MG: 6.25 TABLET, FILM COATED ORAL at 21:06

## 2022-07-15 RX ADMIN — ZOLPIDEM TARTRATE 5 MG: 5 TABLET ORAL at 21:07

## 2022-07-15 RX ADMIN — ONDANSETRON 4 MG: 2 INJECTION INTRAMUSCULAR; INTRAVENOUS at 19:04

## 2022-07-15 RX ADMIN — PROMETHAZINE HYDROCHLORIDE 12.5 MG: 25 INJECTION INTRAMUSCULAR; INTRAVENOUS at 19:12

## 2022-07-15 RX ADMIN — PANTOPRAZOLE SODIUM 40 MG: 40 INJECTION, POWDER, LYOPHILIZED, FOR SOLUTION INTRAVENOUS at 08:16

## 2022-07-15 ASSESSMENT — PAIN DESCRIPTION - DESCRIPTORS: DESCRIPTORS: ACHING;DISCOMFORT

## 2022-07-15 ASSESSMENT — PAIN SCALES - GENERAL
PAINLEVEL_OUTOF10: 9
PAINLEVEL_OUTOF10: 10
PAINLEVEL_OUTOF10: 8
PAINLEVEL_OUTOF10: 5
PAINLEVEL_OUTOF10: 9

## 2022-07-15 ASSESSMENT — PAIN DESCRIPTION - LOCATION
LOCATION: CHEST

## 2022-07-15 ASSESSMENT — PAIN - FUNCTIONAL ASSESSMENT
PAIN_FUNCTIONAL_ASSESSMENT: PREVENTS OR INTERFERES SOME ACTIVE ACTIVITIES AND ADLS
PAIN_FUNCTIONAL_ASSESSMENT: ACTIVITIES ARE NOT PREVENTED

## 2022-07-15 ASSESSMENT — PAIN DESCRIPTION - ORIENTATION: ORIENTATION: UPPER

## 2022-07-15 NOTE — PROGRESS NOTES
Daily Progress Note  Subjective:    Pt. Awake, alert and feeling better  No CP today and SOB improved per pt. Attending Note:    Patient is awake alert feeling better  Wants to go home  Remain in sinus rate is stable  S/p J tube placed   Hx of CAD and CABG-medical treatment  Hx of CEA   S/p EGD and dilatation -had hemoptysis   Anemia noted, give iron watch for now-drop from 11.9 to 7.0-  Keep on coreg and amiodarone  Hx of CEA -had been on Plavix resume when safe   Hx of PAD     Impression and Plan:     Chest pain    Likely d/t vomiting and Hematemesis    Hx of CAD s/p CABG in the remote past, also Hx of PAD s/p PTA and carotid disease s/p Rt. CEA    Vitals stable and symptoms improving as vomiting improves    Holding blood thinners currently d/t hematemesis    Echo stable overall     Hematemesis    Related to EGD with dilation    Improved with phenergan now    General surgery following    Severe esophageal stricture- s/p J-tube placed yesterday    Per notes needs to see [facility] for further treatment     Hgb low-may need transfusion if drops below 7.0  Will follow    Most Recent Echo  7/12/22  Summary   Left ventricular systolic function is normal.   Ejection fraction is visually estimated at 50-55%. Grade I diastolic dysfunction. No evidence of any pericardial effusion. Most Recent Stress test  11/15/2021  EF 48%, ESV 43,  Mild-to-moderate reversible anterior and apical wall perfusion defect(s): Artifacts decrease sensitivity of this finding. Patient has a large hiatal hernia. .    Most Recent Heart Cath  08/03/2012  Cath (EF.60, 90% Ostial LM, 50% Proximal RI, 0% OM1, 70% Ostial OM2, 70% Proximal RCA, 80% Distal RCA, 70% Proximal PDA, 90% Ostial PLB, 80-90% diffuse stenosis of CX. Plan: Pt. referred for bypass surgery. ) - 8/3/2012    PAST MEDICAL HISTORY:  The patient has a history of coronary artery  disease. She is status post CABG done four vessels in 2012.   History of  having peripheral vascular disease in bilateral legs and interventions  done. History of carotid endarterectomy performed. CABG was done in  2012 of LIMA to LAD, SVG to circ, SVG to PDA, SVG to RCA. She has  diabetes, esophageal strictures present. She had EGD done. The patient  is having solid food dysphagia present. History of renal insufficiency,  sees Dr. Any Rosales for that. Hypertension, hyperlipidemia. PAST SURGICAL HISTORY:  CABG done four vessels, right carotid  endarterectomy performed. Gallbladder surgery done. Nissen gastric  fundoplication surgery done in 04/2022. EGD done today. SOCIAL HISTORY:  Does not smoke. Does not drink. ALLERGIES:  TETANUS. HOME MEDICATIONS:  She is on Farxiga, Glucotrol, lisinopril, Crestor,  Coreg, amiodarone, Plavix and aspirin.   Objective:   BP (!) 121/55   Pulse 76   Temp 97.5 °F (36.4 °C) (Oral)   Resp 14   Ht 5' 3\" (1.6 m)   Wt 139 lb 3.2 oz (63.1 kg)   LMP 01/19/2002   SpO2 96%   BMI 24.66 kg/m²     Intake/Output Summary (Last 24 hours) at 7/15/2022 0925  Last data filed at 7/14/2022 1105  Gross per 24 hour   Intake 340 ml   Output 0 ml   Net 340 ml       Medications:   Scheduled Meds:   [Held by provider] carvedilol  6.25 mg Oral BID    amiodarone  200 mg Oral Daily    potassium bicarbonate  50 mEq Oral Once    sodium chloride flush  5-40 mL IntraVENous 2 times per day    promethazine  12.5 mg IntraMUSCular Once    pantoprazole  40 mg IntraVENous BID    insulin glargine  5 Units SubCUTAneous Nightly    insulin lispro  0-6 Units SubCUTAneous Q6H    insulin lispro  0-3 Units SubCUTAneous Nightly      Infusions:   sodium chloride      sodium chloride      dextrose        PRN Meds:  sodium chloride, sodium chloride flush, sodium chloride, ondansetron **OR** ondansetron, polyethylene glycol, acetaminophen **OR** acetaminophen, morphine, glucose, dextrose bolus **OR** dextrose bolus, glucagon (rDNA), dextrose, [Held by provider] hydrALAZINE, HYDROmorphone, promethazine Physical Exam:  Vitals:    07/15/22 0800   BP: (!) 121/55   Pulse: 76   Resp: 14   Temp: 97.5 °F (36.4 °C)   SpO2: 96%        General: AAO, NAD  Chest: Nontender  Cardiac: First and Second Heart Sounds are Normal, No Murmurs or Gallops noted  Lungs:Clear to auscultation and percussion. Abdomen: Soft, NT, ND, +BS  Extremities: No clubbing, no edema  Vascular:  Equal 2+ peripheral pulses. Lab Data:  CBC:   Recent Labs     07/13/22  1236 07/14/22  1140 07/14/22  1635 07/14/22  1956 07/15/22  0117 07/15/22  0629   WBC 7.8 9.5  --   --   --   --    HGB 8.8* 7.9*   < > 7.3* 6.7* 7.0*   HCT 30.0* 26.9*   < > 25.5* 22.7* 23.5*   MCV 81.7 82.0  --   --   --   --     243  --   --   --   --     < > = values in this interval not displayed. BMP:   Recent Labs     07/13/22  1236 07/14/22  1140    137   K 3.4* 4.4    102   CO2 29 25   BUN 14 14   CREATININE 0.8 0.9     LIVER PROFILE: No results for input(s): AST, ALT, LIPASE, BILIDIR, BILITOT, ALKPHOS in the last 72 hours. Invalid input(s): AMYLASE,  ALB  PT/INR: No results for input(s): PROTIME, INR in the last 72 hours. APTT: No results for input(s): APTT in the last 72 hours. BNP:  No results for input(s): BNP in the last 72 hours.       Assessment:  Patient Active Problem List    Diagnosis Date Noted    Carotid stenosis, right 02/18/2022    Carotid stenosis, bilateral 01/26/2022    Acute hypoxemic respiratory failure due to COVID-19 ([facility] 08/18/2021    Esophageal stricture     Hiatal hernia     Iron deficiency anemia     Iron deficiency anemia secondary to blood loss (chronic) 04/14/2020    Other forms of chronic ischemic heart disease 04/14/2020    Hiatal hernia with GERD 04/14/2020    Personal history of other diseases of the digestive system 04/14/2020    Angular cheilitis 01/05/2020    Microalbuminuria 05/16/2017    PVD (peripheral vascular disease) ([facility] 04/11/2017    Anemia 09/20/2016    Vitamin D deficiency 09/20/2016    Type 2 diabetes mellitus without complication, without long-term current use of insulin ([facility] 09/20/2016    Coronary artery disease involving native heart without angina pectoris 09/20/2016    Essential hypertension 09/20/2016    Mixed hyperlipidemia 09/20/2016       Electronically signed by Evette Dowell PA-C on 7/15/2022 at 9:25 AM    Electronically signed by Sandie Peng MD on 7/15/22 at 9:49 AM EDT

## 2022-07-15 NOTE — PROGRESS NOTES
Hospitalist Progress Note      Name:  Dayanna Crowley /Age/Sex: 1950  (66 y.o. female)   MRN & CSN:  [id number] & [id number] Admission Date/Time: 2022  6:07 AM   Location:  [facility] PCP: Faiza De Los Santos Dr Day: 5    Assessment and Plan:       Presyncope likely due to orthostatic hypotension , resolved. Likely hypovolemic  Patient's initial blood pressure was 61/47 mmHg and patient was lethargic hence RRT was called on 2022. Patient improved with IV fluid bolus. She had lactic acidosis with lactate of 3.2 which subsequently resolved. Patient's blood pressure has improved as of today. She had mild drop in her hemoglobin overnight and she is getting 1 unit PRBC transfusion today. Acute hematemesis status post esophageal dilation for esophageal stricture, resolved  Underwent G-tube insertion on 2022    Acute anemia  Initially due to complications of esophageal dilatation. History of atrial fibrillation  Currently normal sinus rhythm. On amiodarone. Not on anticoagulation due to bleeding risk. Prolonged QTC, resolved      Insulin-dependent type 2 diabetes   continue on Lantus 5 unit nightly and low-dose sliding scale. Nutrition  Clears through mouth and patient to resume tube feeds starting today. Diet ADULT DIET;  Clear Liquid  ADULT TUBE FEEDING; PEJ; Peptide Based; Continuous; 10; Yes; 10; Q 8 hours; 50; 80; Q 4 hours   DVT Prophylaxis [] Lovenox, []  Heparin, [x] SCDs, [] Ambulation   GI Prophylaxis [x] PPI,  [] H2 Blocker,  [] Carafate,  [] Diet/Tube Feeds   Code Status Full Code   Disposition Patient requires continued admission due to    MDM [] Low, [] Moderate,[]  High  Patient's risk as above due to      History of Present Illness:         Objective:     No intake or output data in the 24 hours ending 07/15/22 1513     Vitals:   Vitals:    07/15/22 1500   BP: 138/88   Pulse: 80   Resp: 16   Temp: 98.1 °F (36.7 °C)   SpO2: 94% Physical Exam:   GEN Awake. Alert , not in respiratory distress, not in pain  HEENT: PEERLA, , supple neck,   Chest: air entry equal bilaterally, no wheezing or crepitation  Heart: S1 and S2 heard, no murmur, no gallop or rub, regular rate  Abdomen: soft, ND , mild diffuse tenderness, no rigidity and rebound, J-tube in place.   Extremities: no cyanosis, tenderness or erythema, peripheral pulses audible  Neurology: alert, oriented x3, able to move 4 limbs    Medications:   Medications:    iron sucrose  300 mg IntraVENous Daily    carvedilol  6.25 mg Oral BID    amiodarone  200 mg Oral Daily    potassium bicarbonate  50 mEq Oral Once    sodium chloride flush  5-40 mL IntraVENous 2 times per day    promethazine  12.5 mg IntraMUSCular Once    pantoprazole  40 mg IntraVENous BID    insulin glargine  5 Units SubCUTAneous Nightly    insulin lispro  0-6 Units SubCUTAneous Q6H    insulin lispro  0-3 Units SubCUTAneous Nightly      Infusions:    sodium chloride      sodium chloride      dextrose       PRN Meds: sodium chloride, , PRN  sodium chloride flush, 5-40 mL, PRN  sodium chloride, , PRN  ondansetron, 4 mg, Q8H PRN   Or  ondansetron, 4 mg, Q6H PRN  polyethylene glycol, 17 g, Daily PRN  acetaminophen, 650 mg, Q6H PRN   Or  acetaminophen, 650 mg, Q6H PRN  morphine, 1 mg, Q4H PRN  glucose, 4 tablet, PRN  dextrose bolus, 125 mL, PRN   Or  dextrose bolus, 250 mL, PRN  glucagon (rDNA), 1 mg, PRN  dextrose, 100 mL/hr, PRN  [Held by provider] hydrALAZINE, 5 mg, Q4H PRN  HYDROmorphone, 1 mg, Q3H PRN  promethazine, 12.5 mg, Q6H PRN        Electronically signed by Ba Coello MD on 7/15/2022 at 3:13 PM

## 2022-07-15 NOTE — PROGRESS NOTES
Nutrition Note    EN recommendations for home: semi-elemental formula (vital AF 1.2) nocturnal feeding, 100 ml/hour for 12 hours as tolerates     Request to eval home EN regimen to avoid continuous feeding to prevent feeding from interfering with daily activities. Bolus feeding not usually well tolerated in J-tube. Consider nocturnal feeding for 12 hours. If possible remain on semi-elemental formula vital AF 1.2 for better tolerance but may trial standard formula jevity 1.2 if unable to get vital. For nocturnal regimen will need to be able to tolerate 100 ml/hr x 12 hours to meet estimated needs. Will continue to follow at high nutrition risk. Additional free water depending on intake of clear liquids, may need additional 500 ml free water each day.        Electronically signed by Beba Ochoa RD, LD on 7/15/22 at 4:07 PM EDT    Contact: [phone]

## 2022-07-15 NOTE — CASE COMMUNICATION
[facility] Liaison spoke with pt and pt is agreeable to hhc at discharge which should be tomorrow per Hospilalist Dr Gisella Dillard. . Please place inpatient consult to home health needs order in [facility] [place]. Spoke with Armando KELLER and they will follow for hhc and for the TF. She will bring me the script.

## 2022-07-15 NOTE — CONSULTS
IV Consult complete. Nexiva 20g 1.75\" Extra Long PIV inserted in left FA x1 attempt with ultrasound guidance. Brisk blood return, flushes easy. PRBC transfusion re-started at this time.

## 2022-07-15 NOTE — PROGRESS NOTES
1755 Dilaudid 1 mg given for c/o chest pan. Ekg completed no changes from earlier. 1845 pt removed self from monitor Hr went to 125 c/o chest pain againg\"yes I have had this before phenergan works for it\"  Phenergan  12.5 given Im per pt request. Hr lowered and blood pressure lowered  pt stated pain was subsideing . Lung fields CTA. NO temp noted.

## 2022-07-15 NOTE — CONSULTS
Comprehensive Nutrition Assessment    Type and Reason for Visit:  Reassess, Consult    Nutrition Recommendations/Plan:   Begin  EN here with peptide based formula (Vital AF 1.2 Khurram), continuous feeding with J-tube  Progress slowly to initial goal rate of 50 ml/hr to provide 1440 calories, 90 g protein  Monitor/replace depleted electrolytes carefully as Pt at high risk for refeeding syndrome  W/O IVF, 80 ml free water flush Q4H needed to provide 1 ml/kcal fluid requirement  Once Pt tolerating regimen, may consider trial of intact protein formula     Malnutrition Assessment:  Malnutrition Status:  Insufficient data (07/13/22 1430)    Context:  Chronic Illness       Nutrition Assessment:    Pt with dysphagia for solids s/p dililation and J tube placement. H/O hiatal hernia, nissen fundiplication, CAD/CABG, DM. Concerning wt loss noted over the past yr. Currently tolerating some fluids on Clears, was not able to adequately hydrate self yesterday noted. Will order appropriate EN to feed into the jejunum and continue to follow as high nutrition risk. Nutrition Related Findings:    Glu 175, A1c down to 6.2 likely 2/2 wt loss Wound Type: None       Current Nutrition Intake & Therapies:    Average Meal Intake: Unable to assess  Average Supplements Intake: None Ordered  ADULT DIET; Clear Liquid    Anthropometric Measures:  Height: 5' 3\" (160 cm)  Ideal Body Weight (IBW): 115 lbs (52 kg)    Admission Body Weight: 138 lb 14.2 oz (63 kg)  Current Body Weight: 139 lb 3.2 oz (63.1 kg), 120.8 % IBW.  Weight Source: Bed Scale  Current BMI (kg/m2): 24.7  Usual Body Weight: 170 lb (77.1 kg) (per hx last June)  % Weight Change (Calculated): -18.3  Weight Adjustment For: No Adjustment                 BMI Categories: Normal Weight (BMI 22.0 to 24.9) age over 72    Estimated Daily Nutrient Needs:  Energy Requirements Based On: Kcal/kg  Weight Used for Energy Requirements: Current  Energy (kcal/day): 0831-0055 (25-30 khurram/kg)  Weight Used for Protein Requirements: Current  Protein (g/day): 63-75 (1-1.2 g/kg)  Method Used for Fluid Requirements: 1 ml/kcal  Fluid (ml/day): 1600    Nutrition Diagnosis:   Inadequate protein-energy intake related to swallowing difficulty, altered GI structure as evidenced by NPO or clear liquid status due to medical condition, nutrition support - enteral nutrition  Unintended weight loss related to inadequate protein-energy intake, swallowing difficulty as evidenced by weight loss, poor intake prior to admission    Nutrition Interventions:   Food and/or Nutrient Delivery: Continue Current Diet, Start Tube Feeding  Nutrition Education/Counseling: No recommendation at this time  Coordination of Nutrition Care: Continue to monitor while inpatient       Goals:  Previous Goal Met: Progressing toward Goal(s)  Goals: Meet at least 75% of estimated needs, prior to discharge       Nutrition Monitoring and Evaluation:   Behavioral-Environmental Outcomes: None Identified  Food/Nutrient Intake Outcomes: Diet Advancement/Tolerance, Enteral Nutrition Intake/Tolerance, IVF Intake  Physical Signs/Symptoms Outcomes: None Identified, Biochemical Data, Chewing or Swallowing, GI Status, Fluid Status or Edema, Nutrition Focused Physical Findings, Weight    Discharge Planning:    Enteral Nutrition     Miguel Angel Shen RD, LD  Contact: [phone]

## 2022-07-16 LAB
ANION GAP SERPL CALCULATED.3IONS-SCNC: 8 MMOL/L (ref 4–16)
BASOPHILS ABSOLUTE: 0 K/CU MM
BASOPHILS RELATIVE PERCENT: 0.3 % (ref 0–1)
BUN BLDV-MCNC: 13 MG/DL (ref 6–23)
CALCIUM SERPL-MCNC: 8.1 MG/DL (ref 8.3–10.6)
CHLORIDE BLD-SCNC: 102 MMOL/L (ref 99–110)
CO2: 31 MMOL/L (ref 21–32)
CREAT SERPL-MCNC: 0.9 MG/DL (ref 0.6–1.1)
DIFFERENTIAL TYPE: ABNORMAL
EKG ATRIAL RATE: 76 BPM
EKG ATRIAL RATE: 97 BPM
EKG DIAGNOSIS: NORMAL
EKG DIAGNOSIS: NORMAL
EKG P AXIS: 10 DEGREES
EKG P AXIS: 58 DEGREES
EKG P-R INTERVAL: 136 MS
EKG P-R INTERVAL: 144 MS
EKG Q-T INTERVAL: 372 MS
EKG Q-T INTERVAL: 396 MS
EKG QRS DURATION: 102 MS
EKG QRS DURATION: 92 MS
EKG QTC CALCULATION (BAZETT): 445 MS
EKG QTC CALCULATION (BAZETT): 472 MS
EKG R AXIS: -20 DEGREES
EKG R AXIS: -8 DEGREES
EKG T AXIS: 104 DEGREES
EKG T AXIS: 7 DEGREES
EKG VENTRICULAR RATE: 76 BPM
EKG VENTRICULAR RATE: 97 BPM
EOSINOPHILS ABSOLUTE: 0.1 K/CU MM
EOSINOPHILS RELATIVE PERCENT: 0.7 % (ref 0–3)
GFR AFRICAN AMERICAN: >60 ML/MIN/1.73M2
GFR NON-AFRICAN AMERICAN: >60 ML/MIN/1.73M2
GLUCOSE BLD-MCNC: 115 MG/DL (ref 70–99)
GLUCOSE BLD-MCNC: 140 MG/DL (ref 70–99)
GLUCOSE BLD-MCNC: 87 MG/DL (ref 70–99)
GLUCOSE BLD-MCNC: 95 MG/DL (ref 70–99)
GLUCOSE BLD-MCNC: 96 MG/DL (ref 70–99)
GLUCOSE BLD-MCNC: 97 MG/DL (ref 70–99)
HCT VFR BLD CALC: 25.3 % (ref 37–47)
HEMOGLOBIN: 7.6 GM/DL (ref 12.5–16)
IMMATURE NEUTROPHIL %: 0.6 % (ref 0–0.43)
LYMPHOCYTES ABSOLUTE: 2.1 K/CU MM
LYMPHOCYTES RELATIVE PERCENT: 23.5 % (ref 24–44)
MCH RBC QN AUTO: 24.4 PG (ref 27–31)
MCHC RBC AUTO-ENTMCNC: 30 % (ref 32–36)
MCV RBC AUTO: 81.1 FL (ref 78–100)
MONOCYTES ABSOLUTE: 0.8 K/CU MM
MONOCYTES RELATIVE PERCENT: 8.8 % (ref 0–4)
NUCLEATED RBC %: 0 %
PDW BLD-RTO: 17.5 % (ref 11.7–14.9)
PLATELET # BLD: 234 K/CU MM (ref 140–440)
PMV BLD AUTO: 11.8 FL (ref 7.5–11.1)
POTASSIUM SERPL-SCNC: 4.2 MMOL/L (ref 3.5–5.1)
RBC # BLD: 3.12 M/CU MM (ref 4.2–5.4)
SEGMENTED NEUTROPHILS ABSOLUTE COUNT: 5.8 K/CU MM
SEGMENTED NEUTROPHILS RELATIVE PERCENT: 66.1 % (ref 36–66)
SODIUM BLD-SCNC: 141 MMOL/L (ref 135–145)
TOTAL IMMATURE NEUTOROPHIL: 0.05 K/CU MM
TOTAL NUCLEATED RBC: 0 K/CU MM
TROPONIN T: 0.63 NG/ML
TROPONIN T: 0.65 NG/ML
WBC # BLD: 8.7 K/CU MM (ref 4–10.5)

## 2022-07-16 PROCEDURE — 2140000000 HC CCU INTERMEDIATE R&B

## 2022-07-16 PROCEDURE — 05HF33Z INSERTION OF INFUSION DEVICE INTO LEFT CEPHALIC VEIN, PERCUTANEOUS APPROACH: ICD-10-PCS | Performed by: SURGERY

## 2022-07-16 PROCEDURE — C9113 INJ PANTOPRAZOLE SODIUM, VIA: HCPCS | Performed by: PHYSICIAN ASSISTANT

## 2022-07-16 PROCEDURE — 80048 BASIC METABOLIC PNL TOTAL CA: CPT

## 2022-07-16 PROCEDURE — 6370000000 HC RX 637 (ALT 250 FOR IP): Performed by: PHYSICIAN ASSISTANT

## 2022-07-16 PROCEDURE — 36415 COLL VENOUS BLD VENIPUNCTURE: CPT

## 2022-07-16 PROCEDURE — 85025 COMPLETE CBC W/AUTO DIFF WBC: CPT

## 2022-07-16 PROCEDURE — 99024 POSTOP FOLLOW-UP VISIT: CPT | Performed by: SURGERY

## 2022-07-16 PROCEDURE — 2700000000 HC OXYGEN THERAPY PER DAY

## 2022-07-16 PROCEDURE — 6360000002 HC RX W HCPCS: Performed by: PHYSICIAN ASSISTANT

## 2022-07-16 PROCEDURE — 2580000003 HC RX 258: Performed by: INTERNAL MEDICINE

## 2022-07-16 PROCEDURE — 93010 ELECTROCARDIOGRAM REPORT: CPT | Performed by: INTERNAL MEDICINE

## 2022-07-16 PROCEDURE — 84484 ASSAY OF TROPONIN QUANT: CPT

## 2022-07-16 PROCEDURE — 2709999900 HC NON-CHARGEABLE SUPPLY

## 2022-07-16 PROCEDURE — 76937 US GUIDE VASCULAR ACCESS: CPT

## 2022-07-16 PROCEDURE — 82962 GLUCOSE BLOOD TEST: CPT

## 2022-07-16 PROCEDURE — 94761 N-INVAS EAR/PLS OXIMETRY MLT: CPT

## 2022-07-16 PROCEDURE — 6360000002 HC RX W HCPCS: Performed by: INTERNAL MEDICINE

## 2022-07-16 PROCEDURE — 2580000003 HC RX 258: Performed by: PHYSICIAN ASSISTANT

## 2022-07-16 PROCEDURE — 36410 VNPNXR 3YR/> PHY/QHP DX/THER: CPT

## 2022-07-16 RX ORDER — CLOPIDOGREL BISULFATE 75 MG/1
75 TABLET ORAL DAILY
Status: DISCONTINUED | OUTPATIENT
Start: 2022-07-16 | End: 2022-07-17 | Stop reason: HOSPADM

## 2022-07-16 RX ORDER — RANOLAZINE 500 MG/1
500 TABLET, EXTENDED RELEASE ORAL 2 TIMES DAILY
Status: DISCONTINUED | OUTPATIENT
Start: 2022-07-16 | End: 2022-07-17

## 2022-07-16 RX ADMIN — CARVEDILOL 6.25 MG: 6.25 TABLET, FILM COATED ORAL at 21:06

## 2022-07-16 RX ADMIN — INSULIN LISPRO 1 UNITS: 100 INJECTION, SOLUTION INTRAVENOUS; SUBCUTANEOUS at 21:31

## 2022-07-16 RX ADMIN — IRON SUCROSE 300 MG: 20 INJECTION, SOLUTION INTRAVENOUS at 09:48

## 2022-07-16 RX ADMIN — RANOLAZINE 500 MG: 500 TABLET, FILM COATED, EXTENDED RELEASE ORAL at 21:06

## 2022-07-16 RX ADMIN — RANOLAZINE 500 MG: 500 TABLET, FILM COATED, EXTENDED RELEASE ORAL at 13:18

## 2022-07-16 RX ADMIN — CLOPIDOGREL BISULFATE 75 MG: 75 TABLET ORAL at 13:17

## 2022-07-16 RX ADMIN — CARVEDILOL 6.25 MG: 6.25 TABLET, FILM COATED ORAL at 09:16

## 2022-07-16 RX ADMIN — PANTOPRAZOLE SODIUM 40 MG: 40 INJECTION, POWDER, LYOPHILIZED, FOR SOLUTION INTRAVENOUS at 09:16

## 2022-07-16 RX ADMIN — HYDROMORPHONE HYDROCHLORIDE 1 MG: 1 INJECTION, SOLUTION INTRAMUSCULAR; INTRAVENOUS; SUBCUTANEOUS at 21:35

## 2022-07-16 RX ADMIN — INSULIN GLARGINE 5 UNITS: 100 INJECTION, SOLUTION SUBCUTANEOUS at 21:31

## 2022-07-16 RX ADMIN — PANTOPRAZOLE SODIUM 40 MG: 40 INJECTION, POWDER, LYOPHILIZED, FOR SOLUTION INTRAVENOUS at 21:06

## 2022-07-16 RX ADMIN — SODIUM CHLORIDE, PRESERVATIVE FREE 10 ML: 5 INJECTION INTRAVENOUS at 21:10

## 2022-07-16 RX ADMIN — SODIUM CHLORIDE, PRESERVATIVE FREE 10 ML: 5 INJECTION INTRAVENOUS at 09:17

## 2022-07-16 RX ADMIN — AMIODARONE HYDROCHLORIDE 200 MG: 200 TABLET ORAL at 09:16

## 2022-07-16 ASSESSMENT — PAIN SCALES - GENERAL
PAINLEVEL_OUTOF10: 0
PAINLEVEL_OUTOF10: 0
PAINLEVEL_OUTOF10: 7
PAINLEVEL_OUTOF10: 0
PAINLEVEL_OUTOF10: 0

## 2022-07-16 ASSESSMENT — PAIN DESCRIPTION - LOCATION: LOCATION: ABDOMEN

## 2022-07-16 ASSESSMENT — PAIN DESCRIPTION - DESCRIPTORS: DESCRIPTORS: ACHING;DISCOMFORT

## 2022-07-16 NOTE — PROGRESS NOTES
Hospitalist Progress Note      Name:  Keisha Colon /Age/Sex: 1950  (66 y.o. female)   MRN & CSN:  [id number] & [id number] Admission Date/Time: 2022  6:07 AM   Location:  3108/[facility]A PCP: Faiza De Los Santos Dr Day: 6    Assessment and Plan:     Chest pain  Likely cardiac versus gastrointestinal causes. Has history of coronary artery disease  Troponins were drawn this morning which are elevated. EKG reviewed  Evaluated by cardiology, appreciate recs. Her Plavix have been resumed  Monitor CBC  Cycle troponins x 2     Elevated troponins  Likely NSTEMI  Cycle troponins  Continue to monitor      Presyncope likely due to orthostatic hypotension , resolved. Acute hematemesis status post esophageal dilation for esophageal stricture, resolved  Underwent J-tube insertion on 2022  Start nutrition: Tube feeds. Acute anemia  Initially due to complications of esophageal dilatation. Continue to monitor hemoglobin. History of atrial fibrillation  Currently normal sinus rhythm. On amiodarone. Not on anticoagulation due to bleeding risk. Prolonged QTC, resolved      Insulin-dependent type 2 diabetes   continue on Lantus 5 unit nightly and low-dose sliding scale. Blood sugar stable. POCT 4 times daily    Nutrition  Clears through mouth and patient to resume tube feeds starting today. Diet ADULT DIET; Clear Liquid  ADULT TUBE FEEDING; PEJ; Peptide Based; Continuous; 10; Yes; 10; Q 8 hours; 50; 80; Q 4 hours   DVT Prophylaxis [] Lovenox, []  Heparin, [x] SCDs, [] Ambulation   GI Prophylaxis [x] PPI,  [] H2 Blocker,  [] Carafate,  [] Diet/Tube Feeds   Code Status Full Code   Disposition Patient requires continued admission due to    MDM [] Low, [] Moderate,[]  High  Patient's risk as above due to      History of Present Illness:     Seen and examined in AM.  Patient had chest pain overnight which was relieved by IV opiates.   She complaint of chest pain as heavy pressure in the retrosternal area. It did not feel like her usual chest pain from her esophageal stricture. She is chest pain-free this AM.  She has no other complaints. EKG was performed this a.m. and was reviewed. No concerning ST-T wave changes were seen. She has history of coronary artery disease and her antiplatelets were on hold because of GI bleed. Objective: Intake/Output Summary (Last 24 hours) at 7/16/2022 1326  Last data filed at 7/16/2022 1000  Gross per 24 hour   Intake 498 ml   Output --   Net 498 ml        Vitals:   Vitals:    07/16/22 1157   BP: (!) 117/54   Pulse: 69   Resp: 15   Temp: 97.9 °F (36.6 °C)   SpO2: 100%     Physical Exam:   GEN Awake. Alert , not in respiratory distress, not in pain  HEENT: PEERLA, , supple neck,   Chest: air entry equal bilaterally, no wheezing or crepitation  Heart: S1 and S2 heard, no murmur, no gallop or rub, regular rate  Abdomen: soft, ND , mild diffuse tenderness, no rigidity and rebound, J-tube in place.   Extremities: no cyanosis, tenderness or erythema, peripheral pulses audible  Neurology: alert, oriented x3, able to move 4 limbs    Medications:   Medications:    clopidogrel  75 mg Oral Daily    ranolazine  500 mg Oral BID    iron sucrose  300 mg IntraVENous Daily    carvedilol  6.25 mg Oral BID    amiodarone  200 mg Oral Daily    potassium bicarbonate  50 mEq Oral Once    sodium chloride flush  5-40 mL IntraVENous 2 times per day    promethazine  12.5 mg IntraMUSCular Once    pantoprazole  40 mg IntraVENous BID    insulin glargine  5 Units SubCUTAneous Nightly    insulin lispro  0-3 Units SubCUTAneous Nightly      Infusions:    sodium chloride      sodium chloride      dextrose       PRN Meds: sodium chloride, , PRN  nitroGLYCERIN, 0.4 mg, Q5 Min PRN  zolpidem, 5 mg, Nightly PRN  sodium chloride flush, 5-40 mL, PRN  sodium chloride, , PRN  ondansetron, 4 mg, Q8H PRN   Or  ondansetron, 4 mg, Q6H PRN  polyethylene glycol, 17 g, Daily PRN  acetaminophen, 650 mg, Q6H PRN   Or  acetaminophen, 650 mg, Q6H PRN  morphine, 1 mg, Q4H PRN  glucose, 4 tablet, PRN  dextrose bolus, 125 mL, PRN   Or  dextrose bolus, 250 mL, PRN  glucagon (rDNA), 1 mg, PRN  dextrose, 100 mL/hr, PRN  [Held by provider] hydrALAZINE, 5 mg, Q4H PRN  HYDROmorphone, 1 mg, Q3H PRN  promethazine, 12.5 mg, Q6H PRN        Electronically signed by Lashay Kitchen MD on 7/16/2022 at 1:26 PM

## 2022-07-16 NOTE — PROGRESS NOTES
Daily Progress Note  Subjective:    Pt. Awake, alert and feeling better  No CP today and SOB improved per pt. Attending Note:      Impression and Plan:     Chest pain    Likely d/t vomiting and Hematemesis    Hx of CAD s/p CABG in the remote past, also Hx of PAD s/p PTA and carotid disease s/p Rt. CEA    Vitals stable and symptoms improving as vomiting improves    Holding blood thinners currently d/t hematemesis    Echo stable overall     Hematemesis    Related to EGD with dilation    Improved with phenergan now    General surgery following    Severe esophageal stricture- s/p J-tube placed     Per notes needs to see [facility] for further treatment     Hx of CEA- restart plavix when able  Hgb low-but improved now  Will follow-stable from cardiac standpoint    Most Recent Echo  7/12/22  Summary   Left ventricular systolic function is normal.   Ejection fraction is visually estimated at 50-55%. Grade I diastolic dysfunction. No evidence of any pericardial effusion. Most Recent Stress test  11/15/2021  EF 48%, ESV 43,  Mild-to-moderate reversible anterior and apical wall perfusion defect(s): Artifacts decrease sensitivity of this finding. Patient has a large hiatal hernia. .    Most Recent Heart Cath  08/03/2012  Cath (EF.60, 90% Ostial LM, 50% Proximal RI, 0% OM1, 70% Ostial OM2, 70% Proximal RCA, 80% Distal RCA, 70% Proximal PDA, 90% Ostial PLB, 80-90% diffuse stenosis of CX. Plan: Pt. referred for bypass surgery. ) - 8/3/2012    PAST MEDICAL HISTORY:  The patient has a history of coronary artery  disease. She is status post CABG done four vessels in 2012. History of  having peripheral vascular disease in bilateral legs and interventions  done. History of carotid endarterectomy performed. CABG was done in  2012 of LIMA to LAD, SVG to circ, SVG to PDA, SVG to RCA. She has  diabetes, esophageal strictures present. She had EGD done. The patient  is having solid food dysphagia present.   History of renal insufficiency,  sees Dr. Harvinder Gill for that. Hypertension, hyperlipidemia. PAST SURGICAL HISTORY:  CABG done four vessels, right carotid  endarterectomy performed. Gallbladder surgery done. Nissen gastric  fundoplication surgery done in 04/2022. EGD done today. SOCIAL HISTORY:  Does not smoke. Does not drink. ALLERGIES:  TETANUS. HOME MEDICATIONS:  She is on Farxiga, Glucotrol, lisinopril, Crestor,  Coreg, amiodarone, Plavix and aspirin. Objective:   BP (!) 124/59   Pulse 68   Temp 97.9 °F (36.6 °C) (Oral)   Resp 18   Ht 5' 3\" (1.6 m)   Wt 139 lb 3.2 oz (63.1 kg)   LMP 01/19/2002   SpO2 100%   BMI 24.66 kg/m²     Intake/Output Summary (Last 24 hours) at 7/16/2022 1105  Last data filed at 7/16/2022 1000  Gross per 24 hour   Intake 498 ml   Output --   Net 498 ml       Medications:   Scheduled Meds:   iron sucrose  300 mg IntraVENous Daily    carvedilol  6.25 mg Oral BID    amiodarone  200 mg Oral Daily    potassium bicarbonate  50 mEq Oral Once    sodium chloride flush  5-40 mL IntraVENous 2 times per day    promethazine  12.5 mg IntraMUSCular Once    pantoprazole  40 mg IntraVENous BID    insulin glargine  5 Units SubCUTAneous Nightly    insulin lispro  0-3 Units SubCUTAneous Nightly      Infusions:   sodium chloride      sodium chloride      dextrose        PRN Meds:  sodium chloride, nitroGLYCERIN, zolpidem, sodium chloride flush, sodium chloride, ondansetron **OR** ondansetron, polyethylene glycol, acetaminophen **OR** acetaminophen, morphine, glucose, dextrose bolus **OR** dextrose bolus, glucagon (rDNA), dextrose, [Held by provider] hydrALAZINE, HYDROmorphone, promethazine     Physical Exam:  Vitals:    07/16/22 1018   BP:    Pulse: 68   Resp:    Temp:    SpO2:         General: AAO, NAD  Chest: Nontender  Cardiac: First and Second Heart Sounds are Normal, No Murmurs or Gallops noted  Lungs:Clear to auscultation and percussion.   Abdomen: Soft, NT, ND, +BS  Extremities: No clubbing, no edema  Vascular:  Equal 2+ peripheral pulses. Lab Data:  CBC:   Recent Labs     07/13/22  1236 07/14/22  1140 07/14/22  1635 07/15/22  0117 07/15/22  0629 07/16/22  0838   WBC 7.8 9.5  --   --   --  8.7   HGB 8.8* 7.9*   < > 6.7* 7.0* 7.6*   HCT 30.0* 26.9*   < > 22.7* 23.5* 25.3*   MCV 81.7 82.0  --   --   --  81.1    243  --   --   --  234    < > = values in this interval not displayed. BMP:   Recent Labs     07/13/22  1236 07/14/22  1140 07/16/22  0838    137 141   K 3.4* 4.4 4.2    102 102   CO2 29 25 31   BUN 14 14 13   CREATININE 0.8 0.9 0.9     LIVER PROFILE: No results for input(s): AST, ALT, LIPASE, BILIDIR, BILITOT, ALKPHOS in the last 72 hours. Invalid input(s): AMYLASE,  ALB  PT/INR: No results for input(s): PROTIME, INR in the last 72 hours. APTT: No results for input(s): APTT in the last 72 hours. BNP:  No results for input(s): BNP in the last 72 hours.       Assessment:  Patient Active Problem List    Diagnosis Date Noted    Carotid stenosis, right 02/18/2022    Carotid stenosis, bilateral 01/26/2022    Acute hypoxemic respiratory failure due to COVID-19 ([facility] [address] 08/18/2021    Esophageal stricture     Hiatal hernia     Iron deficiency anemia     Iron deficiency anemia secondary to blood loss (chronic) 04/14/2020    Other forms of chronic ischemic heart disease 04/14/2020    Hiatal hernia with GERD 04/14/2020    Personal history of other diseases of the digestive system 04/14/2020    Angular cheilitis 01/05/2020    Microalbuminuria 05/16/2017    PVD (peripheral vascular disease) ([facility] [address] 04/11/2017    Anemia 09/20/2016    Vitamin D deficiency 09/20/2016    Type 2 diabetes mellitus without complication, without long-term current use of insulin ([facility] [address] 09/20/2016    Coronary artery disease involving native heart without angina pectoris 09/20/2016    Essential hypertension 09/20/2016    Mixed hyperlipidemia 09/20/2016       Electronically signed by Miguel Freeman PA-C on 7/16/2022 at 11:05 AM

## 2022-07-16 NOTE — PROGRESS NOTES
Noted elevated troponin-pt. Had an episode of CP last night that is resolved now. Apparently improved with nitro  Troponin was checked this am by primary team-elevated to 0.6 range  No more CP today  Hgb is low and HR was elevated at the time  EKG done and reviewed no significant EKG changes noted  Discussed with attending physician Dr. Uriostegui Reason- more likely Type II NSTEMI from anemia and demand ischemia-would treat medically-not sure she can tolerate [facility] for now regardless d/t co-morbidities   Rec.  Starting antiplatelets if safe from General surgery perspective after J-tube placement and hematemesis-appears to be stabilized now  Recent echo stable and no WMA noted  Medical treatment at this time  Please hold D/C for today-possibly could D/C tomorrow if stable  If Trop stable then can possibly do ischemic W/u OP if indicated

## 2022-07-16 NOTE — PROGRESS NOTES
GENERAL SURGERY PROGRESS NOTE    Hailey Mtz is a 66 y.o. female status post J-tube placement. Subjective:  No acute events overnight. Objective:    Vitals: VITALS:  BP (!) 117/54   Pulse 69   Temp 97.9 °F (36.6 °C) (Oral)   Resp 15   Ht 5' 3\" (1.6 m)   Wt 139 lb 3.2 oz (63.1 kg)   LMP 01/19/2002   SpO2 100%   BMI 24.66 kg/m²     I/O: 07/15 0701 - 07/16 0700  In: 488 [P.O.:240]  Out: -     Labs/Imaging Results:   Lab Results   Component Value Date/Time     07/16/2022 08:38 AM    K 4.2 07/16/2022 08:38 AM     07/16/2022 08:38 AM    CO2 31 07/16/2022 08:38 AM    BUN 13 07/16/2022 08:38 AM    CREATININE 0.9 07/16/2022 08:38 AM    GLUCOSE 87 07/16/2022 08:38 AM    CALCIUM 8.1 07/16/2022 08:38 AM      Lab Results   Component Value Date    WBC 8.7 07/16/2022    HGB 7.6 (L) 07/16/2022    HCT 25.3 (L) 07/16/2022    MCV 81.1 07/16/2022     07/16/2022          IV Fluids:   sodium chloride    sodium chloride    dextrose    Scheduled Meds:   clopidogrel, 75 mg, Oral, Daily    ranolazine, 500 mg, Oral, BID    iron sucrose, 300 mg, IntraVENous, Daily    carvedilol, 6.25 mg, Oral, BID    amiodarone, 200 mg, Oral, Daily    potassium bicarbonate, 50 mEq, Oral, Once    sodium chloride flush, 5-40 mL, IntraVENous, 2 times per day    promethazine, 12.5 mg, IntraMUSCular, Once    pantoprazole, 40 mg, IntraVENous, BID    insulin glargine, 5 Units, SubCUTAneous, Nightly    insulin lispro, 0-3 Units, SubCUTAneous, Nightly    Physical Exam:  General: A&O x 3, no distress. HEENT: Anicteric sclerae, MMM. Extremities: No edema bilat LE. Abdomen: Soft, nondistended, nontender J-tube in place.       Assessment and Plan:     Patient Active Problem List:     Anemia     Vitamin D deficiency     Type 2 diabetes mellitus without complication, without long-term current use of insulin ([facility] [address]     Coronary artery disease involving native heart without angina pectoris     Essential hypertension     Mixed hyperlipidemia     PVD (peripheral vascular disease) (H[facility])     Microalbuminuria     Angular cheilitis     Iron deficiency anemia secondary to blood loss (chronic)     Other forms of chronic ischemic heart disease     Hiatal hernia with GERD     Personal history of other diseases of the digestive system     Esophageal stricture     Hiatal hernia     Iron deficiency anemia     Acute hypoxemic respiratory failure due to COVID-19 [facility])     Carotid stenosis, bilateral     Carotid stenosis, right      Principal Problem:    Esophageal stricture  Plan:     Status post J-tube placement        Okay to start tube feeds via J-tube. Follow dietary recommendations. Okay to start antiplatelet therapy if needed from medical standpoint. Patient apparently can crush meds and liquid so if possible give meds this way. Avoid occasions via J-tube. Flush J-tube frequently to prevent blockage.      Conor Clement, DO

## 2022-07-16 NOTE — PLAN OF CARE
Problem: Chronic Conditions and Co-morbidities  Goal: Patient's chronic conditions and co-morbidity symptoms are monitored and maintained or improved  Outcome: Progressing  Flowsheets (Taken 7/16/2022 0915)  Care Plan - Patient's Chronic Conditions and Co-Morbidity Symptoms are Monitored and Maintained or Improved:   Monitor and assess patient's chronic conditions and comorbid symptoms for stability, deterioration, or improvement   Collaborate with multidisciplinary team to address chronic and comorbid conditions and prevent exacerbation or deterioration   Update acute care plan with appropriate goals if chronic or comorbid symptoms are exacerbated and prevent overall improvement and discharge     Problem: Pain  Goal: Verbalizes/displays adequate comfort level or baseline comfort level  Outcome: Progressing     Problem: Safety - Adult  Goal: Free from fall injury  Outcome: Progressing     Problem: Discharge Planning  Goal: Discharge to home or other facility with appropriate resources  Outcome: Progressing  Flowsheets (Taken 7/16/2022 0915)  Discharge to home or other facility with appropriate resources:   Identify barriers to discharge with patient and caregiver   Arrange for needed discharge resources and transportation as appropriate   Identify discharge learning needs (meds, wound care, etc)   Refer to discharge planning if patient needs post-hospital services based on physician order or complex needs related to functional status, cognitive ability or social support system     Problem: Gastrointestinal - Adult  Goal: Minimal or absence of nausea and vomiting  Outcome: Progressing  Flowsheets (Taken 7/16/2022 0915)  Minimal or absence of nausea and vomiting:   Administer IV fluids as ordered to ensure adequate hydration   Administer ordered antiemetic medications as needed   Provide nonpharmacologic comfort measures as appropriate   Advance diet as tolerated, if ordered  Goal: Maintains or returns to baseline bowel function  Outcome: Progressing  Flowsheets (Taken 7/16/2022 0915)  Maintains or returns to baseline bowel function:   Assess bowel function   Encourage oral fluids to ensure adequate hydration   Administer ordered medications as needed   Encourage mobilization and activity  Goal: Maintains adequate nutritional intake  Outcome: Progressing  Flowsheets (Taken 7/16/2022 0915)  Maintains adequate nutritional intake:   Monitor intake and output, weight and lab values   Identify factors contributing to decreased intake, treat as appropriate   Monitor percentage of each meal consumed  Goal: Establish and maintain optimal ostomy function  Outcome: Progressing     Problem: Hematologic - Adult  Goal: Maintains hematologic stability  Outcome: Progressing  Flowsheets (Taken 7/16/2022 0915)  Maintains hematologic stability:   Assess for signs and symptoms of bleeding or hemorrhage   Monitor labs for bleeding or clotting disorders   Administer blood products/factors as ordered     Problem: ABCDS Injury Assessment  Goal: Absence of physical injury  Outcome: Progressing     Problem: Nutrition Deficit:  Goal: Optimize nutritional status  Outcome: Progressing

## 2022-07-16 NOTE — CONSULTS
IV Consult complete. No suitable vessels in either FA d/t multiple infiltrations this admission. Arrow Endurance Extended Dwell PIV inserted in LUE Cephalic vessel x1 attempt with ultrasound guidance. Brisk blood return, flushes easy. Venofer re-started in new IV.

## 2022-07-16 NOTE — PROGRESS NOTES
Paged on call physician  Ciarra La about patient complains of chest pain, pain going into left upper extremity. PRN morphine given for pain and patient denies effectiveness.

## 2022-07-17 VITALS
OXYGEN SATURATION: 99 % | SYSTOLIC BLOOD PRESSURE: 112 MMHG | DIASTOLIC BLOOD PRESSURE: 63 MMHG | RESPIRATION RATE: 17 BRPM | HEART RATE: 72 BPM | BODY MASS INDEX: 24.66 KG/M2 | WEIGHT: 139.2 LBS | TEMPERATURE: 97.6 F | HEIGHT: 63 IN

## 2022-07-17 LAB
GLUCOSE BLD-MCNC: 112 MG/DL (ref 70–99)
HCT VFR BLD CALC: 24.4 % (ref 37–47)
HEMOGLOBIN: 7.4 GM/DL (ref 12.5–16)

## 2022-07-17 PROCEDURE — C9113 INJ PANTOPRAZOLE SODIUM, VIA: HCPCS | Performed by: PHYSICIAN ASSISTANT

## 2022-07-17 PROCEDURE — 6370000000 HC RX 637 (ALT 250 FOR IP): Performed by: PHYSICIAN ASSISTANT

## 2022-07-17 PROCEDURE — 85025 COMPLETE CBC W/AUTO DIFF WBC: CPT

## 2022-07-17 PROCEDURE — 94761 N-INVAS EAR/PLS OXIMETRY MLT: CPT

## 2022-07-17 PROCEDURE — 2580000003 HC RX 258: Performed by: PHYSICIAN ASSISTANT

## 2022-07-17 PROCEDURE — 2580000003 HC RX 258: Performed by: INTERNAL MEDICINE

## 2022-07-17 PROCEDURE — 6360000002 HC RX W HCPCS: Performed by: PHYSICIAN ASSISTANT

## 2022-07-17 PROCEDURE — 6360000002 HC RX W HCPCS: Performed by: INTERNAL MEDICINE

## 2022-07-17 PROCEDURE — 82962 GLUCOSE BLOOD TEST: CPT

## 2022-07-17 PROCEDURE — 85014 HEMATOCRIT: CPT

## 2022-07-17 PROCEDURE — 85018 HEMOGLOBIN: CPT

## 2022-07-17 RX ORDER — NITROGLYCERIN 0.4 MG/1
TABLET SUBLINGUAL
Qty: 25 TABLET | Refills: 1 | Status: SHIPPED | OUTPATIENT
Start: 2022-07-17

## 2022-07-17 RX ORDER — ROSUVASTATIN CALCIUM 5 MG/1
10 TABLET, COATED ORAL NIGHTLY
Status: DISCONTINUED | OUTPATIENT
Start: 2022-07-17 | End: 2022-07-17 | Stop reason: HOSPADM

## 2022-07-17 RX ORDER — PANTOPRAZOLE SODIUM 40 MG/1
40 TABLET, DELAYED RELEASE ORAL
Qty: 60 TABLET | Refills: 0 | Status: SHIPPED | OUTPATIENT
Start: 2022-07-17 | End: 2022-08-02

## 2022-07-17 RX ADMIN — PANTOPRAZOLE SODIUM 40 MG: 40 INJECTION, POWDER, LYOPHILIZED, FOR SOLUTION INTRAVENOUS at 07:49

## 2022-07-17 RX ADMIN — CARVEDILOL 6.25 MG: 6.25 TABLET, FILM COATED ORAL at 07:48

## 2022-07-17 RX ADMIN — RANOLAZINE 500 MG: 500 TABLET, FILM COATED, EXTENDED RELEASE ORAL at 07:49

## 2022-07-17 RX ADMIN — AMIODARONE HYDROCHLORIDE 200 MG: 200 TABLET ORAL at 07:48

## 2022-07-17 RX ADMIN — IRON SUCROSE 300 MG: 20 INJECTION, SOLUTION INTRAVENOUS at 08:04

## 2022-07-17 RX ADMIN — CLOPIDOGREL BISULFATE 75 MG: 75 TABLET ORAL at 07:49

## 2022-07-17 RX ADMIN — SODIUM CHLORIDE, PRESERVATIVE FREE 10 ML: 5 INJECTION INTRAVENOUS at 07:49

## 2022-07-17 NOTE — PROGRESS NOTES
Daily Progress Note  Subjective:    Pt. Awake, alert and feeling better  No CP today and SOB improved per pt. Attending Note:      Impression and Plan:     Chest pain    Likely d/t vomiting and Hematemesis    Hx of CAD s/p CABG in the remote past, also Hx of PAD s/p PTA and carotid disease s/p Rt. CEA    Vitals stable and symptoms improving as vomiting improves    Echo stable overall    Elevated Trop    Noted elevation yesterday morning-checked after an episode of CP    Likely type II NSTEMI from anemia and demand ischemia    With surgery permission restarted antiplatelet yesterday    No plan for [facility] now    Will consider OP ischemic W/u once stabilized and recovered from current procedures and anemia    ON BB, statin and antiplatelet for now    No CP today at all     Hematemesis    Related to EGD with dilation    Improved with phenergan now    General surgery following    Severe esophageal stricture- s/p J-tube placed    Per notes needs to see [facility] for further treatment     Hx of CEA- back on antiplatelet now  Hgb low-but improved now-recheck today  If Hgb stable on recheck and remains CP free believe it is ok to D/C today with close OP f/u-f/u next week in office    Most Recent Echo  7/12/22  Summary   Left ventricular systolic function is normal.   Ejection fraction is visually estimated at 50-55%. Grade I diastolic dysfunction. No evidence of any pericardial effusion. Most Recent Stress test  11/15/2021  EF 48%, ESV 43,  Mild-to-moderate reversible anterior and apical wall perfusion defect(s): Artifacts decrease sensitivity of this finding. Patient has a large hiatal hernia. .    Most Recent Heart Cath  08/03/2012  Cath (EF.60, 90% Ostial LM, 50% Proximal RI, 0% OM1, 70% Ostial OM2, 70% Proximal RCA, 80% Distal RCA, 70% Proximal PDA, 90% Ostial PLB, 80-90% diffuse stenosis of CX. Plan: Pt. referred for bypass surgery. ) - 8/3/2012    PAST MEDICAL HISTORY:  The patient has a history of coronary artery  disease. She is status post CABG done four vessels in 2012. History of  having peripheral vascular disease in bilateral legs and interventions  done. History of carotid endarterectomy performed. CABG was done in  2012 of LIMA to LAD, SVG to circ, SVG to PDA, SVG to RCA. She has  diabetes, esophageal strictures present. She had EGD done. The patient  is having solid food dysphagia present. History of renal insufficiency,  sees Dr. Felder Crew for that. Hypertension, hyperlipidemia. PAST SURGICAL HISTORY:  CABG done four vessels, right carotid  endarterectomy performed. Gallbladder surgery done. Nissen gastric  fundoplication surgery done in 04/2022. EGD done today. SOCIAL HISTORY:  Does not smoke. Does not drink. ALLERGIES:  TETANUS. HOME MEDICATIONS:  She is on Farxiga, Glucotrol, lisinopril, Crestor,  Coreg, amiodarone, Plavix and aspirin.     Objective:   /63   Pulse 72   Temp 97.6 °F (36.4 °C) (Oral)   Resp 17   Ht 5' 3\" (1.6 m)   Wt 139 lb 3.2 oz (63.1 kg)   LMP 01/19/2002   SpO2 99%   BMI 24.66 kg/m²     Intake/Output Summary (Last 24 hours) at 7/17/2022 0950  Last data filed at 7/17/2022 7604  Gross per 24 hour   Intake 1173.08 ml   Output --   Net 1173.08 ml       Medications:   Scheduled Meds:   rosuvastatin  10 mg Oral Nightly    clopidogrel  75 mg Oral Daily    carvedilol  6.25 mg Oral BID    amiodarone  200 mg Oral Daily    potassium bicarbonate  50 mEq Oral Once    sodium chloride flush  5-40 mL IntraVENous 2 times per day    promethazine  12.5 mg IntraMUSCular Once    pantoprazole  40 mg IntraVENous BID    insulin glargine  5 Units SubCUTAneous Nightly    insulin lispro  0-3 Units SubCUTAneous Nightly      Infusions:   sodium chloride      sodium chloride      dextrose        PRN Meds:  sodium chloride, nitroGLYCERIN, zolpidem, sodium chloride flush, sodium chloride, ondansetron **OR** ondansetron, polyethylene glycol, acetaminophen **OR** acetaminophen, morphine, glucose, dextrose bolus **OR** dextrose bolus, glucagon (rDNA), dextrose, [Held by provider] hydrALAZINE, HYDROmorphone, promethazine     Physical Exam:  Vitals:    07/17/22 0748   BP: 112/63   Pulse: 72   Resp: 17   Temp: 97.6 °F (36.4 °C)   SpO2: 99%        General: AAO, NAD  Chest: Nontender  Cardiac: First and Second Heart Sounds are Normal, No Murmurs or Gallops noted  Lungs:Clear to auscultation and percussion. Abdomen: Soft, NT, ND, +BS  Extremities: No clubbing, no edema  Vascular:  Equal 2+ peripheral pulses. Lab Data:  CBC:   Recent Labs     07/14/22  1140 07/14/22  1635 07/15/22  0117 07/15/22  0629 07/16/22  0838   WBC 9.5  --   --   --  8.7   HGB 7.9*   < > 6.7* 7.0* 7.6*   HCT 26.9*   < > 22.7* 23.5* 25.3*   MCV 82.0  --   --   --  81.1     --   --   --  234    < > = values in this interval not displayed. BMP:   Recent Labs     07/14/22  1140 07/16/22  0838    141   K 4.4 4.2    102   CO2 25 31   BUN 14 13   CREATININE 0.9 0.9     LIVER PROFILE: No results for input(s): AST, ALT, LIPASE, BILIDIR, BILITOT, ALKPHOS in the last 72 hours. Invalid input(s): AMYLASE,  ALB  PT/INR: No results for input(s): PROTIME, INR in the last 72 hours. APTT: No results for input(s): APTT in the last 72 hours. BNP:  No results for input(s): BNP in the last 72 hours.       Assessment:  Patient Active Problem List    Diagnosis Date Noted    Carotid stenosis, right 02/18/2022    Carotid stenosis, bilateral 01/26/2022    Acute hypoxemic respiratory failure due to COVID-19 ([facility] [address] 08/18/2021    Esophageal stricture     Hiatal hernia     Iron deficiency anemia     Iron deficiency anemia secondary to blood loss (chronic) 04/14/2020    Other forms of chronic ischemic heart disease 04/14/2020    Hiatal hernia with GERD 04/14/2020    Personal history of other diseases of the digestive system 04/14/2020    Angular cheilitis 01/05/2020    Microalbuminuria 05/16/2017    PVD (peripheral vascular disease) ([facility] 04/11/2017    Anemia 09/20/2016    Vitamin D deficiency 09/20/2016    Type 2 diabetes mellitus without complication, without long-term current use of insulin ([facility] 09/20/2016    Coronary artery disease involving native heart without angina pectoris 09/20/2016    Essential hypertension 09/20/2016    Mixed hyperlipidemia 09/20/2016       Electronically signed by Mechelle Sanders PA-C on 7/17/2022 at 9:50 AM

## 2022-07-17 NOTE — DISCHARGE SUMMARY
Discharge Summary    Name:  Tyler Whiteside /Age/Sex: 1950  (66 y.o. female)   MRN & CSN:  [id number] & [id number] Admission Date/Time: 2022  6:07 AM   Attending:  Shamika Pastor MD Discharging Physician: Shamika Pastor MD       Admission Diagnosis:   Acute anemia  Acute hematemesis s/p esophageal dilation  Esophageal stricture  History of atrial fibrillation  Type 2 diabetes mellitus  History of coronary artery disease s/p CABG    Discharge Diagnosis:  Acute anemia  Acute hematemesis s/p esophageal dilation  Esophageal stricture  History of atrial fibrillation  History of coronary artery disease s/p CABG  NSTEMI  Type 2 diabetes mellitus      Discharge Exam  Physical Exam  Constitutional:       Appearance: Normal appearance. HENT:      Head: Normocephalic and atraumatic. Mouth/Throat:      Mouth: Mucous membranes are moist.      Pharynx: Oropharynx is clear. Eyes:      Extraocular Movements: Extraocular movements intact. Pupils: Pupils are equal, round, and reactive to light. Cardiovascular:      Rate and Rhythm: Normal rate and regular rhythm. Pulses: Normal pulses. Heart sounds: Normal heart sounds. Pulmonary:      Effort: Pulmonary effort is normal.      Breath sounds: Normal breath sounds. Abdominal:      General: Abdomen is flat. Palpations: Abdomen is soft. Genitourinary:     General: Normal vulva. Rectum: Normal.   Musculoskeletal:         General: Normal range of motion. Cervical back: Normal range of motion and neck supple. Skin:     General: Skin is warm and dry. Neurological:      General: No focal deficit present. Mental Status: She is alert and oriented to person, place, and time. Hospital Course:   Tyler Whiteside is a 66 y.o.  female  who presents with Esophageal stricture    Above patient presented on 2022 for EGD and dilation as she had complaints of solid food dysphagia.     The patient expressed appropriate understanding of and agreement with the discharge recommendations, medications, and plan. Postprocedure she developed hematemesis and chest soreness. Patient CT abdomen pelvis showed distended thoracic esophagus with intraluminal enteric content. Patient's hemoglobin was stable with intermittent drop during the hospitalization. She eventually underwent J-tube placement for nutrition. She will need outpatient consult at a tertiary center such as [address] [place] [address] or [facility] clinic for possible esophageal reconstruction. During the hospitalization she had an episode of presyncope with hypotension likely because of hyperkalemia. She responded well with fluids. She had an episode of acute chest pain and elevated troponins, likely due to NSTEMI. She was resumed on Plavix. Her hemoglobin remained stable after resumption of Plavix. Her chest pain was resolved as well. She is to see cardiology outpatient regarding further cardiac work-up. She is stable at discharge. Consults this admission:  IP CONSULT TO HOSPITALIST  IP CONSULT TO CARDIOLOGY  IP CONSULT TO IV TEAM  IP CONSULT TO DIETITIAN  IP CONSULT TO HOME CARE NEEDS  IP CONSULT TO IV TEAM  IP CONSULT TO HOME CARE NEEDS  IP CONSULT TO IV TEAM      Discharge Instruction:   Handoff to PCP: Will need tertiary center consult for possible esophageal reconstruction; will need cardiology evaluation for NSTEMI. Follow up appointments:   Primary care physician:     Diet:  cardiac diet   Activity: activity as tolerated  Disposition: Discharged to:   []Home, [][facility], []SNF, []Acute Rehab, []Hospice   Condition on discharge: Stable    Discharge Medications:        Medication List        START taking these medications      nitroGLYCERIN 0.4 MG SL tablet  Commonly known as: NITROSTAT  up to max of 3 total doses. If no relief after 1 dose, call 911.      pantoprazole 40 MG tablet  Commonly known as: PROTONIX  Take 1 tablet by mouth every morning (before breakfast) CONTINUE taking these medications      amiodarone 200 MG tablet  Commonly known as: CORDARONE     carvedilol 6.25 MG tablet  Commonly known as: Coreg  Take 1 tablet by mouth 2 times daily (with meals)     clopidogrel 75 MG tablet  Commonly known as: PLAVIX  Take 1 tablet by mouth daily     dapagliflozin 5 MG tablet  Commonly known as: Farxiga  TAKE 1 TABLET BY MOUTH EVERY MORNING     glipiZIDE 5 MG tablet  Commonly known as: GLUCOTROL  Take 1 tablet by mouth 2 times daily (before meals)     lisinopril 5 MG tablet  Commonly known as: PRINIVIL;ZESTRIL  TAKE 1 TABLET BY MOUTH DAILY     rosuvastatin 10 MG tablet  Commonly known as: CRESTOR  TAKE 1 TABLET BY MOUTH EVERY NIGHT     Trulicity 1.5 RY/4.3MS Sopn  Generic drug: Dulaglutide  ADMINISTER 1.5 MG UNDER THE SKIN 1 TIME A WEEK            STOP taking these medications      acetaminophen 500 MG tablet  Commonly known as: TYLENOL     aspirin 81 MG EC tablet               Where to Get Your Medications        These medications were sent to [facility], [address] [facility] Doctor Eric Chakraborty [phone]  [address] [place], [place] [address]      Phone: [phone]   nitroGLYCERIN 0.4 MG SL tablet  pantoprazole 40 MG tablet         Objective Findings at Discharge:       BMP/CBC  Recent Labs     07/15/22  0629 07/16/22  0838 07/17/22  1106   NA  --  141  --    K  --  4.2  --    CL  --  102  --    CO2  --  31  --    BUN  --  13  --    CREATININE  --  0.9  --    WBC  --  8.7  --    HCT 23.5* 25.3* 24.4*   PLT  --  234  --            Additional Information: Patient seen and examined day of discharge.  For more information regarding patient's care please contact [facility] records [phone]    Discharge Time of 35 minutes    Electronically signed by Anton Canela MD on 7/17/2022 at 12:01 PM

## 2022-07-17 NOTE — DISCHARGE INSTRUCTIONS
Tube Feed Vital AF 1.2 - Was running at 30ml/hr - goal rate was 50/hr    Enteral Access: PEJ   Formula Peptide Based   Should patient be NPO or Oral Diet Oral Diet   Delivery Method Continuous   Continuous Initial Rate (mL/hr) 10   Continuous Advance Tube Feeding Yes   Advancement Volume (mL/hr) 10   Advancement Frequency Q 8 hours   Continuous Goal Rate (mL/hr) 50   Water Flush Volume (mL) 80   Water Flush Frequency Q 4 hours

## 2022-07-18 ENCOUNTER — APPOINTMENT (OUTPATIENT)
Dept: CT IMAGING | Age: 72
End: 2022-07-18
Payer: COMMERCIAL

## 2022-07-18 ENCOUNTER — TELEPHONE (OUTPATIENT)
Dept: INTERNAL MEDICINE CLINIC | Age: 72
End: 2022-07-18

## 2022-07-18 ENCOUNTER — HOSPITAL ENCOUNTER (EMERGENCY)
Age: 72
Discharge: ANOTHER HOSPITAL | End: 2022-07-19
Payer: COMMERCIAL

## 2022-07-18 VITALS
WEIGHT: 132 LBS | SYSTOLIC BLOOD PRESSURE: 118 MMHG | DIASTOLIC BLOOD PRESSURE: 65 MMHG | HEIGHT: 63 IN | BODY MASS INDEX: 23.39 KG/M2 | RESPIRATION RATE: 18 BRPM | HEART RATE: 87 BPM | OXYGEN SATURATION: 99 % | TEMPERATURE: 97.9 F

## 2022-07-18 DIAGNOSIS — K66.8 PERITONEAL FREE AIR: Primary | ICD-10-CM

## 2022-07-18 DIAGNOSIS — K22.3 ESOPHAGEAL PERFORATION: ICD-10-CM

## 2022-07-18 DIAGNOSIS — R77.8 ELEVATED TROPONIN: ICD-10-CM

## 2022-07-18 LAB
ABO/RH: NORMAL
ALBUMIN SERPL-MCNC: 3.1 GM/DL (ref 3.4–5)
ALP BLD-CCNC: 79 IU/L (ref 40–129)
ALT SERPL-CCNC: 6 U/L (ref 10–40)
ANION GAP SERPL CALCULATED.3IONS-SCNC: 22 MMOL/L (ref 4–16)
ANTIBODY SCREEN: NEGATIVE
AST SERPL-CCNC: 17 IU/L (ref 15–37)
BASOPHILS ABSOLUTE: 0 K/CU MM
BASOPHILS RELATIVE PERCENT: 0.4 % (ref 0–1)
BILIRUB SERPL-MCNC: 0.8 MG/DL (ref 0–1)
BUN BLDV-MCNC: 12 MG/DL (ref 6–23)
CALCIUM SERPL-MCNC: 8.6 MG/DL (ref 8.3–10.6)
CHLORIDE BLD-SCNC: 94 MMOL/L (ref 99–110)
CO2: 19 MMOL/L (ref 21–32)
COMPONENT: NORMAL
COMPONENT: NORMAL
CREAT SERPL-MCNC: 0.9 MG/DL (ref 0.6–1.1)
CROSSMATCH RESULT: NORMAL
CROSSMATCH RESULT: NORMAL
DIFFERENTIAL TYPE: ABNORMAL
EOSINOPHILS ABSOLUTE: 0.3 K/CU MM
EOSINOPHILS RELATIVE PERCENT: 2.8 % (ref 0–3)
GFR AFRICAN AMERICAN: >60 ML/MIN/1.73M2
GFR NON-AFRICAN AMERICAN: >60 ML/MIN/1.73M2
GLUCOSE BLD-MCNC: 308 MG/DL (ref 70–99)
HCT VFR BLD CALC: 27.3 % (ref 37–47)
HEMOGLOBIN: 8.5 GM/DL (ref 12.5–16)
IMMATURE NEUTROPHIL %: 2.1 % (ref 0–0.43)
LYMPHOCYTES ABSOLUTE: 1.8 K/CU MM
LYMPHOCYTES RELATIVE PERCENT: 16.6 % (ref 24–44)
MCH RBC QN AUTO: 25.1 PG (ref 27–31)
MCHC RBC AUTO-ENTMCNC: 31.1 % (ref 32–36)
MCV RBC AUTO: 80.8 FL (ref 78–100)
MONOCYTES ABSOLUTE: 1 K/CU MM
MONOCYTES RELATIVE PERCENT: 9 % (ref 0–4)
NUCLEATED RBC %: 0 %
PDW BLD-RTO: 18.2 % (ref 11.7–14.9)
PLATELET # BLD: 353 K/CU MM (ref 140–440)
PMV BLD AUTO: 11 FL (ref 7.5–11.1)
POTASSIUM SERPL-SCNC: 3.9 MMOL/L (ref 3.5–5.1)
PRO-BNP: ABNORMAL PG/ML
RBC # BLD: 3.38 M/CU MM (ref 4.2–5.4)
SEGMENTED NEUTROPHILS ABSOLUTE COUNT: 7.5 K/CU MM
SEGMENTED NEUTROPHILS RELATIVE PERCENT: 69.1 % (ref 36–66)
SODIUM BLD-SCNC: 135 MMOL/L (ref 135–145)
STATUS: NORMAL
STATUS: NORMAL
TOTAL IMMATURE NEUTOROPHIL: 0.23 K/CU MM
TOTAL NUCLEATED RBC: 0 K/CU MM
TOTAL PROTEIN: 5.4 GM/DL (ref 6.4–8.2)
TRANSFUSION STATUS: NORMAL
TRANSFUSION STATUS: NORMAL
TROPONIN T: 0.47 NG/ML
UNIT DIVISION: 0
UNIT DIVISION: 0
UNIT NUMBER: NORMAL
UNIT NUMBER: NORMAL
WBC # BLD: 10.9 K/CU MM (ref 4–10.5)

## 2022-07-18 PROCEDURE — 84484 ASSAY OF TROPONIN QUANT: CPT

## 2022-07-18 PROCEDURE — 96365 THER/PROPH/DIAG IV INF INIT: CPT

## 2022-07-18 PROCEDURE — 2580000003 HC RX 258: Performed by: PHYSICIAN ASSISTANT

## 2022-07-18 PROCEDURE — 96375 TX/PRO/DX INJ NEW DRUG ADDON: CPT

## 2022-07-18 PROCEDURE — 6360000002 HC RX W HCPCS: Performed by: PHYSICIAN ASSISTANT

## 2022-07-18 PROCEDURE — 71275 CT ANGIOGRAPHY CHEST: CPT

## 2022-07-18 PROCEDURE — 83880 ASSAY OF NATRIURETIC PEPTIDE: CPT

## 2022-07-18 PROCEDURE — 96376 TX/PRO/DX INJ SAME DRUG ADON: CPT

## 2022-07-18 PROCEDURE — 93005 ELECTROCARDIOGRAM TRACING: CPT | Performed by: PHYSICIAN ASSISTANT

## 2022-07-18 PROCEDURE — 80053 COMPREHEN METABOLIC PANEL: CPT

## 2022-07-18 PROCEDURE — 85025 COMPLETE CBC W/AUTO DIFF WBC: CPT

## 2022-07-18 PROCEDURE — 99285 EMERGENCY DEPT VISIT HI MDM: CPT

## 2022-07-18 PROCEDURE — 6360000004 HC RX CONTRAST MEDICATION: Performed by: PHYSICIAN ASSISTANT

## 2022-07-18 RX ORDER — ASPIRIN 81 MG/1
324 TABLET, CHEWABLE ORAL ONCE
Status: DISCONTINUED | OUTPATIENT
Start: 2022-07-18 | End: 2022-07-19 | Stop reason: HOSPADM

## 2022-07-18 RX ORDER — MORPHINE SULFATE 4 MG/ML
4 INJECTION, SOLUTION INTRAMUSCULAR; INTRAVENOUS EVERY 30 MIN PRN
Status: DISCONTINUED | OUTPATIENT
Start: 2022-07-18 | End: 2022-07-19 | Stop reason: HOSPADM

## 2022-07-18 RX ORDER — SODIUM CHLORIDE 0.9 % (FLUSH) 0.9 %
5-40 SYRINGE (ML) INJECTION 2 TIMES DAILY
Status: DISCONTINUED | OUTPATIENT
Start: 2022-07-18 | End: 2022-07-19 | Stop reason: HOSPADM

## 2022-07-18 RX ORDER — ONDANSETRON 2 MG/ML
4 INJECTION INTRAMUSCULAR; INTRAVENOUS ONCE
Status: COMPLETED | OUTPATIENT
Start: 2022-07-18 | End: 2022-07-18

## 2022-07-18 RX ORDER — 0.9 % SODIUM CHLORIDE 0.9 %
1000 INTRAVENOUS SOLUTION INTRAVENOUS ONCE
Status: COMPLETED | OUTPATIENT
Start: 2022-07-18 | End: 2022-07-18

## 2022-07-18 RX ADMIN — MORPHINE SULFATE 4 MG: 4 INJECTION, SOLUTION INTRAMUSCULAR; INTRAVENOUS at 19:09

## 2022-07-18 RX ADMIN — HYDROMORPHONE HYDROCHLORIDE 0.5 MG: 1 INJECTION, SOLUTION INTRAMUSCULAR; INTRAVENOUS; SUBCUTANEOUS at 20:14

## 2022-07-18 RX ADMIN — HYDROMORPHONE HYDROCHLORIDE 0.5 MG: 1 INJECTION, SOLUTION INTRAMUSCULAR; INTRAVENOUS; SUBCUTANEOUS at 21:07

## 2022-07-18 RX ADMIN — HYDROMORPHONE HYDROCHLORIDE 0.5 MG: 1 INJECTION, SOLUTION INTRAMUSCULAR; INTRAVENOUS; SUBCUTANEOUS at 23:44

## 2022-07-18 RX ADMIN — SODIUM CHLORIDE 1000 ML: 9 INJECTION, SOLUTION INTRAVENOUS at 21:58

## 2022-07-18 RX ADMIN — PIPERACILLIN AND TAZOBACTAM 4500 MG: 4; .5 INJECTION, POWDER, FOR SOLUTION INTRAVENOUS at 22:44

## 2022-07-18 RX ADMIN — HYDROMORPHONE HYDROCHLORIDE 0.5 MG: 1 INJECTION, SOLUTION INTRAMUSCULAR; INTRAVENOUS; SUBCUTANEOUS at 21:58

## 2022-07-18 RX ADMIN — ONDANSETRON 4 MG: 2 INJECTION INTRAMUSCULAR; INTRAVENOUS at 20:08

## 2022-07-18 RX ADMIN — IOPAMIDOL 75 ML: 755 INJECTION, SOLUTION INTRAVENOUS at 19:58

## 2022-07-18 ASSESSMENT — PAIN DESCRIPTION - LOCATION
LOCATION: CHEST

## 2022-07-18 ASSESSMENT — PAIN SCALES - GENERAL
PAINLEVEL_OUTOF10: 10
PAINLEVEL_OUTOF10: 7
PAINLEVEL_OUTOF10: 6
PAINLEVEL_OUTOF10: 10
PAINLEVEL_OUTOF10: 5
PAINLEVEL_OUTOF10: 3
PAINLEVEL_OUTOF10: 7

## 2022-07-18 ASSESSMENT — PAIN DESCRIPTION - DESCRIPTORS
DESCRIPTORS: PRESSURE
DESCRIPTORS: BURNING
DESCRIPTORS: BURNING

## 2022-07-18 ASSESSMENT — PAIN - FUNCTIONAL ASSESSMENT
PAIN_FUNCTIONAL_ASSESSMENT: 0-10
PAIN_FUNCTIONAL_ASSESSMENT: 0-10

## 2022-07-18 ASSESSMENT — PAIN DESCRIPTION - ORIENTATION: ORIENTATION: MID

## 2022-07-18 NOTE — ED PROVIDER NOTES
I independently examined and evaluated Aubrey Temple. I personally saw the patient and performed a substantive portion of the visit including all aspects of the medical decision making. In brief their history revealed patient is with chest pain. Patient reports that she was just inpatient and underwent J-tube placement. During that hospitalization she had an episode of syncope and was found to have an elevated troponin. The workup at that time was felt to be non-cardiac. She developed chest pain today while at home at rest after being discharged. Chest pain has been severe, constant and central chest.  Pain is similar to some of the pain she was having in the hospital.  Patient does have a history of CAD s/p CABG and follows with cardiology who saw her while in hospital.  Of note, patient has been having problems with esophageal strictures and this was the reason for the J-tube placement. Patient was being referred to [facility] for further care regarding this. Their focused exam revealed the patient is afebrile, slightly tachycardic, but otherwise hemodynamically stable. The patient appears age appropriate, appears well-hydrated, well-nourished. Patient does appear uncomfortable. Mucous membranes are moist. Speech is clear. Breathing is unlabored. No respiratory distress. Abdomen is completely non-tender. J-tube in place. Skin is dry. Mental status is normal. The patient moves all extremities and is without facial droop. Bruising to bilateral legs. No bilateral lower extremity edema. 12 lead EKG per my interpretation:  Normal Sinus Rhythm at 75 with short NM  Axis is   Left axis deviation  QTc is   prolonged at 522  There is specific T wave changes appreciated; new T wave inversions in V2 through V4. There is no specific ST wave changes appreciated. No STEMI    Prior EKG to compare with was available and T wave inversions were new when compared to prior. ED course: Pt presents as above. Emergent conditions considered. Presentation prompted initial labs, EKG and imaging. Work-up does demonstrate some new TWIs as above. Cardiology consulted for these and PA did come to ED for eval with plan for possible cath. ASA given. Imaging does demonstrate free air in abdomen, but patient is also post-op; patient's general surgery team consulted and did evaluate patient in ED. CT also demonstrating pneumomediastinum with concern for esophageal perforation especially given recent EGD. Our CT surgeon is consulted, but does not handle esophageal injuries and is recommending transfer. [facility] consulted and thankfully is accepting of patient for further care. IV Zosyn given for coverage of possible perforation. IV dilaudid given for pain with improvement. Patient NPO. Transfer arranged for ED to ED transfer. Questions sought and answered with the patient and family. They voice understanding and agree with plan. Is this patient to be included in the SEP-1 Core Measure due to severe sepsis or septic shock? No   Exclusion criteria - the patient is NOT to be included for SEP-1 Core Measure due to:  2+ SIRS criteria are not met    CRITICAL CARE NOTE:  There was a high probability of clinically significant life-threatening deterioration of the patient's condition requiring my urgent intervention due to chest pain with TWIs and pneumoperitoneum/pneumomediastinum. Multiple subspecialty consultation, IV abx, IV narcotics x several doses, telemetry monitoring, family counseling, discussion with radiology, direct interpretation of CT imaging, chart review including most recent admit and DC summary and frequent re-assessments throughout ED course was performed to address this. Total critical care time is AT LEAST 20 minutes (of my time in addition to PA time).     This includes vital sign monitoring, pulse oximetry monitoring, telemetry monitoring, clinical response to the IV medications, reviewing the nursing notes, consultation time, dictation/documentation time, and interpretation of the lab work. This time excludes time spent performing procedures and separately billable procedures and family discussion time. All decisions regarding differential diagnosis, lab/radiology/EKG interpretation, risk of significant illness, specific reasons for performing tests, management/treatment, response to management/treatment, disposition, reasons for consults, results of consults, etc. were made by myself in conjunction with the Advanced Practice Provider. For all further details of the patient's emergency department visit, please see the Advanced Practice Provider's documentation.        Katherine Hernandez MD  07/19/22 1548

## 2022-07-18 NOTE — TELEPHONE ENCOUNTER
[facility] Trans[facility] Initial Follow Up Call    Outreach made within 2 business days of discharge: Yes    Patient: Ryan Hernandez Patient : 1950   MRN: [id number]  Reason for Admission: There are no discharge diagnoses documented for the most recent discharge. Discharge Date: 22       Spoke with: Khurram Garcia    Discharge department/facility: [facility] Patient Contact:  Was patient able to fill all prescriptions: Yes  Was patient instructed to bring all medications to the follow-up visit: Yes  Is patient taking all medications as directed in the discharge summary?  Yes  Does patient understand their discharge instructions: Yes  Does patient have questions or concerns that need addressed prior to 7-14 day follow up office visit: no    Scheduled appointment with PCP within 7-14 days    Follow Up  Future Appointments   Date Time Provider Jodi Baca   2022  2:00 PM [facility]   2022 10:15 AM [facility]   2022  3:30 PM DO DAYNA Romero [place], [place]

## 2022-07-18 NOTE — ED PROVIDER NOTES
Emergency [address] EMERGENCY DEPARTMENT    Patient: Camron Carson  MRN: [id number]  : 1950  Date of Evaluation: 2022  ED Provider: Cody English PA-C    Chief Complaint       Chief Complaint   Patient presents with    Chest Pain     3 nitro taken at home, placed on 2 L NC by ems for pt comfort, pain started about 20 minutes ago       [address] Evans is a 66 y.o. female who presents to the emergency department for chest pain. Onset was 1730pm while sitting in a chair at home. Sudden onset. Constant. Substernal.  Pressure. Severity 10/10. She took 3 nitro at home without relief. Denies fever, chills, diaphoresis. Denies cough or congestion. She has mild SOB--requested NC oxygen en route to the ED. Denies n/v/d. Denies leg pain or swelling. She does have a history of CAD s/p CABG x4, HTN, HLD. , DM. Non-smoker. Follows with Dr. Jayson Silva. She does report being in the hospital last week for EGD/dilation and states she \"passed out\" due to chest pain during that admission. ROS     CONSTITUTIONAL:  Denies fever. EYES:  Denies visual changes. HEAD:  Denies headache. ENT:  Denies earache, nasal congestion, sore throat. NECK:  Denies neck pain. RESPIRATORY:  Denies any shortness of breath. CARDIOVASCULAR:  +chest pain. GI:  Denies nausea or vomiting. :  Denies urinary symptoms. MUSCULOSKELETAL:  Denies extremity pain or swelling. BACK:  Denies back pain. INTEGUMENT:  Denies skin changes. LYMPHATIC:  Denies lymphadenopathy. NEUROLOGIC:  Denies any numbness/tingling.     Past History     Past Medical History:   Diagnosis Date    Anemia     Managed by Dr. Demi Valenzuela     CAD (coronary artery disease)     follows with Dr Homero Dior    Carotid stenosis     right    Colon polyps     COVID-19 2021    Diabetes mellitus type 2, uncontrolled ([facility] [address]     \"dx 194    Diastolic dysfunction     [facility]    Esophageal stricture     dilation per GI    Hiatal hernia     History of blood transfusion     \"had blood transfusion with 4th child- have hx also of iron infusions for anemia 2017    Hyperlipidemia     Hypertension     IBS (irritable bowel syndrome)     with diarrhea    Iron deficiency anemia     Iron Infusions- follows with Julia Araiza and Jose G Cardenas    LVH (left ventricular hypertrophy) 09/2012    [facility]    Multiple gastric polyps 2004    Dr Yoly Schreiber    PAD (peripheral artery disease) ([facility] [address]     S/P CABG x 4 08/06/2012    4V CABG- LIMA-LAD, SVG-CX, SVG-PDA, SVG-RCA- Follows with Dr Liseth Muniz    Sinus tachycardia     follows with hospitals Cardiology    SVT (supraventricular tachycardia) [facility])     old chart gives hx of SVT in the past    Vitamin D deficiency      Past Surgical History:   Procedure Laterality Date    BALLOON ANGIOPLASTY, ARTERY Right 08/19/2015    RIght SFA 90%->10%, Right Popliteal 1000%->10%    CARDIAC CATHETERIZATION  08/02/2012    CARDIAC SURGERY      open heart surgery 8/2012    CAROTID ENDARTERECTOMY Right 2/18/2022    RIGHT CAROTID ENDARTERECTOMY performed by Nelda Rodriguez MD at [address] [place] [address] Way  15+ yrs ago    COLONOSCOPY  2017    CORONARY ARTERY BYPASS GRAFT  08/02/2012    4V CABG- LIMA-LAD, SVG-CX, SVG-PDA, SVG-RCA    ENDOSCOPY, COLON, DIAGNOSTIC  10/10/2017    esophageal stricture, hiatal hernia, candidiasis    ESOPHAGEAL DILATATION      Dr. Jasmine Harper    GASTRIC FUNDOPLICATION N/A 4/6/0633    NISSEN FUNDOPLICATION HIATAL HERNIA REPAIR LAPAROSCOPIC ROBOTIC performed by Carmelo Choudhary MD at [address]      biltateral- \"total of 9 surgeries- all scopes\"    STOMACH SURGERY N/A 7/14/2022    JEJUNOSTOMY TUBE INSERTION LAPAROSCOPIC ROBOTIC XI performed by Carmelo Choudhary MD at [facility] Left 09/23/2015 9/23/2015-Left mid and distal SFA and Left Popliteal    TUBAL LIGATION  1978    UPPER GASTROINTESTINAL ENDOSCOPY N/A 7/11/2022    EGD DILATION BALLOON 18 performed by Lio Lucio MD at [address] History     Socioeconomic History    Marital status:      Spouse name: Nafisa Guzman    Number of children: 5    Years of education: None    Highest education level: None   Tobacco Use    Smoking status: Never    Smokeless tobacco: Never   Vaping Use    Vaping Use: Never used   Substance and Sexual Activity    Alcohol use: No    Drug use: No    Sexual activity: Not Currently     Partners: Male     Social Determinants of Health     Financial Resource Strain: Low Risk     Difficulty of Paying Living Expenses: Not hard at all   Food Insecurity: No Food Insecurity    Worried About [address] [employer] in the Last Year: Never true    Ran Out of Food in the Last Year: Never true       Medications/Allergies     Previous Medications    AMIODARONE (CORDARONE) 200 MG TABLET    Take 200 mg by mouth daily    CARVEDILOL (COREG) 6.25 MG TABLET    Take 1 tablet by mouth 2 times daily (with meals)    CLOPIDOGREL (PLAVIX) 75 MG TABLET    Take 1 tablet by mouth daily    DAPAGLIFLOZIN (FARXIGA) 5 MG TABLET    TAKE 1 TABLET BY MOUTH EVERY MORNING    GLIPIZIDE (GLUCOTROL) 5 MG TABLET    Take 1 tablet by mouth 2 times daily (before meals)    LISINOPRIL (PRINIVIL;ZESTRIL) 5 MG TABLET    TAKE 1 TABLET BY MOUTH DAILY    NITROGLYCERIN (NITROSTAT) 0.4 MG SL TABLET    up to max of 3 total doses. If no relief after 1 dose, call 911.     PANTOPRAZOLE (PROTONIX) 40 MG TABLET    Take 1 tablet by mouth every morning (before breakfast)    ROSUVASTATIN (CRESTOR) 10 MG TABLET    TAKE 1 TABLET BY MOUTH EVERY NIGHT    TRULICITY 1.5 IV/6.5IT SOPN    ADMINISTER 1.5 MG UNDER THE SKIN 1 TIME A WEEK     Allergies   Allergen Reactions    Tetanus Toxoids Swelling and Rash     fever        Physical Exam       ED Triage Vitals [07/18/22 1850]   BP Temp Temp Source Heart Rate Resp SpO2 Height Weight   (!) 126/56 97.9 °F (36.6 °C) Oral 74 18 100 % 5' 3\" (1.6 m) 132 lb (59.9 kg)     GENERAL APPEARANCE:  Well-developed, well-nourished, no acute distress. HEAD:  NC/AT. EYES:  Sclera anicteric. ENT:  Ears, nose, mouth normal.     NECK:  Supple. CARDIO:  RRR. LUNGS:   CTAB. Respirations unlabored. ABDOMEN:  Soft, non-distended, non-tender. BS active. BACK:  No midline thoracic or lumbar spinal tenderness. EXTREMITIES:  No acute deformities. SKIN:  Warm and dry. NEUROLOGICAL:  Alert and oriented. PSYCHIATRIC:  Normal mood.      Diagnostics     Labs:  Labs Reviewed   CBC WITH AUTO DIFFERENTIAL - Abnormal; Notable for the following components:       Result Value    WBC 10.9 (*)     RBC 3.38 (*)     Hemoglobin 8.5 (*)     Hematocrit 27.3 (*)     MCH 25.1 (*)     MCHC 31.1 (*)     RDW 18.2 (*)     Segs Relative 69.1 (*)     Lymphocytes % 16.6 (*)     Monocytes % 9.0 (*)     Immature Neutrophil % 2.1 (*)     All other components within normal limits   COMPREHENSIVE METABOLIC PANEL W/ REFLEX TO MG FOR LOW K - Abnormal; Notable for the following components:    Chloride 94 (*)     CO2 19 (*)     Glucose 308 (*)     Albumin 3.1 (*)     Total Protein 5.4 (*)     ALT 6 (*)     Anion Gap 22 (*)     All other components within normal limits   TROPONIN - Abnormal; Notable for the following components:    Troponin T 0.471 (*)     All other components within normal limits   BRAIN NATRIURETIC PEPTIDE - Abnormal; Notable for the following components:    Pro-BNP 30,652 (*)     All other components within normal limits     Radiographs:  Echocardiogram complete 2D with doppler with color    Result Date: 7/12/2022  Transthoracic Echocardiography Report (TTE)  Demographics   Patient Name       [facility]   Date of Study       07/12/2022   Date of Birth      1950        Gender              Female   Age                66 year(s)        Race                   Patient Number     [id number]        Room Number         2105   Visit Number       [id number]   Corporate ID [id number]   Accession Number   [id number]        Milas Hodgkins, RVT   Ordering Physician Mahi Garcia MD  Interpreting        Mahi Garcia MD                                       Physician  Procedure Type of Study   TTE procedure:ECHOCARDIOGRAM COMPLETE 2D W DOPPLER W COLOR. Procedure Date Date: 07/12/2022 Start: 07:49 AM Study Location: Portable Technical Quality: Adequate visualization Indications:Coronary artery disease. Patient Status: Routine Height: 63 inches Weight: 135 pounds BSA: 1.64 m2 BMI: 23.91 kg/m2 HR: 80 bpm BP: 97/60 mmHg  Conclusions   Summary  Left ventricular systolic function is normal.  Ejection fraction is visually estimated at 50-55%. Grade I diastolic dysfunction. No evidence of any pericardial effusion. Signature   ------------------------------------------------------------------  Electronically signed by Mahi Garcia MD (Interpreting  physician) on 07/12/2022 at 10:16 AM  ------------------------------------------------------------------   Findings   Left Ventricle  Left ventricular systolic function is normal.  Ejection fraction is visually estimated at 50-55%. Grade I diastolic dysfunction. No regional wall motion abnormalites. Left ventricle size is normal.   Left Atrium  Essentially normal left atrium. Right Atrium  Essentially normal right atrium. Right Ventricle  Essentially normal right ventricle. Aortic Valve  Structurally normal aortic valve. Mitral Valve  Structurally normal mitral valve. Tricuspid Valve  Tricuspid valve is structurally normal.   Pulmonic Valve  The pulmonic valve was not well visualized. Pericardial Effusion  No evidence of any pericardial effusion. Pleural Effusion  No evidence of pleural effusion. Miscellaneous  IVC and abdominal aorta are within normal limits.   M-Mode/2D Measurements & Calculations   LV Diastolic Dimension:   LV Systolic Dimension: to as low as reasonably achievable.; CT of the chest was performed without the administration of intravenous contrast. Multiplanar reformatted images are provided for review. Automated exposure control, iterative reconstruction, and/or weight based adjustment of the mA/kV was utilized to reduce the radiation dose to as low as reasonably achievable. COMPARISON: 07/05/2022 HISTORY: ORDERING SYSTEM PROVIDED HISTORY: chest pain. after EGD dilation TECHNOLOGIST PROVIDED HISTORY: Reason for exam:->chest pain. after EGD dilation Additional Contrast?->None Reason for Exam: chest pain. after EGD dilation; ORDERING SYSTEM PROVIDED HISTORY: chest pain. after EGD dilation TECHNOLOGIST PROVIDED HISTORY: Reason for exam:->chest pain. after EGD dilation Reason for Exam: chest pain. after EGD dilation, spitting boold FINDINGS: Chest: Mediastinum: Enteric contents or blood products are present distending the thoracic esophagus. Mild edema is present in the posterior mediastinum in the region of the distal thoracic esophagus. No pneumomediastinum. Heart size is normal.  No pericardial effusion. Status post CABG. No thoracic adenopathy. No mediastinal hematoma. Lungs/pleura: No acute lung consolidation. No suspicious pulmonary nodule. Central airways are clear. Soft Tissues/Bones: Healed median sternotomy. No acute osseous abnormality. Abdomen/Pelvis: Organs: The unenhanced liver, spleen, pancreas, adrenal glands and kidneys demonstrate no acute abnormality. Status post cholecystectomy. GI/Bowel: No hemorrhage in the region of the gastric cardia or proximal stomach. Edema or blood products in the posterior mediastinum around the distal esophagus as previously described. Otherwise no acutely dilated or inflamed loops of bowel. Pelvis: No pelvic mass, adenopathy, or fluid collection. Peritoneum/Retroperitoneum: No abdominal aortic aneurysm. No adenopathy. No ascites. No free intraperitoneal air.  Bones/Soft Tissues:  No acute abnormality of the visualized osseous structures. 1. Distended thoracic esophagus with intraluminal enteric contents or blood products. 2. Mild edema or blood products in the posterior mediastinum in the region of the distal esophagus. No pneumomediastinum. No discrete hematoma. 3. No acute pulmonary abnormality. Otherwise no acute abnormality in the abdomen or pelvis on the noncontrast CT. CT CHEST WO CONTRAST    Result Date: 7/13/2022  EXAMINATION: CT OF THE ABDOMEN AND PELVIS WITHOUT CONTRAST; CT OF THE CHEST WITHOUT CONTRAST 7/11/2022 12:04 pm TECHNIQUE: CT of the abdomen and pelvis was performed without the administration of intravenous contrast. Multiplanar reformatted images are provided for review. Automated exposure control, iterative reconstruction, and/or weight based adjustment of the mA/kV was utilized to reduce the radiation dose to as low as reasonably achievable.; CT of the chest was performed without the administration of intravenous contrast. Multiplanar reformatted images are provided for review. Automated exposure control, iterative reconstruction, and/or weight based adjustment of the mA/kV was utilized to reduce the radiation dose to as low as reasonably achievable. COMPARISON: 07/05/2022 HISTORY: ORDERING SYSTEM PROVIDED HISTORY: chest pain. after EGD dilation TECHNOLOGIST PROVIDED HISTORY: Reason for exam:->chest pain. after EGD dilation Additional Contrast?->None Reason for Exam: chest pain. after EGD dilation; ORDERING SYSTEM PROVIDED HISTORY: chest pain. after EGD dilation TECHNOLOGIST PROVIDED HISTORY: Reason for exam:->chest pain. after EGD dilation Reason for Exam: chest pain. after EGD dilation, spitting boold FINDINGS: Chest: Mediastinum: Enteric contents or blood products are present distending the thoracic esophagus. Mild edema is present in the posterior mediastinum in the region of the distal thoracic esophagus. No pneumomediastinum.  Heart size is normal.  No pericardial effusion. Status post CABG. No thoracic adenopathy. No mediastinal hematoma. Lungs/pleura: No acute lung consolidation. No suspicious pulmonary nodule. Central airways are clear. Soft Tissues/Bones: Healed median sternotomy. No acute osseous abnormality. Abdomen/Pelvis: Organs: The unenhanced liver, spleen, pancreas, adrenal glands and kidneys demonstrate no acute abnormality. Status post cholecystectomy. GI/Bowel: No hemorrhage in the region of the gastric cardia or proximal stomach. Edema or blood products in the posterior mediastinum around the distal esophagus as previously described. Otherwise no acutely dilated or inflamed loops of bowel. Pelvis: No pelvic mass, adenopathy, or fluid collection. Peritoneum/Retroperitoneum: No abdominal aortic aneurysm. No adenopathy. No ascites. No free intraperitoneal air. Bones/Soft Tissues:  No acute abnormality of the visualized osseous structures. 1. Distended thoracic esophagus with intraluminal enteric contents or blood products. 2. Mild edema or blood products in the posterior mediastinum in the region of the distal esophagus. No pneumomediastinum. No discrete hematoma. 3. No acute pulmonary abnormality. Otherwise no acute abnormality in the abdomen or pelvis on the noncontrast CT. CTA PULMONARY W CONTRAST    Result Date: 7/18/2022  EXAMINATION: CTA OF THE CHEST 7/18/2022 7:38 pm TECHNIQUE: CTA of the chest was performed after the administration of intravenous contrast.  Multiplanar reformatted images are provided for review. MIP images are provided for review. Automated exposure control, iterative reconstruction, and/or weight based adjustment of the mA/kV was utilized to reduce the radiation dose to as low as reasonably achievable.  COMPARISON: 07/11/2022 HISTORY: ORDERING SYSTEM PROVIDED HISTORY: cp TECHNOLOGIST PROVIDED HISTORY: Reason for exam:->cp Decision Support Exception - unselect if not a suspected or confirmed emergency medical condition->Emergency Medical Condition (MA) Reason for Exam: Chest Pain Recent EGD with dilatation FINDINGS: Pulmonary Arteries: Pulmonary arteries are adequately opacified for evaluation. No evidence of intraluminal filling defect to suggest pulmonary embolism. Main pulmonary artery is normal in caliber. Mediastinum: No evidence of mediastinal lymphadenopathy. The heart is enlarged and unchanged. The heart and pericardium demonstrate no acute abnormality. There is no acute abnormality of the thoracic aorta. Proximal esophageal mural thickening has improved from sub 11/20/2022. There is persistent concentric mural thickening of the distal esophagus from the level of the angel to the stomach. Moderate-sized hiatal hernia is again noted. There is persistent mediastinal fat stranding adjacent to the distal esophagus and the margins of the distal esophagus are ill-defined. There is an oval-shaped fat globule to the right of the distal esophagus (axial image 75). There is air in the anterior superior mediastinum (axial image 24-30) anterior to the origins of the great vessels and superior aortic arch. Some of this air is in the expected course of the left brachiocephalic vein with a sharply demarcated anterior margin (axial image 27). Lungs/pleura: There are trace bilateral pleural effusions, new from 07/11/2022. There is bibasilar atelectasis. There is no pneumothorax. Upper Abdomen: There is free air in the peritoneal cavity. Several tiny bubbles of free air suspected at the GE junction (axial image 78-85). Soft Tissues/Bones: Air in the anterior superior mediastinum. Remote sternal splitting procedure. No evidence of an acute pulmonary embolus. There is free air in the peritoneal cavity consistent with a perforated viscus. The distal esophagus is abnormal in this patient status post recent dilatation.   There is mural thickening, adjacent mediastinal fat stranding and tiny bubbles of extraluminal air in the region of the GE junction. There are new trace bilateral pleural effusions. There is air in the anterior superior mediastinum anterior to the great vessel origins and superior aortic arch, near the expected course of the left brachiocephalic vein. Distal esophageal perforation is suspected. Critical results were called by Dr. Anita Kapoor MD to [facility] on 7/18/2022 at 20:31. [place] UGI    Result Date: 7/10/2022  EXAMINATION: DOUBLE CONTRAST UPPER GI SERIES 7/5/2022 TECHNIQUE: Double contrast upper GI series was performed with barium and air contrast. FLUOROSCOPY DOSE AND TYPE OR TIME AND EXPOSURES: Fluoro time: 1.8 minutes Dose area product: 753.71 uGy*m2 Entrance dose: 26.20 mGy COMPARISON: Esophagram performed 01/19/2022. HISTORY: ORDERING SYSTEM PROVIDED HISTORY: Nausea and vomiting, intractability of vomiting not specified, unspecified vomiting type TECHNOLOGIST PROVIDED HISTORY: Reason for Exam: Nausea and vomiting, intractability of vomiting not specified, unspecified vomiting type, Dysphagia, unspecified type, S/P Nissen fundoplication (without gastrostomy tube) procedure FINDINGS: Patient could only tolerate small swallows. Esophagus is normal in course and caliber. Suboptimal evaluation of the esophageal mucosa due to incomplete esophageal distension. There is retropulsion and stasis and contrast within the esophagus with swallow. Evaluation for reflux is limited due to residual contrast throughout the esophagus. There are postsurgical changes compatible with recent Nissen fundoplication. No evidence of recurrent hernia. The duodenal bulb and sweep are normal.     1. Esophageal dysmotility. 2. Postsurgical changes of Nissen fundoplication without evidence of recurrent hernia. Procedures/EKG:   Please see attending physician's note for interpretation. ED Course and MDM   -  Patient seen and evaluated in the emergency department.   -  Triage and nursing notes reviewed and incorporated. -  Old chart records reviewed and incorporated. -  Supervising physician was Dr. Sara Canada. He saw and examined patient. -  Work-up included:  see above  -  ED medications:  NS, morphine, Zofran, Dilaudid, Zosyn  -  Cardiology consulted upon patient's arrival to the ED. I spoke with Zohreh Rogers PA-C with Dr. Michael Cook, who is in agreement for plan for admission and states will likely take patient for heart cath in the next few days. He did come see patient in the ED during her stay and was updated on need for transfer. -  Results discussed with patient and family. CT shows peritoneal free air and pneumomediastinum concerning for perforated esophagus. I spoke with Dr. Gisella Alicea, patient's Gen Surg, regarding patient case who advises consulting CT surgery to see if they are onboard. Unfortunately, Dr. [place] states he does not have capability of caring for patient here and recommends transfer to another facility. Dr. Simon Wolfe, [address] Surg at [facility], reviewed case and recommends transfer to their ED. Dr. Debbie Leon is accepting ED attending. CRITICAL CARE NOTE:  There was a high probability of clinically significant life-threatening deterioration of the patient's condition requiring my urgent intervention due to peritoneal free air/chest pain. Telemetry monitoring, review of labs and imaging, IV fluids, ABX, consults to Cardiology/Gen Surg/CT Surg/OSU was performed to address this. Total critical care time is at least  60 minutes. This includes vital sign monitoring, pulse oximetry monitoring, telemetry monitoring, clinical response to the IV medications, reviewing the nursing notes, consultation time, dictation/documentation time, and interpretation of the lab work. This time excludes time spent performing procedures and separately billable procedures and family discussion time.     In light of current events, I did utilize appropriate PPE (including N95 and surgical face mask, safety glasses, and gloves, as recommended by the health facility/national standard best practice, during my bedside interactions with the patient. Final Impression      1. Peritoneal free air    2. Esophageal perforation    3.  Elevated troponin          DISPOSITION        Yolanda Martin, [address], [address]  07/18/22 0051

## 2022-07-19 LAB
EKG ATRIAL RATE: 75 BPM
EKG DIAGNOSIS: NORMAL
EKG P AXIS: 44 DEGREES
EKG P-R INTERVAL: 106 MS
EKG Q-T INTERVAL: 468 MS
EKG QRS DURATION: 98 MS
EKG QTC CALCULATION (BAZETT): 522 MS
EKG R AXIS: -19 DEGREES
EKG T AXIS: 171 DEGREES
EKG VENTRICULAR RATE: 75 BPM

## 2022-07-19 PROCEDURE — 93010 ELECTROCARDIOGRAM REPORT: CPT | Performed by: INTERNAL MEDICINE

## 2022-07-19 NOTE — CONSULTS
[id number]  Recently here for GI procedure and J-tube placed  She had an episode of CP-trop was elevated to the 0.6 range  Appeared to be more type II NSTEMI after surgery and with anemia- more demand ischemia  She was treated medically with BB, plavix and statin  She was observed for over 24 hours after event without CP  Trop remained stable on the recheck and EKG also was stable from previous with no ST segment changes noted  She came back today with severe onset Lt. Sided CP that was not improving with nitro  EKG changes, T-wave inversions are noted in inferolateral leads, however Trop is actually downtrending from what it was previously at 0.47 range now  CTA chest shows concern for ruptured distal esophagus. Likely needs surgical intervention for this-will hold off on plavix or lovenox for now  Tachycardia likely related to pain- ok to use cardizem drip if needed to control HR-appears stable as of now, BP stable in the 140 range as well  Will cont.  To follow and assist as able    Agree with above

## 2022-07-19 NOTE — CONSULTS
[address], [address] [facility]                                  CONSULTATION    PATIENT NAME: Estephania Mancuso                    :        1950  MED REC NO:   [id number]                          ROOM:       SX12  ACCOUNT NO:   [de-identified]                           ADMIT DATE: 2022  PROVIDER:     ARIANNA Guidry    CONSULT DATE:  2022    CHIEF COMPLAINT:  Chest pain. HISTORY OF PRESENT ILLNESS:  This is a 68-year-old female who was just  here at the hospital recently for GI issues. She came in for an EGD  with stretching due to esophageal stenosis and it did not go well. She  ended up having hematemesis after the _____ and was admitted for this. They ended up placing a J tube for her due to how significant her  esophageal stenosis was, and there was talk of her going to see [facility]  outpatient to discuss some sort of surgery for this. While she was  here, we were consulted for cardiac needs and for chest pain that was  thought likely to be due to her MARY ANNE issues, esophageal issues, and  hematemesis. She did have an acute episode of chest pain during which  she was given nitro and it seemed to help significantly. EKG was done  at that time and it appears stable from her previous. Troponin was  drawn and was noted to be elevated at about 0.6. We rechecked another  troponin about 3-6 hours later and it remained stable. It was thought  at that time since her chest pain completely resolved with the nitro,  troponin remained stable and her EKG remained stable. It was most  likely a type 2 type NSTEMI and with likely not related to significant  complicated plaque changes. She was treated with Ranexa and Plavix as  well as beta blockers.   She did not tolerate the Ranexa very well, so  was discontinued; continued on Plavix and beta blockers, and she was  observed for over 24 hours with no further signs of any chest pain and  continued to tolerate the medications okay. Therefore, she was  discharged to home with recommendations to follow up very shortly  outpatient to discuss stress test or left heart catheterization. Left  heart catheterization was not pursued at the time due to her anemia and  possibly needing esophageal surgery, elected to avoid stent placement if  possible. She was then on blood thinners for quite a while. The  patient seemed to do okay until discharge came. She went home and was  feeling well and then around 5:30 p.m. at night she started to have  acute onset of left-sided chest pain once again. She took nitro and it  did not help, she ended up taking two more nitros and they did not help,  so she came into the ER for further evaluation. She continues at this  time with significant left-sided chest pain that seems to be radiating  into her arm. Troponin was checked. It is actually better than it was  initially down in the 0.4 range now. EKG does show some new T-wave  inversions in the inferolateral leads. However, more concerning CTA  chest was done that shows evidence of free air in the peritoneal cavity  and in the mediastinum that shows concern for distal esophageal  perforation. PAST MEDICAL HISTORY:  CAD status post bypass surgery in 2012, carotid  stenosis status post carotid endarterectomy, esophageal stricture,  hiatal hernia, hyperlipidemia, and hypertension. PAST SURGICAL HISTORY:  Balloon angioplasty to the right leg SFA, she  has a history of PAD as well, four-vessel bypass surgery done in 2012,  carotid endarterectomy done in 02/2022, cholecystectomy, and gastric  fundoplication. SOCIAL HISTORY:  Does not smoke, does not drink. HOME MEDICATIONS:  Amiodarone 200 mg, Coreg 6.25 b.i.d., Plavix 75 mg  q.d., Farxiga 5 mg q.d., glipizide, lisinopril 5 mg q.d., nitro,  Protonix, Crestor 10 mg q.h.s., and Trulicity. ALLERGIES:  TETANUS.     REVIEW OF SYSTEMS:  A 10-point review of systems is reviewed and is  negative unless noted in the HPI. PHYSICAL EXAMINATION:  GENERAL:  Awake and alert female, sitting up in bed, with acute ongoing  chest pain. HEENT:  Head is atraumatic and normocephalic. Eyes, no scleral  erythema, discharge, or conjunctivitis noted. NECK:  Trachea is midline. No apparent masses. CARDIOVASCULAR:  Regular rate and rhythm. No murmurs, rubs, or gallops  auscultated. LUNGS:  Clear to auscultation bilaterally with good rise and fall of the  chest.  ABDOMEN:  Soft, nontender. MUSCULOSKELETAL:  No obvious joint deformities. SKIN:  No erythema or ecchymosis noted. NEUROLOGIC:  Alert and oriented x4. PSYCH:  Normal mood and affect. IMAGING STUDIES:  CTA chest was done that shows no evidence of acute PE  or free air in the peritoneal cavity with a perforated viscus. Distal  esophagus is abnormal post recent dilatation, but there is there mural  thickening adjacent to the mediastinal fat stranding and tiny bubbles of  extraluminal air in the region of the GE junction. New trace of  bilateral pleural effusions around the anterior superior mediastinum  anterior to the great vessel origin of the superior arch and distal  esophageal perforation is suspected per the note. EKG was done that  shows ST and T-wave abnormalities concerning for possible anterolateral  ischemia. Labs were done. Troponin is actually better than it was at  0.471, it was 0.629 previously. BMP appears to be within normal limits. LFTs appear to be within normal limits. CBC shows hemoglobin is  improved from what it was to 8.5. White blood cell count slightly  elevated at 10.9. Platelet level is normal.    IMPRESSION:  A 65-year-old female, who comes in with acute-onset chest  pain, concern for esophageal rupture, status post a gastric procedure  that was done recently.   She does have a cardiac history with  four-vessel bypass surgery as well as PAD and a carotid

## 2022-07-20 NOTE — OP NOTE
Operative Note      Patient: Dario Andre  YOB: 1950  MRN: [id number]    Date of Procedure: 7/14/2022    Pre-Op Diagnosis: Esophageal stricture [K22.2]  Malnutrition, unspecified type ([facility] [E46]    Post-Op Diagnosis: Same       Procedure(s):  JEJUNOSTOMY TUBE INSERTION LAPAROSCOPIC ROBOTIC XI    Surgeon(s):  Carmelo Choudhary MD    Assistant:   Physician Assistant: Elizabeth Watson PA-C    Anesthesia: General    Estimated Blood Loss (mL): Minimal    Complications: None    Specimens:   * No specimens in log *    Implants:  * No implants in log *      Drains:   Gastrostomy/Enterostomy/Jejunostomy Tube Feeding Jejunostomy LLQ (Active)   Drainage Appearance Brown 07/17/22 0757   Site Description Intact 07/17/22 0757   J Port Status Infusing 07/17/22 0757   Surrounding Skin Clean, dry & intact 07/17/22 0757   Dressing Status Old drainage noted 07/17/22 0757   Dressing Type Split gauze 07/17/22 0757   Tube Feeding Other Tube Feeding (must specify product in comment) 07/17/22 0757   Tube feeding/verify rate (mL/hr) 30 mL/hr 07/17/22 0716   Tube Feeding Intake (mL) 68 ml 07/17/22 0626   Free Water/Flush (mL) 127 mL 07/17/22 9139   Action Taken Feed set changed 07/16/22 1754   Residual Volume (ml) 0 ml 07/17/22 0400       [REMOVED] Gastrostomy/Enterostomy/Jejunostomy Tube Feeding Jejunostomy LLQ (Removed)       [REMOVED] Ileostomy/Jejunostomy LLQ Jejunostomy (Removed)       [REMOVED] Urinary Catheter Salmon (Removed)       Findings: same      Procedure Details   The patient was seen again in the Holding Room. The risks, benefits, complications, treatment options, and expected outcomes were discussed with the patient.  The possibilities of reaction to medication, pulmonary aspiration, perforation of viscus, bleeding, recurrent infection, the need for additional procedures, failure to diagnose a condition, the possible need to convert to an open procedure, and creating a complication requiring transfusion or operation were discussed with the patient. The patient and/or family concurred with the proposed plan, giving informed consent. The site of surgery properly noted/marked. The patient was taken to Operating Room, identified as Cass Napier and the procedure verified as above. A Time Out was held and the above information confirmed. The patient was brought to the operating room and placed supine after induction of anesthesia the abdomen was prepped and draped in the usual sterile fashion. Using a 5-mm optical trocar, the right upper quadrant was entered without Incidence and insufflation was tolerated, and 3 additional 8.5-mm trocars were placed forthe robotic arms under direct vision. The patient did have some adhesions of the omentum to the midline abdominal wall, and these were lysed robotically. After positioning the patient, the robotic cart was docked from the left side of the patient, and using bipolar on the left hand and scissor diathermy on the right hand and the assistant with suctioning, the abdominal wall lysis of  adhesions was performed, lysing the omentum from the abdominal wall. At this point, once everything was cleared, there was a noted attachment of the jejunum to the abdominal wall near left upper quadrant. This is from the  patient's previous jejunostomy tube during her initial operation for the esophagogastrectomy. I proceeded to divide the jejunum proximal to the abdominal wall adhesion, and I performed a side-to-side intracorporeal anastomosis distal to the previous site of jejunostomy tube placement. This was done using a 45 mm blue load robotic stapler side to side, and then the enterotomy site was closed using a 3-0 PDS Stratafix suture to close the staple line defect.  At this point, the jejunum was detached from the abdominal wall, and an opening was made at the jejunum, and this was sewn intracorporeally to the muscle; the incision was made from the outside to open the skin to have access to the jejunum, and a 16-Kyrgyz Salmon catheter was inserted through transcutaneously into the jejunum. The balloon was inflated with 5 mL of water, creating essentially a feeding site. The remainder of the anatomy appeared within normal limits. No metastatic isease was noted. At this point, the trocars were removed under direct vision, and the trocar sites were approximated using 0 Vicryl as well as 4-0 Vicryl for the skin. The patient tolerated this part of the procedure.     Altagracia Esposito MD

## 2022-08-02 ENCOUNTER — OFFICE VISIT (OUTPATIENT)
Dept: INTERNAL MEDICINE CLINIC | Age: 72
End: 2022-08-02
Payer: COMMERCIAL

## 2022-08-02 VITALS
OXYGEN SATURATION: 98 % | WEIGHT: 144.8 LBS | DIASTOLIC BLOOD PRESSURE: 76 MMHG | SYSTOLIC BLOOD PRESSURE: 130 MMHG | HEIGHT: 63 IN | BODY MASS INDEX: 25.66 KG/M2 | HEART RATE: 73 BPM

## 2022-08-02 DIAGNOSIS — Z95.5 H/O HEART ARTERY STENT: ICD-10-CM

## 2022-08-02 DIAGNOSIS — N17.9 AKI (ACUTE KIDNEY INJURY) (HCC): ICD-10-CM

## 2022-08-02 DIAGNOSIS — I25.10 CORONARY ARTERY DISEASE INVOLVING NATIVE HEART WITHOUT ANGINA PECTORIS, UNSPECIFIED VESSEL OR LESION TYPE: ICD-10-CM

## 2022-08-02 DIAGNOSIS — Z09 HOSPITAL DISCHARGE FOLLOW-UP: ICD-10-CM

## 2022-08-02 DIAGNOSIS — I25.2 HISTORY OF NON-ST ELEVATION MYOCARDIAL INFARCTION (NSTEMI): ICD-10-CM

## 2022-08-02 DIAGNOSIS — D64.9 ANEMIA, UNSPECIFIED TYPE: ICD-10-CM

## 2022-08-02 DIAGNOSIS — K22.2 ESOPHAGEAL STRICTURE: Primary | ICD-10-CM

## 2022-08-02 DIAGNOSIS — Z87.19 HISTORY OF HEMATEMESIS: ICD-10-CM

## 2022-08-02 PROCEDURE — 1111F DSCHRG MED/CURRENT MED MERGE: CPT | Performed by: FAMILY MEDICINE

## 2022-08-02 PROCEDURE — 99214 OFFICE O/P EST MOD 30 MIN: CPT | Performed by: FAMILY MEDICINE

## 2022-08-02 RX ORDER — SACUBITRIL AND VALSARTAN 24; 26 MG/1; MG/1
TABLET, FILM COATED ORAL
COMMUNITY
Start: 2022-07-22 | End: 2022-08-02

## 2022-08-02 RX ORDER — GUAR GUM
1 PACKET (EA) ORAL 3 TIMES DAILY
COMMUNITY
Start: 2022-07-29 | End: 2022-08-02

## 2022-08-02 RX ORDER — FUROSEMIDE 40 MG/1
40 TABLET ORAL DAILY
COMMUNITY
Start: 2022-07-30 | End: 2022-09-01 | Stop reason: SDUPTHER

## 2022-08-02 RX ORDER — LOPERAMIDE HYDROCHLORIDE 2 MG/1
4 CAPSULE ORAL
COMMUNITY
Start: 2022-07-29 | End: 2022-08-02

## 2022-08-02 RX ORDER — LOPERAMIDE HYDROCHLORIDE 2 MG/1
2 CAPSULE ORAL PRN
COMMUNITY
Start: 2022-07-29 | End: 2022-08-05 | Stop reason: SDUPTHER

## 2022-08-02 RX ORDER — LACTOSE-REDUCED FOOD
60 LIQUID (ML) ORAL EVERY 12 HOURS
COMMUNITY
Start: 2022-07-29

## 2022-08-02 NOTE — PROGRESS NOTES
Post-Discharge Transitional Care  Follow Up      Jamil Aguilar   YOB: 1950    Date of Office Visit:  8/2/2022  Date of Hospital Admission: 7/18/22  Date of Hospital Discharge: 7/19/22  Risk of hospital readmission (high >=14%. Medium >=10%) :Readmission Risk Score: 15.8      Care management risk score Rising risk (score 2-5) and Complex Care (Scores >=6): No Risk Score On File     Non face to face  following discharge, date last encounter closed (first attempt may have been earlier): *No documented post hospital discharge outreach found in the last 14 days    Call initiated 2 business days of discharge: *No response recorded in the last 14 days    ASSESSMENT/PLAN:   Esophageal stricture  History of hematemesis  Coronary artery disease involving native heart without angina pectoris, unspecified vessel or lesion type  Anemia, unspecified type  WES (acute kidney injury) ([facility]  History of non-ST elevation myocardial infarction (NSTEMI)  H/O heart artery stent  Hospital discharge follow-up  -     ME DISCHARGE MEDS RECONCILED W/ CURRENT OUTPATIENT MED LIST  Keep f/u with specialists- nephrology, surgery, GI, cardiology  Labs reviewed: CBC, CMP, Lipase, Troponin  CT abdomen/pelvis and chest reviewed  Medical Decision Making: moderate complexity  Return in about 3 months (around 11/2/2022) for Diabetes, HLD. On this date 8/2/2022 I have spent 30 minutes reviewing previous notes, test results and face to face with the patient discussing the diagnosis and importance of compliance with the treatment plan as well as documenting on the day of the visit. Subjective:   HPI:  Follow up of Hospital problems/diagnosis(es): Acute hematemesis S/P EGD esophageal dilation(failed procedure) Anemia, J-tube placement    Patient discharged from [facility]: NSTEMI with LHC with EL placed to graft  of LIMA to LAD on 7/20. On Plavix and will need to be on for a year    Pt has known esophageal stricture.  She states it was due to previous surgery. She will need to have a revision  Diarrhea- improved. Using Immodium  Still not eating solid food. She has a J -tube for  Vital AF feeding- at night for 12 hours  Pt can eat smooth liquids by mouth  Bruising of arms  DM II- On Trulicity and  and glipizide  HLD- On Crestor 10  P. Edema- On Lasix  WES- due to see nephrology for follow up labs  She will follow up with surgery soon    Inpatient course: Discharge summary reviewed- see chart. Interval history/Current status: stable    Patient Active Problem List   Diagnosis    Anemia    Vitamin D deficiency    Type 2 diabetes mellitus without complication, without long-term current use of insulin ([facility]    Coronary artery disease involving native heart without angina pectoris    Essential hypertension    Mixed hyperlipidemia    PVD (peripheral vascular disease) (HCC)    Microalbuminuria    Angular cheilitis    Iron deficiency anemia secondary to blood loss (chronic)    Other forms of chronic ischemic heart disease    Hiatal hernia with GERD    Personal history of other diseases of the digestive system    Esophageal stricture    Hiatal hernia    Iron deficiency anemia    Acute hypoxemic respiratory failure due to COVID-19 [facility])    Carotid stenosis, bilateral    Carotid stenosis, right       Medications listed as ordered at the time of discharge from hospital     Medication List            Accurate as of August 2, 2022 11:59 PM. If you have any questions, ask your nurse or doctor.                 CHANGE how you take these medications      glipiZIDE 5 MG tablet  Commonly known as: GLUCOTROL  Take 1 tablet by mouth 2 times daily (before meals)  What changed: when to take this            CONTINUE taking these medications      carvedilol 6.25 MG tablet  Commonly known as: Coreg  Take 1 tablet by mouth 2 times daily (with meals)     clopidogrel 75 MG tablet  Commonly known as: PLAVIX  Take 1 tablet by mouth daily     furosemide 40 MG tablet  Commonly known as: LASIX     nitroGLYCERIN 0.4 MG SL tablet  Commonly known as: NITROSTAT  up to max of 3 total doses. If no relief after 1 dose, call 911. rosuvastatin 10 MG tablet  Commonly known as: CRESTOR  TAKE 1 TABLET BY MOUTH EVERY NIGHT     sacubitril-valsartan 24-26 MG per tablet  Commonly known as: ENTRESTO     Trulicity 1.5 NV/6.0JA Sopn  Generic drug: Dulaglutide  ADMINISTER 1.5 MG UNDER THE SKIN 1 TIME A WEEK     Vital AF 1.2 Jl Liqd                Medications marked \"taking\" at this time  Outpatient Medications Marked as Taking for the 8/2/22 encounter (Office Visit) with Severa Som, DO   Medication Sig Dispense Refill    sacubitril-valsartan (ENTRESTO) 24-26 MG per tablet Take 0.5 tablets by mouth in the morning and 0.5 tablets before bedtime. Nutritional Supplements (VITAL AF 1.2 JL) LIQD 60 mL/hr every 12 hours      furosemide (LASIX) 40 MG tablet Take 40 mg by mouth in the morning. [DISCONTINUED] loperamide (IMODIUM) 2 MG capsule Take 2 mg by mouth as needed      nitroGLYCERIN (NITROSTAT) 0.4 MG SL tablet up to max of 3 total doses. If no relief after 1 dose, call 911. 25 tablet 1    TRULICITY 1.5 QQ/0.5NI SOPN ADMINISTER 1.5 MG UNDER THE SKIN 1 TIME A WEEK 2 mL 3    glipiZIDE (GLUCOTROL) 5 MG tablet Take 1 tablet by mouth 2 times daily (before meals) (Patient not taking: Reported on 8/8/2022) 180 tablet 1    rosuvastatin (CRESTOR) 10 MG tablet TAKE 1 TABLET BY MOUTH EVERY NIGHT (Patient not taking: Reported on 8/8/2022) 90 tablet 1    carvedilol (COREG) 6.25 MG tablet Take 1 tablet by mouth 2 times daily (with meals) 60 tablet 3    clopidogrel (PLAVIX) 75 MG tablet Take 1 tablet by mouth daily 30 tablet 3        Medications patient taking as of now reconciled against medications ordered at time of hospital discharge: Yes    A comprehensive review of systems was negative except for what was noted in the HPI.     Objective:    /76 (Site: Right Upper Arm, Position: Sitting, Cuff Size: Medium Adult)   Pulse 73   Ht 5' 3\" (1.6 m)   Wt 144 lb 12.8 oz (65.7 kg)   LMP 01/19/2002   SpO2 98%   BMI 25.65 kg/m²   General Appearance: alert and oriented to person, place and time, well-developed and well-nourished, in no acute distress  Eyes: pupils equal, round, and reactive to light and extraocular eye movements intact  ENT: tympanic membrane, external ear and ear canal normal bilaterally, oropharynx clear and moist with normal mucous membranes  Neck: neck supple and non tender without mass   Pulmonary/Chest: clear to auscultation bilaterally- no wheezes, rales or rhonchi, normal air movement, no respiratory distress  Cardiovascular: normal rate and normal S1 and S2  Abdomen: soft, non-tender and non-distended. +J tube  Extremities: + pedal edema. No tenderness  Neurologic: CN intact. Speech normal      An electronic signature was used to authenticate this note.   --Yordan Dubose, DO

## 2022-08-04 NOTE — TELEPHONE ENCOUNTER
Patient uses [facility] on [address] [facility] and has no refills left.

## 2022-08-05 RX ORDER — LOPERAMIDE HYDROCHLORIDE 2 MG/1
2 CAPSULE ORAL 4 TIMES DAILY PRN
Qty: 60 CAPSULE | Refills: 1 | Status: SHIPPED | OUTPATIENT
Start: 2022-08-05 | End: 2022-09-13 | Stop reason: SDUPTHER

## 2022-08-08 ENCOUNTER — OFFICE VISIT (OUTPATIENT)
Dept: SURGERY | Age: 72
End: 2022-08-08

## 2022-08-08 VITALS
DIASTOLIC BLOOD PRESSURE: 72 MMHG | SYSTOLIC BLOOD PRESSURE: 130 MMHG | WEIGHT: 134.1 LBS | HEART RATE: 75 BPM | HEIGHT: 63 IN | OXYGEN SATURATION: 96 % | BODY MASS INDEX: 23.76 KG/M2

## 2022-08-08 DIAGNOSIS — Z93.4 JEJUNOSTOMY TUBE PRESENT (HCC): ICD-10-CM

## 2022-08-08 DIAGNOSIS — Z09 POSTOPERATIVE EXAMINATION: Primary | ICD-10-CM

## 2022-08-08 PROCEDURE — 99024 POSTOP FOLLOW-UP VISIT: CPT | Performed by: PHYSICIAN ASSISTANT

## 2022-08-08 PROCEDURE — APPNB30 APP NON BILLABLE TIME 0-30 MINS: Performed by: PHYSICIAN ASSISTANT

## 2022-08-08 ASSESSMENT — PATIENT HEALTH QUESTIONNAIRE - PHQ9
1. LITTLE INTEREST OR PLEASURE IN DOING THINGS: 0
SUM OF ALL RESPONSES TO PHQ QUESTIONS 1-9: 0
2. FEELING DOWN, DEPRESSED OR HOPELESS: 0
SUM OF ALL RESPONSES TO PHQ QUESTIONS 1-9: 0
SUM OF ALL RESPONSES TO PHQ9 QUESTIONS 1 & 2: 0
SUM OF ALL RESPONSES TO PHQ QUESTIONS 1-9: 0
SUM OF ALL RESPONSES TO PHQ QUESTIONS 1-9: 0

## 2022-08-08 NOTE — PROGRESS NOTES
Chief Complaint   Patient presents with    Follow-up     1st FU EGD @ [facility] 7/11/22         SUBJECTIVE:  Patient presents today for her 1st post op visit following Robotic assisted J-tube placement. Pt reports that pain is intermittent near the J-tube site. Described as burning. Pt is  tolerating a full liquid diet. Pt is slowly increasing her J-tube feeds at home with the assistance of home care. Currently tolerating 60mL/hr over 12 hours. Plans to increase to 70mL/hr this week. BMs are Diarrhea That is controlled with imodium. Incisions: well healed. Pt was hospitalized recently at [facility] for an MI. She is s/p LHC and stent placement. Because of this, she is not able to undergo any procedures for her esophagus until cleared by cardiology, likely 6-12 months.      Past Surgical History:   Procedure Laterality Date    BALLOON ANGIOPLASTY, ARTERY Right 08/19/2015    RIght SFA 90%->10%, Right Popliteal 1000%->10%    CARDIAC CATHETERIZATION  08/02/2012    CARDIAC SURGERY      open heart surgery 8/2012    CAROTID ENDARTERECTOMY Right 2/18/2022    RIGHT CAROTID ENDARTERECTOMY performed by Carlo Choudhary MD at [address] [place] [address] Way  15+ yrs ago    COLONOSCOPY  2017   [facility] GRAFT  08/02/2012    4V CABG- LIMA-LAD, SVG-CX, SVG-PDA, SVG-RCA    ENDOSCOPY, COLON, DIAGNOSTIC  10/10/2017    esophageal stricture, hiatal hernia, candidiasis   Sandra Emery GASTRIC FUNDOPLICATION N/A 1/7/1909    NISSEN FUNDOPLICATION HIATAL HERNIA REPAIR LAPAROSCOPIC ROBOTIC performed by Siria Obrien MD at [address] [facility]      biltateral- \"total of 9 surgeries- all scopes\"    STOMACH SURGERY N/A 7/14/2022    JEJUNOSTOMY TUBE INSERTION LAPAROSCOPIC ROBOTIC XI performed by Siria Obrien MD at [address] Left 09/23/2015 9/23/2015-Left mid and distal SFA and Left Popliteal    TUBAL LIGATION  1978    UPPER GASTROINTESTINAL ENDOSCOPY N/A 7/11/2022    EGD DILATION BALLOON 18 performed by Dexter Restrepo MD at [facility] ENDOSCOPY     Past Medical History:   Diagnosis Date    Anemia     Managed by Dr. Ismael Muhammad CAD (coronary artery disease)     follows with Dr Loi Lin Carotid stenosis     right    Colon polyps     COVID-19 08/18/2021    Diabetes mellitus type 2, uncontrolled ([facility]     \"dx 3974    Diastolic dysfunction 99/0672    [facility]    Esophageal stricture     dilation per GI    Hiatal hernia     History of blood transfusion     \"had blood transfusion with 4th child- have hx also of iron infusions for anemia 2017    Hyperlipidemia     Hypertension     IBS (irritable bowel syndrome)     with diarrhea    Iron deficiency anemia     Iron Infusions- follows with Julia Araiza and Jose G Cardenas    LVH (left ventricular hypertrophy) 09/2012    [facility]    Multiple gastric polyps 2004    Dr Brittanie Rodriguez PAD (peripheral artery disease) ([facility]     S/P CABG x 4 08/06/2012    4V CABG- LIMA-LAD, SVG-CX, SVG-PDA, SVG-RCA- Follows with Dr Manjula Acosta Sinus tachycardia     follows with [facility]    SVT (supraventricular tachycardia) [facility])     old chart gives hx of SVT in the past    Vitamin D deficiency      Family History   Problem Relation Age of Onset    Kidney Disease Mother         Dialysis    Diabetes Mother     High Blood Pressure Mother     Coronary Art Dis Mother         MI    Cancer Father         pancreatic cancer (Smoking)    Coronary Art Dis Father 43        MI early onset    Diabetes Other      Social History     Socioeconomic History    Marital status:      Spouse name: Dada Noam Number of children: 11    Years of education: Not on file    Highest education level: Not on file   Occupational History    Not on file   Tobacco Use    Smoking status: Never    Smokeless tobacco: Never   Vaping Use    Vaping Use: Never used   Substance and Sexual Activity    Alcohol use: No    Drug use: No    Sexual activity: Not Currently     Partners: Male   Other Topics Concern    Not on file   Social History Narrative    Not on file     Social Determinants of Health     Financial Resource Strain: Low Risk     Difficulty of Paying Living Expenses: Not hard at all   Food Insecurity: No Food Insecurity    Worried About Running Out of Food in the Last Year: Never true    Aram of Food in the Last Year: Never true   Transportation Needs: Not on file   Physical Activity: Not on file   Stress: Not on file   Social Connections: Not on file   Intimate Partner Violence: Not on file   Housing Stability: Not on file       OBJECTIVE:  Physical Exam  Constitutional:       Appearance: She is well-developed. HENT:      Head: Normocephalic. Eyes:      Pupils: Pupils are equal, round, and reactive to light. Cardiovascular:      Rate and Rhythm: Normal rate. Pulmonary:      Effort: Pulmonary effort is normal.   Abdominal:      General: There is no distension. Palpations: Abdomen is soft. There is no mass. Tenderness: There is no abdominal tenderness. There is no guarding or rebound. Comments: Lap incisions well-healed. J-tube site healing well. There is some surrounding erythema - no evidence of infection. Bumper too tight, causing indent in the skin. Musculoskeletal:         General: Normal range of motion. Cervical back: Normal range of motion and neck supple. Skin:     General: Skin is warm. Neurological:      Mental Status: She is alert and oriented to person, place, and time. ASSESSMENT:  Patient doing well on this post operative check. Wounds well healed. No diagnosis found. PLAN:  Increase to goal feeds as tolerated. Clean around j-tube. Adjust bumper PRN if too tight or too loose. Will start silvadene around J-tube site daily. Pt is to increase activities as tolerated. No orders of the defined types were placed in this encounter.        No

## 2022-08-11 ENCOUNTER — TELEPHONE (OUTPATIENT)
Dept: INTERNAL MEDICINE CLINIC | Age: 72
End: 2022-08-11

## 2022-08-18 ENCOUNTER — OFFICE VISIT (OUTPATIENT)
Dept: SURGERY | Age: 72
End: 2022-08-18
Payer: COMMERCIAL

## 2022-08-18 VITALS
HEIGHT: 63 IN | BODY MASS INDEX: 23.57 KG/M2 | DIASTOLIC BLOOD PRESSURE: 66 MMHG | SYSTOLIC BLOOD PRESSURE: 112 MMHG | HEART RATE: 85 BPM | WEIGHT: 133 LBS

## 2022-08-18 DIAGNOSIS — R13.10 DYSPHAGIA, UNSPECIFIED TYPE: Primary | ICD-10-CM

## 2022-08-18 PROCEDURE — 99214 OFFICE O/P EST MOD 30 MIN: CPT | Performed by: SURGERY

## 2022-08-18 PROCEDURE — 1123F ACP DISCUSS/DSCN MKR DOCD: CPT | Performed by: SURGERY

## 2022-08-18 ASSESSMENT — ENCOUNTER SYMPTOMS
COLOR CHANGE: 0
SORE THROAT: 0
APNEA: 0
EYE ITCHING: 0
RECTAL PAIN: 0
BACK PAIN: 0
ANAL BLEEDING: 0
CONSTIPATION: 0
TROUBLE SWALLOWING: 1
CHOKING: 0
STRIDOR: 0
EYE REDNESS: 0
PHOTOPHOBIA: 0

## 2022-08-19 NOTE — PROGRESS NOTES
Chief Complaint   Patient presents with    Follow-up     PEG Tube Leaking         SUBJECTIVE:  HPI: Patient is here with complaints of j-tube site leaking. Pt has j tube secondary to esophageal stricture. She was recently seen at [facility] for this. Most recent admission to [facility] reviewed and pt had chest pain which she had cardiac stent placed and now on anticoagulant. She is feeling better. She has f/u with thoracic surgery for the esophageal stricture which I attempted to balloon dilate and had a complication of bleeding. She is receiving night TF and geovanna well. She is also eating pureed food po. Pt denies abd pain fever or chills. .    I have reviewed the patient's(pertinent information to this visit) medical history, family history(scanned in  the [address] [place] [address] under \"patient questioner\"), social history and review of systems with the patient today in the office.             Past Surgical History:   Procedure Laterality Date    BALLOON ANGIOPLASTY, ARTERY Right 08/19/2015    RIght SFA 90%->10%, Right Popliteal 1000%->10%    CARDIAC CATHETERIZATION  08/02/2012    CARDIAC SURGERY      open heart surgery 8/2012    CAROTID ENDARTERECTOMY Right 2/18/2022    RIGHT CAROTID ENDARTERECTOMY performed by Eileen Holden MD at [address] [place] [address] Way  15+ yrs ago    COLONOSCOPY  2017    CORONARY ARTERY BYPASS GRAFT  08/02/2012    4V CABG- LIMA-LAD, SVG-CX, SVG-PDA, SVG-RCA    ENDOSCOPY, COLON, DIAGNOSTIC  10/10/2017    esophageal stricture, hiatal hernia, candidiasis    ESOPHAGEAL DILATATION      Dr. Flavio Yañez    GASTRIC FUNDOPLICATION N/A 5/5/8246    NISSEN FUNDOPLICATION HIATAL HERNIA REPAIR LAPAROSCOPIC ROBOTIC performed by Cinthia Phillips MD at [address]      biltateral- \"total of 9 surgeries- all scopes\"    STOMACH SURGERY N/A 7/14/2022    JEJUNOSTOMY TUBE INSERTION LAPAROSCOPIC ROBOTIC XI performed by Cinthia hPillips MD at [facility] Left 09/23/2015 9/23/2015-Left mid and distal SFA and Left Popliteal    TUBAL LIGATION  1978    UPPER GASTROINTESTINAL ENDOSCOPY N/A 7/11/2022    EGD DILATION BALLOON 18 performed by Izzy Ozuna MD at [address] [facility] ENDOSCOPY     Past Medical History:   Diagnosis Date    Anemia     Managed by Dr. Melissa Francis     CAD (coronary artery disease)     follows with Dr Erika Mayes    Carotid stenosis     right    Colon polyps     COVID-19 08/18/2021    Diabetes mellitus type 2, uncontrolled ([facility]     \"dx 3579    Diastolic dysfunction 14/1485    [facility]    Esophageal stricture     dilation per GI    Hiatal hernia     History of blood transfusion     \"had blood transfusion with 4th child- have hx also of iron infusions for anemia 2017    Hyperlipidemia     Hypertension     IBS (irritable bowel syndrome)     with diarrhea    Iron deficiency anemia     Iron Infusions- follows with Julia Araiza and Jose G Cardenas    LVH (left ventricular hypertrophy) 09/2012    [facility]    Multiple gastric polyps 2004    Dr Gail Mccauley    PAD (peripheral artery disease) ([facility]     S/P CABG x 4 08/06/2012    4V CABG- LIMA-LAD, SVG-CX, SVG-PDA, SVG-RCA- Follows with Dr Apolinar Guido    Sinus tachycardia     follows with [facility]    SVT (supraventricular tachycardia) [facility])     old chart gives hx of SVT in the past    Vitamin D deficiency      Family History   Problem Relation Age of Onset    Kidney Disease Mother         Dialysis    Diabetes Mother     High Blood Pressure Mother     Coronary Art Dis Mother         MI    Cancer Father         pancreatic cancer (Smoking)    Coronary Art Dis Father 43        MI early onset    Diabetes Other      Social History     Socioeconomic History    Marital status:      Spouse name: Estrella Alexandra    Number of children: 5    Years of education: Not on file    Highest education level: Not on file   Occupational History    Not on file   Tobacco Use    Smoking status: Never    Smokeless tobacco: Never   Vaping Use    Vaping Use: Never used   Substance and Sexual Activity    Alcohol use: No    Drug use: No    Sexual activity: Not Currently     Partners: Male   Other Topics Concern    Not on file   Social History Narrative    Not on file     Social Determinants of Health     Financial Resource Strain: Low Risk     Difficulty of Paying Living Expenses: Not hard at all   Food Insecurity: No Food Insecurity    Worried About Running Out of Food in the Last Year: Never true    Ran Out of Food in the Last Year: Never true   Transportation Needs: Not on file   Physical Activity: Not on file   Stress: Not on file   Social Connections: Not on file   Intimate Partner Violence: Not on file   Housing Stability: Not on file       Current Outpatient Medications   Medication Sig Dispense Refill    silver sulfADIAZINE (SILVADENE) 1 % cream Apply topically daily. 50 g 2    loperamide (IMODIUM) 2 MG capsule Take 1 capsule by mouth 4 times daily as needed for Diarrhea 60 capsule 1    GUAR GUM PO Take by mouth in the morning, at noon, and at bedtime      acetaminophen (TYLENOL) 500 MG tablet Take 1,000 mg by mouth every 4 hours as needed for Pain      aspirin 81 MG EC tablet Take 81 mg by mouth in the morning. simvastatin (ZOCOR) 20 MG tablet Take 20 mg by mouth nightly      dapagliflozin (FARXIGA) 5 MG tablet Take 1 tablet by mouth every morning 90 tablet 1    sacubitril-valsartan (ENTRESTO) 24-26 MG per tablet Take 0.5 tablets by mouth in the morning and 0.5 tablets before bedtime. Nutritional Supplements (VITAL AF 1.2 JL) LIQD 60 mL/hr every 12 hours      furosemide (LASIX) 40 MG tablet Take 40 mg by mouth in the morning. nitroGLYCERIN (NITROSTAT) 0.4 MG SL tablet up to max of 3 total doses.  If no relief after 1 dose, call 911. 25 tablet 1    TRULICITY 1.5 BV/9.3PK SOPN ADMINISTER 1.5 MG UNDER THE SKIN 1 TIME A WEEK 2 mL 3    glipiZIDE (GLUCOTROL) 5 MG tablet Take 1 tablet by mouth 2 times daily (before meals) 180 tablet 1    rosuvastatin (CRESTOR) 10 MG tablet TAKE 1 TABLET BY MOUTH EVERY NIGHT 90 tablet 1    carvedilol (COREG) 6.25 MG tablet Take 1 tablet by mouth 2 times daily (with meals) 60 tablet 3    clopidogrel (PLAVIX) 75 MG tablet Take 1 tablet by mouth daily 30 tablet 3    cilostazol (PLETAL) 50 MG tablet Take 50 mg by mouth in the morning and 50 mg before bedtime. (Patient not taking: Reported on 8/18/2022)       No current facility-administered medications for this visit. Allergies   Allergen Reactions    Tetanus Toxoids Swelling and Rash     fever    Adhesive Tape Rash       Review of Systems:         Review of Systems   Constitutional:  Negative for chills and fever. HENT:  Positive for trouble swallowing. Negative for ear pain, mouth sores, sore throat and tinnitus. Eyes:  Negative for photophobia, redness and itching. Respiratory:  Negative for apnea, choking and stridor. Cardiovascular:  Negative for chest pain and palpitations. Gastrointestinal:  Negative for anal bleeding, constipation and rectal pain. Endocrine: Negative for polydipsia. Genitourinary:  Negative for enuresis, flank pain and hematuria. Musculoskeletal:  Negative for back pain, joint swelling and myalgias. Skin:  Negative for color change and pallor. Allergic/Immunologic: Negative for environmental allergies. Neurological:  Negative for syncope and speech difficulty. Psychiatric/Behavioral:  Negative for confusion and hallucinations. OBJECTIVE:  Physical Exam:    /66 (Site: Right Upper Arm, Position: Sitting, Cuff Size: Medium Adult)   Pulse 85   Ht 5' 3\" (1.6 m)   Wt 133 lb (60.3 kg)   LMP 01/19/2002   BMI 23.56 kg/m²      Physical Exam  Constitutional:       Appearance: She is well-developed. HENT:      Head: Normocephalic. Eyes:      Pupils: Pupils are equal, round, and reactive to light. Cardiovascular:      Rate and Rhythm: Normal rate.    Pulmonary:      Effort: Pulmonary effort is normal.   Abdominal:      General: There is no distension. Palpations: Abdomen is soft. There is no mass. Tenderness: There is no abdominal tenderness. There is no guarding or rebound. Musculoskeletal:         General: Normal range of motion. Cervical back: Normal range of motion and neck supple. Skin:     General: Skin is warm. Neurological:      Mental Status: She is alert and oriented to person, place, and time. ASSESSMENT:  No diagnosis found. PLAN:  Treatment:  J tube adjusted. F/u in 3 months. Patient counseled on risks, benefits, and alternatives of treatment plan at length today. Patient states an understanding and willingness to proceed with plan. No orders of the defined types were placed in this encounter. No orders of the defined types were placed in this encounter. Follow Up:  No follow-ups on file.       Carmela Houser MD

## 2022-08-26 LAB — MAGNESIUM: 2.5 MG/DL (ref 1.4–2.5)

## 2022-09-02 DIAGNOSIS — E11.9 TYPE 2 DIABETES MELLITUS WITHOUT COMPLICATION, WITHOUT LONG-TERM CURRENT USE OF INSULIN (HCC): ICD-10-CM

## 2022-09-06 RX ORDER — DULAGLUTIDE 1.5 MG/.5ML
INJECTION, SOLUTION SUBCUTANEOUS
Qty: 2 ML | Refills: 3 | OUTPATIENT
Start: 2022-09-06

## 2022-09-12 ENCOUNTER — OFFICE VISIT (OUTPATIENT)
Dept: SURGERY | Age: 72
End: 2022-09-12
Payer: COMMERCIAL

## 2022-09-12 VITALS
WEIGHT: 131.9 LBS | HEART RATE: 74 BPM | DIASTOLIC BLOOD PRESSURE: 74 MMHG | HEIGHT: 63 IN | SYSTOLIC BLOOD PRESSURE: 128 MMHG | BODY MASS INDEX: 23.37 KG/M2 | OXYGEN SATURATION: 96 %

## 2022-09-12 DIAGNOSIS — T81.89XA STITCH GRANULOMA, INITIAL ENCOUNTER: ICD-10-CM

## 2022-09-12 DIAGNOSIS — Z93.4 JEJUNOSTOMY TUBE PRESENT (HCC): Primary | ICD-10-CM

## 2022-09-12 PROCEDURE — 10060 I&D ABSCESS SIMPLE/SINGLE: CPT | Performed by: PHYSICIAN ASSISTANT

## 2022-09-12 ASSESSMENT — PATIENT HEALTH QUESTIONNAIRE - PHQ9
SUM OF ALL RESPONSES TO PHQ QUESTIONS 1-9: 0
SUM OF ALL RESPONSES TO PHQ9 QUESTIONS 1 & 2: 0
SUM OF ALL RESPONSES TO PHQ QUESTIONS 1-9: 0
2. FEELING DOWN, DEPRESSED OR HOPELESS: 0
1. LITTLE INTEREST OR PLEASURE IN DOING THINGS: 0

## 2022-09-13 RX ORDER — LOPERAMIDE HYDROCHLORIDE 2 MG/1
2 CAPSULE ORAL 4 TIMES DAILY PRN
Qty: 60 CAPSULE | Refills: 1 | Status: SHIPPED | OUTPATIENT
Start: 2022-09-13

## 2022-09-13 NOTE — PROGRESS NOTES
Chief Complaint   Patient presents with    Follow-up     1mo FU Peg Tube Wound Check           SUBJECTIVE:  HPI: Patient presents today with concerns about her feeding tube not working. Pt with J-tube that was placed robotically. Pt reports that she has been using it nightly for continuous feeds, and has been tolerating well. Early this morning, her pump gave her a warning that the tube was malfunctioning - concern that it was not in place. Pt denies abd pain, N/V. Tolerating liquid diet well, but still not getting much in PO. Pt also c/o wound to lower abd incision that she noticed was red and starting to drain about 1 week ago. Denies fever/chills. I have reviewed the patient's (pertinent information to this visit) medical history, family history (scanned in  the Media tab under \"patient questioner\"), social history and review of systems with the patient today in the office.             Past Surgical History:   Procedure Laterality Date    BALLOON ANGIOPLASTY, ARTERY Right 08/19/2015    RIght SFA 90%->10%, Right Popliteal 1000%->10%    CARDIAC CATHETERIZATION  08/02/2012    CARDIAC SURGERY      open heart surgery 8/2012    CAROTID ENDARTERECTOMY Right 2/18/2022    RIGHT CAROTID ENDARTERECTOMY performed by Henry Gates MD at [address] [facility] [address]  15+ yrs ago    COLONOSCOPY  2017    CORONARY ARTERY BYPASS GRAFT  08/02/2012    4V CABG- LIMA-LAD, SVG-CX, SVG-PDA, SVG-RCA    ENDOSCOPY, COLON, DIAGNOSTIC  10/10/2017    esophageal stricture, hiatal hernia, candidiasis    ESOPHAGEAL DILATATION      Dr. Mooney Si    GASTRIC FUNDOPLICATION N/A 7/6/6101    NISSEN FUNDOPLICATION HIATAL HERNIA REPAIR LAPAROSCOPIC ROBOTIC performed by Brie Zeng MD at [address]      biltateral- \"total of 9 surgeries- all scopes\"    STOMACH SURGERY N/A 7/14/2022    JEJUNOSTOMY TUBE INSERTION LAPAROSCOPIC ROBOTIC XI performed by Brie Zeng MD at [facility] 09/23/2015 9/23/2015-Left mid and distal SFA and Left Popliteal    TUBAL LIGATION  1978    UPPER GASTROINTESTINAL ENDOSCOPY N/A 7/11/2022    EGD DILATION BALLOON 18 performed by Carl Rome MD at [facility] ENDOSCOPY     Past Medical History:   Diagnosis Date    Anemia     Managed by Dr. Rogers Officer     CAD (coronary artery disease)     follows with Dr Magaly Pratt    Carotid stenosis     right    Colon polyps     COVID-19 08/18/2021    Diabetes mellitus type 2, uncontrolled ([facility] [address]     \"dx 3972    Diastolic dysfunction 65/5535    [facility]    Esophageal stricture     dilation per GI    Hiatal hernia     History of blood transfusion     \"had blood transfusion with 4th child- have hx also of iron infusions for anemia 2017    Hyperlipidemia     Hypertension     IBS (irritable bowel syndrome)     with diarrhea    Iron deficiency anemia     Iron Infusions- follows with Julia Araiza and Jose G Cardenas    LVH (left ventricular hypertrophy) 09/2012    [facility]    Multiple gastric polyps 2004    Dr Simin Hannon    PAD (peripheral artery disease) ([facility] [address]     S/P CABG x 4 08/06/2012    4V CABG- LIMA-LAD, SVG-CX, SVG-PDA, SVG-RCA- Follows with Dr Jesus Collins    Sinus tachycardia     follows with [facility]    SVT (supraventricular tachycardia) [facility])     old chart gives hx of SVT in the past    Vitamin D deficiency      Family History   Problem Relation Age of Onset    Kidney Disease Mother         Dialysis    Diabetes Mother     High Blood Pressure Mother     Coronary Art Dis Mother         MI    Cancer Father         pancreatic cancer (Smoking)    Coronary Art Dis Father 43        MI early onset    Diabetes Other      Social History     Socioeconomic History    Marital status:      Spouse name: Yvonne Beltre    Number of children: 5    Years of education: Not on file    Highest education level: Not on file   Occupational History    Not on file   Tobacco Use    Smoking status: Never    Smokeless tobacco: Never   Vaping Use Vaping Use: Never used   Substance and Sexual Activity    Alcohol use: No    Drug use: No    Sexual activity: Not Currently     Partners: Male   Other Topics Concern    Not on file   Social History Narrative    Not on file     Social Determinants of Health     Financial Resource Strain: Not on file   Food Insecurity: Not on file   Transportation Needs: Not on file   Physical Activity: Not on file   Stress: Not on file   Social Connections: Not on file   Intimate Partner Violence: Not on file   Housing Stability: Not on file       Current Outpatient Medications   Medication Sig Dispense Refill    furosemide (LASIX) 40 MG tablet TAKE 1/2 TABLET BY MOUTH TWICE DAILY 90 tablet 3    silver sulfADIAZINE (SILVADENE) 1 % cream Apply topically daily. 50 g 2    GUAR GUM PO Take by mouth in the morning, at noon, and at bedtime      acetaminophen (TYLENOL) 500 MG tablet Take 1,000 mg by mouth every 4 hours as needed for Pain      aspirin 81 MG EC tablet Take 81 mg by mouth in the morning. simvastatin (ZOCOR) 20 MG tablet Take 20 mg by mouth nightly      dapagliflozin (FARXIGA) 5 MG tablet Take 1 tablet by mouth every morning 90 tablet 1    sacubitril-valsartan (ENTRESTO) 24-26 MG per tablet Take 0.5 tablets by mouth in the morning and 0.5 tablets before bedtime. Nutritional Supplements (VITAL AF 1.2 JL) LIQD 60 mL/hr every 12 hours      nitroGLYCERIN (NITROSTAT) 0.4 MG SL tablet up to max of 3 total doses.  If no relief after 1 dose, call 911. 25 tablet 1    TRULICITY 1.5 CO/5.9SU SOPN ADMINISTER 1.5 MG UNDER THE SKIN 1 TIME A WEEK 2 mL 3    glipiZIDE (GLUCOTROL) 5 MG tablet Take 1 tablet by mouth 2 times daily (before meals) 180 tablet 1    rosuvastatin (CRESTOR) 10 MG tablet TAKE 1 TABLET BY MOUTH EVERY NIGHT 90 tablet 1    carvedilol (COREG) 6.25 MG tablet Take 1 tablet by mouth 2 times daily (with meals) 60 tablet 3    clopidogrel (PLAVIX) 75 MG tablet Take 1 tablet by mouth daily 30 tablet 3    loperamide (IMODIUM) 2 MG capsule Take 1 capsule by mouth 4 times daily as needed for Diarrhea 60 capsule 1     No current facility-administered medications for this visit. Allergies   Allergen Reactions    Tetanus Toxoids Swelling and Rash     fever    Adhesive Tape Rash       Review of Systems:       Review of Systems   Constitutional:  Negative for chills and fever. HENT:  Negative for ear pain, mouth sores, sore throat and tinnitus. Eyes:  Negative for photophobia, redness and itching. Respiratory:  Negative for apnea, choking and stridor. Cardiovascular:  Negative for chest pain and palpitations. Gastrointestinal:  Negative for abdominal pain, anal bleeding, constipation and rectal pain. Endocrine: Negative for polydipsia. Genitourinary:  Negative for enuresis, flank pain and hematuria. Musculoskeletal:  Negative for back pain, joint swelling and myalgias. Skin:  Positive for color change and wound. Negative for pallor. Allergic/Immunologic: Negative for environmental allergies. Neurological:  Negative for syncope and speech difficulty. Psychiatric/Behavioral:  Negative for confusion and hallucinations. OBJECTIVE:  Physical Exam:    /74 (Site: Left Upper Arm, Position: Sitting, Cuff Size: Medium Adult)   Pulse 74   Ht 5' 3\" (1.6 m)   Wt 131 lb 14.4 oz (59.8 kg)   LMP 01/19/2002   SpO2 96%   BMI 23.37 kg/m²      Physical Exam  Constitutional:       Appearance: She is well-developed. HENT:      Head: Normocephalic. Eyes:      Pupils: Pupils are equal, round, and reactive to light. Cardiovascular:      Rate and Rhythm: Normal rate. Pulmonary:      Effort: Pulmonary effort is normal.   Abdominal:      General: There is no distension. Palpations: Abdomen is soft. There is no mass. Tenderness: There is no abdominal tenderness. There is no guarding or rebound. Comments: J-tube present. Bumper at 4.5cm and loose.   Stitch granuloma noted at lower

## 2022-09-19 ASSESSMENT — ENCOUNTER SYMPTOMS
APNEA: 0
ABDOMINAL PAIN: 0
EYE ITCHING: 0
COLOR CHANGE: 1
ANAL BLEEDING: 0
RECTAL PAIN: 0
CHOKING: 0
SORE THROAT: 0
EYE REDNESS: 0
BACK PAIN: 0
PHOTOPHOBIA: 0
CONSTIPATION: 0
STRIDOR: 0

## 2022-09-30 LAB
LEFT VENTRICULAR EJECTION FRACTION HIGH VALUE: NORMAL
LEFT VENTRICULAR EJECTION FRACTION MODE: NORMAL
LV EF: NORMAL %

## 2022-10-12 ENCOUNTER — OFFICE VISIT (OUTPATIENT)
Dept: SURGERY | Age: 72
End: 2022-10-12

## 2022-10-12 VITALS
WEIGHT: 134.8 LBS | OXYGEN SATURATION: 98 % | HEART RATE: 64 BPM | HEIGHT: 63 IN | SYSTOLIC BLOOD PRESSURE: 104 MMHG | BODY MASS INDEX: 23.88 KG/M2 | DIASTOLIC BLOOD PRESSURE: 62 MMHG

## 2022-10-12 DIAGNOSIS — R58 BLEEDING: Primary | ICD-10-CM

## 2022-10-12 PROCEDURE — 99024 POSTOP FOLLOW-UP VISIT: CPT | Performed by: SURGERY

## 2022-10-12 ASSESSMENT — PATIENT HEALTH QUESTIONNAIRE - PHQ9
SUM OF ALL RESPONSES TO PHQ QUESTIONS 1-9: 3
SUM OF ALL RESPONSES TO PHQ9 QUESTIONS 1 & 2: 3
SUM OF ALL RESPONSES TO PHQ QUESTIONS 1-9: 3
SUM OF ALL RESPONSES TO PHQ QUESTIONS 1-9: 3
2. FEELING DOWN, DEPRESSED OR HOPELESS: 3
SUM OF ALL RESPONSES TO PHQ QUESTIONS 1-9: 3
1. LITTLE INTEREST OR PLEASURE IN DOING THINGS: 0

## 2022-10-12 NOTE — PROGRESS NOTES
Post-Operative Clinic Note    Chief Complaint   Patient presents with    Follow-up     FU c/o  jtube is bleeding  S/P jtube placement 07/20/22 Dr. Jimmy Carlson pt         SUBJECTIVE:  Patient is here today for a post-operative visit. Patient is s/p jejunostomy tube placement this summer by . She reports earlier this morning she had bleeding at the j-tube site. Bleeding has stopped spontaneously. She reports there was an area of blood staining on her shirt the size of her fist.  She denies pain. She denies bloody Bms or melena.      Past Surgical History:   Procedure Laterality Date    BALLOON ANGIOPLASTY, ARTERY Right 08/19/2015    RIght SFA 90%->10%, Right Popliteal 1000%->10%    CARDIAC CATHETERIZATION  08/02/2012    CARDIAC SURGERY      open heart surgery 8/2012    CAROTID ENDARTERECTOMY Right 2/18/2022    RIGHT CAROTID ENDARTERECTOMY performed by Zeyad Alcala MD at [address] [facility]  15+ yrs ago    COLONOSCOPY  2017    CORONARY ARTERY BYPASS GRAFT  08/02/2012    4V CABG- LIMA-LAD, SVG-CX, SVG-PDA, SVG-RCA    ENDOSCOPY, COLON, DIAGNOSTIC  10/10/2017    esophageal stricture, hiatal hernia, candidiasis    ESOPHAGEAL DILATATION      Dr. Gerda Calvillo    GASTRIC FUNDOPLICATION N/A 4/2/2233    NISSEN FUNDOPLICATION HIATAL HERNIA REPAIR LAPAROSCOPIC ROBOTIC performed by Syed Maldonado MD at [address]      biltateral- \"total of 9 surgeries- all scopes\"    STOMACH SURGERY N/A 7/14/2022    JEJUNOSTOMY TUBE INSERTION LAPAROSCOPIC ROBOTIC XI performed by Syed Maldonado MD at [address] [facility] Left 09/23/2015 9/23/2015-Left mid and distal SFA and Left Popliteal    TUBAL LIGATION  1978    UPPER GASTROINTESTINAL ENDOSCOPY N/A 7/11/2022    EGD DILATION BALLOON 18 performed by Syed Maldonado MD at [address] [facility] ENDOSCOPY     Past Medical History:   Diagnosis Date    Anemia     Managed by Dr. Hernandez Rahman     CAD (coronary artery disease)     follows with Dr Yuni Quick    Carotid stenosis     right    Colon polyps     COVID-19 08/18/2021    Diabetes mellitus type 2, uncontrolled     \"dx 4550    Diastolic dysfunction 15/9722    [facility]    Esophageal stricture     dilation per GI    Hiatal hernia     History of blood transfusion     \"had blood transfusion with 4th child- have hx also of iron infusions for anemia 2017    Hyperlipidemia     Hypertension     IBS (irritable bowel syndrome)     with diarrhea    Iron deficiency anemia     Iron Infusions- follows with Julia Araiza and Jose G Cardenas    LVH (left ventricular hypertrophy) 09/2012    [facility]    Multiple gastric polyps 2004    Dr Jessi Blackwood    PAD (peripheral artery disease) ([facility] [address]     S/P CABG x 4 08/06/2012    4V CABG- LIMA-LAD, SVG-CX, SVG-PDA, SVG-RCA- Follows with Dr Jose De La Torre    Sinus tachycardia     follows with [facility]    SVT (supraventricular tachycardia) [facility])     old chart gives hx of SVT in the past    Vitamin D deficiency      Family History   Problem Relation Age of Onset    Kidney Disease Mother         Dialysis    Diabetes Mother     High Blood Pressure Mother     Coronary Art Dis Mother         MI    Cancer Father         pancreatic cancer (Smoking)    Coronary Art Dis Father 43        MI early onset    Diabetes Other      Social History     Socioeconomic History    Marital status:      Spouse name: Sean Agustin    Number of children: 5    Years of education: Not on file    Highest education level: Not on file   Occupational History    Not on file   Tobacco Use    Smoking status: Never    Smokeless tobacco: Never   Vaping Use    Vaping Use: Never used   Substance and Sexual Activity    Alcohol use: No    Drug use: No    Sexual activity: Not Currently     Partners: Male   Other Topics Concern    Not on file   Social History Narrative    Not on file     Social Determinants of Health     Financial Resource Strain: Not on file   Food Insecurity: Not on file   Transportation Needs: Not on file

## 2022-10-13 RX ORDER — ROSUVASTATIN CALCIUM 10 MG/1
TABLET, COATED ORAL
Qty: 90 TABLET | Refills: 1 | Status: SHIPPED | OUTPATIENT
Start: 2022-10-13

## 2022-10-27 ENCOUNTER — OFFICE VISIT (OUTPATIENT)
Dept: SURGERY | Age: 72
End: 2022-10-27
Payer: COMMERCIAL

## 2022-10-27 VITALS
WEIGHT: 133 LBS | DIASTOLIC BLOOD PRESSURE: 56 MMHG | BODY MASS INDEX: 23.57 KG/M2 | HEART RATE: 80 BPM | OXYGEN SATURATION: 100 % | HEIGHT: 63 IN | SYSTOLIC BLOOD PRESSURE: 100 MMHG

## 2022-10-27 DIAGNOSIS — Z93.4 JEJUNOSTOMY TUBE PRESENT (HCC): Primary | ICD-10-CM

## 2022-10-27 PROCEDURE — 3078F DIAST BP <80 MM HG: CPT | Performed by: SURGERY

## 2022-10-27 PROCEDURE — 3074F SYST BP LT 130 MM HG: CPT | Performed by: SURGERY

## 2022-10-27 PROCEDURE — 1123F ACP DISCUSS/DSCN MKR DOCD: CPT | Performed by: SURGERY

## 2022-10-27 PROCEDURE — 99214 OFFICE O/P EST MOD 30 MIN: CPT | Performed by: SURGERY

## 2022-11-08 ENCOUNTER — OFFICE VISIT (OUTPATIENT)
Dept: INTERNAL MEDICINE CLINIC | Age: 72
End: 2022-11-08
Payer: COMMERCIAL

## 2022-11-08 VITALS
OXYGEN SATURATION: 99 % | WEIGHT: 133 LBS | BODY MASS INDEX: 23.57 KG/M2 | HEART RATE: 71 BPM | SYSTOLIC BLOOD PRESSURE: 118 MMHG | DIASTOLIC BLOOD PRESSURE: 62 MMHG | HEIGHT: 63 IN

## 2022-11-08 DIAGNOSIS — E11.9 TYPE 2 DIABETES MELLITUS WITHOUT COMPLICATION, WITHOUT LONG-TERM CURRENT USE OF INSULIN (HCC): Primary | ICD-10-CM

## 2022-11-08 DIAGNOSIS — E78.5 HYPERLIPIDEMIA, UNSPECIFIED HYPERLIPIDEMIA TYPE: ICD-10-CM

## 2022-11-08 DIAGNOSIS — I25.10 CORONARY ARTERY DISEASE INVOLVING NATIVE HEART WITHOUT ANGINA PECTORIS, UNSPECIFIED VESSEL OR LESION TYPE: ICD-10-CM

## 2022-11-08 DIAGNOSIS — N18.31 STAGE 3A CHRONIC KIDNEY DISEASE (HCC): ICD-10-CM

## 2022-11-08 LAB — HBA1C MFR BLD: 7.2 %

## 2022-11-08 PROCEDURE — 3074F SYST BP LT 130 MM HG: CPT | Performed by: FAMILY MEDICINE

## 2022-11-08 PROCEDURE — 99214 OFFICE O/P EST MOD 30 MIN: CPT | Performed by: FAMILY MEDICINE

## 2022-11-08 PROCEDURE — 83036 HEMOGLOBIN GLYCOSYLATED A1C: CPT | Performed by: FAMILY MEDICINE

## 2022-11-08 PROCEDURE — 1123F ACP DISCUSS/DSCN MKR DOCD: CPT | Performed by: FAMILY MEDICINE

## 2022-11-08 PROCEDURE — 3051F HG A1C>EQUAL 7.0%<8.0%: CPT | Performed by: FAMILY MEDICINE

## 2022-11-08 PROCEDURE — 3078F DIAST BP <80 MM HG: CPT | Performed by: FAMILY MEDICINE

## 2022-11-08 RX ORDER — LOPERAMIDE HYDROCHLORIDE 2 MG/1
2 CAPSULE ORAL 4 TIMES DAILY PRN
Qty: 60 CAPSULE | Refills: 2 | Status: SHIPPED | OUTPATIENT
Start: 2022-11-08

## 2022-11-08 RX ORDER — ROSUVASTATIN CALCIUM 10 MG/1
TABLET, COATED ORAL
Qty: 90 TABLET | Refills: 1 | Status: SHIPPED | OUTPATIENT
Start: 2022-11-08

## 2022-11-08 SDOH — ECONOMIC STABILITY: FOOD INSECURITY: WITHIN THE PAST 12 MONTHS, THE FOOD YOU BOUGHT JUST DIDN'T LAST AND YOU DIDN'T HAVE MONEY TO GET MORE.: NEVER TRUE

## 2022-11-08 SDOH — ECONOMIC STABILITY: FOOD INSECURITY: WITHIN THE PAST 12 MONTHS, YOU WORRIED THAT YOUR FOOD WOULD RUN OUT BEFORE YOU GOT MONEY TO BUY MORE.: NEVER TRUE

## 2022-11-08 ASSESSMENT — ENCOUNTER SYMPTOMS
NAUSEA: 0
BACK PAIN: 0
COUGH: 0
DIARRHEA: 1
SHORTNESS OF BREATH: 0
ABDOMINAL PAIN: 0

## 2022-11-08 ASSESSMENT — SOCIAL DETERMINANTS OF HEALTH (SDOH): HOW HARD IS IT FOR YOU TO PAY FOR THE VERY BASICS LIKE FOOD, HOUSING, MEDICAL CARE, AND HEATING?: NOT HARD AT ALL

## 2022-11-08 NOTE — PROGRESS NOTES
Re Rivas (:  1950) is a 66 y.o. female,established patient, here for evaluation of the following chief complaint(s):  Follow-up (4 month), Diabetes, and Other (Pt had a MI last month and had a cardiac stent placed at [address] [place] [address])         ASSESSMENT/PLAN:  1. Type 2 diabetes mellitus without complication, without long-term current use of insulin (HCC) Continue Farxiga and Trulicity  - POCT glycosylated hemoglobin (Hb A1C)    2. Hyperlipidemia, unspecified hyperlipidemia type  - rosuvastatin (CRESTOR) 10 MG tablet; TAKE 1 TABLET BY MOUTH EVERY NIGHT  Dispense: 90 tablet; Refill: 1    3. Coronary artery disease involving native heart without angina pectoris, unspecified vessel or lesion type  Continue medications    4. Stage 3a chronic kidney disease ([facility] [address]  Keep f/u with nephrology    Labs reviewed below  On this date 2022 I have spent 30 minutes reviewing previous notes, test results and face to face with the patient discussing the diagnosis and importance of compliance with the treatment plan as well as documenting on the day of the visit. Return in about 5 months (around 2023) for Diabetes, HLD.        Lab Results   Component Value Date    WBC 9.2 2022    HGB 12.2 2022    HCT 39.0 2022    MCV 85.9 2022     2022     Lab Results   Component Value Date    CHOL 218 (H) 2021     Lab Results   Component Value Date    TRIG 227 (H) 2021     Lab Results   Component Value Date    HDL 37 (L) 2021     Lab Results   Component Value Date    LDLCALC 136 (H) 2021    LDLDIRECT 118 (H) 2015     Lab Results   Component Value Date    LABA1C 7.8 2022     Lab Results   Component Value Date    .2 2021     Lab Results   Component Value Date     2022    K 5.1 2022    CL 98 2022    CO2 25 2022    BUN 74 (H) 2022    CREATININE 1.4 (H) 2022    GLUCOSE 172 (H) 2022    CALCIUM 10.0 2022 PROT 7.8 08/26/2022    LABALBU 4.4 08/26/2022    BILITOT 0.3 08/26/2022    ALKPHOS 76 08/26/2022    AST 14 08/26/2022    ALT 8 08/26/2022    LABGLOM >60 07/18/2022    GFRAA >60 07/18/2022    AGRATIO 1.2 04/07/2022    GLOB 3.4 08/26/2022     Lab Results   Component Value Date    TSH 1.690 03/08/2018     Lab Results   Component Value Date/Time    COLORU YELLOW 08/26/2022 11:59 AM    COLORU YELLOW 11/01/2021 10:50 AM    LABSPEC 1.018 08/26/2022 11:59 AM    LABPH 5.5 08/26/2022 11:59 AM    NITRU Negative 11/01/2021 10:50 AM    GLUCOSEU >1000 08/26/2022 11:59 AM    KETUA NEGATIVE 08/26/2022 11:59 AM    UROBILINOGEN <2 08/26/2022 11:59 AM    BILIRUBINUR NEGATIVE 08/26/2022 11:59 AM       Subjective   SUBJECTIVE/OBJECTIVE:    HISTORY OF PRESENT ILLNESS:  This is a 66 y.o. female here for the following:  Patient Active Problem List    Diagnosis Date Noted    Carotid stenosis, right 02/18/2022    Carotid stenosis, bilateral 01/26/2022    Acute hypoxemic respiratory failure due to COVID-19 ([facility] [address] 08/18/2021    Esophageal stricture     Hiatal hernia     Iron deficiency anemia     Iron deficiency anemia secondary to blood loss (chronic) 04/14/2020    Other forms of chronic ischemic heart disease 04/14/2020    Hiatal hernia with GERD 04/14/2020    Personal history of other diseases of the digestive system 04/14/2020    Angular cheilitis 01/05/2020    Microalbuminuria 05/16/2017    PVD (peripheral vascular disease) ([address] 04/11/2017    Anemia 09/20/2016    Vitamin D deficiency 09/20/2016    Type 2 diabetes mellitus without complication, without long-term current use of insulin ([facility] [address] 09/20/2016    Coronary artery disease involving native heart without angina pectoris 09/20/2016    Essential hypertension 09/20/2016    Mixed hyperlipidemia 09/20/2016      HLD- on Crestor 10  DM II- on Farxiga and Trulicity. Off glipizide   CAD/ Hx of stent. Stable.  On Plavix, Coreg, Crestor  CKD 3a- follows with Dr. Ori Thompson- Nephrology  She still has a J-tube. Patient is eating soups now. She can not eat meats. She has to eat 3 solid meals a day for the J-tube to be removed. Patient was to have a repeat EGD, but it was canceled by [facility]. This is rescheduled  She still has chronic diarrhea and the Immodium helps    Review of Systems   Constitutional:  Negative for diaphoresis and fever. Respiratory:  Negative for cough and shortness of breath. Cardiovascular:  Negative for chest pain and palpitations. Gastrointestinal:  Positive for diarrhea. Negative for abdominal pain and nausea. Genitourinary:  Negative for difficulty urinating. Musculoskeletal:  Negative for back pain. Neurological:  Negative for dizziness and headaches. Allergies   Allergen Reactions    Tetanus Toxoids Swelling and Rash     fever    Adhesive Tape Rash     Current Outpatient Medications   Medication Sig Dispense Refill    loperamide (IMODIUM) 2 MG capsule Take 1 capsule by mouth 4 times daily as needed for Diarrhea 60 capsule 2    rosuvastatin (CRESTOR) 10 MG tablet TAKE 1 TABLET BY MOUTH EVERY NIGHT 90 tablet 1    furosemide (LASIX) 40 MG tablet TAKE 1/2 TABLET BY MOUTH TWICE DAILY 90 tablet 3    silver sulfADIAZINE (SILVADENE) 1 % cream Apply topically daily. 50 g 2    acetaminophen (TYLENOL) 500 MG tablet Take 1,000 mg by mouth every 4 hours as needed for Pain      aspirin 81 MG EC tablet Take 81 mg by mouth in the morning. dapagliflozin (FARXIGA) 5 MG tablet Take 1 tablet by mouth every morning 90 tablet 1    sacubitril-valsartan (ENTRESTO) 24-26 MG per tablet Take 0.5 tablets by mouth in the morning and 0.5 tablets before bedtime. Nutritional Supplements (VITAL AF 1.2 JL) LIQD 60 mL/hr every 12 hours      nitroGLYCERIN (NITROSTAT) 0.4 MG SL tablet up to max of 3 total doses.  If no relief after 1 dose, call 911. 25 tablet 1    TRULICITY 1.5 ZT/5.7US SOPN ADMINISTER 1.5 MG UNDER THE SKIN 1 TIME A WEEK 2 mL 3    carvedilol (COREG) 6.25 MG tablet Take 1 tablet by mouth 2 times daily (with meals) 60 tablet 3    clopidogrel (PLAVIX) 75 MG tablet Take 1 tablet by mouth daily 30 tablet 3     No current facility-administered medications for this visit. Vitals:    11/08/22 0910   BP: 118/62   Site: Right Upper Arm   Position: Sitting   Cuff Size: Large Adult   Pulse: 71   SpO2: 99%   Weight: 133 lb (60.3 kg)   Height: 5' 3\" (1.6 m)     Objective   Physical Exam  Vitals reviewed. Constitutional:       General: She is not in acute distress. Eyes:      Extraocular Movements: Extraocular movements intact. Cardiovascular:      Rate and Rhythm: Normal rate and regular rhythm. Pulmonary:      Effort: Pulmonary effort is normal. No respiratory distress. Breath sounds: Normal breath sounds. Abdominal:      Palpations: Abdomen is soft. Tenderness: There is no abdominal tenderness. Comments: J-tube   Musculoskeletal:      Cervical back: Neck supple. Right lower leg: No edema. Left lower leg: No edema. Neurological:      Mental Status: She is alert and oriented to person, place, and time. Psychiatric:         Mood and Affect: Mood normal.              An electronic signature was used to authenticate this note. --Walter Dakin, DO     This dictation was generated by voice recognition computer software. Although all attempts are made to edit the dictation for accuracy, there may be errors in the transcription that are not intended.

## 2022-11-10 ENCOUNTER — COMMUNITY OUTREACH (OUTPATIENT)
Dept: INTERNAL MEDICINE CLINIC | Age: 72
End: 2022-11-10

## 2022-11-10 NOTE — PROGRESS NOTES
Patient's HM shows they are overdue for [facility] and  files searched. No results to attach to order nor HM updated.        Patient declines Mammogram  08/11/2022

## 2022-11-11 ENCOUNTER — TELEPHONE (OUTPATIENT)
Dept: INTERNAL MEDICINE CLINIC | Age: 72
End: 2022-11-11

## 2022-11-11 DIAGNOSIS — Z12.31 SCREENING MAMMOGRAM FOR HIGH-RISK PATIENT: Primary | ICD-10-CM

## 2022-11-19 ASSESSMENT — ENCOUNTER SYMPTOMS
EYE ITCHING: 0
EYE REDNESS: 0
SORE THROAT: 0
CHOKING: 0
STRIDOR: 0
PHOTOPHOBIA: 0
APNEA: 0
ANAL BLEEDING: 0
BACK PAIN: 0
COLOR CHANGE: 0
RECTAL PAIN: 0
CONSTIPATION: 0

## 2022-11-20 NOTE — PROGRESS NOTES
Chief Complaint   Patient presents with    Follow-up     Feed tube leaking bleeding and coming out          SUBJECTIVE:  HPI: Patient is here with complaints of bleeding around the jejunostomy tube. She has some hypergranulation tissue causing this. .    I have reviewed the patient's(pertinent information to this visit) medical history, family history(scanned in  the [address] [place] [address] under \"patient questioner\"), social history and review of systems with the patient today in the office.             Past Surgical History:   Procedure Laterality Date    BALLOON ANGIOPLASTY, ARTERY Right 08/19/2015    RIght SFA 90%->10%, Right Popliteal 1000%->10%    CARDIAC CATHETERIZATION  08/02/2012    CARDIAC SURGERY      open heart surgery 8/2012    CAROTID ENDARTERECTOMY Right 2/18/2022    RIGHT CAROTID ENDARTERECTOMY performed by Adam Romero MD at [address] [place] [address]  15+ yrs ago    COLONOSCOPY  2017    CORONARY ARTERY BYPASS GRAFT  08/02/2012    4V CABG- LIMA-LAD, SVG-CX, SVG-PDA, SVG-RCA    ENDOSCOPY, COLON, DIAGNOSTIC  10/10/2017    esophageal stricture, hiatal hernia, candidiasis    ESOPHAGEAL DILATATION      Dr. Sugey Finney    GASTRIC FUNDOPLICATION N/A 2/1/6084    NISSEN FUNDOPLICATION HIATAL HERNIA REPAIR LAPAROSCOPIC ROBOTIC performed by Evgeny Marsh MD at [address]      biltateral- \"total of 9 surgeries- all scopes\"    STOMACH SURGERY N/A 7/14/2022    JEJUNOSTOMY TUBE INSERTION LAPAROSCOPIC ROBOTIC XI performed by Evgeny Marsh MD at [facility] Left 09/23/2015 9/23/2015-Left mid and distal SFA and Left Popliteal    TUBAL LIGATION  1978    UPPER GASTROINTESTINAL ENDOSCOPY N/A 7/11/2022    EGD DILATION BALLOON 18 performed by Evgeny Marsh MD at [facility] ENDOSCOPY     Past Medical History:   Diagnosis Date    Anemia     Managed by Dr. Keisha Laughlin     CAD (coronary artery disease)     follows with Dr Xin Guerin    Carotid stenosis     right    Colon polyps     COVID-19 08/18/2021    Diabetes mellitus type 2, uncontrolled     \"dx 2346    Diastolic dysfunction 89/7190    [facility]    Esophageal stricture     dilation per GI    Hiatal hernia     History of blood transfusion     \"had blood transfusion with 4th child- have hx also of iron infusions for anemia 2017    Hyperlipidemia     Hypertension     IBS (irritable bowel syndrome)     with diarrhea    Iron deficiency anemia     Iron Infusions- follows with Julia Araiaz and Jose G Cardenas    LVH (left ventricular hypertrophy) 09/2012    [facility]    Multiple gastric polyps 2004    Dr Akua Flower    PAD (peripheral artery disease) ([facility] [address]     S/P CABG x 4 08/06/2012    4V CABG- LIMA-LAD, SVG-CX, SVG-PDA, SVG-RCA- Follows with Dr Alejandro Woods    Sinus tachycardia     follows with [facility]    SVT (supraventricular tachycardia) [facility])     old chart gives hx of SVT in the past    Vitamin D deficiency      Family History   Problem Relation Age of Onset    Kidney Disease Mother         Dialysis    Diabetes Mother     High Blood Pressure Mother     Coronary Art Dis Mother         MI    Cancer Father         pancreatic cancer (Smoking)    Coronary Art Dis Father 43        MI early onset    Diabetes Other      Social History     Socioeconomic History    Marital status:      Spouse name: Luiza Tuttle    Number of children: 5    Years of education: Not on file    Highest education level: Not on file   Occupational History    Not on file   Tobacco Use    Smoking status: Never    Smokeless tobacco: Never   Vaping Use    Vaping Use: Never used   Substance and Sexual Activity    Alcohol use: No    Drug use: No    Sexual activity: Not Currently     Partners: Male   Other Topics Concern    Not on file   Social History Narrative    Not on file     Social Determinants of Health     Financial Resource Strain: Low Risk     Difficulty of Paying Living Expenses: Not hard at all   Food Insecurity: No Food Insecurity    Worried About Running Out of Food in the Last Year: Never true    Ran Out of Food in the Last Year: Never true   Transportation Needs: Not on file   Physical Activity: Not on file   Stress: Not on file   Social Connections: Not on file   Intimate Partner Violence: Not on file   Housing Stability: Not on file       Current Outpatient Medications   Medication Sig Dispense Refill    loperamide (IMODIUM) 2 MG capsule Take 1 capsule by mouth 4 times daily as needed for Diarrhea 60 capsule 2    rosuvastatin (CRESTOR) 10 MG tablet TAKE 1 TABLET BY MOUTH EVERY NIGHT 90 tablet 1    furosemide (LASIX) 40 MG tablet TAKE 1/2 TABLET BY MOUTH TWICE DAILY 90 tablet 3    silver sulfADIAZINE (SILVADENE) 1 % cream Apply topically daily. 50 g 2    acetaminophen (TYLENOL) 500 MG tablet Take 1,000 mg by mouth every 4 hours as needed for Pain      aspirin 81 MG EC tablet Take 81 mg by mouth in the morning. dapagliflozin (FARXIGA) 5 MG tablet Take 1 tablet by mouth every morning 90 tablet 1    sacubitril-valsartan (ENTRESTO) 24-26 MG per tablet Take 0.5 tablets by mouth in the morning and 0.5 tablets before bedtime. Nutritional Supplements (VITAL AF 1.2 JL) LIQD 60 mL/hr every 12 hours      nitroGLYCERIN (NITROSTAT) 0.4 MG SL tablet up to max of 3 total doses. If no relief after 1 dose, call 911. 25 tablet 1    TRULICITY 1.5 KD/2.0UW SOPN ADMINISTER 1.5 MG UNDER THE SKIN 1 TIME A WEEK 2 mL 3    carvedilol (COREG) 6.25 MG tablet Take 1 tablet by mouth 2 times daily (with meals) 60 tablet 3    clopidogrel (PLAVIX) 75 MG tablet Take 1 tablet by mouth daily 30 tablet 3     No current facility-administered medications for this visit. Allergies   Allergen Reactions    Tetanus Toxoids Swelling and Rash     fever    Adhesive Tape Rash       Review of Systems:         Review of Systems   Constitutional:  Negative for chills and fever. HENT:  Negative for ear pain, mouth sores, sore throat and tinnitus.     Eyes:  Negative for photophobia, redness and itching. Respiratory:  Negative for apnea, choking and stridor. Cardiovascular:  Negative for chest pain and palpitations. Gastrointestinal:  Negative for anal bleeding, constipation and rectal pain. Endocrine: Negative for polydipsia. Genitourinary:  Negative for enuresis, flank pain and hematuria. Musculoskeletal:  Negative for back pain, joint swelling and myalgias. Skin:  Negative for color change and pallor. Allergic/Immunologic: Negative for environmental allergies. Neurological:  Negative for syncope and speech difficulty. Psychiatric/Behavioral:  Negative for confusion and hallucinations. OBJECTIVE:  Physical Exam:    BP (!) 100/56   Pulse 80   Ht 5' 3\" (1.6 m)   Wt 133 lb (60.3 kg)   LMP 01/19/2002   SpO2 100%   BMI 23.56 kg/m²      Physical Exam  Constitutional:       Appearance: She is well-developed. HENT:      Head: Normocephalic. Eyes:      Pupils: Pupils are equal, round, and reactive to light. Cardiovascular:      Rate and Rhythm: Normal rate. Pulmonary:      Effort: Pulmonary effort is normal.   Abdominal:      General: There is no distension. Palpations: Abdomen is soft. There is no mass. Tenderness: There is no abdominal tenderness. There is no guarding or rebound. Musculoskeletal:         General: Normal range of motion. Cervical back: Normal range of motion and neck supple. Skin:     General: Skin is warm. Neurological:      Mental Status: She is alert and oriented to person, place, and time. ASSESSMENT:  1. Jejunostomy tube present ([facility] [address]          PLAN:  Treatment: Silver nitrate stick was used for the hypergranulation tissue. J-tube was confirmed in proper position. .  Patient counseled on risks, benefits, and alternatives of treatment plan at length today. Patient states an understanding and willingness to proceed with plan. No orders of the defined types were placed in this encounter.        No orders of the defined types were placed in this encounter. Follow Up:  No follow-ups on file.       Chad Martin MD

## 2022-12-17 DIAGNOSIS — E11.9 TYPE 2 DIABETES MELLITUS WITHOUT COMPLICATION, WITHOUT LONG-TERM CURRENT USE OF INSULIN (HCC): ICD-10-CM

## 2022-12-19 ENCOUNTER — OFFICE VISIT (OUTPATIENT)
Dept: SURGERY | Age: 72
End: 2022-12-19
Payer: COMMERCIAL

## 2022-12-19 VITALS
OXYGEN SATURATION: 95 % | SYSTOLIC BLOOD PRESSURE: 118 MMHG | DIASTOLIC BLOOD PRESSURE: 68 MMHG | WEIGHT: 130.3 LBS | HEART RATE: 85 BPM | HEIGHT: 63 IN | BODY MASS INDEX: 23.09 KG/M2

## 2022-12-19 DIAGNOSIS — Z93.4 JEJUNOSTOMY TUBE PRESENT (HCC): Primary | ICD-10-CM

## 2022-12-19 PROCEDURE — 3074F SYST BP LT 130 MM HG: CPT | Performed by: SURGERY

## 2022-12-19 PROCEDURE — 1123F ACP DISCUSS/DSCN MKR DOCD: CPT | Performed by: SURGERY

## 2022-12-19 PROCEDURE — 99214 OFFICE O/P EST MOD 30 MIN: CPT | Performed by: SURGERY

## 2022-12-19 PROCEDURE — 3078F DIAST BP <80 MM HG: CPT | Performed by: SURGERY

## 2022-12-19 NOTE — PROGRESS NOTES
Chief Complaint   Patient presents with    Follow-up     FIDELIA drain fell out         SUBJECTIVE:  HPI: Patient is here with complaints of J-tube came out. Patient is tolerating oral intake without difficulty. She was supposed to have an esophagogastroduodenoscopy with dilation at [address] [place] [address]. Patient does not need the J-tube for now. I have reviewed the patient's(pertinent information to this visit) medical history, family history(scanned in  the [address] [place] [address] under \"patient questioner\"), social history and review of systems with the patient today in the office.             Past Surgical History:   Procedure Laterality Date    BALLOON ANGIOPLASTY, ARTERY Right 08/19/2015    RIght SFA 90%->10%, Right Popliteal 1000%->10%    CARDIAC CATHETERIZATION  08/02/2012    CARDIAC SURGERY      open heart surgery 8/2012    CAROTID ENDARTERECTOMY Right 2/18/2022    RIGHT CAROTID ENDARTERECTOMY performed by Flower Verdugo MD at [address] [place] [address] Way  15+ yrs ago    COLONOSCOPY  2017    CORONARY ARTERY BYPASS GRAFT  08/02/2012    4V CABG- LIMA-LAD, SVG-CX, SVG-PDA, SVG-RCA    ENDOSCOPY, COLON, DIAGNOSTIC  10/10/2017    esophageal stricture, hiatal hernia, candidiasis    ESOPHAGEAL DILATATION      Dr. Fabian Precise    GASTRIC FUNDOPLICATION N/A 8/8/7540    NISSEN FUNDOPLICATION HIATAL HERNIA REPAIR LAPAROSCOPIC ROBOTIC performed by Ana Rosa Hopson MD at [address]      biltateral- \"total of 9 surgeries- all scopes\"    STOMACH SURGERY N/A 7/14/2022    JEJUNOSTOMY TUBE INSERTION LAPAROSCOPIC ROBOTIC XI performed by Ana Rosa Hopson MD at [facility] Left 09/23/2015 9/23/2015-Left mid and distal SFA and Left Popliteal    TUBAL LIGATION  1978    UPPER GASTROINTESTINAL ENDOSCOPY N/A 7/11/2022    EGD DILATION BALLOON 18 performed by Ana Rosa Hopson MD at [facility] ENDOSCOPY     Past Medical History:   Diagnosis Date    Anemia     Managed by Dr. Toña Mahan     CAD (coronary artery disease)     follows with  Juan Roney    Carotid stenosis     right    Colon polyps     COVID-19 08/18/2021    Diabetes mellitus type 2, uncontrolled     \"dx 2751    Diastolic dysfunction 48/7041    [facility]    Esophageal stricture     dilation per GI    Hiatal hernia     History of blood transfusion     \"had blood transfusion with 4th child- have hx also of iron infusions for anemia 2017    Hyperlipidemia     Hypertension     IBS (irritable bowel syndrome)     with diarrhea    Iron deficiency anemia     Iron Infusions- follows with Julia Araiza and Jose G Cardenas    LVH (left ventricular hypertrophy) 09/2012    [facility]    Multiple gastric polyps 2004    Dr Adele Swenson    PAD (peripheral artery disease) ([facility] [address]     S/P CABG x 4 08/06/2012    4V CABG- LIMA-LAD, SVG-CX, SVG-PDA, SVG-RCA- Follows with Dr Holley Mckeon    Sinus tachycardia     follows with [facility]    SVT (supraventricular tachycardia) [facility])     old chart gives hx of SVT in the past    Vitamin D deficiency      Family History   Problem Relation Age of Onset    Kidney Disease Mother         Dialysis    Diabetes Mother     High Blood Pressure Mother     Coronary Art Dis Mother         MI    Cancer Father         pancreatic cancer (Smoking)    Coronary Art Dis Father 43        MI early onset    Diabetes Other      Social History     Socioeconomic History    Marital status:      Spouse name: Debra Ochoa    Number of children: 5    Years of education: Not on file    Highest education level: Not on file   Occupational History    Not on file   Tobacco Use    Smoking status: Never    Smokeless tobacco: Never   Vaping Use    Vaping Use: Never used   Substance and Sexual Activity    Alcohol use: No    Drug use: No    Sexual activity: Not Currently     Partners: Male   Other Topics Concern    Not on file   Social History Narrative    Not on file     Social Determinants of Health     Financial Resource Strain: Low Risk     Difficulty of Paying Living Expenses: Not hard at all Food Insecurity: No Food Insecurity    Worried About Running Out of Food in the Last Year: Never true    Ran Out of Food in the Last Year: Never true   Transportation Needs: Not on file   Physical Activity: Not on file   Stress: Not on file   Social Connections: Not on file   Intimate Partner Violence: Not on file   Housing Stability: Not on file       Current Outpatient Medications   Medication Sig Dispense Refill    losartan (COZAAR) 50 MG tablet TAKE 1 TABLET BY MOUTH EVERY DAY      fluconazole (DIFLUCAN) 40 MG/ML suspension SHAKE LIQUID AND TAKE 10 ML BY MOUTH DAILY FOR 21 DAYS      sucralfate (CARAFATE) 1 GM/10ML suspension SHAKE LIQUID AND TAKE 10 ML BY MOUTH EVERY 6 HOURS FOR 21 DAYS      loperamide (IMODIUM) 2 MG capsule Take 1 capsule by mouth 4 times daily as needed for Diarrhea (Patient taking differently: Take 2 mg by mouth 4 times daily) 60 capsule 2    rosuvastatin (CRESTOR) 10 MG tablet TAKE 1 TABLET BY MOUTH EVERY NIGHT 90 tablet 1    furosemide (LASIX) 40 MG tablet TAKE 1/2 TABLET BY MOUTH TWICE DAILY 90 tablet 3    silver sulfADIAZINE (SILVADENE) 1 % cream Apply topically daily. 50 g 2    acetaminophen (TYLENOL) 500 MG tablet Take 1,000 mg by mouth every 4 hours as needed for Pain      aspirin 81 MG EC tablet Take 81 mg by mouth in the morning. dapagliflozin (FARXIGA) 5 MG tablet Take 1 tablet by mouth every morning 90 tablet 1    Nutritional Supplements (VITAL AF 1.2 JL) LIQD 60 mL/hr every 12 hours      nitroGLYCERIN (NITROSTAT) 0.4 MG SL tablet up to max of 3 total doses. If no relief after 1 dose, call 911. 25 tablet 1    carvedilol (COREG) 6.25 MG tablet Take 1 tablet by mouth 2 times daily (with meals) 60 tablet 3    clopidogrel (PLAVIX) 75 MG tablet Take 1 tablet by mouth daily 30 tablet 3    TRULICITY 1.5 TP/4.7JP SC injection ADMINISTER 1.5 MG UNDER THE SKIN 1 TIME A WEEK 2 mL 3     No current facility-administered medications for this visit.       Allergies   Allergen Reactions Tetanus Toxoids Swelling and Rash     fever    Adhesive Tape Rash       Review of Systems:         Review of Systems   Constitutional:  Negative for chills and fever. HENT:  Negative for ear pain, mouth sores, sore throat and tinnitus. Eyes:  Negative for photophobia, redness and itching. Respiratory:  Negative for apnea, choking and stridor. Cardiovascular:  Negative for chest pain and palpitations. Gastrointestinal:  Negative for anal bleeding, constipation and rectal pain. Endocrine: Negative for polydipsia. Genitourinary:  Negative for enuresis, flank pain and hematuria. Musculoskeletal:  Negative for back pain, joint swelling and myalgias. Skin:  Negative for color change and pallor. Allergic/Immunologic: Negative for environmental allergies. Neurological:  Negative for syncope and speech difficulty. Psychiatric/Behavioral:  Negative for confusion and hallucinations. OBJECTIVE:  Physical Exam:    /68 (Site: Right Upper Arm, Position: Sitting, Cuff Size: Large Adult)   Pulse 85   Ht 5' 3\" (1.6 m)   Wt 130 lb 4.8 oz (59.1 kg)   LMP 01/19/2002   SpO2 95%   BMI 23.08 kg/m²      Physical Exam  Constitutional:       Appearance: She is well-developed. HENT:      Head: Normocephalic. Eyes:      Pupils: Pupils are equal, round, and reactive to light. Cardiovascular:      Rate and Rhythm: Normal rate. Pulmonary:      Effort: Pulmonary effort is normal.   Abdominal:      General: There is no distension. Palpations: Abdomen is soft. There is no mass. Tenderness: There is no abdominal tenderness. There is no guarding or rebound. Musculoskeletal:         General: Normal range of motion. Cervical back: Normal range of motion and neck supple. Skin:     General: Skin is warm. Neurological:      Mental Status: She is alert and oriented to person, place, and time.          ASSESSMENT:  1. Jejunostomy tube present ([facility] [address]          PLAN:  Treatment:  pt doing well and geovanna eating. J-tube fell out. .  Patient counseled on risks, benefits, and alternatives of treatment plan at length today. Patient states an understanding and willingness to proceed with plan. No orders of the defined types were placed in this encounter. No orders of the defined types were placed in this encounter. Follow Up:  No follow-ups on file.       Edgar Glasgow MD

## 2022-12-20 RX ORDER — DULAGLUTIDE 1.5 MG/.5ML
INJECTION, SOLUTION SUBCUTANEOUS
Qty: 2 ML | Refills: 3 | Status: SHIPPED | OUTPATIENT
Start: 2022-12-20

## 2023-01-02 ASSESSMENT — ENCOUNTER SYMPTOMS
EYE REDNESS: 0
CHOKING: 0
APNEA: 0
STRIDOR: 0
BACK PAIN: 0
CONSTIPATION: 0
RECTAL PAIN: 0
PHOTOPHOBIA: 0
COLOR CHANGE: 0
SORE THROAT: 0
EYE ITCHING: 0
ANAL BLEEDING: 0

## 2023-01-20 ENCOUNTER — TELEPHONE (OUTPATIENT)
Dept: INTERNAL MEDICINE CLINIC | Age: 73
End: 2023-01-20

## 2023-01-20 DIAGNOSIS — E11.9 TYPE 2 DIABETES MELLITUS WITHOUT COMPLICATION, WITHOUT LONG-TERM CURRENT USE OF INSULIN (HCC): Primary | ICD-10-CM

## 2023-01-20 NOTE — TELEPHONE ENCOUNTER
Patient called stating that she can not get her medication Trulicity because her pharmacy [facility] on [address] says that this particular medication is on back order. Patient is all out of this medication. Would like a call back on what other pharmacy she could  the Trulicity from.

## 2023-01-24 NOTE — TELEPHONE ENCOUNTER
Spoke to patient. She prefers to increase the [place] to 10 mg. Trulicity will be stopped due to being on back order.  She will di BMP in a couple of weeks

## 2023-01-27 LAB — DIABETIC RETINOPATHY: NORMAL

## 2023-02-02 ENCOUNTER — TELEPHONE (OUTPATIENT)
Dept: INTERNAL MEDICINE CLINIC | Age: 73
End: 2023-02-02

## 2023-02-02 NOTE — TELEPHONE ENCOUNTER
Received PA request for Farxiga (dapagliflozin) 10 mg tab, take 1 tab QAM, #90/90d. DX J08.5- DM II s complication, s long-term current use of insulin, I65.23 Carotid stenosis-CHETAN (could also use I25.10-CAD, I73.9-PVD, I25.89- Other forms of Chronic Ischemic Heart Disease). This is a dosage increase, from 5mg-10mg. Due to Trulicity being on back order. PA completed via covermymeds. PA approved until 02/02/24. Pt informed. Approval scanned to chart. No further action is needed, at this time.

## 2023-04-25 DIAGNOSIS — E78.5 HYPERLIPIDEMIA, UNSPECIFIED HYPERLIPIDEMIA TYPE: ICD-10-CM

## 2023-04-25 RX ORDER — ROSUVASTATIN CALCIUM 10 MG/1
TABLET, COATED ORAL
Qty: 90 TABLET | Refills: 1 | Status: SHIPPED | OUTPATIENT
Start: 2023-04-25

## 2023-05-22 ENCOUNTER — OFFICE VISIT (OUTPATIENT)
Dept: INTERNAL MEDICINE CLINIC | Age: 73
End: 2023-05-22
Payer: COMMERCIAL

## 2023-05-22 VITALS
WEIGHT: 101.4 LBS | TEMPERATURE: 98.4 F | BODY MASS INDEX: 17.96 KG/M2 | RESPIRATION RATE: 16 BRPM | DIASTOLIC BLOOD PRESSURE: 88 MMHG | OXYGEN SATURATION: 99 % | SYSTOLIC BLOOD PRESSURE: 138 MMHG | HEART RATE: 54 BPM

## 2023-05-22 DIAGNOSIS — B37.0 THRUSH: ICD-10-CM

## 2023-05-22 DIAGNOSIS — R63.4 WEIGHT LOSS: ICD-10-CM

## 2023-05-22 DIAGNOSIS — K21.00 GASTROESOPHAGEAL REFLUX DISEASE WITH ESOPHAGITIS WITHOUT HEMORRHAGE: ICD-10-CM

## 2023-05-22 DIAGNOSIS — R11.2 NAUSEA AND VOMITING, UNSPECIFIED VOMITING TYPE: Primary | ICD-10-CM

## 2023-05-22 LAB
BASOPHILS # BLD: 0.1 K/UL (ref 0–0.2)
BASOPHILS NFR BLD: 1 %
DEPRECATED RDW RBC AUTO: 14.2 % (ref 12.4–15.4)
EOSINOPHIL # BLD: 0 K/UL (ref 0–0.6)
EOSINOPHIL NFR BLD: 0.1 %
ERYTHROCYTE [SEDIMENTATION RATE] IN BLOOD BY WESTERGREN METHOD: 23 MM/HR (ref 0–30)
HCT VFR BLD AUTO: 39.2 % (ref 36–48)
HGB BLD-MCNC: 12.9 G/DL (ref 12–16)
LYMPHOCYTES # BLD: 1.7 K/UL (ref 1–5.1)
LYMPHOCYTES NFR BLD: 21.9 %
MCH RBC QN AUTO: 28 PG (ref 26–34)
MCHC RBC AUTO-ENTMCNC: 32.9 G/DL (ref 31–36)
MCV RBC AUTO: 84.9 FL (ref 80–100)
MONOCYTES # BLD: 0.6 K/UL (ref 0–1.3)
MONOCYTES NFR BLD: 7.7 %
NEUTROPHILS # BLD: 5.4 K/UL (ref 1.7–7.7)
NEUTROPHILS NFR BLD: 69.3 %
PLATELET # BLD AUTO: 290 K/UL (ref 135–450)
PMV BLD AUTO: 9.6 FL (ref 5–10.5)
RBC # BLD AUTO: 4.62 M/UL (ref 4–5.2)
T4 FREE SERPL-MCNC: 1.6 NG/DL (ref 0.9–1.8)
TSH SERPL DL<=0.005 MIU/L-ACNC: 0.13 UIU/ML (ref 0.27–4.2)
WBC # BLD AUTO: 7.8 K/UL (ref 4–11)

## 2023-05-22 PROCEDURE — 1123F ACP DISCUSS/DSCN MKR DOCD: CPT | Performed by: FAMILY MEDICINE

## 2023-05-22 PROCEDURE — 3078F DIAST BP <80 MM HG: CPT | Performed by: FAMILY MEDICINE

## 2023-05-22 PROCEDURE — 99213 OFFICE O/P EST LOW 20 MIN: CPT | Performed by: FAMILY MEDICINE

## 2023-05-22 PROCEDURE — 3074F SYST BP LT 130 MM HG: CPT | Performed by: FAMILY MEDICINE

## 2023-05-22 RX ORDER — ONDANSETRON 4 MG/1
4 TABLET, ORALLY DISINTEGRATING ORAL 3 TIMES DAILY PRN
Qty: 15 TABLET | Refills: 0 | Status: SHIPPED | OUTPATIENT
Start: 2023-05-22

## 2023-05-22 RX ORDER — FAMOTIDINE 20 MG/1
1 TABLET, FILM COATED ORAL NIGHTLY PRN
COMMUNITY
Start: 2023-04-18

## 2023-05-22 ASSESSMENT — ENCOUNTER SYMPTOMS
COUGH: 0
VOMITING: 1
CONSTIPATION: 0
SHORTNESS OF BREATH: 0
DIARRHEA: 0
NAUSEA: 1
ABDOMINAL PAIN: 0

## 2023-05-22 NOTE — PROGRESS NOTES
Jf Rubin (:  1950) is a 67 y.o. female,established patient, here for evaluation of the following chief complaint(s):  Nausea & Vomiting (Pt c/o N/V x 2 days. She is unable to even keep water down. She has been around her granddaughter, who tested (+) for Flu and C-diff 7-10 days ago (she is cleared to return to school tomorrow). Pt denies body aches/headache/fever.), Thrush (Pt c/o possible Thrush. She has white patches in her mouth. It is painful to eat, drink, and swallow, x 2 days. ), and Other (Pt states she has lost almost 15 lbs, in the last month, w/o trying. )         ASSESSMENT/PLAN:  1. Nausea and vomiting, unspecified vomiting type  -Start Zofran  - ondansetron (ZOFRAN-ODT) 4 MG disintegrating tablet; Take 1 tablet by mouth 3 times daily as needed for Nausea or Vomiting  Dispense: 15 tablet; Refill: 0  ADR's explained  - CBC with Auto Differential  - Comprehensive Metabolic Panel    2. Gastroesophageal reflux disease with esophagitis without hemorrhage  On Pepcid  Off Protonix as it caused SE    3. Weight loss  - CBC with Auto Differential  - Comprehensive Metabolic Panel  - TSH with Reflex  - Sedimentation Rate    4. Thrush  -Start nystatin suspension  - nystatin (MYCOSTATIN) 456970 UNIT/ML suspension; Take 5 mLs by mouth 4 times daily  Dispense: 60 mL; Refill: 0  ADR's explained      Declines  ED. Worse to ED  Patient to call to follow up with Dr. Gil Public  Return if symptoms worsen or fail to improve.        Lab Results   Component Value Date    WBC 7.8 2023    HGB 12.9 2023    HCT 39.2 2023    MCV 84.9 2023     2023       Lab Results   Component Value Date    LABA1C 6.8 2023     Lab Results   Component Value Date    .2 2021     Lab Results   Component Value Date     2023    K 2.8 (LL) 2023     2023    CO2 24 2023    BUN 15 2023    CREATININE 1.0 2023    GLUCOSE 160 (H) 2023

## 2023-05-23 LAB
ALBUMIN SERPL-MCNC: 4 G/DL (ref 3.4–5)
ALBUMIN/GLOB SERPL: 1.4 {RATIO} (ref 1.1–2.2)
ALP SERPL-CCNC: 100 U/L (ref 40–129)
ALT SERPL-CCNC: <5 U/L (ref 10–40)
ANION GAP SERPL CALCULATED.3IONS-SCNC: 21 MMOL/L (ref 3–16)
AST SERPL-CCNC: 9 U/L (ref 15–37)
BILIRUB SERPL-MCNC: 0.8 MG/DL (ref 0–1)
BUN SERPL-MCNC: 15 MG/DL (ref 7–20)
CALCIUM SERPL-MCNC: 8.9 MG/DL (ref 8.3–10.6)
CHLORIDE SERPL-SCNC: 100 MMOL/L (ref 99–110)
CO2 SERPL-SCNC: 24 MMOL/L (ref 21–32)
CREAT SERPL-MCNC: 1 MG/DL (ref 0.6–1.2)
GFR SERPLBLD CREATININE-BSD FMLA CKD-EPI: 59 ML/MIN/{1.73_M2}
GLUCOSE SERPL-MCNC: 160 MG/DL (ref 70–99)
POTASSIUM SERPL-SCNC: 2.8 MMOL/L (ref 3.5–5.1)
PROT SERPL-MCNC: 6.9 G/DL (ref 6.4–8.2)
SODIUM SERPL-SCNC: 145 MMOL/L (ref 136–145)
T3 SERPL-MCNC: 0.56 NG/ML (ref 0.8–2)

## 2023-05-23 RX ORDER — POTASSIUM CHLORIDE 20 MEQ/1
20 TABLET, EXTENDED RELEASE ORAL 2 TIMES DAILY
Qty: 10 TABLET | Refills: 0 | Status: SHIPPED | OUTPATIENT
Start: 2023-05-23

## 2023-05-24 ENCOUNTER — APPOINTMENT (OUTPATIENT)
Dept: GENERAL RADIOLOGY | Age: 73
End: 2023-05-24
Payer: COMMERCIAL

## 2023-05-24 ENCOUNTER — TELEPHONE (OUTPATIENT)
Dept: INTERNAL MEDICINE CLINIC | Age: 73
End: 2023-05-24

## 2023-05-24 ENCOUNTER — HOSPITAL ENCOUNTER (OUTPATIENT)
Age: 73
Setting detail: OBSERVATION
Discharge: HOME | End: 2023-05-25
Attending: EMERGENCY MEDICINE | Admitting: INTERNAL MEDICINE
Payer: COMMERCIAL

## 2023-05-24 DIAGNOSIS — E83.42 HYPOMAGNESEMIA: ICD-10-CM

## 2023-05-24 DIAGNOSIS — E87.6 HYPOKALEMIA: ICD-10-CM

## 2023-05-24 DIAGNOSIS — E87.6 HYPOKALEMIA: Primary | ICD-10-CM

## 2023-05-24 DIAGNOSIS — R11.2 INTRACTABLE NAUSEA AND VOMITING: Primary | ICD-10-CM

## 2023-05-24 LAB
ALBUMIN SERPL-MCNC: 4.1 GM/DL (ref 3.4–5)
ALP BLD-CCNC: 98 IU/L (ref 40–129)
ALT SERPL-CCNC: 5 U/L (ref 10–40)
ANION GAP SERPL CALCULATED.3IONS-SCNC: 19 MMOL/L (ref 4–16)
ANION GAP SERPL CALCULATED.3IONS-SCNC: 22 MMOL/L (ref 4–16)
AST SERPL-CCNC: 14 IU/L (ref 15–37)
BACTERIA: NEGATIVE /HPF
BASOPHILS ABSOLUTE: 0.1 K/CU MM
BASOPHILS RELATIVE PERCENT: 0.7 % (ref 0–1)
BILIRUB SERPL-MCNC: 0.7 MG/DL (ref 0–1)
BILIRUBIN URINE: ABNORMAL MG/DL
BLOOD, URINE: ABNORMAL
BUN SERPL-MCNC: 15 MG/DL (ref 6–23)
BUN SERPL-MCNC: 15 MG/DL (ref 6–23)
CALCIUM SERPL-MCNC: 8.6 MG/DL (ref 8.3–10.6)
CALCIUM SERPL-MCNC: 9.3 MG/DL (ref 8.3–10.6)
CHLORIDE BLD-SCNC: 100 MMOL/L (ref 99–110)
CHLORIDE BLD-SCNC: 96 MMOL/L (ref 99–110)
CLARITY: CLEAR
CO2: 21 MMOL/L (ref 21–32)
CO2: 22 MMOL/L (ref 21–32)
COLOR: YELLOW
CREAT SERPL-MCNC: 0.9 MG/DL (ref 0.6–1.1)
CREAT SERPL-MCNC: 1 MG/DL (ref 0.6–1.1)
DIFFERENTIAL TYPE: ABNORMAL
EOSINOPHILS ABSOLUTE: 0 K/CU MM
EOSINOPHILS RELATIVE PERCENT: 0 % (ref 0–3)
GFR SERPL CREATININE-BSD FRML MDRD: 59 ML/MIN/1.73M2
GFR SERPL CREATININE-BSD FRML MDRD: >60 ML/MIN/1.73M2
GLUCOSE BLD-MCNC: 122 MG/DL (ref 70–99)
GLUCOSE SERPL-MCNC: 142 MG/DL (ref 70–99)
GLUCOSE SERPL-MCNC: 152 MG/DL (ref 70–99)
GLUCOSE, URINE: 500 MG/DL
HCT VFR BLD CALC: 46.6 % (ref 37–47)
HEMOGLOBIN: 14 GM/DL (ref 12.5–16)
HYALINE CASTS: 0 /LPF
IMMATURE NEUTROPHIL %: 0.6 % (ref 0–0.43)
KETONES, URINE: >80 MG/DL
LEUKOCYTE ESTERASE, URINE: NEGATIVE
LIPASE: 32 IU/L (ref 13–60)
LYMPHOCYTES ABSOLUTE: 2.1 K/CU MM
LYMPHOCYTES RELATIVE PERCENT: 22.3 % (ref 24–44)
MAGNESIUM: 1.9 MG/DL (ref 1.8–2.4)
MCH RBC QN AUTO: 26.9 PG (ref 27–31)
MCHC RBC AUTO-ENTMCNC: 30 % (ref 32–36)
MCV RBC AUTO: 89.4 FL (ref 78–100)
MONOCYTES ABSOLUTE: 0.4 K/CU MM
MONOCYTES RELATIVE PERCENT: 3.8 % (ref 0–4)
MUCUS: ABNORMAL HPF
NITRITE URINE, QUANTITATIVE: NEGATIVE
NUCLEATED RBC %: 0 %
PDW BLD-RTO: 13.5 % (ref 11.7–14.9)
PH, URINE: 5.5 (ref 5–8)
PLATELET # BLD: 378 K/CU MM (ref 140–440)
PMV BLD AUTO: 10.7 FL (ref 7.5–11.1)
POTASSIUM SERPL-SCNC: 2.9 MMOL/L (ref 3.5–5.1)
POTASSIUM SERPL-SCNC: 3.3 MMOL/L (ref 3.5–5.1)
PROTEIN UA: 30 MG/DL
RBC # BLD: 5.21 M/CU MM (ref 4.2–5.4)
RBC URINE: 1 /HPF (ref 0–6)
SEGMENTED NEUTROPHILS ABSOLUTE COUNT: 6.9 K/CU MM
SEGMENTED NEUTROPHILS RELATIVE PERCENT: 72.6 % (ref 36–66)
SODIUM BLD-SCNC: 140 MMOL/L (ref 135–145)
SODIUM BLD-SCNC: 140 MMOL/L (ref 135–145)
SPECIFIC GRAVITY UA: >1.03 (ref 1–1.03)
SQUAMOUS EPITHELIAL: 2 /HPF
TOTAL IMMATURE NEUTOROPHIL: 0.06 K/CU MM
TOTAL NUCLEATED RBC: 0 K/CU MM
TOTAL PROTEIN: 8 GM/DL (ref 6.4–8.2)
TRICHOMONAS: ABNORMAL /HPF
TROPONIN T: <0.01 NG/ML
TROPONIN T: <0.01 NG/ML
UROBILINOGEN, URINE: 1 MG/DL (ref 0.2–1)
WBC # BLD: 9.6 K/CU MM (ref 4–10.5)
WBC UA: 1 /HPF (ref 0–5)

## 2023-05-24 PROCEDURE — 96366 THER/PROPH/DIAG IV INF ADDON: CPT

## 2023-05-24 PROCEDURE — 6360000002 HC RX W HCPCS: Performed by: EMERGENCY MEDICINE

## 2023-05-24 PROCEDURE — 85025 COMPLETE CBC W/AUTO DIFF WBC: CPT

## 2023-05-24 PROCEDURE — 80053 COMPREHEN METABOLIC PANEL: CPT

## 2023-05-24 PROCEDURE — 6360000002 HC RX W HCPCS: Performed by: NURSE PRACTITIONER

## 2023-05-24 PROCEDURE — 94664 DEMO&/EVAL PT USE INHALER: CPT

## 2023-05-24 PROCEDURE — 36415 COLL VENOUS BLD VENIPUNCTURE: CPT

## 2023-05-24 PROCEDURE — 83690 ASSAY OF LIPASE: CPT

## 2023-05-24 PROCEDURE — 96368 THER/DIAG CONCURRENT INF: CPT

## 2023-05-24 PROCEDURE — 82962 GLUCOSE BLOOD TEST: CPT

## 2023-05-24 PROCEDURE — G0378 HOSPITAL OBSERVATION PER HR: HCPCS

## 2023-05-24 PROCEDURE — 6370000000 HC RX 637 (ALT 250 FOR IP): Performed by: NURSE PRACTITIONER

## 2023-05-24 PROCEDURE — 94761 N-INVAS EAR/PLS OXIMETRY MLT: CPT

## 2023-05-24 PROCEDURE — 84484 ASSAY OF TROPONIN QUANT: CPT

## 2023-05-24 PROCEDURE — 81001 URINALYSIS AUTO W/SCOPE: CPT

## 2023-05-24 PROCEDURE — 94150 VITAL CAPACITY TEST: CPT

## 2023-05-24 PROCEDURE — 83735 ASSAY OF MAGNESIUM: CPT

## 2023-05-24 PROCEDURE — 2580000003 HC RX 258: Performed by: EMERGENCY MEDICINE

## 2023-05-24 PROCEDURE — 93005 ELECTROCARDIOGRAM TRACING: CPT | Performed by: EMERGENCY MEDICINE

## 2023-05-24 PROCEDURE — 99285 EMERGENCY DEPT VISIT HI MDM: CPT

## 2023-05-24 PROCEDURE — 80048 BASIC METABOLIC PNL TOTAL CA: CPT

## 2023-05-24 PROCEDURE — 89220 SPUTUM SPECIMEN COLLECTION: CPT

## 2023-05-24 PROCEDURE — 6360000002 HC RX W HCPCS: Performed by: FAMILY MEDICINE

## 2023-05-24 PROCEDURE — 74018 RADEX ABDOMEN 1 VIEW: CPT

## 2023-05-24 PROCEDURE — 96365 THER/PROPH/DIAG IV INF INIT: CPT

## 2023-05-24 RX ORDER — POLYETHYLENE GLYCOL 3350 17 G
2 POWDER IN PACKET (EA) ORAL
Status: DISCONTINUED | OUTPATIENT
Start: 2023-05-24 | End: 2023-05-25 | Stop reason: HOSPADM

## 2023-05-24 RX ORDER — ROSUVASTATIN CALCIUM 5 MG/1
10 TABLET, COATED ORAL NIGHTLY
Status: DISCONTINUED | OUTPATIENT
Start: 2023-05-24 | End: 2023-05-25 | Stop reason: HOSPADM

## 2023-05-24 RX ORDER — ACETAMINOPHEN 650 MG/1
650 SUPPOSITORY RECTAL EVERY 6 HOURS PRN
Status: DISCONTINUED | OUTPATIENT
Start: 2023-05-24 | End: 2023-05-25 | Stop reason: HOSPADM

## 2023-05-24 RX ORDER — ACETAMINOPHEN 325 MG/1
650 TABLET ORAL EVERY 6 HOURS PRN
Status: DISCONTINUED | OUTPATIENT
Start: 2023-05-24 | End: 2023-05-25 | Stop reason: HOSPADM

## 2023-05-24 RX ORDER — SODIUM CHLORIDE 9 MG/ML
INJECTION, SOLUTION INTRAVENOUS CONTINUOUS
Status: DISCONTINUED | OUTPATIENT
Start: 2023-05-24 | End: 2023-05-25 | Stop reason: HOSPADM

## 2023-05-24 RX ORDER — LOSARTAN POTASSIUM 25 MG/1
50 TABLET ORAL DAILY
Status: DISCONTINUED | OUTPATIENT
Start: 2023-05-24 | End: 2023-05-25 | Stop reason: HOSPADM

## 2023-05-24 RX ORDER — PANTOPRAZOLE SODIUM 40 MG/10ML
40 INJECTION, POWDER, LYOPHILIZED, FOR SOLUTION INTRAVENOUS DAILY
Status: DISCONTINUED | OUTPATIENT
Start: 2023-05-24 | End: 2023-05-25 | Stop reason: HOSPADM

## 2023-05-24 RX ORDER — SODIUM CHLORIDE 0.9 % (FLUSH) 0.9 %
10 SYRINGE (ML) INJECTION PRN
Status: DISCONTINUED | OUTPATIENT
Start: 2023-05-24 | End: 2023-05-25 | Stop reason: HOSPADM

## 2023-05-24 RX ORDER — SODIUM CHLORIDE 0.9 % (FLUSH) 0.9 %
5-40 SYRINGE (ML) INJECTION EVERY 12 HOURS SCHEDULED
Status: DISCONTINUED | OUTPATIENT
Start: 2023-05-24 | End: 2023-05-25 | Stop reason: HOSPADM

## 2023-05-24 RX ORDER — CLOPIDOGREL BISULFATE 75 MG/1
75 TABLET ORAL DAILY
Status: DISCONTINUED | OUTPATIENT
Start: 2023-05-24 | End: 2023-05-25 | Stop reason: HOSPADM

## 2023-05-24 RX ORDER — SODIUM CHLORIDE 9 MG/ML
INJECTION, SOLUTION INTRAVENOUS PRN
Status: DISCONTINUED | OUTPATIENT
Start: 2023-05-24 | End: 2023-05-25 | Stop reason: HOSPADM

## 2023-05-24 RX ORDER — POTASSIUM CHLORIDE 7.45 MG/ML
10 INJECTION INTRAVENOUS
Status: DISPENSED | OUTPATIENT
Start: 2023-05-24 | End: 2023-05-24

## 2023-05-24 RX ORDER — ONDANSETRON 2 MG/ML
4 INJECTION INTRAMUSCULAR; INTRAVENOUS EVERY 6 HOURS PRN
Status: DISCONTINUED | OUTPATIENT
Start: 2023-05-24 | End: 2023-05-25 | Stop reason: HOSPADM

## 2023-05-24 RX ORDER — CARVEDILOL 6.25 MG/1
6.25 TABLET ORAL 2 TIMES DAILY WITH MEALS
Status: DISCONTINUED | OUTPATIENT
Start: 2023-05-24 | End: 2023-05-25 | Stop reason: HOSPADM

## 2023-05-24 RX ORDER — ONDANSETRON 4 MG/1
4 TABLET, ORALLY DISINTEGRATING ORAL EVERY 8 HOURS PRN
Status: DISCONTINUED | OUTPATIENT
Start: 2023-05-24 | End: 2023-05-25 | Stop reason: HOSPADM

## 2023-05-24 RX ORDER — MAGNESIUM SULFATE IN WATER 40 MG/ML
2000 INJECTION, SOLUTION INTRAVENOUS ONCE
Status: COMPLETED | OUTPATIENT
Start: 2023-05-24 | End: 2023-05-24

## 2023-05-24 RX ORDER — POTASSIUM CHLORIDE 7.45 MG/ML
10 INJECTION INTRAVENOUS
Status: COMPLETED | OUTPATIENT
Start: 2023-05-24 | End: 2023-05-25

## 2023-05-24 RX ORDER — POLYETHYLENE GLYCOL 3350 17 G/17G
17 POWDER, FOR SOLUTION ORAL DAILY PRN
Status: DISCONTINUED | OUTPATIENT
Start: 2023-05-24 | End: 2023-05-25 | Stop reason: HOSPADM

## 2023-05-24 RX ORDER — ENOXAPARIN SODIUM 100 MG/ML
30 INJECTION SUBCUTANEOUS DAILY
Status: DISCONTINUED | OUTPATIENT
Start: 2023-05-24 | End: 2023-05-25 | Stop reason: HOSPADM

## 2023-05-24 RX ORDER — ASPIRIN 81 MG/1
81 TABLET, CHEWABLE ORAL DAILY
Status: DISCONTINUED | OUTPATIENT
Start: 2023-05-24 | End: 2023-05-25 | Stop reason: HOSPADM

## 2023-05-24 RX ADMIN — MAGNESIUM SULFATE HEPTAHYDRATE 2000 MG: 40 INJECTION, SOLUTION INTRAVENOUS at 14:09

## 2023-05-24 RX ADMIN — ROSUVASTATIN CALCIUM 10 MG: 5 TABLET, COATED ORAL at 20:59

## 2023-05-24 RX ADMIN — POTASSIUM CHLORIDE 10 MEQ: 7.46 INJECTION, SOLUTION INTRAVENOUS at 16:55

## 2023-05-24 RX ADMIN — CARVEDILOL 6.25 MG: 6.25 TABLET, FILM COATED ORAL at 16:51

## 2023-05-24 RX ADMIN — CLOPIDOGREL BISULFATE 75 MG: 75 TABLET ORAL at 16:52

## 2023-05-24 RX ADMIN — POTASSIUM CHLORIDE 10 MEQ: 7.46 INJECTION, SOLUTION INTRAVENOUS at 14:05

## 2023-05-24 RX ADMIN — LOSARTAN POTASSIUM 50 MG: 25 TABLET, FILM COATED ORAL at 16:51

## 2023-05-24 RX ADMIN — POTASSIUM CHLORIDE 10 MEQ: 7.46 INJECTION, SOLUTION INTRAVENOUS at 23:20

## 2023-05-24 RX ADMIN — ASPIRIN 81 MG CHEWABLE TABLET 81 MG: 81 TABLET CHEWABLE at 16:51

## 2023-05-24 RX ADMIN — SODIUM CHLORIDE 125 ML/HR: 9 INJECTION, SOLUTION INTRAVENOUS at 14:02

## 2023-05-24 ASSESSMENT — PAIN SCALES - GENERAL: PAINLEVEL_OUTOF10: 0

## 2023-05-24 NOTE — PROGRESS NOTES
4 Eyes Skin Assessment     NAME:  Bebe Freitas OF BIRTH:  1950  MEDICAL RECORD NUMBER:  [id number]    The patient is being assessed for  Admission    I agree that at least one RN has performed a thorough Head to Toe Skin Assessment on the patient. ALL assessment sites listed below have been assessed. Areas assessed by both nurses:    Head, Face, Ears, Shoulders, Back, Chest, Arms, Elbows, Hands, Sacrum. Buttock, Coccyx, Ischium, and Legs. Feet and Heels        Does the Patient have a Wound?  No noted wound(s)       Slava Prevention initiated by RN: No  Wound Care Orders initiated by RN: No    Pressure Injury (Stage 3,4, Unstageable, DTI, NWPT, and Complex wounds) if present, place Wound referral order by RN under : No    New Ostomies, if present place, Ostomy referral order under : No     Nurse 1 eSignature: Electronically signed by Curt Mas RN on 5/24/23 at 7:12 PM EDT    **SHARE this note so that the co-signing nurse can place an eSignature**    Nurse 2 eSignature: Electronically signed by Jacinto Leo RN on 5/24/23 at 11:25 PM EDT

## 2023-05-24 NOTE — H&P
V2.0  History and Physical      Name:  Nishant Rizo /Age/Sex: 1950  (67 y.o. female)   MRN & CSN:  [id number] & [id number] Encounter Date/Time: 2023 2:43 PM EDT   Location:  OBS  PCP: Jayden Petersen, [address] [place] [address] Day: 1    Assessment and Plan:   Nishant Rizo is a 67 y.o. female with a pmh of esophageal stricture, hypertension, CAD, carotid stenosis, type 2 diabetes who presents with Intractable nausea and vomiting    Hospital Problems             Last Modified POA    * (Principal) Intractable nausea and vomiting 2023 Yes     Intractable nausea and vomiting  Hypokalemia  -Symptoms lasted 3 days, slightly improving today  -Potassium 2.9  -Received potassium and magnesium supplements in ED  -Placed to observation unit  -Clear liquid diet: Continue IV fluids, monitor potassium level  -PPI, antiemesis as needed  -Serial abdominal exams, x-ray of abdomen    Hypertension  -Continue losartan and Coreg    History of hyperlipidemia  History of CAD  -Continue Plavix, statin, aspirin, Coreg    Type 2 diabetes  -Insulin sliding scale    Disposition:   Current Living situation: Home  Expected Disposition: Home  Estimated D/C: 24 hours    Diet ADULT DIET; Clear Liquid  ADULT ORAL NUTRITION SUPPLEMENT; Breakfast, Lunch, Dinner; Standard High Calorie/High Protein Oral Supplement   DVT Prophylaxis [x] Lovenox, []  Heparin, [] SCDs, [] Ambulation,  [] Eliquis, [] Xarelto   Code Status Full Code   Surrogate Decision Maker/ POA      History from:     patient    History of Present Illness:     Chief Complaint: Nausea vomiting  Nishant Rizo is a 67 y.o. female with pmh of esophageal stricture, hypertension, hyperlipidemia, CAD, carotid stenosis, type 2 diabetes who presents with intractable nausea and vomiting for 3 days. Patient stated her family members had similar symptoms including diarrhea recently. She had multiple vomiting daily. She could not keep anything down.   Patient visited her

## 2023-05-24 NOTE — ED PROVIDER NOTES
Specific Gravity, UA >1.030 1.001 - 1.035    Blood, Urine TRACE (A) NEGATIVE    pH, Urine 5.5 5.0 - 8.0    Protein, UA 30 (A) NEGATIVE MG/DL    Urobilinogen, Urine 1.0 0.2 - 1.0 MG/DL    Nitrite Urine, Quantitative NEGATIVE NEGATIVE    Leukocyte Esterase, Urine NEGATIVE NEGATIVE   Magnesium   Result Value Ref Range    Magnesium 1.9 1.8 - 2.4 mg/dl   EKG 12 Lead   Result Value Ref Range    Ventricular Rate 60 BPM    Atrial Rate 60 BPM    P-R Interval 132 ms    QRS Duration 92 ms    Q-T Interval 476 ms    QTc Calculation (Bazett) 476 ms    P Axis 60 degrees    R Axis -20 degrees    T Axis 2 degrees    Diagnosis       Normal sinus rhythm  Possible Left atrial enlargement  Septal infarct , age undetermined  Abnormal ECG  When compared with ECG of 18-JUL-2022 18:57,  premature ventricular complexes are no longer present  T wave inversion more evident in Inferior leads  T wave inversion less evident in Anterolateral leads         Radiographs (if obtained):  [] The following radiograph was interpreted by myself in the absence of a radiologist:  [x] Radiologist's Report reviewed at time of ED visit:  ***    CC/HPI Summary, DDx, ED Course, and Reassessment: ***    {History from (Optional):50450}    {Limitations to history (Optional):17469}    Patient was given the following medications:  Medications   potassium chloride 10 mEq/100 mL IVPB (Peripheral Line) (has no administration in time range)   magnesium sulfate 2000 mg in 50 mL IVPB premix (has no administration in time range)   0.9 % sodium chloride infusion (has no administration in time range)       Independent Imaging Interpretation by me: ***    EKG (if obtained): (All EKG's are interpreted by myself in the absence of a cardiologist) NSR @ 60. LAD. Normal R wave progression. Nonspecific T wave changes without ST elevation or depression. Similar to prior tracing.     Chronic conditions affecting care: Hiatal hernia, CAD    {Discussion with Other Profesionals

## 2023-05-24 NOTE — ED NOTES
ED TO INPATIENT SBAR HANDOFF    Patient Name: Nishant Rizo   :  1950  67 y.o. Preferred Name  [facility]  Family/Caregiver Present no   Restraints no   C-SSRS: Risk of Suicide: No Risk  Sitter no   Sepsis Risk Score Sepsis Risk Score: 0.84      Situation  Chief Complaint   Patient presents with    Abnormal Lab     Patient reports potassium was low and she was given potassium pills but she is unable to keep them down.      Brief Description of Patient's Condition: Pt seen for low K and was unable to take K pills she was discharged with due to vomiting  Mental Status: oriented and alert  Arrived from: home    Imaging:   No orders to display     Abnormal labs:   Abnormal Labs Reviewed   CBC WITH AUTO DIFFERENTIAL - Abnormal; Notable for the following components:       Result Value    MCH 26.9 (*)     MCHC 30.0 (*)     Segs Relative 72.6 (*)     Lymphocytes % 22.3 (*)     Immature Neutrophil % 0.6 (*)     All other components within normal limits   COMPREHENSIVE METABOLIC PANEL - Abnormal; Notable for the following components:    Potassium 2.9 (*)     Chloride 96 (*)     Est, Glom Filt Rate 59 (*)     Glucose 142 (*)     ALT 5 (*)     AST 14 (*)     Anion Gap 22 (*)     All other components within normal limits   URINALYSIS - Abnormal; Notable for the following components:    Glucose, Urine 500 (*)     Bilirubin Urine MODERATE NUMBER OR AMOUNT OBSERVED (*)     Ketones, Urine >80 (*)     Blood, Urine TRACE (*)     Protein, UA 30 (*)     All other components within normal limits   MICROSCOPIC URINALYSIS - Abnormal; Notable for the following components:    Mucus, UA RARE (*)     All other components within normal limits       Background  History:   Past Medical History:   Diagnosis Date    Anemia     Managed by Dr. Wallace Carrion     CAD (coronary artery disease)     follows with Dr Pernell Bah    Carotid stenosis     right    Colon polyps     COVID-19 2021    Diabetes mellitus type 2, uncontrolled

## 2023-05-25 VITALS
HEIGHT: 63 IN | WEIGHT: 101 LBS | RESPIRATION RATE: 14 BRPM | SYSTOLIC BLOOD PRESSURE: 149 MMHG | OXYGEN SATURATION: 100 % | DIASTOLIC BLOOD PRESSURE: 63 MMHG | BODY MASS INDEX: 17.89 KG/M2 | TEMPERATURE: 97.8 F | HEART RATE: 59 BPM

## 2023-05-25 LAB
ANION GAP SERPL CALCULATED.3IONS-SCNC: 17 MMOL/L (ref 4–16)
BUN SERPL-MCNC: 14 MG/DL (ref 6–23)
CALCIUM SERPL-MCNC: 8.2 MG/DL (ref 8.3–10.6)
CHLORIDE BLD-SCNC: 105 MMOL/L (ref 99–110)
CO2: 20 MMOL/L (ref 21–32)
CREAT SERPL-MCNC: 1 MG/DL (ref 0.6–1.1)
EKG ATRIAL RATE: 60 BPM
EKG DIAGNOSIS: NORMAL
EKG P AXIS: 60 DEGREES
EKG P-R INTERVAL: 132 MS
EKG Q-T INTERVAL: 476 MS
EKG QRS DURATION: 92 MS
EKG QTC CALCULATION (BAZETT): 476 MS
EKG R AXIS: -20 DEGREES
EKG T AXIS: 2 DEGREES
EKG VENTRICULAR RATE: 60 BPM
GFR SERPL CREATININE-BSD FRML MDRD: 59 ML/MIN/1.73M2
GLUCOSE SERPL-MCNC: 131 MG/DL (ref 70–99)
POTASSIUM SERPL-SCNC: 3.4 MMOL/L (ref 3.5–5.1)
SODIUM BLD-SCNC: 142 MMOL/L (ref 135–145)

## 2023-05-25 PROCEDURE — 80048 BASIC METABOLIC PNL TOTAL CA: CPT

## 2023-05-25 PROCEDURE — G0378 HOSPITAL OBSERVATION PER HR: HCPCS

## 2023-05-25 PROCEDURE — 6370000000 HC RX 637 (ALT 250 FOR IP): Performed by: NURSE PRACTITIONER

## 2023-05-25 PROCEDURE — 93010 ELECTROCARDIOGRAM REPORT: CPT | Performed by: INTERNAL MEDICINE

## 2023-05-25 PROCEDURE — 96366 THER/PROPH/DIAG IV INF ADDON: CPT

## 2023-05-25 PROCEDURE — 36415 COLL VENOUS BLD VENIPUNCTURE: CPT

## 2023-05-25 RX ADMIN — CLOPIDOGREL BISULFATE 75 MG: 75 TABLET ORAL at 08:43

## 2023-05-25 RX ADMIN — CARVEDILOL 6.25 MG: 6.25 TABLET, FILM COATED ORAL at 08:43

## 2023-05-25 RX ADMIN — ASPIRIN 81 MG CHEWABLE TABLET 81 MG: 81 TABLET CHEWABLE at 08:44

## 2023-05-25 RX ADMIN — LOSARTAN POTASSIUM 50 MG: 25 TABLET, FILM COATED ORAL at 08:43

## 2023-05-25 ASSESSMENT — PAIN SCALES - GENERAL: PAINLEVEL_OUTOF10: 0

## 2023-05-25 NOTE — PROGRESS NOTES
Discharge instructions reviewed with pt. Pt voiced understanding. IV removed with catheter intact. Pt left with all belongings.

## 2023-05-25 NOTE — DISCHARGE INSTRUCTIONS
Discontinue lasix, follow-up with your primary care physician in 2 to 3 days, return to ER for worsening symptoms or any other concerns.

## 2023-05-25 NOTE — DISCHARGE SUMMARY
V2.0  Discharge Summary    Name:  Conner Quintanilla /Age/Sex: 1950 (89 y.o. female)   Admit Date: 2023  Discharge Date: 23    MRN & CSN:  [id number] & [id number] Encounter Date and Time 23 8:51 AM EDT    Attending:  Johnathon Proctor MD Discharging Provider: JOSE RAMON [facility] Course:     Brief HPI: Conner Quintanilla is a 67 y.o. female with past medical history of esophageal stricture, hypertension, CAD, carotid stenosis, type 2 diabetes who presented with intractable nausea and vomiting and hypokalemia. Patient reported improving nausea and vomiting when arriving at ED. Her x-ray of abdomen no signs of obstruction. Her potassium level was 2.9. Patient received potassium and magnesium supplement in ED. Repeat potassium level is 3.4 today. Patient tolerated liquid diet on her baseline. Patient will be discharged home. Follow-up with the primary care physician in 2 to 3 days. Return to ER for worsening symptoms or any other concerns. Brief Problem Based Course:   Intractable nausea and vomiting  Hypokalemia  -Symptoms lasted 3 days, slightly improving today  -Potassium 2.9 initially  -Received potassium and magnesium supplements in ED  -X-ray of abdomen no signs of abduction  -Repeat potassium 3.4 today  -Patient tolerated liquid diet.  -Patient will be discharged home     Hypertension  -Continue losartan and Coreg     History of hyperlipidemia  History of CAD  -Continue Plavix, statin, aspirin, Coreg     Type 2 diabetes  -Insulin sliding scale    Severe malnutrition  -Chronic  -PCP follow-up to get dietitian consult      The patient expressed appropriate understanding of, and agreement with the discharge recommendations, medications, and plan.      Consults this admission:  None    Discharge Diagnosis:   Intractable nausea and vomiting  Hypokalemia  Hypertension  History of hyperlipidemia  History of CAD  Type 2 diabetes    Discharge Instruction:   Follow up

## 2023-07-07 ENCOUNTER — OFFICE VISIT (OUTPATIENT)
Dept: INTERNAL MEDICINE CLINIC | Age: 73
End: 2023-07-07
Payer: COMMERCIAL

## 2023-07-07 VITALS
SYSTOLIC BLOOD PRESSURE: 124 MMHG | OXYGEN SATURATION: 99 % | BODY MASS INDEX: 17.64 KG/M2 | DIASTOLIC BLOOD PRESSURE: 68 MMHG | HEART RATE: 82 BPM | WEIGHT: 99.6 LBS

## 2023-07-07 DIAGNOSIS — E11.9 TYPE 2 DIABETES MELLITUS WITHOUT COMPLICATION, WITHOUT LONG-TERM CURRENT USE OF INSULIN (HCC): Primary | ICD-10-CM

## 2023-07-07 DIAGNOSIS — B37.31 YEAST VAGINITIS: ICD-10-CM

## 2023-07-07 DIAGNOSIS — B37.0 THRUSH: ICD-10-CM

## 2023-07-07 DIAGNOSIS — R05.9 COUGH, UNSPECIFIED TYPE: ICD-10-CM

## 2023-07-07 DIAGNOSIS — E44.0 MODERATE PROTEIN-CALORIE MALNUTRITION (HCC): ICD-10-CM

## 2023-07-07 PROCEDURE — 99214 OFFICE O/P EST MOD 30 MIN: CPT | Performed by: FAMILY MEDICINE

## 2023-07-07 PROCEDURE — 3044F HG A1C LEVEL LT 7.0%: CPT | Performed by: FAMILY MEDICINE

## 2023-07-07 PROCEDURE — 1123F ACP DISCUSS/DSCN MKR DOCD: CPT | Performed by: FAMILY MEDICINE

## 2023-07-07 PROCEDURE — 3078F DIAST BP <80 MM HG: CPT | Performed by: FAMILY MEDICINE

## 2023-07-07 PROCEDURE — 3074F SYST BP LT 130 MM HG: CPT | Performed by: FAMILY MEDICINE

## 2023-07-07 RX ORDER — FLUCONAZOLE 150 MG/1
150 TABLET ORAL
Qty: 2 TABLET | Refills: 0 | Status: SHIPPED | OUTPATIENT
Start: 2023-07-07 | End: 2023-07-13

## 2023-07-07 RX ORDER — BENZONATATE 100 MG/1
100 CAPSULE ORAL 3 TIMES DAILY PRN
Qty: 30 CAPSULE | Refills: 0 | Status: SHIPPED | OUTPATIENT
Start: 2023-07-07 | End: 2023-07-17

## 2023-07-07 ASSESSMENT — ENCOUNTER SYMPTOMS
COUGH: 1
SHORTNESS OF BREATH: 0
NAUSEA: 0
ABDOMINAL PAIN: 0

## 2023-07-07 NOTE — PROGRESS NOTES
Kylie Mcmullen (:  1950) is a 67 y.o. female,established patient, here for evaluation of the following chief complaint(s): Thrush (Mouth so sore she can't eat ), Vaginitis, and Cough         ASSESSMENT/PLAN:  1. Type 2 diabetes mellitus without complication, without long-term current use of insulin ([address])    D/C Farxiga and start Januvia  - SITagliptin (JANUVIA) 50 MG tablet; Take 1 tablet by mouth daily  Dispense: 90 tablet; Refill: 0  ADR's explained    2. Thrush  Start nystatin:  - nystatin (MYCOSTATIN) 247046 UNIT/ML suspension; Take 5 mLs by mouth 4 times daily . Swish and spit out  Dispense: 60 mL; Refill: 1  ADR's explained    3. Yeast vaginitis  Start Diflucan  - fluconazole (DIFLUCAN) 150 MG tablet; Take 1 tablet by mouth every 72 hours for 6 days  Dispense: 2 tablet; Refill: 0  ADR's explained    4. Cough, unspecified type  -Start  - benzonatate (TESSALON) 100 MG capsule; Take 1 capsule by mouth 3 times daily as needed for Cough  Dispense: 30 capsule; Refill: 0    5. Moderate protein-calorie malnutrition (HCC)  Increase calories. Continue Pediasure    Return if symptoms worsen or fail to improve.        Lab Results   Component Value Date    WBC 9.6 2023    HGB 14.0 2023    HCT 46.6 2023    MCV 89.4 2023     2023     Lab Results   Component Value Date    CHOL 218 (H) 2021     Lab Results   Component Value Date    TRIG 227 (H) 2021     Lab Results   Component Value Date    HDL 37 (L) 2021     Lab Results   Component Value Date    LDLCALC 136 (H) 2021    LDLDIRECT 118 (H) 2015     Lab Results   Component Value Date    LABA1C 6.8 2023     Lab Results   Component Value Date    .2 2021     Lab Results   Component Value Date     2023    K 3.4 (L) 2023     2023    CO2 20 (L) 2023    BUN 14 2023    CREATININE 1.0 2023    GLUCOSE 131 (H) 2023    CALCIUM 8.2 (L)

## 2023-07-20 ENCOUNTER — TELEPHONE (OUTPATIENT)
Dept: INTERNAL MEDICINE CLINIC | Age: 73
End: 2023-07-20

## 2023-07-20 NOTE — TELEPHONE ENCOUNTER
PROVIDER FEEDBACK LOOP CALLED 3X     Patient:Ivania Garcia  : 1950  Referring Provider: Berwyn Nyhan  Referral Type:  Imaging     Procedures:  [id number] - SESAR DIGITAL SCREEN W OR WO CAD BILATERAL  Date Service Ordered 2022        We were unable to reach Monica Wong to schedule the test ordered by your office after 3 call attempts. Please call/follow up with your patient to explain the significance of the ordered test and direct the patient to call Central Scheduling to schedule the test at their earliest convenience. Please complete one of the following actions from Quick Actions buttons:     Route to Provider:  Route message to ordering provider to seek next steps in care plan. Telephone Encounter:  Telephone encounter will open. Call patient to explain significance of ordered test and direct patient to call Central Scheduling to schedule test then Document details of call. Open Referral:  Review referral notes or details if needed. Close Referral:  Referral will open. Document in Notes section of referral why the referral is being closed. Examples of referral closure:  Patient had test done outside of of an office in the [address], Patient refuses test, Patient no longer having symptoms, Unable to reach patient. Only close the referral if you are sure the test will not proceed.      Thank you     Pre-Access Scheduling Team

## 2023-08-25 NOTE — PROGRESS NOTES
Patient is calling to discuss recommendations when it comes to alcohol intake.    RNCC reviewed the following with the patient:      No binge drinking, no taking shots, and advised no more than 1-2 drinks per occasion.  due to transplant kidney dehydrating effects are exaggerated with alcohol use   Drink water along with alcohol to avoid dehydration.  Ensure IS are taken at scheduled time despite drinking alcohol.    Patient V/U.  Patient states he does not enjoy the taste of alcohol so he does not plan to drink alcohol, however was curious what our recommendations where    Rowan Webber RN   Transplant Coordinator  [phone]       Patient will arrive at 0600 at [facility] on 7/11/2022 for her procedure at 0800. 1. Do not eat or drink anything after midnight - unless instructed by your doctor prior to surgery. This includes                   no water, chewing gum or mints. 2. Follow your directions as prescribed by the doctor for your procedure and medications. 3. Check with your Doctor regarding stopping vitamins, supplements, blood thinners (Plavix, Coumadin, Lovenox, Effient, Pradaxa, Xarelto, Fragmin or                   other blood thinners) and follow their instructions. 4. Do not smoke, vape or use chewing tobacco morning of surgery. Do not drink any alcoholic beverages 24 hours prior to surgery. This includes NA Beer. No street drugs 7 days prior to surgery. 5. You may brush your teeth and gargle the morning of surgery. DO NOT SWALLOW WATER   6. You MUST make arrangements for a responsible adult to take you home after your surgery and be able to check on you every couple                   hours for the day. You will not be allowed to leave alone or drive yourself home. It is strongly suggested someone stay with you the first 24                   hrs. Your surgery will be cancelled if you do not have a ride home. 7. Please wear simple, loose fitting clothing to the hospital.  Lulu Mosquedae not bring valuables (money, credit cards, checkbooks, etc.) Do not wear any                   makeup (including no eye makeup) or nail polish on your fingers or toes. 8. DO NOT wear any jewelry or piercings on day of surgery. All body piercing jewelry must be removed. 9. If you have dentures, they will be removed before going to the OR; we will provide you a container. If you wear contact lenses or glasses,                  they will be removed; please bring a case for them. 10. If you  have a Living Will and Durable Power of  for Healthcare, please bring in a copy.            11. Please bring picture ID,  insurance card, paperwork from the doctors office    (H & P, Consent, & card for implantable devices). 12. Take a shower the morning of your procedure with Hibiclens or an anti-bacterial soap. Do not apply any make-up, deodorant, lotion, oil or powder. 13.  Enter thru the main entrance wearing a mask on the day of surgery. Patient will take her amiodarone and coreg the morning of her procedure. Patient verbalized understanding concerning her EGD instructions and had no questions at this time.

## 2023-09-05 ENCOUNTER — OFFICE VISIT (OUTPATIENT)
Dept: INTERNAL MEDICINE CLINIC | Age: 73
End: 2023-09-05
Payer: COMMERCIAL

## 2023-09-05 VITALS
BODY MASS INDEX: 18.89 KG/M2 | WEIGHT: 106.6 LBS | SYSTOLIC BLOOD PRESSURE: 128 MMHG | OXYGEN SATURATION: 99 % | HEIGHT: 63 IN | HEART RATE: 56 BPM | DIASTOLIC BLOOD PRESSURE: 89 MMHG

## 2023-09-05 DIAGNOSIS — E78.5 HYPERLIPIDEMIA, UNSPECIFIED HYPERLIPIDEMIA TYPE: ICD-10-CM

## 2023-09-05 DIAGNOSIS — K13.0 PERLECHE: ICD-10-CM

## 2023-09-05 DIAGNOSIS — E11.9 TYPE 2 DIABETES MELLITUS WITHOUT COMPLICATION, WITHOUT LONG-TERM CURRENT USE OF INSULIN (HCC): Primary | ICD-10-CM

## 2023-09-05 DIAGNOSIS — I10 ESSENTIAL HYPERTENSION: ICD-10-CM

## 2023-09-05 DIAGNOSIS — K22.2 ESOPHAGEAL STRICTURE: ICD-10-CM

## 2023-09-05 PROCEDURE — 99214 OFFICE O/P EST MOD 30 MIN: CPT | Performed by: FAMILY MEDICINE

## 2023-09-05 PROCEDURE — 1123F ACP DISCUSS/DSCN MKR DOCD: CPT | Performed by: FAMILY MEDICINE

## 2023-09-05 PROCEDURE — 3044F HG A1C LEVEL LT 7.0%: CPT | Performed by: FAMILY MEDICINE

## 2023-09-05 PROCEDURE — 3074F SYST BP LT 130 MM HG: CPT | Performed by: FAMILY MEDICINE

## 2023-09-05 PROCEDURE — 3079F DIAST BP 80-89 MM HG: CPT | Performed by: FAMILY MEDICINE

## 2023-09-05 ASSESSMENT — ENCOUNTER SYMPTOMS
BACK PAIN: 0
NAUSEA: 0
COUGH: 0
ABDOMINAL PAIN: 0
SHORTNESS OF BREATH: 0

## 2023-09-05 NOTE — PROGRESS NOTES
Pepcid  CAD- follows with cardiology    Review of Systems   Constitutional:  Negative for diaphoresis and fever. Respiratory:  Negative for cough and shortness of breath. Cardiovascular:  Negative for chest pain and palpitations. Gastrointestinal:  Negative for abdominal pain and nausea. Genitourinary:  Negative for difficulty urinating. Musculoskeletal:  Negative for back pain. Neurological:  Negative for dizziness and headaches. Allergies   Allergen Reactions    Tetanus Toxoids Swelling and Rash     fever    Adhesive Tape Rash     Current Outpatient Medications   Medication Sig Dispense Refill    mupirocin (BACTROBAN) 2 % ointment Apply topically 3 times daily to affected area for 7 days 22 g 0    SITagliptin (JANUVIA) 50 MG tablet Take 1 tablet by mouth daily 90 tablet 0    famotidine (PEPCID) 20 MG tablet Take 1 tablet by mouth nightly as needed      ondansetron (ZOFRAN-ODT) 4 MG disintegrating tablet Take 1 tablet by mouth 3 times daily as needed for Nausea or Vomiting 15 tablet 0    rosuvastatin (CRESTOR) 10 MG tablet TAKE 1 TABLET BY MOUTH EVERY NIGHT 90 tablet 1    losartan (COZAAR) 50 MG tablet TAKE 1 TABLET BY MOUTH EVERY DAY      sucralfate (CARAFATE) 1 GM/10ML suspension SHAKE LIQUID AND TAKE 10 ML BY MOUTH EVERY 6 HOURS FOR 21 DAYS      acetaminophen (TYLENOL) 500 MG tablet Take 2 tablets by mouth every 4 hours as needed for Pain      aspirin 81 MG EC tablet Take 1 tablet by mouth daily      Nutritional Supplements (VITAL AF 1.2 JL) LIQD 60 mL/hr every 12 hours      nitroGLYCERIN (NITROSTAT) 0.4 MG SL tablet up to max of 3 total doses. If no relief after 1 dose, call 911. 25 tablet 1    carvedilol (COREG) 6.25 MG tablet Take 1 tablet by mouth 2 times daily (with meals) 60 tablet 3    clopidogrel (PLAVIX) 75 MG tablet Take 1 tablet by mouth daily 30 tablet 3     No current facility-administered medications for this visit.           Vitals:    09/05/23 1004 09/05/23 1049   BP: (!) 132/98

## 2023-10-18 DIAGNOSIS — E11.9 TYPE 2 DIABETES MELLITUS WITHOUT COMPLICATION, WITHOUT LONG-TERM CURRENT USE OF INSULIN (HCC): ICD-10-CM

## 2023-12-01 ENCOUNTER — TELEPHONE (OUTPATIENT)
Dept: INTERNAL MEDICINE CLINIC | Age: 73
End: 2023-12-01

## 2023-12-01 DIAGNOSIS — E78.5 HYPERLIPIDEMIA, UNSPECIFIED HYPERLIPIDEMIA TYPE: ICD-10-CM

## 2023-12-01 RX ORDER — ROSUVASTATIN CALCIUM 10 MG/1
10 TABLET, COATED ORAL NIGHTLY
Qty: 90 TABLET | Refills: 1 | Status: SHIPPED | OUTPATIENT
Start: 2023-12-01

## 2023-12-01 NOTE — TELEPHONE ENCOUNTER
Got a call from the patient, stated she needs refill for Rosuvastatin. Needs it sent to [address] on [address].

## 2024-02-02 LAB
CHOLESTEROL, TOTAL: 187 MG/DL
CHOLESTEROL/HDL RATIO: NORMAL
ESTIMATED AVERAGE GLUCOSE: NORMAL
HBA1C MFR BLD: 7 %
HDLC SERPL-MCNC: 54 MG/DL (ref 35–70)
LDL CHOLESTEROL CALCULATED: 118 MG/DL (ref 0–160)
NONHDLC SERPL-MCNC: NORMAL MG/DL
T4 FREE: NORMAL
TRIGL SERPL-MCNC: 77 MG/DL
TSH SERPL DL<=0.05 MIU/L-ACNC: 1.76 UIU/ML
VLDLC SERPL CALC-MCNC: 15 MG/DL

## 2024-02-05 ENCOUNTER — OFFICE VISIT (OUTPATIENT)
Dept: INTERNAL MEDICINE CLINIC | Age: 74
End: 2024-02-05
Payer: COMMERCIAL

## 2024-02-05 VITALS
HEIGHT: 63 IN | WEIGHT: 124.2 LBS | DIASTOLIC BLOOD PRESSURE: 74 MMHG | HEART RATE: 55 BPM | OXYGEN SATURATION: 99 % | BODY MASS INDEX: 22.01 KG/M2 | SYSTOLIC BLOOD PRESSURE: 120 MMHG

## 2024-02-05 DIAGNOSIS — E78.5 HYPERLIPIDEMIA, UNSPECIFIED HYPERLIPIDEMIA TYPE: ICD-10-CM

## 2024-02-05 DIAGNOSIS — K21.00 GASTROESOPHAGEAL REFLUX DISEASE WITH ESOPHAGITIS WITHOUT HEMORRHAGE: ICD-10-CM

## 2024-02-05 DIAGNOSIS — Z98.890 S/P BALLOON DILATATION OF ESOPHAGEAL STRICTURE: ICD-10-CM

## 2024-02-05 DIAGNOSIS — K22.2 ESOPHAGEAL STENOSIS: ICD-10-CM

## 2024-02-05 DIAGNOSIS — E11.65 TYPE 2 DIABETES MELLITUS WITH HYPERGLYCEMIA, WITHOUT LONG-TERM CURRENT USE OF INSULIN (HCC): Primary | ICD-10-CM

## 2024-02-05 DIAGNOSIS — E11.9 TYPE 2 DIABETES MELLITUS WITHOUT COMPLICATION, WITHOUT LONG-TERM CURRENT USE OF INSULIN (HCC): ICD-10-CM

## 2024-02-05 PROCEDURE — 3074F SYST BP LT 130 MM HG: CPT | Performed by: FAMILY MEDICINE

## 2024-02-05 PROCEDURE — 99214 OFFICE O/P EST MOD 30 MIN: CPT | Performed by: FAMILY MEDICINE

## 2024-02-05 PROCEDURE — 1123F ACP DISCUSS/DSCN MKR DOCD: CPT | Performed by: FAMILY MEDICINE

## 2024-02-05 PROCEDURE — 3078F DIAST BP <80 MM HG: CPT | Performed by: FAMILY MEDICINE

## 2024-02-05 RX ORDER — ROSUVASTATIN CALCIUM 20 MG/1
20 TABLET, COATED ORAL DAILY
Qty: 90 TABLET | Refills: 1 | Status: SHIPPED | OUTPATIENT
Start: 2024-02-05

## 2024-02-05 ASSESSMENT — ENCOUNTER SYMPTOMS
SHORTNESS OF BREATH: 0
COUGH: 0
NAUSEA: 0
ABDOMINAL PAIN: 0

## 2024-02-05 ASSESSMENT — PATIENT HEALTH QUESTIONNAIRE - PHQ9
1. LITTLE INTEREST OR PLEASURE IN DOING THINGS: 0
SUM OF ALL RESPONSES TO PHQ QUESTIONS 1-9: 0
2. FEELING DOWN, DEPRESSED OR HOPELESS: 0
SUM OF ALL RESPONSES TO PHQ QUESTIONS 1-9: 0
SUM OF ALL RESPONSES TO PHQ9 QUESTIONS 1 & 2: 0

## 2024-02-05 NOTE — PROGRESS NOTES
Ivania Garcia (:  1950) is a 73 y.o. female,established patient, here for evaluation of the following chief complaint(s):  Follow-up, Hyperlipidemia, and Diabetes         ASSESSMENT/PLAN:  1. Type 2 diabetes mellitus with hyperglycemia, without long-term current use of insulin (HCC)  Continue Januvia  - Hemoglobin A1C; Future  - Comprehensive Metabolic Panel; Future    2. Hyperlipidemia, unspecified hyperlipidemia type  -Increase Crestor to 20 mg  - rosuvastatin (CRESTOR) 20 MG tablet; Take 1 tablet by mouth daily  Dispense: 90 tablet; Refill: 1  ADR's explained    3. Gastroesophageal reflux disease with esophagitis without hemorrhage  Continue Pepcid 20    4. Esophageal stenosis-stable    5. S/P balloon dilatation of esophageal stricture    Medications reconciled and discussed with the patient  Patient declines mammograms  Return in about 5 months (around 2024) for Diabetes, HLD/foot exam.       Lab Results   Component Value Date    WBC 9.6 2023    HGB 14.0 2023    HCT 46.6 2023    MCV 89.4 2023     2023     Lab Results   Component Value Date    CHOL 218 (H) 2021     Lab Results   Component Value Date    TRIG 227 (H) 2021     Lab Results   Component Value Date    HDL 37 (L) 2021     Lab Results   Component Value Date    LDLCALC 136 (H) 2021    LDLDIRECT 118 (H) 2015     Lab Results   Component Value Date    LABA1C 6.8 2023     Lab Results   Component Value Date    .2 2021     Lab Results   Component Value Date     2023    K 3.4 (L) 2023     2023    CO2 20 (L) 2023    BUN 14 2023    CREATININE 1.0 2023    GLUCOSE 131 (H) 2023    CALCIUM 8.2 (L) 2023    PROT 8.0 2023    LABALBU 4.1 2023    BILITOT 0.7 2023    ALKPHOS 98 2023    AST 14 (L) 2023    ALT 5 (L) 2023    LABGLOM 59 (L) 2023    GFRAA >60 2022

## 2024-03-26 ENCOUNTER — TELEPHONE (OUTPATIENT)
Dept: INTERNAL MEDICINE CLINIC | Age: 74
End: 2024-03-26

## 2024-03-26 ENCOUNTER — OFFICE VISIT (OUTPATIENT)
Dept: INTERNAL MEDICINE CLINIC | Age: 74
End: 2024-03-26
Payer: COMMERCIAL

## 2024-03-26 VITALS
WEIGHT: 131 LBS | HEART RATE: 63 BPM | SYSTOLIC BLOOD PRESSURE: 138 MMHG | DIASTOLIC BLOOD PRESSURE: 76 MMHG | OXYGEN SATURATION: 98 % | BODY MASS INDEX: 23.21 KG/M2

## 2024-03-26 DIAGNOSIS — K22.2 ESOPHAGEAL STRICTURE: ICD-10-CM

## 2024-03-26 DIAGNOSIS — Z79.899 LONG TERM USE OF DRUG: ICD-10-CM

## 2024-03-26 DIAGNOSIS — B37.81 CANDIDA ESOPHAGITIS (HCC): Primary | ICD-10-CM

## 2024-03-26 LAB
ALBUMIN SERPL-MCNC: 3.9 G/DL (ref 3.4–5)
ALP SERPL-CCNC: 105 U/L (ref 40–129)
ALT SERPL-CCNC: 16 U/L (ref 10–40)
AST SERPL-CCNC: 20 U/L (ref 15–37)
BILIRUB DIRECT SERPL-MCNC: <0.2 MG/DL (ref 0–0.3)
BILIRUB INDIRECT SERPL-MCNC: NORMAL MG/DL (ref 0–1)
BILIRUB SERPL-MCNC: 0.3 MG/DL (ref 0–1)
PROT SERPL-MCNC: 6.7 G/DL (ref 6.4–8.2)

## 2024-03-26 PROCEDURE — 99213 OFFICE O/P EST LOW 20 MIN: CPT | Performed by: FAMILY MEDICINE

## 2024-03-26 PROCEDURE — 1123F ACP DISCUSS/DSCN MKR DOCD: CPT | Performed by: FAMILY MEDICINE

## 2024-03-26 PROCEDURE — 3078F DIAST BP <80 MM HG: CPT | Performed by: FAMILY MEDICINE

## 2024-03-26 PROCEDURE — 3075F SYST BP GE 130 - 139MM HG: CPT | Performed by: FAMILY MEDICINE

## 2024-03-26 ASSESSMENT — ENCOUNTER SYMPTOMS
ABDOMINAL PAIN: 0
COUGH: 0
SHORTNESS OF BREATH: 0

## 2024-03-26 NOTE — PROGRESS NOTES
Ivania Garcia (:  1950) is a 73 y.o. female,established patient, here for evaluation of the following chief complaint(s):  Other (Esophagus infection )         ASSESSMENT/PLAN:  1. Candida esophagitis (HCC)    2. Esophageal stricture  S/P esophageal dilation    3. Long term use of drug  - Hepatic Function Panel    Await lab. Patient  would like to try Fluconazole  On this date 3/26/24, I spent 20 minutes reviewing previous notes and test results as well as a face to face encounter discussing diagnosis and treatment plan and documenting the day of the visit  Return if symptoms worsen or fail to improve.       Lab Results   Component Value Date    WBC 9.6 2023    HGB 14.0 2023    HCT 46.6 2023    MCV 89.4 2023     2023     Lab Results   Component Value Date    CHOL 187 2024     Lab Results   Component Value Date    TRIG 77 2024     Lab Results   Component Value Date    HDL 54 2024     Lab Results   Component Value Date    LDLCALC 118 2024    LDLDIRECT 118 (H) 2015     Lab Results   Component Value Date    LABA1C 7.0 2024     Lab Results   Component Value Date    .2 2021     Lab Results   Component Value Date     2023    K 3.4 (L) 2023     2023    CO2 20 (L) 2023    BUN 14 2023    CREATININE 1.0 2023    GLUCOSE 131 (H) 2023    CALCIUM 8.2 (L) 2023    PROT 8.0 2023    LABALBU 4.1 2023    BILITOT 0.7 2023    ALKPHOS 98 2023    AST 14 (L) 2023    ALT 5 (L) 2023    LABGLOM 59 (L) 2023    GFRAA >60 2022    AGRATIO 1.4 2023    GLOB 3.6 2022     Lab Results   Component Value Date    TSH 1.760 2024     Lab Results   Component Value Date/Time    COLORU YELLOW 2023 12:13 PM    LABSPEC 1.022 2022 09:09 AM    LABPH 5.5 2023 12:13 PM    NITRU NEGATIVE 2023 12:13 PM    NITRU Negative

## 2024-03-26 NOTE — TELEPHONE ENCOUNTER
Patient talked to heart doctor and got permission to take medication. Patient was asking about during visit. The Heart doctor okayed it for patient

## 2024-03-27 RX ORDER — FLUCONAZOLE 200 MG/1
TABLET ORAL
Qty: 15 TABLET | Refills: 0 | Status: SHIPPED | OUTPATIENT
Start: 2024-03-27

## 2024-03-27 NOTE — TELEPHONE ENCOUNTER
Spoke to patient and discussed medication.  Liver testing is normal.  Patient would like to try the fluconazole for treatment. Fluconazole sent in. ADR's explained

## 2024-03-29 ASSESSMENT — ENCOUNTER SYMPTOMS: NAUSEA: 1

## 2024-05-06 DIAGNOSIS — E11.9 TYPE 2 DIABETES MELLITUS WITHOUT COMPLICATION, WITHOUT LONG-TERM CURRENT USE OF INSULIN (HCC): ICD-10-CM

## 2024-07-08 LAB
A/G RATIO: 1.4 RATIO (ref 0.8–2.6)
ALBUMIN: 3.6 G/DL (ref 3.5–5.2)
ALP BLD-CCNC: 102 U/L (ref 23–144)
ALT SERPL-CCNC: 17 U/L (ref 0–60)
AST SERPL-CCNC: 21 U/L (ref 0–55)
BILIRUB SERPL-MCNC: 0.4 MG/DL (ref 0–1.2)
BUN / CREAT RATIO: 15 (ref 7–25)
BUN BLDV-MCNC: 16 MG/DL (ref 3–29)
CALCIUM SERPL-MCNC: 8.7 MG/DL (ref 8.5–10.5)
CHLORIDE BLD-SCNC: 105 MEQ/L (ref 96–110)
CO2: 24 MEQ/L (ref 19–32)
CREAT SERPL-MCNC: 1.1 MG/DL (ref 0.5–1.2)
ESTIMATED GLOMERULAR FILTRATION RATE CREATININE EQUATION: 53 MLS/MIN/1.73M2
FASTING STATUS: ABNORMAL
GLOBULIN: 2.6 G/DL (ref 1.9–3.6)
GLUCOSE BLD-MCNC: 228 MG/DL (ref 70–99)
HBA1C MFR BLD: 8.8 % (ref 4–6)
POTASSIUM SERPL-SCNC: 3.9 MEQ/L (ref 3.4–5.3)
SODIUM BLD-SCNC: 140 MEQ/L (ref 135–148)
TOTAL PROTEIN: 6.2 G/DL (ref 6–8.3)

## 2024-07-09 ENCOUNTER — OFFICE VISIT (OUTPATIENT)
Dept: INTERNAL MEDICINE CLINIC | Age: 74
End: 2024-07-09
Payer: COMMERCIAL

## 2024-07-09 VITALS
HEART RATE: 60 BPM | BODY MASS INDEX: 24.27 KG/M2 | OXYGEN SATURATION: 99 % | WEIGHT: 137 LBS | SYSTOLIC BLOOD PRESSURE: 126 MMHG | DIASTOLIC BLOOD PRESSURE: 70 MMHG

## 2024-07-09 DIAGNOSIS — K22.2 ESOPHAGEAL STRICTURE: ICD-10-CM

## 2024-07-09 DIAGNOSIS — E11.65 TYPE 2 DIABETES MELLITUS WITH HYPERGLYCEMIA, WITHOUT LONG-TERM CURRENT USE OF INSULIN (HCC): Primary | ICD-10-CM

## 2024-07-09 DIAGNOSIS — I25.10 CORONARY ARTERY DISEASE INVOLVING NATIVE HEART WITHOUT ANGINA PECTORIS, UNSPECIFIED VESSEL OR LESION TYPE: ICD-10-CM

## 2024-07-09 DIAGNOSIS — E78.5 HYPERLIPIDEMIA, UNSPECIFIED HYPERLIPIDEMIA TYPE: ICD-10-CM

## 2024-07-09 DIAGNOSIS — I10 ESSENTIAL HYPERTENSION: ICD-10-CM

## 2024-07-09 PROCEDURE — 3074F SYST BP LT 130 MM HG: CPT | Performed by: FAMILY MEDICINE

## 2024-07-09 PROCEDURE — 3052F HG A1C>EQUAL 8.0%<EQUAL 9.0%: CPT | Performed by: FAMILY MEDICINE

## 2024-07-09 PROCEDURE — 1123F ACP DISCUSS/DSCN MKR DOCD: CPT | Performed by: FAMILY MEDICINE

## 2024-07-09 PROCEDURE — 3078F DIAST BP <80 MM HG: CPT | Performed by: FAMILY MEDICINE

## 2024-07-09 PROCEDURE — 99214 OFFICE O/P EST MOD 30 MIN: CPT | Performed by: FAMILY MEDICINE

## 2024-07-09 SDOH — ECONOMIC STABILITY: FOOD INSECURITY: WITHIN THE PAST 12 MONTHS, THE FOOD YOU BOUGHT JUST DIDN'T LAST AND YOU DIDN'T HAVE MONEY TO GET MORE.: NEVER TRUE

## 2024-07-09 SDOH — ECONOMIC STABILITY: HOUSING INSECURITY
IN THE LAST 12 MONTHS, WAS THERE A TIME WHEN YOU DID NOT HAVE A STEADY PLACE TO SLEEP OR SLEPT IN A SHELTER (INCLUDING NOW)?: NO

## 2024-07-09 SDOH — ECONOMIC STABILITY: FOOD INSECURITY: WITHIN THE PAST 12 MONTHS, YOU WORRIED THAT YOUR FOOD WOULD RUN OUT BEFORE YOU GOT MONEY TO BUY MORE.: NEVER TRUE

## 2024-07-09 SDOH — ECONOMIC STABILITY: INCOME INSECURITY: HOW HARD IS IT FOR YOU TO PAY FOR THE VERY BASICS LIKE FOOD, HOUSING, MEDICAL CARE, AND HEATING?: NOT HARD AT ALL

## 2024-07-09 ASSESSMENT — ENCOUNTER SYMPTOMS
COUGH: 0
NAUSEA: 0
ABDOMINAL PAIN: 0
SHORTNESS OF BREATH: 0

## 2024-07-09 NOTE — PROGRESS NOTES
Ivania Garcia (:  1950) is a 73 y.o. female,established patient, here for evaluation of the following chief complaint(s):  Diabetes and Hyperlipidemia      Assessment & Plan   ASSESSMENT/PLAN:  1. Type 2 diabetes mellitus with hyperglycemia, without long-term current use of insulin (HCC)  -Increase Januvia to 100 mg  ADR's explained  - Hemoglobin A1C; Future  - Basic Metabolic Panel; Future  - Microalbumin / Creatinine Urine Ratio; Future    2. Hyperlipidemia, unspecified hyperlipidemia type  Continue Crestor 20    3. Essential hypertension  Continue losartan and Coreg  - CBC with Auto Differential; Future    4. Coronary artery disease involving native heart without angina pectoris, unspecified vessel or lesion type  She is off of aspirin due to bruising per cardiology  Continue Plavix and Coreg    5. Esophageal stricture  S/P EGD  Keep f/u with [facility]- GI    Medications reconciled and discussed with the patient  Return in about 4 months (around 2024) for Diabetes, HLD.       Lab Results   Component Value Date    WBC 9.6 2023    HGB 14.0 2023    HCT 46.6 2023    MCV 89.4 2023     2023     Lab Results   Component Value Date    CHOL 187 2024     Lab Results   Component Value Date    TRIG 77 2024     Lab Results   Component Value Date    HDL 54 2024     Lab Results   Component Value Date    LDLDIRECT 118 (H) 2015     Lab Results   Component Value Date    LABA1C 8.8 (H) 2024     Lab Results   Component Value Date    .2 2021     Lab Results   Component Value Date     2024    K 3.9 2024     2024    CO2 24 2024    BUN 16 2024    CREATININE 1.1 2024    GLUCOSE 228 (H) 2024    CALCIUM 8.7 2024    BILITOT 0.4 2024    ALKPHOS 102 2024    AST 21 2024    ALT 17 2024    LABGLOM 59 (L) 2023    GFRAA >60 2022    AGRATIO 1.4 2024    GLOB

## 2024-07-25 ENCOUNTER — TELEPHONE (OUTPATIENT)
Dept: INTERNAL MEDICINE CLINIC | Age: 74
End: 2024-07-25

## 2024-07-25 DIAGNOSIS — E11.65 TYPE 2 DIABETES MELLITUS WITH HYPERGLYCEMIA, WITHOUT LONG-TERM CURRENT USE OF INSULIN (HCC): Primary | ICD-10-CM

## 2024-08-06 ENCOUNTER — TELEPHONE (OUTPATIENT)
Dept: INTERNAL MEDICINE CLINIC | Age: 74
End: 2024-08-06

## 2024-08-06 DIAGNOSIS — E78.5 HYPERLIPIDEMIA, UNSPECIFIED HYPERLIPIDEMIA TYPE: ICD-10-CM

## 2024-08-06 RX ORDER — ROSUVASTATIN CALCIUM 20 MG/1
20 TABLET, COATED ORAL DAILY
Qty: 90 TABLET | Refills: 1 | Status: SHIPPED | OUTPATIENT
Start: 2024-08-06

## 2024-11-07 LAB
BASOPHILS ABSOLUTE: 0 K/UL (ref 0–0.3)
BASOPHILS RELATIVE PERCENT: 0.3 % (ref 0–2)
BUN / CREAT RATIO: 15 (ref 7–25)
BUN BLDV-MCNC: 16 MG/DL (ref 3–29)
CALCIUM SERPL-MCNC: 8.9 MG/DL (ref 8.5–10.5)
CHLORIDE BLD-SCNC: 102 MEQ/L (ref 96–110)
CO2: 29 MEQ/L (ref 19–32)
CREAT SERPL-MCNC: 1.1 MG/DL (ref 0.5–1.2)
CREATININE URINE: 136.7 MG/DL
DIFFERENTIAL COUNT: ABNORMAL
EOSINOPHILS ABSOLUTE: 0.1 K/UL (ref 0–0.5)
EOSINOPHILS RELATIVE PERCENT: 0.7 % (ref 0–5)
ESTIMATED GLOMERULAR FILTRATION RATE CREATININE EQUATION: 53 MLS/MIN/1.73M2
FASTING STATUS: ABNORMAL
GLUCOSE BLD-MCNC: 262 MG/DL (ref 70–99)
HCT VFR BLD CALC: 37.9 % (ref 34–49)
HEMOGLOBIN: 11.9 G/DL (ref 11.2–15.7)
IMMATURE GRANS (ABS): 0 K/UL (ref 0–0.1)
IMMATURE GRANULOCYTES %: 0.3 %
LYMPHOCYTES ABSOLUTE: 1.5 K/UL (ref 0.9–4.1)
LYMPHOCYTES RELATIVE PERCENT: 15.9 % (ref 14–51)
MCH RBC QN AUTO: 28.3 PG (ref 26–34)
MCHC RBC AUTO-ENTMCNC: 31.4 G/DL (ref 30.7–35.5)
MCV RBC AUTO: 90.2 FL (ref 80–100)
MICROALBUMIN/CREAT 24H UR: ABNORMAL MCG/DL
MICROALBUMIN/CREAT UR-RTO: 143 MCG/MG CREAT.
MONOCYTES ABSOLUTE: 0.7 K/UL (ref 0.2–1)
MONOCYTES RELATIVE PERCENT: 7.5 % (ref 4–12)
NEUTROPHILS ABSOLUTE: 7 K/UL (ref 1.8–7.5)
NEUTROPHILS RELATIVE PERCENT: 75.3 % (ref 42–80)
PDW BLD-RTO: 12.5 %
PLATELET # BLD: 224 K/UL (ref 140–400)
PMV BLD AUTO: 12.5 FL (ref 7.2–11.7)
POTASSIUM SERPL-SCNC: 3.8 MEQ/L (ref 3.4–5.3)
RBC # BLD: 4.2 M/UL (ref 3.95–5.26)
RETICULOCYTE ABSOLUTE COUNT: 0 /100 WBC
SODIUM BLD-SCNC: 140 MEQ/L (ref 135–148)
WBC # BLD: 9.2 K/UL (ref 3.5–10.9)

## 2024-11-08 LAB — HBA1C MFR BLD: 9 %

## 2024-11-11 ENCOUNTER — OFFICE VISIT (OUTPATIENT)
Dept: INTERNAL MEDICINE CLINIC | Age: 74
End: 2024-11-11
Payer: COMMERCIAL

## 2024-11-11 VITALS
SYSTOLIC BLOOD PRESSURE: 168 MMHG | BODY MASS INDEX: 24.27 KG/M2 | HEART RATE: 55 BPM | WEIGHT: 137 LBS | DIASTOLIC BLOOD PRESSURE: 70 MMHG | OXYGEN SATURATION: 97 %

## 2024-11-11 DIAGNOSIS — I10 ESSENTIAL HYPERTENSION: ICD-10-CM

## 2024-11-11 DIAGNOSIS — Z79.4 TYPE 2 DIABETES MELLITUS WITH STAGE 3A CHRONIC KIDNEY DISEASE, WITH LONG-TERM CURRENT USE OF INSULIN (HCC): Primary | ICD-10-CM

## 2024-11-11 DIAGNOSIS — E11.22 TYPE 2 DIABETES MELLITUS WITH STAGE 3A CHRONIC KIDNEY DISEASE, WITH LONG-TERM CURRENT USE OF INSULIN (HCC): Primary | ICD-10-CM

## 2024-11-11 DIAGNOSIS — E78.5 HYPERLIPIDEMIA, UNSPECIFIED HYPERLIPIDEMIA TYPE: ICD-10-CM

## 2024-11-11 DIAGNOSIS — N18.31 TYPE 2 DIABETES MELLITUS WITH STAGE 3A CHRONIC KIDNEY DISEASE, WITH LONG-TERM CURRENT USE OF INSULIN (HCC): Primary | ICD-10-CM

## 2024-11-11 DIAGNOSIS — Z98.890 S/P CAROTID ENDARTERECTOMY: ICD-10-CM

## 2024-11-11 PROCEDURE — 3052F HG A1C>EQUAL 8.0%<EQUAL 9.0%: CPT | Performed by: FAMILY MEDICINE

## 2024-11-11 PROCEDURE — 3078F DIAST BP <80 MM HG: CPT | Performed by: FAMILY MEDICINE

## 2024-11-11 PROCEDURE — 1123F ACP DISCUSS/DSCN MKR DOCD: CPT | Performed by: FAMILY MEDICINE

## 2024-11-11 PROCEDURE — 3077F SYST BP >= 140 MM HG: CPT | Performed by: FAMILY MEDICINE

## 2024-11-11 PROCEDURE — 99214 OFFICE O/P EST MOD 30 MIN: CPT | Performed by: FAMILY MEDICINE

## 2024-11-11 RX ORDER — ASPIRIN 81 MG/1
81 TABLET ORAL DAILY
COMMUNITY
Start: 2024-09-05 | End: 2025-09-05

## 2024-11-11 RX ORDER — FLUCONAZOLE 200 MG/1
200 TABLET ORAL DAILY
COMMUNITY
Start: 2024-08-02 | End: 2024-11-11

## 2024-11-11 RX ORDER — GLIPIZIDE 5 MG/1
5 TABLET ORAL
Qty: 60 TABLET | Refills: 3 | Status: SHIPPED | OUTPATIENT
Start: 2024-11-11

## 2024-11-11 ASSESSMENT — ENCOUNTER SYMPTOMS
NAUSEA: 0
ABDOMINAL PAIN: 0
SHORTNESS OF BREATH: 0
COUGH: 0

## 2024-11-11 NOTE — PROGRESS NOTES
Ivania Garcia (:  1950) is a 73 y.o. female,established patient, here for evaluation of the following chief complaint(s):  Follow-up and Diabetes      Assessment & Plan   ASSESSMENT/PLAN:    Assessment & Plan  1. Type 2 diabetes mellitus with stage 3a chronic kidney disease.  She will start glipizide 5 mg tablet. She will take 1 tablet by mouth before her breakfast on the first week and then she will go up to twice a day before meals. Dispense 60 tablets, refill 3. She will continue her Januvia 100 mg.   A hemoglobin A1c and a complete metabolic panel have been ordered.    2. Essential hypertension-uncontrolled.  She will monitor her blood pressure. She will return to the office in 4 days for an in-office blood pressure check.  She will remain on her losartan 50 mg and her carvedilol 6.25 mg. If her blood pressure remains elevated, she will need to increase the losartan to twice a day.    3. Hyperlipidemia.  She will continue her Crestor 20 mg.   A lipid panel has been ordered.    4. Status post left carotid endarterectomy.  She will keep follow-up with her vascular surgeon.    Follow-up  Return in 4 days for an in-office blood pressure check.    Medications reconciled and discussed with the patient  Persist RTO or call  Return for follow up in 3 to 4 months for  DM, HTN.       Lab Results   Component Value Date    WBC 9.2 2024    HGB 11.9 2024    HCT 37.9 2024    MCV 90.2 2024     2024     Lab Results   Component Value Date    CHOL 187 2024     Lab Results   Component Value Date    TRIG 77 2024     Lab Results   Component Value Date    HDL 54 2024     Lab Results   Component Value Date     2024    LDLDIRECT 118 (H) 2015       Lab Results   Component Value Date    LABA1C 9.0 (H) 2024     Lab Results   Component Value Date    .2 2021     Lab Results   Component Value Date     2024    K 3.8 2024

## 2024-11-19 ENCOUNTER — NURSE ONLY (OUTPATIENT)
Dept: INTERNAL MEDICINE CLINIC | Age: 74
End: 2024-11-19

## 2024-11-19 ENCOUNTER — TELEPHONE (OUTPATIENT)
Dept: INTERNAL MEDICINE CLINIC | Age: 74
End: 2024-11-19

## 2024-11-19 VITALS — SYSTOLIC BLOOD PRESSURE: 150 MMHG | DIASTOLIC BLOOD PRESSURE: 70 MMHG

## 2024-11-19 DIAGNOSIS — Z01.30 BLOOD PRESSURE CHECK: Primary | ICD-10-CM

## 2024-11-19 NOTE — PROGRESS NOTES
Pt came in on this date for BP check.     BP was 150/70. Pt states she takes losartan 50 mg twice a day     PCP informed

## 2024-11-19 NOTE — TELEPHONE ENCOUNTER
Pt came in on this date for BP check.     BP was 150/70. Pt states she takes losartan 50 mg twice a day

## 2024-11-19 NOTE — TELEPHONE ENCOUNTER
Spoke to patient.  She plans to call her cardiologist as she is not on the carvedilol.  She does not recall when this was discontinued.  Her old records have that she was on carvedilol 25 mg.  Patient will discuss with cardiology about the blood pressure and the medication, if she is unable to get in with them or they do not want to add anything, we can consider adding an additional medication

## 2025-02-22 DIAGNOSIS — E78.5 HYPERLIPIDEMIA, UNSPECIFIED HYPERLIPIDEMIA TYPE: ICD-10-CM

## 2025-02-24 RX ORDER — ROSUVASTATIN CALCIUM 20 MG/1
20 TABLET, COATED ORAL DAILY
Qty: 90 TABLET | Refills: 0 | Status: SHIPPED | OUTPATIENT
Start: 2025-02-24 | End: 2025-02-26 | Stop reason: SDUPTHER

## 2025-02-26 DIAGNOSIS — E78.5 HYPERLIPIDEMIA, UNSPECIFIED HYPERLIPIDEMIA TYPE: ICD-10-CM

## 2025-02-26 RX ORDER — ROSUVASTATIN CALCIUM 20 MG/1
20 TABLET, COATED ORAL DAILY
Qty: 90 TABLET | Refills: 0 | Status: SHIPPED | OUTPATIENT
Start: 2025-02-26

## 2025-03-10 ENCOUNTER — OFFICE VISIT (OUTPATIENT)
Dept: CARDIOTHORACIC SURGERY | Age: 75
End: 2025-03-10

## 2025-03-10 VITALS
SYSTOLIC BLOOD PRESSURE: 133 MMHG | OXYGEN SATURATION: 98 % | DIASTOLIC BLOOD PRESSURE: 78 MMHG | HEART RATE: 78 BPM | WEIGHT: 143.6 LBS | BODY MASS INDEX: 25.44 KG/M2

## 2025-03-10 DIAGNOSIS — I65.23 CAROTID STENOSIS, BILATERAL: Primary | ICD-10-CM

## 2025-03-10 PROCEDURE — 3075F SYST BP GE 130 - 139MM HG: CPT | Performed by: THORACIC SURGERY (CARDIOTHORACIC VASCULAR SURGERY)

## 2025-03-10 PROCEDURE — 1159F MED LIST DOCD IN RCRD: CPT | Performed by: THORACIC SURGERY (CARDIOTHORACIC VASCULAR SURGERY)

## 2025-03-10 PROCEDURE — 99205 OFFICE O/P NEW HI 60 MIN: CPT | Performed by: THORACIC SURGERY (CARDIOTHORACIC VASCULAR SURGERY)

## 2025-03-10 PROCEDURE — 1123F ACP DISCUSS/DSCN MKR DOCD: CPT | Performed by: THORACIC SURGERY (CARDIOTHORACIC VASCULAR SURGERY)

## 2025-03-10 PROCEDURE — 3078F DIAST BP <80 MM HG: CPT | Performed by: THORACIC SURGERY (CARDIOTHORACIC VASCULAR SURGERY)

## 2025-03-10 NOTE — PROGRESS NOTES
3/10/2025    Blaire Yancey, DO   [address] [place] [address]    Tom Sim MD           Re: , Ivania    Dear Dr. Yancey,  Dear Dr. Sim,    I had the pleasure of seeing your patient Ivania Garcia (74 y.o. female) today in office for a consultation regarding her peripheral arterial diease. She was a prior patient of Dr Monsivais and is here today to establish care.  She had carotid endarterectomy knees on both sides.  She had 1 on the right side in 2022 and recently by Dr. Monsivais in September 2024.  Apparently a postoperative ultrasound showed a stenosis postoperatively on the left side and she comes today for a vascular surgical consultation.  She has no neurologic symptoms and feels well.  He is worried that she might need another carotid surgery or intervention.  She also recently had several upper esophageal dilatations, which were successful and she was told that she does not need to come back for another dilatation.      Past Medical History:  Past Medical History:   Diagnosis Date    Anemia     Managed by Dr. ARLEY Araiza    Arthritis     CAD (coronary artery disease)     follows with Dr Juan Sim    Carotid stenosis     right    Colon polyps     COVID-19 08/18/2021    Diabetes mellitus type 2, uncontrolled     \"dx 2017    Diastolic dysfunction 09/2012    [facility]    Esophageal stricture     dilation per GI    Hiatal hernia     History of blood transfusion     \"had blood transfusion with 4th child- have hx also of iron infusions for anemia 2017    Hyperlipidemia     Hypertension     IBS (irritable bowel syndrome)     with diarrhea    Iron deficiency anemia     Iron Infusions- follows with Julia Araiza and Jose G Cardenas    LVH (left ventricular hypertrophy) 09/2012    [facility]    Multiple gastric polyps 2004    Dr Schroeder    PAD (peripheral artery disease)     S/P CABG x 4 08/06/2012    4V CABG- LIMA-LAD, SVG-CX, SVG-PDA, SVG-RCA- Follows with Dr Sim    Sinus

## 2025-03-12 ENCOUNTER — TELEPHONE (OUTPATIENT)
Dept: CARDIOTHORACIC SURGERY | Age: 75
End: 2025-03-12

## 2025-03-14 DIAGNOSIS — N18.31 TYPE 2 DIABETES MELLITUS WITH STAGE 3A CHRONIC KIDNEY DISEASE, WITH LONG-TERM CURRENT USE OF INSULIN (HCC): ICD-10-CM

## 2025-03-14 DIAGNOSIS — E11.22 TYPE 2 DIABETES MELLITUS WITH STAGE 3A CHRONIC KIDNEY DISEASE, WITH LONG-TERM CURRENT USE OF INSULIN (HCC): ICD-10-CM

## 2025-03-14 DIAGNOSIS — Z79.4 TYPE 2 DIABETES MELLITUS WITH STAGE 3A CHRONIC KIDNEY DISEASE, WITH LONG-TERM CURRENT USE OF INSULIN (HCC): ICD-10-CM

## 2025-03-14 RX ORDER — GLIPIZIDE 5 MG/1
5 TABLET ORAL
Qty: 60 TABLET | Refills: 1 | Status: SHIPPED | OUTPATIENT
Start: 2025-03-14

## 2025-03-14 NOTE — TELEPHONE ENCOUNTER
Call to schedule follow up appointment for diabetes  
Patient called for a refill on glipizide  
Patient is not pregnant (male or female)

## 2025-03-17 DIAGNOSIS — E11.22 TYPE 2 DIABETES MELLITUS WITH STAGE 3A CHRONIC KIDNEY DISEASE, WITH LONG-TERM CURRENT USE OF INSULIN (HCC): ICD-10-CM

## 2025-03-17 DIAGNOSIS — N18.31 TYPE 2 DIABETES MELLITUS WITH STAGE 3A CHRONIC KIDNEY DISEASE, WITH LONG-TERM CURRENT USE OF INSULIN (HCC): ICD-10-CM

## 2025-03-17 DIAGNOSIS — Z79.4 TYPE 2 DIABETES MELLITUS WITH STAGE 3A CHRONIC KIDNEY DISEASE, WITH LONG-TERM CURRENT USE OF INSULIN (HCC): ICD-10-CM

## 2025-03-17 RX ORDER — GLIPIZIDE 5 MG/1
5 TABLET ORAL
Qty: 60 TABLET | Refills: 0 | Status: SHIPPED | OUTPATIENT
Start: 2025-03-17

## 2025-03-25 ENCOUNTER — HOSPITAL ENCOUNTER (OUTPATIENT)
Dept: CT IMAGING | Age: 75
Discharge: HOME | End: 2025-03-25
Payer: MEDICARE

## 2025-03-25 ENCOUNTER — HOSPITAL ENCOUNTER (OUTPATIENT)
Dept: ULTRASOUND IMAGING | Age: 75
Discharge: HOME | End: 2025-03-25
Payer: MEDICARE

## 2025-03-25 DIAGNOSIS — I65.23 CAROTID STENOSIS, BILATERAL: ICD-10-CM

## 2025-03-25 LAB
EGFR, POC: 59 ML/MIN/1.73M2
POC CREATININE: 1 MG/DL (ref 0.5–1.2)

## 2025-03-25 PROCEDURE — 6360000004 HC RX CONTRAST MEDICATION: Performed by: PHYSICIAN ASSISTANT

## 2025-03-25 PROCEDURE — 93880 EXTRACRANIAL BILAT STUDY: CPT

## 2025-03-25 PROCEDURE — 82565 ASSAY OF CREATININE: CPT

## 2025-03-25 PROCEDURE — 70498 CT ANGIOGRAPHY NECK: CPT

## 2025-03-25 PROCEDURE — 2500000003 HC RX 250 WO HCPCS: Performed by: PHYSICIAN ASSISTANT

## 2025-03-25 RX ORDER — IOPAMIDOL 755 MG/ML
75 INJECTION, SOLUTION INTRAVASCULAR
Status: COMPLETED | OUTPATIENT
Start: 2025-03-25 | End: 2025-03-25

## 2025-03-25 RX ORDER — SODIUM CHLORIDE 9 MG/ML
10 INJECTION, SOLUTION INTRAMUSCULAR; INTRAVENOUS; SUBCUTANEOUS PRN
Status: DISCONTINUED | OUTPATIENT
Start: 2025-03-25 | End: 2025-03-26 | Stop reason: HOSPADM

## 2025-03-25 RX ADMIN — IOPAMIDOL 75 ML: 755 INJECTION, SOLUTION INTRAVENOUS at 11:18

## 2025-03-25 RX ADMIN — SODIUM CHLORIDE, PRESERVATIVE FREE 10 ML: 5 INJECTION INTRAVENOUS at 11:15

## 2025-04-04 LAB
A/G RATIO: 1.4 RATIO (ref 0.8–2.6)
ALBUMIN: 3.8 G/DL (ref 3.5–5.2)
ALP BLD-CCNC: 98 U/L (ref 23–144)
ALT SERPL-CCNC: 19 U/L (ref 0–60)
AST SERPL-CCNC: 20 U/L (ref 0–55)
BILIRUB SERPL-MCNC: 0.3 MG/DL (ref 0–1.2)
BUN / CREAT RATIO: 18 (ref 7–25)
BUN BLDV-MCNC: 22 MG/DL (ref 3–29)
CALCIUM SERPL-MCNC: 9.4 MG/DL (ref 8.5–10.5)
CHLORIDE BLD-SCNC: 106 MEQ/L (ref 96–110)
CHOLESTEROL, TOTAL: 194 MG/DL
CO2: 23 MEQ/L (ref 19–32)
CREAT SERPL-MCNC: 1.2 MG/DL (ref 0.5–1.2)
ESTIMATED GLOMERULAR FILTRATION RATE CREATININE EQUATION: 47 MLS/MIN/1.73M2
FASTING STATUS: ABNORMAL
GLOBULIN: 2.8 G/DL (ref 1.9–3.6)
GLUCOSE BLD-MCNC: 142 MG/DL (ref 70–99)
HBA1C MFR BLD: 6.9 %
HDLC SERPL-MCNC: 54 MG/DL
LDL CHOLESTEROL: 125 MG/DL
POTASSIUM SERPL-SCNC: 5 MEQ/L (ref 3.4–5.3)
SODIUM BLD-SCNC: 140 MEQ/L (ref 135–148)
TOTAL PROTEIN: 6.6 G/DL (ref 6–8.3)
TRIGL SERPL-MCNC: 74 MG/DL
VLDLC SERPL CALC-MCNC: 15 MG/DL (ref 4–38)

## 2025-04-07 ENCOUNTER — OFFICE VISIT (OUTPATIENT)
Dept: INTERNAL MEDICINE CLINIC | Age: 75
End: 2025-04-07

## 2025-04-07 VITALS
WEIGHT: 157 LBS | HEART RATE: 67 BPM | DIASTOLIC BLOOD PRESSURE: 74 MMHG | BODY MASS INDEX: 27.81 KG/M2 | SYSTOLIC BLOOD PRESSURE: 122 MMHG | OXYGEN SATURATION: 98 %

## 2025-04-07 DIAGNOSIS — E78.5 HYPERLIPIDEMIA, UNSPECIFIED HYPERLIPIDEMIA TYPE: ICD-10-CM

## 2025-04-07 DIAGNOSIS — M17.12 PRIMARY OSTEOARTHRITIS OF LEFT KNEE: ICD-10-CM

## 2025-04-07 DIAGNOSIS — I65.23 BILATERAL CAROTID ARTERY STENOSIS: ICD-10-CM

## 2025-04-07 DIAGNOSIS — N18.31 TYPE 2 DIABETES MELLITUS WITH STAGE 3A CHRONIC KIDNEY DISEASE, WITHOUT LONG-TERM CURRENT USE OF INSULIN (HCC): Primary | ICD-10-CM

## 2025-04-07 DIAGNOSIS — E11.22 TYPE 2 DIABETES MELLITUS WITH STAGE 3A CHRONIC KIDNEY DISEASE, WITHOUT LONG-TERM CURRENT USE OF INSULIN (HCC): Primary | ICD-10-CM

## 2025-04-07 PROBLEM — I48.91 ATRIAL FIBRILLATION, UNSPECIFIED TYPE (HCC): Status: ACTIVE | Noted: 2025-04-07

## 2025-04-07 PROBLEM — J96.01 ACUTE HYPOXEMIC RESPIRATORY FAILURE DUE TO COVID-19: Status: RESOLVED | Noted: 2021-08-18 | Resolved: 2025-04-07

## 2025-04-07 PROBLEM — U07.1 ACUTE HYPOXEMIC RESPIRATORY FAILURE DUE TO COVID-19: Status: RESOLVED | Noted: 2021-08-18 | Resolved: 2025-04-07

## 2025-04-07 RX ORDER — OMEPRAZOLE 40 MG/1
40 CAPSULE, DELAYED RELEASE ORAL DAILY
COMMUNITY

## 2025-04-07 RX ORDER — ISOSORBIDE MONONITRATE 30 MG/1
30 TABLET, EXTENDED RELEASE ORAL EVERY MORNING
COMMUNITY
Start: 2025-04-03

## 2025-04-07 SDOH — ECONOMIC STABILITY: FOOD INSECURITY: WITHIN THE PAST 12 MONTHS, THE FOOD YOU BOUGHT JUST DIDN'T LAST AND YOU DIDN'T HAVE MONEY TO GET MORE.: NEVER TRUE

## 2025-04-07 SDOH — ECONOMIC STABILITY: FOOD INSECURITY: WITHIN THE PAST 12 MONTHS, YOU WORRIED THAT YOUR FOOD WOULD RUN OUT BEFORE YOU GOT MONEY TO BUY MORE.: NEVER TRUE

## 2025-04-07 ASSESSMENT — ENCOUNTER SYMPTOMS
SHORTNESS OF BREATH: 0
COUGH: 0
NAUSEA: 0
ABDOMINAL PAIN: 0

## 2025-04-07 ASSESSMENT — PATIENT HEALTH QUESTIONNAIRE - PHQ9
SUM OF ALL RESPONSES TO PHQ QUESTIONS 1-9: 0
SUM OF ALL RESPONSES TO PHQ QUESTIONS 1-9: 0
2. FEELING DOWN, DEPRESSED OR HOPELESS: NOT AT ALL
SUM OF ALL RESPONSES TO PHQ QUESTIONS 1-9: 0
1. LITTLE INTEREST OR PLEASURE IN DOING THINGS: NOT AT ALL
SUM OF ALL RESPONSES TO PHQ QUESTIONS 1-9: 0

## 2025-04-14 NOTE — PROGRESS NOTES
4/17/2025    Blaire Yancey, DO   [address] [place] [address]    Tom Sim MD           Re: , Ivania    Dear Dr. Yancey,  Dear Dr. Sim,    I had the pleasure of seeing your patient Ivania Garcia (74 y.o. female) today in office for a consultation regarding her peripheral arterial diease. She was a prior patient of Dr Monsivais and is here today for a follow-up visit.   She had carotid endarterectomy knees on both sides.  She had one on the right side in 2022 and recently the left side by Dr. Monsivais in September 2024.  As there was a possible recurrent stenosis on the left side we ordered a CT angiography of her neck and a repeat carotid duplex scan during her last visit.  She comes today to review the results.  She feels well and has no complaints.    Current Medications:    Current Outpatient Medications:     aspirin 81 MG EC tablet, Take 1 tablet by mouth daily, Disp: 90 tablet, Rfl: 0    nitroGLYCERIN (NITROSTAT) 0.4 MG SL tablet, up to max of 3 total doses. If no relief after 1 dose, call 911. (Patient taking differently: Place 1 tablet under the tongue up to max of 3 total doses. If no relief after 1 dose, call 911.), Disp: 25 tablet, Rfl: 1    glipiZIDE (GLUCOTROL) 5 MG tablet, TAKE 1 TABLET BY MOUTH TWICE DAILY BEFORE MEALS, Disp: 180 tablet, Rfl: 1    omeprazole (PRILOSEC) 40 MG delayed release capsule, Take 1 capsule by mouth daily, Disp: , Rfl:     isosorbide mononitrate (IMDUR) 30 MG extended release tablet, Take 1 tablet by mouth every morning, Disp: , Rfl:     rosuvastatin (CRESTOR) 20 MG tablet, Take 1 tablet by mouth daily, Disp: 90 tablet, Rfl: 0    SITagliptin (JANUVIA) 100 MG tablet, Take 1 tablet by mouth daily, Disp: 30 tablet, Rfl: 5    famotidine (PEPCID) 20 MG tablet, Take 1 tablet by mouth nightly as needed, Disp: , Rfl:     losartan (COZAAR) 50 MG tablet, Take 1 tablet by mouth in the morning and at bedtime, Disp: , Rfl:     acetaminophen (TYLENOL) 500 MG

## 2025-04-16 DIAGNOSIS — N18.31 TYPE 2 DIABETES MELLITUS WITH STAGE 3A CHRONIC KIDNEY DISEASE, WITH LONG-TERM CURRENT USE OF INSULIN (HCC): ICD-10-CM

## 2025-04-16 DIAGNOSIS — Z79.4 TYPE 2 DIABETES MELLITUS WITH STAGE 3A CHRONIC KIDNEY DISEASE, WITH LONG-TERM CURRENT USE OF INSULIN (HCC): ICD-10-CM

## 2025-04-16 DIAGNOSIS — E11.22 TYPE 2 DIABETES MELLITUS WITH STAGE 3A CHRONIC KIDNEY DISEASE, WITH LONG-TERM CURRENT USE OF INSULIN (HCC): ICD-10-CM

## 2025-04-16 RX ORDER — GLIPIZIDE 5 MG/1
5 TABLET ORAL
Qty: 180 TABLET | Refills: 1 | Status: SHIPPED | OUTPATIENT
Start: 2025-04-16

## 2025-04-17 ENCOUNTER — OFFICE VISIT (OUTPATIENT)
Dept: CARDIOTHORACIC SURGERY | Age: 75
End: 2025-04-17
Payer: MEDICARE

## 2025-04-17 VITALS
HEART RATE: 76 BPM | DIASTOLIC BLOOD PRESSURE: 84 MMHG | BODY MASS INDEX: 27.86 KG/M2 | WEIGHT: 157.25 LBS | OXYGEN SATURATION: 98 % | SYSTOLIC BLOOD PRESSURE: 126 MMHG

## 2025-04-17 DIAGNOSIS — I65.23 CAROTID STENOSIS, BILATERAL: Primary | ICD-10-CM

## 2025-04-17 DIAGNOSIS — Z13.1 DIABETES MELLITUS SCREENING: ICD-10-CM

## 2025-04-17 PROCEDURE — 3017F COLORECTAL CA SCREEN DOC REV: CPT | Performed by: THORACIC SURGERY (CARDIOTHORACIC VASCULAR SURGERY)

## 2025-04-17 PROCEDURE — 3074F SYST BP LT 130 MM HG: CPT | Performed by: THORACIC SURGERY (CARDIOTHORACIC VASCULAR SURGERY)

## 2025-04-17 PROCEDURE — 3079F DIAST BP 80-89 MM HG: CPT | Performed by: THORACIC SURGERY (CARDIOTHORACIC VASCULAR SURGERY)

## 2025-04-17 PROCEDURE — 1159F MED LIST DOCD IN RCRD: CPT | Performed by: THORACIC SURGERY (CARDIOTHORACIC VASCULAR SURGERY)

## 2025-04-17 PROCEDURE — G8419 CALC BMI OUT NRM PARAM NOF/U: HCPCS | Performed by: THORACIC SURGERY (CARDIOTHORACIC VASCULAR SURGERY)

## 2025-04-17 PROCEDURE — 1036F TOBACCO NON-USER: CPT | Performed by: THORACIC SURGERY (CARDIOTHORACIC VASCULAR SURGERY)

## 2025-04-17 PROCEDURE — 1090F PRES/ABSN URINE INCON ASSESS: CPT | Performed by: THORACIC SURGERY (CARDIOTHORACIC VASCULAR SURGERY)

## 2025-04-17 PROCEDURE — 1123F ACP DISCUSS/DSCN MKR DOCD: CPT | Performed by: THORACIC SURGERY (CARDIOTHORACIC VASCULAR SURGERY)

## 2025-04-17 PROCEDURE — G8427 DOCREV CUR MEDS BY ELIG CLIN: HCPCS | Performed by: THORACIC SURGERY (CARDIOTHORACIC VASCULAR SURGERY)

## 2025-04-17 PROCEDURE — G8400 PT W/DXA NO RESULTS DOC: HCPCS | Performed by: THORACIC SURGERY (CARDIOTHORACIC VASCULAR SURGERY)

## 2025-04-17 RX ORDER — ASPIRIN 81 MG/1
81 TABLET ORAL DAILY
Qty: 90 TABLET | Refills: 0 | Status: SHIPPED | OUTPATIENT
Start: 2025-04-17

## 2025-04-18 ENCOUNTER — TELEPHONE (OUTPATIENT)
Dept: CARDIOTHORACIC SURGERY | Age: 75
End: 2025-04-18

## 2025-04-18 RX ORDER — SODIUM CHLORIDE 0.9 % (FLUSH) 0.9 %
5-40 SYRINGE (ML) INJECTION EVERY 12 HOURS SCHEDULED
OUTPATIENT
Start: 2025-04-18

## 2025-04-18 RX ORDER — SODIUM CHLORIDE 9 MG/ML
INJECTION, SOLUTION INTRAVENOUS PRN
OUTPATIENT
Start: 2025-04-18

## 2025-04-18 RX ORDER — SODIUM CHLORIDE 0.9 % (FLUSH) 0.9 %
5-40 SYRINGE (ML) INJECTION PRN
OUTPATIENT
Start: 2025-04-18

## 2025-04-18 RX ORDER — CHLORHEXIDINE GLUCONATE 40 MG/ML
SOLUTION TOPICAL ONCE
OUTPATIENT
Start: 2025-04-18 | End: 2025-04-18

## 2025-04-18 RX ORDER — MUPIROCIN 20 MG/G
OINTMENT TOPICAL ONCE
OUTPATIENT
Start: 2025-04-18

## 2025-04-18 NOTE — TELEPHONE ENCOUNTER
Cardiothoracic and Vascular Surgery    Pre-Operative Open Heart Patient Medication Letter    Surgeon: Dr. Starks   Procedure: TCAR  Date of Surgery: TBD  Time of Surgery: 0  Arrival time of Surgery: 0    In order to optimize the perioperative management and outcomes of surgery, please adhere to the following recommendations:    NEW medications to start before surgery:  Aspirin 81mg starting on 4/18/2025  Chlorhexidine(Hibiclens) starting on: TBD - Last dose: TBD    Medications to STOP 2 weeks prior to surgery  Herbal medications and vitamin supplements    LAST DOSE of these medications will be on TBD - 7 days prior to surgery  NSAIDS- excluding aspirin 81mg    LAST DOSE of these medications will be on TBD- 2 days prior to surgery  Glipizide(Glucotrol)  Sitagliptin(Januvia)    LAST DOSE of these medications will be on TBD - day before surgery  Losartan(Cozaar): Take AM dose NOT PM dose    Medications to continue taking including the morning on the day of surgery (TBD):  Acetaminophen(Tylenol)  Aspirin 81mg  Clopidogrel(Plavix)  Famotidine(Pepcid)  Isosorbide Mononitrate(Imdur)  Nitroglycerin(Nitrostat)  Omeprazole(Prilosec)  Rosuvastatin(Crestor)

## 2025-04-18 NOTE — TELEPHONE ENCOUNTER
Cardiothoracic and Vascular Surgery    Pre-Operative Patient Medication Letter    Surgeon: Dr. Roger Starks   Procedure: TCAR   Date of Surgery: 06/03/2025  Time of Surgery: 10 am   Arrival time of Surgery: 8 am     In order to optimize the perioperative management and outcomes of surgery, please adhere to the following recommendations:    NEW medications to start before surgery:  Aspirin 81 MG tablet   Chlorhexidine(Hibiclens) starting on 06/02/2025 Last Dose-06/03/2025    Medications to STOP 2 weeks prior to surgery  Herbal medications and vitamin supplements    LAST DOSE of these medications will be on 05/27/2025 - 7 days prior to surgery  NSAIDS- excluding aspirin 81 mg     LAST DOSE of these medications will be on 06/01/2025 -2 days prior to surgery  Glipizide(Glucotrol)  Sitagliptin(Januvia)     LAST DOSE of these medications will be on 06/02/2025 - day before surgery-  Losartan(Cozaar) 50 MG tablet Take morning dosage NOT night dosage     Medications to continue taking including the morning on the day of surgery 06/03/2025  Aspirin 81 MG tablet   Acetaminophen(Tylenol) 500 MG tablet  Clopidogrel(Plavix) 75 MG tablet   Famotidine(Pepcid) 20 MG tablet   Isosorbide Mononoitrate (IMDUR) 30 MG   Nitroglycerin(Nitrostat) 0.4 MG tablet   Omerprazole(Prilosec) 40 MG delay release  Rosuvastatin(Crestor) 20 MG        Patient given instructions in office on 05/27/2025.  Surgery is scheduled for TCAR @ 10 am, w/arrival @ 8 am, @ [facility]. Medication/Education Letter gone over with patient. Questions answered, Patient voiced understanding.        Patient was notified that surgery date or time could be changed due to an emergency. Patient voiced understanding.

## 2025-05-15 ENCOUNTER — TELEPHONE (OUTPATIENT)
Dept: CARDIOTHORACIC SURGERY | Age: 75
End: 2025-05-15

## 2025-05-15 NOTE — TELEPHONE ENCOUNTER
2020       Blank Turner MD  [address] [place] [id number]  Via Fax: [phone]      Patient: Severo Estrada   YOB: 1950   Date of Visit: 2020       Dear Dr. Turner:    Thank you for referring Severo Estrada to me for evaluation. Below are my notes for this visit with him.    If you have questions, please do not hesitate to call me. I look forward to following your patient along with you.      Sincerely,        Delgado Stanley MD        CC: No Recipients  Delgado Stanley MD  2020  2:14 PM  Sign when Signing Visit    [facility]  INTERVENTIONAL CARDIOLOGY    OFFICE PROGRESS NOTE      Name:  Severo Estrada  : 1950    Date of consultation:   2020    Referring physician: Blank Turner MD    Reason for Visit:  Elevated calcium score    HPI:   Presents due to elevated calcium score.  Test was ordered for screening however he has had a steady decline in functional capacity over the past two years.  He has to rest when mowing the lawn, becomes mildly SOB when carrying groceries.  No chest pain or palpitations.    CARDIAC HISTORY:   · Calcium score 2020 - 170, evenly distributed among all the arteries  · Treadmill stress EKG  - normal  · US carotid 2017 - mild bilateral disease  · HTN, HPL, DM  · Lipid profile 2020 - LDL 51, HDL 40    ASSESSMENT AND RECOMMENDATIONS:   · SOB, elevated calcium score - given his risk factors and gradual decline in functional capacity, should have stress test to rule out coronary ischemia.  · Treadmill nuclear  · Increase to intermediate intensity statin given his calcium score    Hypertension: Controlled on current regimen, no changes  Hyperlipidemia: On intermediate intensity statin for primary prevention, LDL at goal, no changes  DM per primary      Follow-up:  Clinic: 3 months  Testing/interventions: Stress test     Patient understands he/she can return sooner if any issues should arise.       Delgado RODRIGUEZ  Spoke with patient to provide her an update regarding her surgery. Advised that Dr. Starks is hoping to schedule her surgery on 6/3/2025 but that we are waiting to hear from the TCAR rep. Advised that we would call with an update as soon as we have one.   MD Lizeth  9/17/2020    -------------------------------------    LABS (1 year):     Office Visit on 09/17/2020   Component Date Value Ref Range Status   • Fasting Status 07/15/2020 yes   Final   • Glucose 07/15/2020 122  mg/dL Final   • Sodium 07/15/2020 135   Final   • Potassium 07/15/2020 4.6   Final   • Chloride 07/15/2020 100   Final   • Carbon Dioxide 07/15/2020 29.0   Final   • Anion Gap 07/15/2020 6   Final   • BUN 07/15/2020 16   Final   • Creatinine 07/15/2020 0.94  mg/dL Final   • GFR Estimate, Non  07/15/2020 82   Final   • GFR Estimate,  07/15/2020 95   Final   • BUN/Creatinine Ratio 07/15/2020 17.0   Final   • CALCIUM 07/15/2020 9.6   Final   • WBC 06/01/2020 9.1  K/mcL Final   • RBC 06/01/2020 4.76   Final   • HGB 06/01/2020 15.2  g/dL Final   • HCT 06/01/2020 46.3  % Final   • MCV 06/01/2020 97.3   Final   • MCH 06/01/2020 31.9   Final   • MCHC 06/01/2020 32.8   Final   • PLT 06/01/2020 273.0  K/mcL Final   • Neutrophil 06/01/2020 58.4   Final   • LYMPH 06/01/2020 28.9   Final   • MONO 06/01/2020 9.6   Final   • EOSIN 06/01/2020 1.9   Final   • Immature Granulocytes 06/01/2020 0.3   Final   • Absolute Neutrophil 06/01/2020 5.33   Final   • Absolute Lymph 06/01/2020 2.64   Final   • Absolute Mono 06/01/2020 0.88   Final   • Absolute Eos 06/01/2020 0.17   Final   • Absolute Baso 06/01/2020 0.08   Final   • Absolute Immature Granulocytes 06/01/2020 0.03   Final   • FASTING STATUS 06/01/2020 yes   Final   • CHOLESTEROL 06/01/2020 106   Final   • TRIGLYCERIDE 06/01/2020 79   Final   • HDL 06/01/2020 39   Final   • CALCULATED LDL 06/01/2020 51   Final   • CALCULATED NON HDL 06/01/2020 67   Final   • VLDL, CALC 06/01/2020 16   Final       MEDICATIONS:     Current Outpatient Medications   Medication Sig Dispense Refill   • metFORMIN (GLUCOPHAGE-XR) 750 MG 24 hr tablet Take 750 mg by mouth 2 times daily.     • glimepiride (AMARYL) 1 MG tablet Take 1 mg by mouth daily.     •  atorvastatin (LIPITOR) 10 MG tablet Take 10 mg by mouth daily.     • allopurinol (ZYLOPRIM) 100 MG tablet Take 100 mg by mouth daily.     • Omega-3 Fatty Acids (Fish Oil) 1000 MG capsule Take 1,000 mg by mouth daily.     • Cholecalciferol 50 mcg (2,000 units) tablet Take 1 Units by mouth daily.     • Misc Natural Products (GLUCOSAMINE CHONDROITIN ADV PO) Take 1 Dose by mouth daily.       No current facility-administered medications for this visit.        PAST MEDICAL HISTORY:     Past Medical History:   Diagnosis Date   • Amblyopia    • Anxiety    • Cataracts, bilateral    • Cellulitis of left foot    • Depression    • Diabetes mellitus type 2 without retinopathy (CMS/HCC)    • Essential hypertension    • History of hepatitis A virus infection    • Hyperlipidemia    • MGD (meibomian gland dysfunction)        Family and Social History:     Family History:  Family History   Problem Relation Age of Onset   • Hernia Father    • Diabetes Sister          Social History:  Social History     Tobacco Use   • Smoking status: Not on file   Substance Use Topics   • Alcohol use: Not on file   • Drug use: Not on file        ROS:   GENERAL HEALTH: no fevers, chills, sweats  SKIN: no unusual skin lesions or rashes  RESPIRATORY: no cough or shortness of breath  CARDIOVASCULAR: no claudication, see HPI  GI: no abdominal pain or heartburn  NEURO: no headaches, no focal weakness or paresthesias  All other systems reviewed and negative.    EXAM:   There were no vitals taken for this visit.  GENERAL: in no apparent distress  HEENT: atraumatic, normocephalic  NECK: no JVD, normal carotid pulses without bruits  LUNGS: normal excursion, clear to auscultation bilaterally  HEART: RRR, nl S1/S2, no gallop, no murmur, no rub  ABD: soft, nontender, nondistended, normal bowel sounds  EXTREMITIES: no cyanosis, clubbing or edema  SKIN: warm, dry, normal turgor  NEURO: alert, oriented x3, symmetrical face, no dysarthria, no focal deficits  PSYCH:  cooperative, calm

## 2025-05-15 NOTE — TELEPHONE ENCOUNTER
Spoke with Dr Starks who advised he received report from [facility] rep [place] today that states patient is a good candidate for procedure, therefore he would like to schedule this for 06/03/25 at 10:00am, I have emailed rep to verify she is available for this day, if not I asked about 7:30am on 06/06/25, waiting to hear back to proceed with scheduling

## 2025-05-19 ENCOUNTER — TELEPHONE (OUTPATIENT)
Dept: CARDIOTHORACIC SURGERY | Age: 75
End: 2025-05-19

## 2025-05-19 DIAGNOSIS — I65.23 BILATERAL CAROTID ARTERY STENOSIS: Primary | ICD-10-CM

## 2025-05-19 NOTE — TELEPHONE ENCOUNTER
Confirmation Number: [id number]  Patient Initials: MM  Date: 6/3/2025  Time: 10:00:00  Expected Duration: 02:00:00  Hospital: [facility]  Surgeon: MAMIE PARKER  Procedure: TCAR  Case Creator: Neema Lima  : KWESI TORRES

## 2025-05-19 NOTE — TELEPHONE ENCOUNTER
Email was sent to [email or web address] for intra op neuromonitoring for TCAR case on 06/03/25 @10:00am

## 2025-05-20 ENCOUNTER — TELEPHONE (OUTPATIENT)
Dept: CARDIOTHORACIC SURGERY | Age: 75
End: 2025-05-20

## 2025-05-21 ENCOUNTER — TELEPHONE (OUTPATIENT)
Dept: CARDIOTHORACIC SURGERY | Age: 75
End: 2025-05-21

## 2025-05-28 ENCOUNTER — CLINICAL SUPPORT (OUTPATIENT)
Dept: CARDIOTHORACIC SURGERY | Age: 75
End: 2025-05-28

## 2025-05-28 VITALS
WEIGHT: 155.5 LBS | BODY MASS INDEX: 29.36 KG/M2 | HEART RATE: 69 BPM | OXYGEN SATURATION: 98 % | SYSTOLIC BLOOD PRESSURE: 114 MMHG | DIASTOLIC BLOOD PRESSURE: 72 MMHG | HEIGHT: 61 IN

## 2025-05-28 DIAGNOSIS — Z01.818 PRE-OP EVALUATION: Primary | ICD-10-CM

## 2025-05-28 RX ORDER — CARVEDILOL 25 MG/1
25 TABLET ORAL 2 TIMES DAILY WITH MEALS
COMMUNITY
Start: 2025-04-20

## 2025-05-28 RX ORDER — FUROSEMIDE 20 MG/1
20 TABLET ORAL DAILY
COMMUNITY
Start: 2025-05-20

## 2025-05-28 NOTE — PROGRESS NOTES
Medication letter reviewed and given . Surgery is scheduled for 6/3/2025 at 1000 with a 0800 arrival at [facility]. All questions answered. Patient verbalized understanding.     Patient was notified that the surgery date or time could be changed due to an emergency. Patient verbalized understanding.

## 2025-05-30 DIAGNOSIS — E78.5 HYPERLIPIDEMIA, UNSPECIFIED HYPERLIPIDEMIA TYPE: ICD-10-CM

## 2025-05-30 RX ORDER — ROSUVASTATIN CALCIUM 20 MG/1
20 TABLET, COATED ORAL DAILY
Qty: 90 TABLET | Refills: 1 | Status: SHIPPED | OUTPATIENT
Start: 2025-05-30

## 2025-05-31 ENCOUNTER — ANESTHESIA EVENT (OUTPATIENT)
Age: 75
DRG: 036 | End: 2025-05-31
Payer: MEDICARE

## 2025-05-31 NOTE — ANESTHESIA PRE PROCEDURE
Department of Anesthesiology  Preprocedure Note       Name:  Ivania Garcia   Age:  74 y.o.  :  1950                                          MRN:  [id number]         Date:  2025      Surgeon: Surgeon(s):  Roger Starks MD    Procedure: Procedure(s):  Transcarotid artery revascularization (TCAR)    Medications prior to admission:   Prior to Admission medications    Medication Sig Start Date End Date Taking? Authorizing Provider   rosuvastatin (CRESTOR) 20 MG tablet TAKE 1 TABLET BY MOUTH DAILY 25   Blaire Yancey DO   furosemide (LASIX) 20 MG tablet Take 1 tablet by mouth daily 25   Freedom Farmer MD   carvedilol (COREG) 25 MG tablet Take 1 tablet by mouth 2 times daily (with meals) 25   Freedom Farmer MD   aspirin 81 MG EC tablet Take 1 tablet by mouth daily 25   Keisha Sandoval PA-C   glipiZIDE (GLUCOTROL) 5 MG tablet TAKE 1 TABLET BY MOUTH TWICE DAILY BEFORE MEALS 25   Blaire Yancey DO   omeprazole (PRILOSEC) 40 MG delayed release capsule Take 1 capsule by mouth daily    Freedom Farmer MD   isosorbide mononitrate (IMDUR) 30 MG extended release tablet Take 1 tablet by mouth every morning 4/3/25   Freedom Farmer MD   SITagliptin (JANUVIA) 100 MG tablet Take 1 tablet by mouth daily 24   Blaire Yancey DO   famotidine (PEPCID) 20 MG tablet Take 1 tablet by mouth nightly as needed 23   Freedom Farmer MD   losartan (COZAAR) 50 MG tablet Take 1 tablet by mouth in the morning and at bedtime 22   Freedom Farmer MD   acetaminophen (TYLENOL) 500 MG tablet Take 2 tablets by mouth every 4 hours as needed for Pain    Freedom Farmer MD   nitroGLYCERIN (NITROSTAT) 0.4 MG SL tablet up to max of 3 total doses. If no relief after 1 dose, call 911. 22   Becky Sim MD   clopidogrel (PLAVIX) 75 MG tablet Take 1 tablet by mouth daily 8/20/15   Alvaro Sim MD       Current medications:    No current

## 2025-06-03 ENCOUNTER — ANESTHESIA (OUTPATIENT)
Age: 75
DRG: 036 | End: 2025-06-03
Payer: MEDICARE

## 2025-06-03 ENCOUNTER — APPOINTMENT (OUTPATIENT)
Dept: GENERAL RADIOLOGY | Age: 75
DRG: 036 | End: 2025-06-03
Attending: THORACIC SURGERY (CARDIOTHORACIC VASCULAR SURGERY)
Payer: MEDICARE

## 2025-06-03 ENCOUNTER — HOSPITAL ENCOUNTER (INPATIENT)
Age: 75
LOS: 1 days | Discharge: HOME | DRG: 036 | End: 2025-06-04
Attending: THORACIC SURGERY (CARDIOTHORACIC VASCULAR SURGERY) | Admitting: THORACIC SURGERY (CARDIOTHORACIC VASCULAR SURGERY)
Payer: MEDICARE

## 2025-06-03 DIAGNOSIS — I65.23 BILATERAL CAROTID ARTERY STENOSIS: ICD-10-CM

## 2025-06-03 DIAGNOSIS — I65.22 LEFT CAROTID STENOSIS: Primary | ICD-10-CM

## 2025-06-03 DIAGNOSIS — I65.22 STENOSIS OF LEFT CAROTID ARTERY: ICD-10-CM

## 2025-06-03 LAB
ANION GAP SERPL CALCULATED.3IONS-SCNC: 10 MMOL/L (ref 9–17)
ANION GAP SERPL CALCULATED.3IONS-SCNC: 10 MMOL/L (ref 9–17)
BASOPHILS # BLD: 0.06 K/UL
BASOPHILS NFR BLD: 1 % (ref 0–1)
BUN SERPL-MCNC: 22 MG/DL (ref 7–20)
BUN SERPL-MCNC: 24 MG/DL (ref 7–20)
CALCIUM SERPL-MCNC: 8.5 MG/DL (ref 8.3–10.6)
CALCIUM SERPL-MCNC: 9 MG/DL (ref 8.3–10.6)
CHLORIDE SERPL-SCNC: 102 MMOL/L (ref 99–110)
CHLORIDE SERPL-SCNC: 106 MMOL/L (ref 99–110)
CO2 SERPL-SCNC: 24 MMOL/L (ref 21–32)
CO2 SERPL-SCNC: 27 MMOL/L (ref 21–32)
CREAT SERPL-MCNC: 1.1 MG/DL (ref 0.6–1.2)
CREAT SERPL-MCNC: 1.3 MG/DL (ref 0.6–1.2)
ECHO BSA: 1.78 M2
EOSINOPHIL # BLD: 0.6 K/UL
EOSINOPHILS RELATIVE PERCENT: 8 % (ref 0–3)
ERYTHROCYTE [DISTWIDTH] IN BLOOD BY AUTOMATED COUNT: 15 % (ref 11.7–14.9)
ERYTHROCYTE [DISTWIDTH] IN BLOOD BY AUTOMATED COUNT: 15 % (ref 11.7–14.9)
GFR, ESTIMATED: 40 ML/MIN/1.73M2
GFR, ESTIMATED: 47 ML/MIN/1.73M2
GLUCOSE BLD-MCNC: 264 MG/DL (ref 74–99)
GLUCOSE BLD-MCNC: 302 MG/DL (ref 74–99)
GLUCOSE SERPL-MCNC: 214 MG/DL (ref 74–99)
GLUCOSE SERPL-MCNC: 292 MG/DL (ref 74–99)
HCT VFR BLD AUTO: 35.6 % (ref 37–47)
HCT VFR BLD AUTO: 36 % (ref 37–47)
HGB BLD-MCNC: 10.6 G/DL (ref 12.5–16)
HGB BLD-MCNC: 10.9 G/DL (ref 12.5–16)
IMM GRANULOCYTES # BLD AUTO: 0.06 K/UL
IMM GRANULOCYTES NFR BLD: 1 %
INR PPP: 1
INR PPP: 1
LYMPHOCYTES NFR BLD: 1.49 K/UL
LYMPHOCYTES RELATIVE PERCENT: 20 % (ref 24–44)
MAGNESIUM SERPL-MCNC: 1.9 MG/DL (ref 1.8–2.4)
MCH RBC QN AUTO: 26 PG (ref 27–31)
MCH RBC QN AUTO: 26.3 PG (ref 27–31)
MCHC RBC AUTO-ENTMCNC: 29.4 G/DL (ref 32–36)
MCHC RBC AUTO-ENTMCNC: 30.6 G/DL (ref 32–36)
MCV RBC AUTO: 85.8 FL (ref 78–100)
MCV RBC AUTO: 88.2 FL (ref 78–100)
MONOCYTES NFR BLD: 0.69 K/UL
MONOCYTES NFR BLD: 9 % (ref 0–5)
NEUTROPHILS NFR BLD: 61 % (ref 36–66)
NEUTS SEG NFR BLD: 4.58 K/UL
PHOSPHATE SERPL-MCNC: 3.4 MG/DL (ref 2.5–4.9)
PLATELET # BLD AUTO: 206 K/UL (ref 140–440)
PLATELET # BLD AUTO: 210 K/UL (ref 140–440)
PMV BLD AUTO: 11.4 FL (ref 7.5–11.1)
PMV BLD AUTO: 11.5 FL (ref 7.5–11.1)
POTASSIUM SERPL-SCNC: 3.8 MMOL/L (ref 3.5–5.1)
POTASSIUM SERPL-SCNC: 4.4 MMOL/L (ref 3.5–5.1)
PROTHROMBIN TIME: 12.9 SEC (ref 11.7–14.5)
PROTHROMBIN TIME: 13.6 SEC (ref 11.7–14.5)
RBC # BLD AUTO: 4.08 M/UL (ref 4.2–5.4)
RBC # BLD AUTO: 4.15 M/UL (ref 4.2–5.4)
SODIUM SERPL-SCNC: 140 MMOL/L (ref 136–145)
SODIUM SERPL-SCNC: 140 MMOL/L (ref 136–145)
WBC OTHER # BLD: 13.3 K/UL (ref 4–10.5)
WBC OTHER # BLD: 7.5 K/UL (ref 4–10.5)

## 2025-06-03 PROCEDURE — C1725 CATH, TRANSLUMIN NON-LASER: HCPCS | Performed by: THORACIC SURGERY (CARDIOTHORACIC VASCULAR SURGERY)

## 2025-06-03 PROCEDURE — 37799 UNLISTED PX VASCULAR SURGERY: CPT

## 2025-06-03 PROCEDURE — 37215 TRANSCATH STENT CCA W/EPS: CPT | Performed by: THORACIC SURGERY (CARDIOTHORACIC VASCULAR SURGERY)

## 2025-06-03 PROCEDURE — 2500000003 HC RX 250 WO HCPCS: Performed by: THORACIC SURGERY (CARDIOTHORACIC VASCULAR SURGERY)

## 2025-06-03 PROCEDURE — C1894 INTRO/SHEATH, NON-LASER: HCPCS | Performed by: THORACIC SURGERY (CARDIOTHORACIC VASCULAR SURGERY)

## 2025-06-03 PROCEDURE — 037J3DZ DILATION OF LEFT COMMON CAROTID ARTERY WITH INTRALUMINAL DEVICE, PERCUTANEOUS APPROACH: ICD-10-PCS | Performed by: THORACIC SURGERY (CARDIOTHORACIC VASCULAR SURGERY)

## 2025-06-03 PROCEDURE — 85610 PROTHROMBIN TIME: CPT

## 2025-06-03 PROCEDURE — 2580000003 HC RX 258: Performed by: PHYSICIAN ASSISTANT

## 2025-06-03 PROCEDURE — 6360000002 HC RX W HCPCS

## 2025-06-03 PROCEDURE — 2500000003 HC RX 250 WO HCPCS: Performed by: PHYSICIAN ASSISTANT

## 2025-06-03 PROCEDURE — 84100 ASSAY OF PHOSPHORUS: CPT

## 2025-06-03 PROCEDURE — 6370000000 HC RX 637 (ALT 250 FOR IP): Performed by: THORACIC SURGERY (CARDIOTHORACIC VASCULAR SURGERY)

## 2025-06-03 PROCEDURE — 2720000010 HC SURG SUPPLY STERILE: Performed by: THORACIC SURGERY (CARDIOTHORACIC VASCULAR SURGERY)

## 2025-06-03 PROCEDURE — 6360000002 HC RX W HCPCS: Performed by: PHYSICIAN ASSISTANT

## 2025-06-03 PROCEDURE — 3700000000 HC ANESTHESIA ATTENDED CARE: Performed by: THORACIC SURGERY (CARDIOTHORACIC VASCULAR SURGERY)

## 2025-06-03 PROCEDURE — 3600000006 HC SURGERY LEVEL 6 BASE: Performed by: THORACIC SURGERY (CARDIOTHORACIC VASCULAR SURGERY)

## 2025-06-03 PROCEDURE — 6360000002 HC RX W HCPCS: Performed by: THORACIC SURGERY (CARDIOTHORACIC VASCULAR SURGERY)

## 2025-06-03 PROCEDURE — 85025 COMPLETE CBC W/AUTO DIFF WBC: CPT

## 2025-06-03 PROCEDURE — 93005 ELECTROCARDIOGRAM TRACING: CPT | Performed by: PHYSICIAN ASSISTANT

## 2025-06-03 PROCEDURE — 88307 TISSUE EXAM BY PATHOLOGIST: CPT | Performed by: PATHOLOGY

## 2025-06-03 PROCEDURE — 6370000000 HC RX 637 (ALT 250 FOR IP): Performed by: INTERNAL MEDICINE

## 2025-06-03 PROCEDURE — 2700000000 HC OXYGEN THERAPY PER DAY

## 2025-06-03 PROCEDURE — 86901 BLOOD TYPING SEROLOGIC RH(D): CPT

## 2025-06-03 PROCEDURE — 2709999900 HC NON-CHARGEABLE SUPPLY: Performed by: THORACIC SURGERY (CARDIOTHORACIC VASCULAR SURGERY)

## 2025-06-03 PROCEDURE — 3700000001 HC ADD 15 MINUTES (ANESTHESIA): Performed by: THORACIC SURGERY (CARDIOTHORACIC VASCULAR SURGERY)

## 2025-06-03 PROCEDURE — 6370000000 HC RX 637 (ALT 250 FOR IP): Performed by: PHYSICIAN ASSISTANT

## 2025-06-03 PROCEDURE — 83735 ASSAY OF MAGNESIUM: CPT

## 2025-06-03 PROCEDURE — 94761 N-INVAS EAR/PLS OXIMETRY MLT: CPT

## 2025-06-03 PROCEDURE — C1876 STENT, NON-COA/NON-COV W/DEL: HCPCS | Performed by: THORACIC SURGERY (CARDIOTHORACIC VASCULAR SURGERY)

## 2025-06-03 PROCEDURE — 2500000003 HC RX 250 WO HCPCS

## 2025-06-03 PROCEDURE — 82962 GLUCOSE BLOOD TEST: CPT

## 2025-06-03 PROCEDURE — 88341 IMHCHEM/IMCYTCHM EA ADD ANTB: CPT | Performed by: PATHOLOGY

## 2025-06-03 PROCEDURE — C1769 GUIDE WIRE: HCPCS | Performed by: THORACIC SURGERY (CARDIOTHORACIC VASCULAR SURGERY)

## 2025-06-03 PROCEDURE — 2580000003 HC RX 258: Performed by: THORACIC SURGERY (CARDIOTHORACIC VASCULAR SURGERY)

## 2025-06-03 PROCEDURE — 85027 COMPLETE CBC AUTOMATED: CPT

## 2025-06-03 PROCEDURE — 86900 BLOOD TYPING SEROLOGIC ABO: CPT

## 2025-06-03 PROCEDURE — 71045 X-RAY EXAM CHEST 1 VIEW: CPT

## 2025-06-03 PROCEDURE — 6360000004 HC RX CONTRAST MEDICATION: Performed by: THORACIC SURGERY (CARDIOTHORACIC VASCULAR SURGERY)

## 2025-06-03 PROCEDURE — 86850 RBC ANTIBODY SCREEN: CPT

## 2025-06-03 PROCEDURE — B3141ZZ FLUOROSCOPY OF LEFT COMMON CAROTID ARTERY USING LOW OSMOLAR CONTRAST: ICD-10-PCS | Performed by: THORACIC SURGERY (CARDIOTHORACIC VASCULAR SURGERY)

## 2025-06-03 PROCEDURE — 85347 COAGULATION TIME ACTIVATED: CPT | Performed by: THORACIC SURGERY (CARDIOTHORACIC VASCULAR SURGERY)

## 2025-06-03 PROCEDURE — 80048 BASIC METABOLIC PNL TOTAL CA: CPT

## 2025-06-03 PROCEDURE — 2100000000 HC CCU R&B

## 2025-06-03 PROCEDURE — 3600000016 HC SURGERY LEVEL 6 ADDTL 15MIN: Performed by: THORACIC SURGERY (CARDIOTHORACIC VASCULAR SURGERY)

## 2025-06-03 PROCEDURE — 86920 COMPATIBILITY TEST SPIN: CPT

## 2025-06-03 PROCEDURE — 36620 INSERTION CATHETER ARTERY: CPT

## 2025-06-03 DEVICE — 9-7MM X 40MM
Type: IMPLANTABLE DEVICE | Status: FUNCTIONAL
Brand: ENROUTE TRANSCAROTID STENT

## 2025-06-03 RX ORDER — SODIUM CHLORIDE 9 MG/ML
INJECTION, SOLUTION INTRAVENOUS PRN
Status: COMPLETED | OUTPATIENT
Start: 2025-06-03 | End: 2025-06-03

## 2025-06-03 RX ORDER — FENTANYL CITRATE 50 UG/ML
INJECTION, SOLUTION INTRAMUSCULAR; INTRAVENOUS
Status: DISCONTINUED | OUTPATIENT
Start: 2025-06-03 | End: 2025-06-03 | Stop reason: SDUPTHER

## 2025-06-03 RX ORDER — SODIUM CHLORIDE 0.9 % (FLUSH) 0.9 %
5-40 SYRINGE (ML) INJECTION EVERY 12 HOURS SCHEDULED
Status: DISCONTINUED | OUTPATIENT
Start: 2025-06-03 | End: 2025-06-04 | Stop reason: HOSPADM

## 2025-06-03 RX ORDER — FAMOTIDINE 10 MG/ML
20 INJECTION, SOLUTION INTRAVENOUS 2 TIMES DAILY
Status: DISCONTINUED | OUTPATIENT
Start: 2025-06-03 | End: 2025-06-04 | Stop reason: HOSPADM

## 2025-06-03 RX ORDER — NOREPINEPHRINE BITARTRATE 0.06 MG/ML
INJECTION, SOLUTION INTRAVENOUS
Status: DISCONTINUED | OUTPATIENT
Start: 2025-06-03 | End: 2025-06-03

## 2025-06-03 RX ORDER — LOSARTAN POTASSIUM 25 MG/1
50 TABLET ORAL 2 TIMES DAILY
Status: DISCONTINUED | OUTPATIENT
Start: 2025-06-03 | End: 2025-06-03

## 2025-06-03 RX ORDER — ACETAMINOPHEN 650 MG
TABLET, EXTENDED RELEASE ORAL
Status: DISCONTINUED | OUTPATIENT
Start: 2025-06-03 | End: 2025-06-03 | Stop reason: ALTCHOICE

## 2025-06-03 RX ORDER — CEFAZOLIN SODIUM 1 G/3ML
INJECTION, POWDER, FOR SOLUTION INTRAMUSCULAR; INTRAVENOUS
Status: DISCONTINUED | OUTPATIENT
Start: 2025-06-03 | End: 2025-06-03 | Stop reason: SDUPTHER

## 2025-06-03 RX ORDER — PROPOFOL 10 MG/ML
INJECTION, EMULSION INTRAVENOUS
Status: DISCONTINUED | OUTPATIENT
Start: 2025-06-03 | End: 2025-06-03 | Stop reason: SDUPTHER

## 2025-06-03 RX ORDER — ONDANSETRON 4 MG/1
4 TABLET, ORALLY DISINTEGRATING ORAL EVERY 8 HOURS PRN
Status: DISCONTINUED | OUTPATIENT
Start: 2025-06-03 | End: 2025-06-04 | Stop reason: HOSPADM

## 2025-06-03 RX ORDER — SUCCINYLCHOLINE/SOD CL,ISO/PF 100 MG/5ML
SYRINGE (ML) INTRAVENOUS
Status: DISCONTINUED | OUTPATIENT
Start: 2025-06-03 | End: 2025-06-03 | Stop reason: SDUPTHER

## 2025-06-03 RX ORDER — NEOMYCIN/BACITRACIN/POLYMYXINB 3.5-400-5K
OINTMENT (GRAM) TOPICAL 2 TIMES DAILY
Status: DISCONTINUED | OUTPATIENT
Start: 2025-06-03 | End: 2025-06-04 | Stop reason: HOSPADM

## 2025-06-03 RX ORDER — TRAMADOL HYDROCHLORIDE 50 MG/1
50 TABLET ORAL EVERY 6 HOURS PRN
Status: DISCONTINUED | OUTPATIENT
Start: 2025-06-03 | End: 2025-06-04 | Stop reason: HOSPADM

## 2025-06-03 RX ORDER — NICARDIPINE HYDROCHLORIDE 2.5 MG/ML
INJECTION INTRAVENOUS
Status: DISCONTINUED | OUTPATIENT
Start: 2025-06-03 | End: 2025-06-03 | Stop reason: SDUPTHER

## 2025-06-03 RX ORDER — FUROSEMIDE 20 MG/1
20 TABLET ORAL DAILY
Status: DISCONTINUED | OUTPATIENT
Start: 2025-06-03 | End: 2025-06-04 | Stop reason: HOSPADM

## 2025-06-03 RX ORDER — HYDRALAZINE HYDROCHLORIDE 20 MG/ML
10 INJECTION INTRAMUSCULAR; INTRAVENOUS
Status: DISCONTINUED | OUTPATIENT
Start: 2025-06-03 | End: 2025-06-04 | Stop reason: HOSPADM

## 2025-06-03 RX ORDER — SODIUM CHLORIDE 9 MG/ML
INJECTION, SOLUTION INTRAVENOUS PRN
Status: DISCONTINUED | OUTPATIENT
Start: 2025-06-03 | End: 2025-06-03 | Stop reason: HOSPADM

## 2025-06-03 RX ORDER — CARVEDILOL 25 MG/1
25 TABLET ORAL 2 TIMES DAILY WITH MEALS
Status: DISCONTINUED | OUTPATIENT
Start: 2025-06-03 | End: 2025-06-04 | Stop reason: HOSPADM

## 2025-06-03 RX ORDER — CLOPIDOGREL BISULFATE 75 MG/1
75 TABLET ORAL DAILY
Status: DISCONTINUED | OUTPATIENT
Start: 2025-06-03 | End: 2025-06-04 | Stop reason: HOSPADM

## 2025-06-03 RX ORDER — MAGNESIUM SULFATE IN WATER 40 MG/ML
2000 INJECTION, SOLUTION INTRAVENOUS PRN
Status: DISCONTINUED | OUTPATIENT
Start: 2025-06-03 | End: 2025-06-04 | Stop reason: HOSPADM

## 2025-06-03 RX ORDER — POTASSIUM CHLORIDE 7.45 MG/ML
10 INJECTION INTRAVENOUS PRN
Status: DISCONTINUED | OUTPATIENT
Start: 2025-06-03 | End: 2025-06-04 | Stop reason: HOSPADM

## 2025-06-03 RX ORDER — ONDANSETRON 2 MG/ML
4 INJECTION INTRAMUSCULAR; INTRAVENOUS EVERY 6 HOURS PRN
Status: DISCONTINUED | OUTPATIENT
Start: 2025-06-03 | End: 2025-06-04 | Stop reason: HOSPADM

## 2025-06-03 RX ORDER — ISOSORBIDE MONONITRATE 30 MG/1
30 TABLET, EXTENDED RELEASE ORAL EVERY MORNING
Status: DISCONTINUED | OUTPATIENT
Start: 2025-06-03 | End: 2025-06-04 | Stop reason: HOSPADM

## 2025-06-03 RX ORDER — POTASSIUM CHLORIDE 1500 MG/1
40 TABLET, EXTENDED RELEASE ORAL PRN
Status: DISCONTINUED | OUTPATIENT
Start: 2025-06-03 | End: 2025-06-04 | Stop reason: HOSPADM

## 2025-06-03 RX ORDER — GLUCAGON 1 MG/ML
1 KIT INJECTION PRN
Status: DISCONTINUED | OUTPATIENT
Start: 2025-06-03 | End: 2025-06-04 | Stop reason: HOSPADM

## 2025-06-03 RX ORDER — CHLORHEXIDINE GLUCONATE 40 MG/ML
SOLUTION TOPICAL ONCE
Status: DISCONTINUED | OUTPATIENT
Start: 2025-06-03 | End: 2025-06-03 | Stop reason: HOSPADM

## 2025-06-03 RX ORDER — DEXTROSE MONOHYDRATE 100 MG/ML
INJECTION, SOLUTION INTRAVENOUS CONTINUOUS PRN
Status: DISCONTINUED | OUTPATIENT
Start: 2025-06-03 | End: 2025-06-04 | Stop reason: HOSPADM

## 2025-06-03 RX ORDER — SODIUM CHLORIDE, SODIUM LACTATE, POTASSIUM CHLORIDE, CALCIUM CHLORIDE 600; 310; 30; 20 MG/100ML; MG/100ML; MG/100ML; MG/100ML
INJECTION, SOLUTION INTRAVENOUS CONTINUOUS
Status: DISCONTINUED | OUTPATIENT
Start: 2025-06-03 | End: 2025-06-04

## 2025-06-03 RX ORDER — SODIUM CHLORIDE 0.9 % (FLUSH) 0.9 %
5-40 SYRINGE (ML) INJECTION PRN
Status: DISCONTINUED | OUTPATIENT
Start: 2025-06-03 | End: 2025-06-04 | Stop reason: HOSPADM

## 2025-06-03 RX ORDER — BUPIVACAINE HYDROCHLORIDE 5 MG/ML
INJECTION, SOLUTION PERINEURAL
Status: DISCONTINUED | OUTPATIENT
Start: 2025-06-03 | End: 2025-06-03 | Stop reason: ALTCHOICE

## 2025-06-03 RX ORDER — NOREPINEPHRINE BITARTRATE 0.06 MG/ML
INJECTION, SOLUTION INTRAVENOUS
Status: DISCONTINUED | OUTPATIENT
Start: 2025-06-03 | End: 2025-06-03 | Stop reason: SDUPTHER

## 2025-06-03 RX ORDER — HYDROMORPHONE HYDROCHLORIDE 1 MG/ML
0.25 INJECTION, SOLUTION INTRAMUSCULAR; INTRAVENOUS; SUBCUTANEOUS
Status: DISCONTINUED | OUTPATIENT
Start: 2025-06-03 | End: 2025-06-04 | Stop reason: HOSPADM

## 2025-06-03 RX ORDER — ENOXAPARIN SODIUM 100 MG/ML
40 INJECTION SUBCUTANEOUS DAILY
Status: DISCONTINUED | OUTPATIENT
Start: 2025-06-04 | End: 2025-06-04 | Stop reason: HOSPADM

## 2025-06-03 RX ORDER — SODIUM CHLORIDE 0.9 % (FLUSH) 0.9 %
5-40 SYRINGE (ML) INJECTION PRN
Status: DISCONTINUED | OUTPATIENT
Start: 2025-06-03 | End: 2025-06-03 | Stop reason: HOSPADM

## 2025-06-03 RX ORDER — POLYETHYLENE GLYCOL 3350 17 G/17G
17 POWDER, FOR SOLUTION ORAL DAILY
Status: DISCONTINUED | OUTPATIENT
Start: 2025-06-03 | End: 2025-06-04 | Stop reason: HOSPADM

## 2025-06-03 RX ORDER — BISACODYL 5 MG/1
5 TABLET, DELAYED RELEASE ORAL DAILY PRN
Status: DISCONTINUED | OUTPATIENT
Start: 2025-06-03 | End: 2025-06-04 | Stop reason: HOSPADM

## 2025-06-03 RX ORDER — MAGNESIUM HYDROXIDE 1200 MG/15ML
LIQUID ORAL CONTINUOUS PRN
Status: COMPLETED | OUTPATIENT
Start: 2025-06-03 | End: 2025-06-03

## 2025-06-03 RX ORDER — PROTAMINE SULFATE 10 MG/ML
INJECTION, SOLUTION INTRAVENOUS
Status: DISCONTINUED | OUTPATIENT
Start: 2025-06-03 | End: 2025-06-03 | Stop reason: SDUPTHER

## 2025-06-03 RX ORDER — ROSUVASTATIN CALCIUM 20 MG/1
20 TABLET, COATED ORAL DAILY
Status: DISCONTINUED | OUTPATIENT
Start: 2025-06-03 | End: 2025-06-04 | Stop reason: HOSPADM

## 2025-06-03 RX ORDER — GLYCOPYRROLATE 0.2 MG/ML
INJECTION INTRAMUSCULAR; INTRAVENOUS
Status: DISCONTINUED | OUTPATIENT
Start: 2025-06-03 | End: 2025-06-03 | Stop reason: SDUPTHER

## 2025-06-03 RX ORDER — FAMOTIDINE 20 MG/1
20 TABLET, FILM COATED ORAL 2 TIMES DAILY
Status: DISCONTINUED | OUTPATIENT
Start: 2025-06-03 | End: 2025-06-04 | Stop reason: HOSPADM

## 2025-06-03 RX ORDER — HEPARIN SODIUM 200 [USP'U]/100ML
INJECTION, SOLUTION INTRAVENOUS PRN
Status: DISCONTINUED | OUTPATIENT
Start: 2025-06-03 | End: 2025-06-03 | Stop reason: HOSPADM

## 2025-06-03 RX ORDER — LIDOCAINE HYDROCHLORIDE 20 MG/ML
INJECTION, SOLUTION INTRAVENOUS
Status: DISCONTINUED | OUTPATIENT
Start: 2025-06-03 | End: 2025-06-03 | Stop reason: SDUPTHER

## 2025-06-03 RX ORDER — ROCURONIUM BROMIDE 10 MG/ML
INJECTION, SOLUTION INTRAVENOUS
Status: DISCONTINUED | OUTPATIENT
Start: 2025-06-03 | End: 2025-06-03 | Stop reason: SDUPTHER

## 2025-06-03 RX ORDER — GINSENG 100 MG
CAPSULE ORAL
Status: DISCONTINUED | OUTPATIENT
Start: 2025-06-03 | End: 2025-06-03 | Stop reason: ALTCHOICE

## 2025-06-03 RX ORDER — LOSARTAN POTASSIUM 25 MG/1
50 TABLET ORAL 2 TIMES DAILY
Status: DISCONTINUED | OUTPATIENT
Start: 2025-06-03 | End: 2025-06-04 | Stop reason: HOSPADM

## 2025-06-03 RX ORDER — ISOSORBIDE MONONITRATE 30 MG/1
30 TABLET, EXTENDED RELEASE ORAL EVERY MORNING
Status: DISCONTINUED | OUTPATIENT
Start: 2025-06-04 | End: 2025-06-03

## 2025-06-03 RX ORDER — SODIUM CHLORIDE 0.9 % (FLUSH) 0.9 %
5-40 SYRINGE (ML) INJECTION EVERY 12 HOURS SCHEDULED
Status: DISCONTINUED | OUTPATIENT
Start: 2025-06-03 | End: 2025-06-03 | Stop reason: HOSPADM

## 2025-06-03 RX ORDER — MUPIROCIN 20 MG/G
OINTMENT TOPICAL ONCE
Status: DISCONTINUED | OUTPATIENT
Start: 2025-06-03 | End: 2025-06-03 | Stop reason: HOSPADM

## 2025-06-03 RX ORDER — IOPAMIDOL 755 MG/ML
INJECTION, SOLUTION INTRAVASCULAR PRN
Status: DISCONTINUED | OUTPATIENT
Start: 2025-06-03 | End: 2025-06-03 | Stop reason: HOSPADM

## 2025-06-03 RX ORDER — HEPARIN SODIUM 1000 [USP'U]/ML
INJECTION, SOLUTION INTRAVENOUS; SUBCUTANEOUS
Status: DISCONTINUED | OUTPATIENT
Start: 2025-06-03 | End: 2025-06-03 | Stop reason: SDUPTHER

## 2025-06-03 RX ORDER — ASPIRIN 300 MG/1
300 SUPPOSITORY RECTAL ONCE
Status: COMPLETED | OUTPATIENT
Start: 2025-06-03 | End: 2025-06-03

## 2025-06-03 RX ORDER — INSULIN LISPRO 100 [IU]/ML
0-4 INJECTION, SOLUTION INTRAVENOUS; SUBCUTANEOUS
Status: DISCONTINUED | OUTPATIENT
Start: 2025-06-03 | End: 2025-06-04 | Stop reason: HOSPADM

## 2025-06-03 RX ORDER — DEXAMETHASONE SODIUM PHOSPHATE 4 MG/ML
INJECTION, SOLUTION INTRA-ARTICULAR; INTRALESIONAL; INTRAMUSCULAR; INTRAVENOUS; SOFT TISSUE
Status: DISCONTINUED | OUTPATIENT
Start: 2025-06-03 | End: 2025-06-03 | Stop reason: SDUPTHER

## 2025-06-03 RX ORDER — ASPIRIN 81 MG/1
81 TABLET, CHEWABLE ORAL DAILY
Status: DISCONTINUED | OUTPATIENT
Start: 2025-06-04 | End: 2025-06-04 | Stop reason: HOSPADM

## 2025-06-03 RX ORDER — SODIUM CHLORIDE 9 MG/ML
INJECTION, SOLUTION INTRAVENOUS PRN
Status: DISCONTINUED | OUTPATIENT
Start: 2025-06-03 | End: 2025-06-04 | Stop reason: HOSPADM

## 2025-06-03 RX ORDER — POTASSIUM CHLORIDE 7.45 MG/ML
10 INJECTION INTRAVENOUS PRN
Status: DISCONTINUED | OUTPATIENT
Start: 2025-06-03 | End: 2025-06-03

## 2025-06-03 RX ORDER — ONDANSETRON 2 MG/ML
INJECTION INTRAMUSCULAR; INTRAVENOUS
Status: DISCONTINUED | OUTPATIENT
Start: 2025-06-03 | End: 2025-06-03 | Stop reason: SDUPTHER

## 2025-06-03 RX ADMIN — Medication 100 MG: at 12:54

## 2025-06-03 RX ADMIN — ASPIRIN 300 MG: 300 SUPPOSITORY RECTAL at 14:33

## 2025-06-03 RX ADMIN — NOREPINEPHRINE BITARTRATE 4 MCG/MIN: 0.06 INJECTION, SOLUTION INTRAVENOUS at 11:17

## 2025-06-03 RX ADMIN — LOSARTAN POTASSIUM 50 MG: 25 TABLET, FILM COATED ORAL at 17:03

## 2025-06-03 RX ADMIN — GLYCOPYRROLATE 0.2 MG: 0.2 INJECTION, SOLUTION INTRAMUSCULAR; INTRAVENOUS at 11:31

## 2025-06-03 RX ADMIN — SUGAMMADEX 200 MG: 100 INJECTION, SOLUTION INTRAVENOUS at 12:29

## 2025-06-03 RX ADMIN — ONDANSETRON 4 MG: 2 INJECTION INTRAMUSCULAR; INTRAVENOUS at 11:11

## 2025-06-03 RX ADMIN — PROPOFOL 100 MG: 10 INJECTION, EMULSION INTRAVENOUS at 12:54

## 2025-06-03 RX ADMIN — WATER 2000 MG: 1 INJECTION INTRAMUSCULAR; INTRAVENOUS; SUBCUTANEOUS at 20:31

## 2025-06-03 RX ADMIN — CEFAZOLIN 2 G: 1 INJECTION, POWDER, FOR SOLUTION INTRAMUSCULAR; INTRAVENOUS; PARENTERAL at 11:16

## 2025-06-03 RX ADMIN — SODIUM CHLORIDE, SODIUM LACTATE, POTASSIUM CHLORIDE, AND CALCIUM CHLORIDE: .6; .31; .03; .02 INJECTION, SOLUTION INTRAVENOUS at 14:28

## 2025-06-03 RX ADMIN — NICARDIPINE HYDROCHLORIDE 0.1 MG: 25 INJECTION INTRAVENOUS at 12:04

## 2025-06-03 RX ADMIN — CLOPIDOGREL BISULFATE 75 MG: 75 TABLET, FILM COATED ORAL at 15:02

## 2025-06-03 RX ADMIN — FAMOTIDINE 20 MG: 20 TABLET, FILM COATED ORAL at 20:32

## 2025-06-03 RX ADMIN — POTASSIUM CHLORIDE 40 MEQ: 1500 TABLET, EXTENDED RELEASE ORAL at 18:18

## 2025-06-03 RX ADMIN — FENTANYL CITRATE 50 MCG: 50 INJECTION, SOLUTION INTRAMUSCULAR; INTRAVENOUS at 11:02

## 2025-06-03 RX ADMIN — ROCURONIUM BROMIDE 100 MG: 10 INJECTION, SOLUTION INTRAVENOUS at 11:05

## 2025-06-03 RX ADMIN — ROSUVASTATIN CALCIUM 20 MG: 20 TABLET, FILM COATED ORAL at 17:03

## 2025-06-03 RX ADMIN — BACITRACIN ZINC, NEOMYCIN, POLYMYXIN B SULFAT: 5000; 3.5; 4 OINTMENT TOPICAL at 20:34

## 2025-06-03 RX ADMIN — SODIUM CHLORIDE: 9 INJECTION, SOLUTION INTRAVENOUS at 10:58

## 2025-06-03 RX ADMIN — NICARDIPINE HYDROCHLORIDE 10 MG/HR: 2.5 INJECTION, SOLUTION INTRAVENOUS at 12:22

## 2025-06-03 RX ADMIN — INSULIN LISPRO 2 UNITS: 100 INJECTION, SOLUTION INTRAVENOUS; SUBCUTANEOUS at 20:32

## 2025-06-03 RX ADMIN — NICARDIPINE HYDROCHLORIDE 0.1 MG: 25 INJECTION INTRAVENOUS at 12:20

## 2025-06-03 RX ADMIN — FAMOTIDINE 20 MG: 10 INJECTION, SOLUTION INTRAVENOUS at 14:34

## 2025-06-03 RX ADMIN — HEPARIN SODIUM 10000 UNITS: 1000 INJECTION INTRAVENOUS; SUBCUTANEOUS at 11:42

## 2025-06-03 RX ADMIN — MAGNESIUM SULFATE HEPTAHYDRATE 2000 MG: 40 INJECTION, SOLUTION INTRAVENOUS at 18:55

## 2025-06-03 RX ADMIN — SODIUM CHLORIDE, PRESERVATIVE FREE 10 ML: 5 INJECTION INTRAVENOUS at 20:31

## 2025-06-03 RX ADMIN — INSULIN LISPRO 3 UNITS: 100 INJECTION, SOLUTION INTRAVENOUS; SUBCUTANEOUS at 18:35

## 2025-06-03 RX ADMIN — PROPOFOL 40 MG: 10 INJECTION, EMULSION INTRAVENOUS at 11:38

## 2025-06-03 RX ADMIN — VANCOMYCIN HYDROCHLORIDE 1000 MG: 1 INJECTION, POWDER, LYOPHILIZED, FOR SOLUTION INTRAVENOUS at 16:03

## 2025-06-03 RX ADMIN — DEXAMETHASONE SODIUM PHOSPHATE 8 MG: 4 INJECTION, SOLUTION INTRAMUSCULAR; INTRAVENOUS at 11:11

## 2025-06-03 RX ADMIN — PROPOFOL 120 MG: 10 INJECTION, EMULSION INTRAVENOUS at 11:05

## 2025-06-03 RX ADMIN — LIDOCAINE HYDROCHLORIDE 100 MG: 20 INJECTION, SOLUTION INTRAVENOUS at 11:05

## 2025-06-03 RX ADMIN — FENTANYL CITRATE 50 MCG: 50 INJECTION, SOLUTION INTRAMUSCULAR; INTRAVENOUS at 11:12

## 2025-06-03 RX ADMIN — SODIUM CHLORIDE: 9 INJECTION, SOLUTION INTRAVENOUS at 12:58

## 2025-06-03 RX ADMIN — ISOSORBIDE MONONITRATE 30 MG: 30 TABLET, EXTENDED RELEASE ORAL at 17:13

## 2025-06-03 RX ADMIN — NICARDIPINE HYDROCHLORIDE 5 MG/HR: 2.5 INJECTION, SOLUTION INTRAVENOUS at 13:36

## 2025-06-03 RX ADMIN — NICARDIPINE HYDROCHLORIDE 0.1 MG: 25 INJECTION INTRAVENOUS at 12:15

## 2025-06-03 RX ADMIN — PROTAMINE SULFATE 50 MG: 10 INJECTION, SOLUTION INTRAVENOUS at 12:17

## 2025-06-03 RX ADMIN — HYDROMORPHONE HYDROCHLORIDE 0.5 MG: 1 INJECTION, SOLUTION INTRAMUSCULAR; INTRAVENOUS; SUBCUTANEOUS at 12:16

## 2025-06-03 RX ADMIN — GLYCOPYRROLATE 0.2 MG: 0.2 INJECTION, SOLUTION INTRAMUSCULAR; INTRAVENOUS at 11:17

## 2025-06-03 ASSESSMENT — LIFESTYLE VARIABLES: SMOKING_STATUS: 0

## 2025-06-03 NOTE — ANESTHESIA PROCEDURE NOTES
Arterial Line:    An arterial line was placed using surface landmarks, in the OR for the following indication(s): continuous blood pressure monitoring.    A 20 gauge (size), 4.45 cm (length), Arrow (type) catheter was placed, Seldinger technique used, into the right radial artery, secured by Tegaderm.  Anesthesia type: General    Events:  patient tolerated procedure well with no complications.6/3/2025 11:08 AM6/3/2025 11:11 AM  Anesthesiologist: Hubert Tello MD  Resident/CRNA: Sreedhar Yost APRN - SHANIQUE  Other anesthesia staff: Debora Rey  Performed: Other staff   Preanesthetic Checklist  Completed: patient identified, IV checked, site marked, risks and benefits discussed, surgical/procedural consents, equipment checked, pre-op evaluation, timeout performed, anesthesia consent given, oxygen available, monitors applied/VS acknowledged, fire risk safety assessment completed and verbalized and blood product R/B/A discussed and consented

## 2025-06-03 NOTE — CONSULTS
V2.0  [facility] Consult Note      Name:  Ivania Garcia /Age/Sex: 1950  (74 y.o. female)   MRN & CSN:  [id number] & [id number] Encounter Date/Time: 6/3/2025 2:37 PM EDT   Location:  -A PCP: Blaire Yancey DO     Attending:Roger Starks,*  Consulting Provider: Caorle Chase MD      Hospital Day: 1    Assessment and Recommendations   Ivania Garcia is a 74 y.o. female who presents with Bilateral carotid artery stenosis    Hospital Problems           Last Modified POA    * (Principal) Bilateral carotid artery stenosis 2025 Unknown    Left carotid stenosis 6/3/2025 Yes       Recommendations:    #Left carotid artery stenosis s/p TCAR  Care per primary CV surgery team  Aspirin and statin    #Type 2 diabetes  - Takes Januvia and glipizide at home  -Last A1c from 2025 is 6.9  Low dose sliding scale    #Mixed HLD  Continue Crestor 20 mg daily      Diet ADULT DIET; Regular; 4 carb choices (60 gm/meal); Low Fat/Low Chol/High Fiber/2 gm Na   DVT Prophylaxis [x] Lovenox, []  Heparin, [] SCDs, [] Ambulation,  [] Eliquis, [] Xarelto   Code Status Full Code   Surrogate Decision Maker/ POA  spouse     History Obtained From:    patient, electronic medical record    History of Present Illness:     Chief Complaint: Bilateral carotid artery stenosis    Ivania Garcia is a 74 y.o. female who is seen today by the hospitalist team at the request of Dr. Starks.  Patient presented for left TCAR.  Hospitalist consult for medical management postop.  Patient seen postop,patient with some neck pain. No other complaints. Denies SOB or chest pain.       Review of Systems:    As above    Objective:     Intake/Output Summary (Last 24 hours) at 6/3/2025 1437  Last data filed at 6/3/2025 1316  Gross per 24 hour   Intake 1010 ml   Output 200 ml   Net 810 ml      Vitals:   Vitals:    25 0823 25 1106   BP: (!) 123/98    Pulse: 60    Resp: 15    Temp: 97 °F (36.1 °C)    TempSrc: Temporal    SpO2: 97%

## 2025-06-03 NOTE — ANESTHESIA POSTPROCEDURE EVALUATION
Department of Anesthesiology  Postprocedure Note    Patient: Ivania Garcia  MRN: [id number]  YOB: 1950  Date of evaluation: 6/3/2025    Procedure Summary       Date: 06/03/25 Room / Location: [facility] CATH LAB  / [facility] CARDIAC CATH LAB    Anesthesia Start: 1058 Anesthesia Stop: 1340    Procedure: Transcarotid artery revascularization (TCAR) Diagnosis:       Bilateral carotid artery stenosis      (Bilateral carotid artery stenosis [I65.23])    Providers: Roger Starks MD Responsible Provider: Hubert Tello MD    Anesthesia Type: General ASA Status: 4            Anesthesia Type: General    Naila Phase I: Naila Score: 10    Naila Phase II:      Anesthesia Post Evaluation    Patient location during evaluation: ICU  Patient participation: complete - patient participated  Level of consciousness: sleepy but conscious  Airway patency: patent  Nausea & Vomiting: no nausea and no vomiting  Cardiovascular status: hemodynamically stable  Respiratory status: acceptable, spontaneous ventilation and face mask  Hydration status: stable  Pain management: adequate    No notable events documented.

## 2025-06-03 NOTE — OP NOTE
Operative Note      Patient: Ivania Garcia  YOB: 1950  MRN: [id number]    Date of Procedure: 6/3/2025    Pre-Op Diagnosis Codes:   Recurrent left carotid stenosis  S/p Left carotid endarterectomy by the previous group at a different institution in 2024    Post-Op Diagnosis: Same       Procedure(s):  Transcarotid artery revascularization (TCAR) left using a 9mm x 7 mm x 40 mm tapered stent    Surgeon(s):  Roger Starks MD    Assistant:   Magaly Garcia SA. Our first assistant performed all necessary activities including exposure, first assistant role and assisting with the closure throughout the entire procedure.       Anesthesia: General    Estimated Blood Loss (mL): 100cc    Complications: None    Specimens:   * No specimens in log *    Implants:  Implant Name Type Inv. Item Serial No.  Lot No. LRB No. Used Action   STENT CAR L 40 MM BRADLEY 9-7 MM DEL SHTH L 57 CM BRADLEY 6 FR ART - [id number] Carotid stents STENT CAR L 40 MM BRADLEY 9-7 MM DEL SHTH L 57 CM BRADLEY 6 FR ART  [id number] N/A 1 Implanted         Drains:   Gastrostomy/Enterostomy/Jejunostomy Tube Feeding Jejunostomy LLQ (Active)       Findings:  Infection Present At Time Of Surgery (PATOS) (choose all levels that have infection present):  No infection present  Other Findings: The patient had a nice result after the second balloon post-dilation. After the initial placement of the stent, there was a mild residual stenosis, which was gone after post-dilation with a 4.5mm balloon.  Detailed Description of Procedure:   After full informed consent was obtained the patient was brought to the operating room.  There he was positioned in the supine position and after general anesthesia patient successfully accomplished and the appropriate arterial line was placed she was positioned for a TCAR procedure.  We placed a shoulder roll and had the head head turned to the contralateral side.  A surface ultrasound exam

## 2025-06-03 NOTE — H&P
H&P note  I have seen the patient today and performed a cursory exam. She feels well. The office H&P note below was reviewed, there has been no change.    Full informed consent was obtained. All the risk and benefits of the proposed procedure and all alternatives, including medical treatment were discussed in detail with the patient and her family. Multiple questions were asked. All questions were answered. There were no further questions. We scheduled the procedure for this am, 6/3/2025.     Roger Starks MD      Previous office note:    Blaire Yancey, DO   [address] [place] [address]     Tom Sim MD                                                                                         Re: [place]     Dear Dr. Yancey,  Dear Dr. Sim,     I had the pleasure of seeing your patient Ivania Garcia (74 y.o. female) today in office for a consultation regarding her peripheral arterial diease. She was a prior patient of Dr Monsivais and is here today for a follow-up visit.   She had carotid endarterectomy knees on both sides.  She had one on the right side in 2022 and recently the left side by Dr. Monsivais in September 2024.  As there was a possible recurrent stenosis on the left side we ordered a CT angiography of her neck and a repeat carotid duplex scan during her last visit.  She comes today to review the results.  She feels well and has no complaints.     Current Medications:    Current Medication      Current Outpatient Medications:     aspirin 81 MG EC tablet, Take 1 tablet by mouth daily, Disp: 90 tablet, Rfl: 0    nitroGLYCERIN (NITROSTAT) 0.4 MG SL tablet, up to max of 3 total doses. If no relief after 1 dose, call 911. (Patient taking differently: Place 1 tablet under the tongue up to max of 3 total doses. If no relief after 1 dose, call 911.), Disp: 25 tablet, Rfl: 1    glipiZIDE (GLUCOTROL) 5 MG tablet, TAKE 1 TABLET BY MOUTH TWICE DAILY BEFORE MEALS, Disp: 180 tablet,

## 2025-06-03 NOTE — CONSULTS
PHARMACY VANCOMYCIN MONITORING SERVICE  Pharmacy consulted by Rochelle KELLER for monitoring and adjustment.    Indication for treatment: Surgical prophylaxis    Pertinent Laboratory Values:   Temp Readings from Last 3 Encounters:   06/03/25 97 °F (36.1 °C) (Temporal)   05/25/23 97.8 °F (36.6 °C) (Oral)   05/22/23 98.4 °F (36.9 °C) (Oral)     Estimated Creatinine Clearance: 36 mL/min (A) (based on SCr of 1.3 mg/dL (H)).  Recent Labs     06/03/25  0837   WBC 7.5     Recent Labs     06/03/25  0837   BUN 24*   CREATININE 1.3*       Intake/Output Summary (Last 24 hours) at 6/3/2025 1352  Last data filed at 6/3/2025 1316  Gross per 24 hour   Intake 1010 ml   Output 200 ml   Net 810 ml     Last Encounter Weight:  Wt Readings from Last 3 Encounters:   06/03/25 70.3 kg (155 lb)   05/28/25 70.5 kg (155 lb 8 oz)   04/17/25 71.3 kg (157 lb 4 oz)     Ideal body weight: 54.7 kg (120 lb 9.5 oz)  Adjusted ideal body weight: 60.9 kg (134 lb 5.7 oz)     Pertinent Cultures:   Date    Source    Results  None    Vancomycin level:   No results for input(s): \"VANCORANDOM\" in the last 72 hours.    Assessment:  HPI: The patient is a 74 y.o. female who presented with PAD, now s/p TCAR procedure with Dr. Starks.  Scr, BUN, and urine output: Scr 1.3 mg/dL and BUN 24    Plan:  Patient did not receive vancomycin dose pre-op  Will start post-op surgical prophylaxis with vancomycin 1000 mg (15 mg/kg) IV q24h x 2 doses for a total duration of 24 hours from procedure.  No vancomycin level indicated.  Thank you for the consult.    Thank you for the consult.  Shandra Hunter [facility], PharmD.  6/3/2025 1:52 PM

## 2025-06-04 VITALS
HEART RATE: 58 BPM | DIASTOLIC BLOOD PRESSURE: 58 MMHG | OXYGEN SATURATION: 99 % | TEMPERATURE: 98 F | BODY MASS INDEX: 26.67 KG/M2 | HEIGHT: 64 IN | RESPIRATION RATE: 14 BRPM | SYSTOLIC BLOOD PRESSURE: 160 MMHG | WEIGHT: 156.2 LBS

## 2025-06-04 LAB
ANION GAP SERPL CALCULATED.3IONS-SCNC: 11 MMOL/L (ref 9–17)
BUN SERPL-MCNC: 22 MG/DL (ref 7–20)
CALCIUM SERPL-MCNC: 9 MG/DL (ref 8.3–10.6)
CHLORIDE SERPL-SCNC: 105 MMOL/L (ref 99–110)
CO2 SERPL-SCNC: 24 MMOL/L (ref 21–32)
CREAT SERPL-MCNC: 1.2 MG/DL (ref 0.6–1.2)
ECHO BSA: 1.78 M2
EKG ATRIAL RATE: 56 BPM
EKG DIAGNOSIS: NORMAL
EKG P AXIS: 51 DEGREES
EKG P-R INTERVAL: 156 MS
EKG Q-T INTERVAL: 508 MS
EKG QRS DURATION: 98 MS
EKG QTC CALCULATION (BAZETT): 490 MS
EKG R AXIS: -18 DEGREES
EKG T AXIS: 88 DEGREES
EKG VENTRICULAR RATE: 56 BPM
ERYTHROCYTE [DISTWIDTH] IN BLOOD BY AUTOMATED COUNT: 15 % (ref 11.7–14.9)
GFR, ESTIMATED: 45 ML/MIN/1.73M2
GLUCOSE BLD-MCNC: 176 MG/DL (ref 74–99)
GLUCOSE BLD-MCNC: 207 MG/DL (ref 74–99)
GLUCOSE SERPL-MCNC: 188 MG/DL (ref 74–99)
HCT VFR BLD AUTO: 32.6 % (ref 37–47)
HGB BLD-MCNC: 9.8 G/DL (ref 12.5–16)
INR PPP: 1
MAGNESIUM SERPL-MCNC: 1.9 MG/DL (ref 1.8–2.4)
MCH RBC QN AUTO: 25.5 PG (ref 27–31)
MCHC RBC AUTO-ENTMCNC: 30.1 G/DL (ref 32–36)
MCV RBC AUTO: 84.7 FL (ref 78–100)
PARTIAL THROMBOPLASTIN TIME: 25.3 SEC (ref 25.1–37.1)
PHOSPHATE SERPL-MCNC: 3.2 MG/DL (ref 2.5–4.9)
PLATELET # BLD AUTO: 203 K/UL (ref 140–440)
PMV BLD AUTO: 11.6 FL (ref 7.5–11.1)
POTASSIUM SERPL-SCNC: 4.9 MMOL/L (ref 3.5–5.1)
PROTHROMBIN TIME: 13.1 SEC (ref 11.7–14.5)
RBC # BLD AUTO: 3.85 M/UL (ref 4.2–5.4)
SODIUM SERPL-SCNC: 139 MMOL/L (ref 136–145)
WBC OTHER # BLD: 15.3 K/UL (ref 4–10.5)

## 2025-06-04 PROCEDURE — 2500000003 HC RX 250 WO HCPCS: Performed by: PHYSICIAN ASSISTANT

## 2025-06-04 PROCEDURE — 6360000002 HC RX W HCPCS: Performed by: PHYSICIAN ASSISTANT

## 2025-06-04 PROCEDURE — 85027 COMPLETE CBC AUTOMATED: CPT

## 2025-06-04 PROCEDURE — 93010 ELECTROCARDIOGRAM REPORT: CPT | Performed by: INTERNAL MEDICINE

## 2025-06-04 PROCEDURE — 80048 BASIC METABOLIC PNL TOTAL CA: CPT

## 2025-06-04 PROCEDURE — 97162 PT EVAL MOD COMPLEX 30 MIN: CPT

## 2025-06-04 PROCEDURE — 97535 SELF CARE MNGMENT TRAINING: CPT

## 2025-06-04 PROCEDURE — 97116 GAIT TRAINING THERAPY: CPT

## 2025-06-04 PROCEDURE — 97166 OT EVAL MOD COMPLEX 45 MIN: CPT

## 2025-06-04 PROCEDURE — 83735 ASSAY OF MAGNESIUM: CPT

## 2025-06-04 PROCEDURE — 85610 PROTHROMBIN TIME: CPT

## 2025-06-04 PROCEDURE — 85730 THROMBOPLASTIN TIME PARTIAL: CPT

## 2025-06-04 PROCEDURE — 84100 ASSAY OF PHOSPHORUS: CPT

## 2025-06-04 PROCEDURE — 82962 GLUCOSE BLOOD TEST: CPT

## 2025-06-04 PROCEDURE — 94761 N-INVAS EAR/PLS OXIMETRY MLT: CPT

## 2025-06-04 PROCEDURE — 6370000000 HC RX 637 (ALT 250 FOR IP): Performed by: INTERNAL MEDICINE

## 2025-06-04 PROCEDURE — 6370000000 HC RX 637 (ALT 250 FOR IP): Performed by: PHYSICIAN ASSISTANT

## 2025-06-04 RX ORDER — TRAMADOL HYDROCHLORIDE 50 MG/1
50 TABLET ORAL EVERY 8 HOURS PRN
Qty: 10 TABLET | Refills: 0 | Status: SHIPPED | OUTPATIENT
Start: 2025-06-04 | End: 2025-06-14

## 2025-06-04 RX ORDER — NEOMYCIN/BACITRACIN/POLYMYXINB 3.5-400-5K
OINTMENT (GRAM) TOPICAL
Qty: 28 G | Refills: 1 | Status: SHIPPED | OUTPATIENT
Start: 2025-06-04

## 2025-06-04 RX ORDER — LIDOCAINE HYDROCHLORIDE 10 MG/ML
5 INJECTION, SOLUTION EPIDURAL; INFILTRATION; INTRACAUDAL; PERINEURAL ONCE
Status: COMPLETED | OUTPATIENT
Start: 2025-06-04 | End: 2025-06-04

## 2025-06-04 RX ADMIN — ROSUVASTATIN CALCIUM 20 MG: 20 TABLET, FILM COATED ORAL at 08:02

## 2025-06-04 RX ADMIN — ASPIRIN 81 MG: 81 TABLET, CHEWABLE ORAL at 08:02

## 2025-06-04 RX ADMIN — SODIUM CHLORIDE, PRESERVATIVE FREE 10 ML: 5 INJECTION INTRAVENOUS at 08:03

## 2025-06-04 RX ADMIN — CLOPIDOGREL BISULFATE 75 MG: 75 TABLET, FILM COATED ORAL at 08:02

## 2025-06-04 RX ADMIN — FUROSEMIDE 20 MG: 20 TABLET ORAL at 10:12

## 2025-06-04 RX ADMIN — ENOXAPARIN SODIUM 40 MG: 100 INJECTION SUBCUTANEOUS at 08:02

## 2025-06-04 RX ADMIN — POLYETHYLENE GLYCOL (3350) 17 G: 17 POWDER, FOR SOLUTION ORAL at 08:03

## 2025-06-04 RX ADMIN — WATER 2000 MG: 1 INJECTION INTRAMUSCULAR; INTRAVENOUS; SUBCUTANEOUS at 05:08

## 2025-06-04 RX ADMIN — INSULIN LISPRO 1 UNITS: 100 INJECTION, SOLUTION INTRAVENOUS; SUBCUTANEOUS at 11:52

## 2025-06-04 RX ADMIN — MAGNESIUM SULFATE HEPTAHYDRATE 2000 MG: 40 INJECTION, SOLUTION INTRAVENOUS at 07:25

## 2025-06-04 RX ADMIN — WATER 2000 MG: 1 INJECTION INTRAMUSCULAR; INTRAVENOUS; SUBCUTANEOUS at 11:52

## 2025-06-04 RX ADMIN — ISOSORBIDE MONONITRATE 30 MG: 30 TABLET, EXTENDED RELEASE ORAL at 08:02

## 2025-06-04 RX ADMIN — LOSARTAN POTASSIUM 50 MG: 25 TABLET, FILM COATED ORAL at 08:02

## 2025-06-04 RX ADMIN — BACITRACIN ZINC, NEOMYCIN, POLYMYXIN B SULFAT: 5000; 3.5; 4 OINTMENT TOPICAL at 08:05

## 2025-06-04 RX ADMIN — FAMOTIDINE 20 MG: 20 TABLET, FILM COATED ORAL at 08:02

## 2025-06-04 RX ADMIN — LIDOCAINE HYDROCHLORIDE 5 ML: 10 INJECTION, SOLUTION EPIDURAL; INFILTRATION; INTRACAUDAL; PERINEURAL at 13:58

## 2025-06-04 NOTE — PLAN OF CARE
Problem: Discharge Planning  Goal: Discharge to home or other facility with appropriate resources  6/4/2025 0228 by Wesley Blandon RN  Outcome: Progressing  Flowsheets (Taken 6/3/2025 2000)  Discharge to home or other facility with appropriate resources: Identify barriers to discharge with patient and caregiver  6/3/2025 1509 by Calista Crow RN  Outcome: Progressing     Problem: Chronic Conditions and Co-morbidities  Goal: Patient's chronic conditions and co-morbidity symptoms are monitored and maintained or improved  6/4/2025 0228 by Wesley Blandon RN  Outcome: Progressing  Flowsheets (Taken 6/3/2025 2000)  Care Plan - Patient's Chronic Conditions and Co-Morbidity Symptoms are Monitored and Maintained or Improved: Monitor and assess patient's chronic conditions and comorbid symptoms for stability, deterioration, or improvement  6/3/2025 1509 by Calista Crow RN  Outcome: Progressing     Problem: Safety - Adult  Goal: Free from fall injury  6/4/2025 0228 by Wesley Blandon RN  Outcome: Progressing  Flowsheets (Taken 6/3/2025 2000)  Free From Fall Injury: Instruct family/caregiver on patient safety  6/3/2025 1509 by Calista Crow RN  Outcome: Progressing     Problem: ABCDS Injury Assessment  Goal: Absence of physical injury  Outcome: Progressing  Flowsheets (Taken 6/3/2025 2000)  Absence of Physical Injury: Implement safety measures based on patient assessment

## 2025-06-04 NOTE — CONSULTS
[facility] ACUTE CARE PHYSICAL THERAPY EVALUATION  Ivania Garcia, 1950, 2008/2008-A, 6/4/2025    History  [place]:  The primary encounter diagnosis was Left carotid stenosis. Diagnoses of Bilateral carotid artery stenosis and Stenosis of left carotid artery were also pertinent to this visit.  Patient  has a past medical history of Anemia, Arthritis, CAD (coronary artery disease), Carotid stenosis, Colon polyps, COVID-19, Diabetes mellitus type 2, uncontrolled, Diastolic dysfunction, Esophageal stricture, Hiatal hernia, History of blood transfusion, Hyperlipidemia, Hypertension, IBS (irritable bowel syndrome), Iron deficiency anemia, LVH (left ventricular hypertrophy), Multiple gastric polyps, PAD (peripheral artery disease), S/P CABG x 4, Sinus tachycardia, SVT (supraventricular tachycardia), and Vitamin D deficiency.  Patient  has a past surgical history that includes knee surgery; Tubal ligation (1978); Coronary artery bypass graft (08/02/2012); Balloon angioplasty, artery (Right, 08/19/2015); transluminal angioplasty (Left, 09/23/2015); Cardiac surgery; Cardiac catheterization (08/02/2012); Cholecystectomy (15+ yrs ago); Colonoscopy (2017); Endoscopy, colon, diagnostic (10/10/2017); Esophagus dilation; Carotid endarterectomy (Right, 02/18/2022); Gastric fundoplication (N/A, 04/01/2022); Upper gastrointestinal endoscopy (N/A, 07/11/2022); Stomach surgery (N/A, 07/14/2022); and Carotid endarterectomy (Left, 2024).    Recommendation: Home with HH PT and PRN assist    Subjective:  Patient states: \"Last year we decided to cook [employer] for June!\".    Pain:  reports mild pain in surgical sites at rest, does not provide numerical rating.    Communication with other providers:  RN approval for therapy session, Handoff to RN, co-eval with OT Andrea and OT student Cristal  Restrictions: general precautions, falls    Home Setup/Prior level of function  Social/Functional History  Lives With: Spouse  Type of

## 2025-06-04 NOTE — PROGRESS NOTES
V2.0    [facility] Progress Note      Name:  Ivania Garcia /Age/Sex: 1950  (74 y.o. female)   MRN & CSN:  [id number] & [id number] Encounter Date/Time: 2025 10:57 AM EDT   Location:  -A PCP: Blaire Yancey DO     Attending:Roger Starks,*       Hospital Day: 2    Assessment and Recommendations   Ivania Garcia is a 74 y.o. female  who presents with Bilateral carotid artery stenosis      #Left carotid artery stenosis s/p TCAR  Care per primary CV surgery team  Aspirin and statin     #Type 2 diabetes  -Takes Januvia and glipizide at home  -Last A1c from 2025 is 6.9  Low dose sliding scale     #Mixed HLD  Continue Crestor 20 mg daily        Personally reviewed Lab Studies and Imaging             Subjective:     Chief Complaint:     Patient seen and examined at bedside this morning.  Denies any complaint      Review of Systems:      Pertinent positives and negatives discussed in HPI    Objective:     Intake/Output Summary (Last 24 hours) at 2025 1057  Last data filed at 2025 1010  Gross per 24 hour   Intake 3249.22 ml   Output 1400 ml   Net 1849.22 ml      Vitals:   Vitals:    25 0900 25 1000 25 1012 25 1047   BP: 125/62 (!) 149/58 (!) 149/58    Pulse: 62 78     Resp: 15 20     Temp:       TempSrc:       SpO2: 99% 99%  99%   Weight:       Height:             Physical Exam:      General: NAD  Eyes: EOMI  ENT: neck supple  Cardiovascular: Regular rate.  Respiratory: Clear to auscultation  Gastrointestinal: Soft, non tender  Genitourinary: no suprapubic tenderness  Musculoskeletal: No edema  Skin: warm, dry  Neuro: Alert.  Psych: Mood appropriate.         Medications:   Medications:    [Held by provider] carvedilol  25 mg Oral BID WC    furosemide  20 mg Oral Daily    rosuvastatin  20 mg Oral Daily    sodium chloride flush  5-40 mL IntraVENous 2 times per day    enoxaparin  40 mg SubCUTAneous Daily    famotidine  20 mg Oral BID    Or    famotidine (PEPCID) 
1411: EKG completed; ARIANNA Hartman reviewed results. No new orders at this time.    
4 Eyes Skin Assessment     NAME:  Ivania Garcia  YOB: 1950  MEDICAL RECORD NUMBER:  [id number]    The patient is being assessed for  Post-Op Surgical    I agree that at least one RN has performed a thorough Head to Toe Skin Assessment on the patient. ALL assessment sites listed below have been assessed.      Areas assessed by both nurses:    Head, Face, Ears, Shoulders, Back, Chest, Arms, Elbows, Hands, Sacrum. Buttock, Coccyx, Ischium, Legs. Feet and Heels, and Under Medical Devices         Does the Patient have a Wound? No noted wound(s)       Slava Prevention initiated by RN: Yes  Wound Care Orders initiated by RN: No    Pressure Injury (Stage 3,4, Unstageable, DTI, NWPT, and Complex wounds) if present, place Wound referral order by RN under : No    New Ostomies, if present place, Ostomy referral order under : No     Nurse 1 eSignature: Electronically signed by Calista Crow RN on 6/3/25 at 2:00 PM EDT    **SHARE this note so that the co-signing nurse can place an eSignature**    Nurse 2 eSignature: Electronically signed by Jean Blandon RN on 6/4/25 at 7:07 AM EDT   
RN notified DIPAK Valdes and DIPAK Byrd of oozing R groin incision. Pt requiring multiple dressing changes. RN assisted DIPAK Valdes with placing suture in R Groin incision. RN cleaned site with chloraprep stick and then placed gauze with bacitracin over site with clear tegaderm. Pt educated that suture will be removed during first office visit per DIPAK Valdes. RN will continue to monitor site.   
To holding area.  Assessment completed and questions answered. Call light in reach.  
(ADLs):  Feeding: Independent   Grooming: SBA (completed hand hygiene, facial hygiene, and oral hygiene tasks of brushing/rinsing in standing at sink; min cues for safe body positioning during tasks)  UB bathing: SBA   LB bathing: Min A (for thoroughness with reaching distal LEs)  UB dressing: SBA (donning clean robe seated EOB)  LB dressing: Max A (dependent with donning BL socks at chair level, able to manage clothing to hips in standing with CGA for steadying)  Toileting: CGA (pt urinated while seated on regular toilet; CGA for steadying with clothing mgmt both directions, able to complete seated jennifer care without assist)    Cognitive and Psychosocial Functioning:  Overall cognitive status: WNL  Affect: Normal     Balance:   Sitting: SBA at edge of chair and on toilet  Standing: SBA with no AD static standing, CGA for safety with dynamic standing tasks    Functional Mobility:  Bed Mobility: Not observed. Pt received sitting in chair upon arrival  Transfers: CGA to/from chair and toilet (min cues for safe hand placement each direction)  Ambulation: CGA with no AD to/from bathroom for ADLs at sink + 200 ft in hallway (See PT evaluation for gait assessment)      AM-PAC 6 click short form for inpatient daily activity:   How much help from another person does the patient currently need... Unable  Dep A Lot  Max A A Lot   Mod A A Little  Min A A Little   CGA  SBA None   Mod I  Indep  Sup   1.  Putting on and taking off regular lower body clothing? [] 1    [x] 2   [] 2   [] 3   [] 3   [] 4      2. Bathing (including washing, rinsing, drying)? [] 1   [] 2   [] 2 [x] 3 [] 3 [] 4   3. Toileting, which includes using toilet, bedpan, or urinal? [] 1    [] 2   [] 2   [] 3   [x] 3   [] 4     4. Putting on and taking off regular upper body clothing? [] 1   [] 2   [] 2   [] 3   [x] 3    [] 4      5. Taking care of personal grooming such as brushing teeth? [] 1   [] 2    [] 2 [] 3    [x] 3   [] 4      6. Eating meals?   [] 1   []

## 2025-06-04 NOTE — PLAN OF CARE
Problem: Discharge Planning  Goal: Discharge to home or other facility with appropriate resources  6/4/2025 0721 by Ronen Ledesma RN  Outcome: Progressing  6/4/2025 0228 by Wesley Blandon RN  Outcome: Progressing  Flowsheets (Taken 6/3/2025 2000)  Discharge to home or other facility with appropriate resources: Identify barriers to discharge with patient and caregiver     Problem: Chronic Conditions and Co-morbidities  Goal: Patient's chronic conditions and co-morbidity symptoms are monitored and maintained or improved  6/4/2025 0721 by Ronen Ledesma RN  Outcome: Progressing  6/4/2025 0228 by Wesley Blandon RN  Outcome: Progressing  Flowsheets (Taken 6/3/2025 2000)  Care Plan - Patient's Chronic Conditions and Co-Morbidity Symptoms are Monitored and Maintained or Improved: Monitor and assess patient's chronic conditions and comorbid symptoms for stability, deterioration, or improvement     Problem: Safety - Adult  Goal: Free from fall injury  6/4/2025 0721 by Ronen Ledesma RN  Outcome: Progressing  6/4/2025 0228 by Wesley Blandon RN  Outcome: Progressing  Flowsheets (Taken 6/3/2025 2000)  Free From Fall Injury: Instruct family/caregiver on patient safety     Problem: ABCDS Injury Assessment  Goal: Absence of physical injury  6/4/2025 0721 by Ronen Ledesma RN  Outcome: Progressing  6/4/2025 0228 by Wesley Blandon RN  Outcome: Progressing  Flowsheets (Taken 6/3/2025 2000)  Absence of Physical Injury: Implement safety measures based on patient assessment

## 2025-06-04 NOTE — DISCHARGE SUMMARY
Cardiothoracic and Vascular Surgery Discharge Summary  Today's Date: 25  Name:  Ivania Garcia /Age/Sex: 1950  (74 y.o. female)   MRN & CSN:  [id number] & [id number] Admission Date/Time: 6/3/2025  7:55 AM   Location:  - PCP: Blaire Yancey DO       Admit date: 6/3/2025   Discharge date: 2025      Admitting Provider: Roger Starks MD   Discharge Provider: Rochelle Decker PA-C     Discharge Diagnoses:   Active Hospital Problems    Diagnosis Date Noted    Left carotid stenosis [I65.22] 2025     Priority: High    Bilateral carotid artery stenosis [I65.23] 2025     Discharged Condition: stable.        OPERATION: S/P Transcarotid artery revascularization (TCAR) left using a 9mm x 7 mm x 40 mm tapered stent on 6/3/25      Hospital Course:    Patient was extubated in the OR and admitted to ICU post op. Neurovascular checks completed Q1H until POD1 with no deficits noted. Dressing changed POD 1. Salmon catheter, and arterial line removed.  Dual antiplatelet therapy ordered with aspirin and Plavix.  Patient's right groin had some bleeding this morning.  Stitch placed per Betsy Sandoval.  Will remove that in the office.  Medication sent to the patient's pharmacy.  Patient educated on discharge instructions.  All questions answered.  Follow-up appointment scheduled. Patient is doing well today and is ready for DC.   PT/OT evaluated patient and is recommending be discharged to Home      Consults: Internal medicine, PT, OT, case management.    Disposition: home      VS at discharge   Vitals:    25 1200   BP: (!) 160/58   Pulse: 66   Resp: 17   Temp: 98 °F (36.7 °C)   SpO2: 99%       GEN:  WDWN, NAD, pleasant and conversational  Neck:  supple, no carotid bruits, no JVD  ENT: SILVANO  Chest: equal excursion  Lung:  CTAB  Heart: RRR, no murmur  Abd: soft, NT,ND, +BS  Ext: normal ROM, no BLE edema  Neuro: no focal deficits  Skin: Warm and dry, LE/groin/neck incision CDI.  No

## 2025-06-04 NOTE — CARE COORDINATION
CM reviewed pt’s medical record and discussed pt in IDR. CM introduced self and explained the role of case management. CM in to see pt to initiate D/C planning. Pt is alert, oriented, and kind. PTA pt was independent with mobility and ADLs. Pt has a PCP and insurance. Pt denies any food insecurities. CM is cooperative with CM assessment. Pt declined [facility] services. Plan for discharge is home with spouse. Declined HHC Services. Please notify CM if any new DC needs arise.     06/04/25 1250   Service Assessment   Patient Orientation Alert and Oriented;Person;Place;Situation;Self   Cognition Alert   History Provided By Patient;Medical Record   Support Systems Spouse/Significant Other;Family Members   Patient's Healthcare Decision Maker is: Legal Next of Kin   PCP Verified by CM Yes   Last Visit to PCP Within last 3 months   Prior Functional Level Independent in ADLs/IADLs   Current Functional Level Assistance with the following:;Mobility   Can patient return to prior living arrangement Yes   Ability to make needs known: Good   Family able to assist with home care needs: Yes   Would you like for me to discuss the discharge plan with any other family members/significant others, and if so, who? Yes  (spouse as needed)   Financial Resources Medicare   Community Resources None   CM/SW Referral Other (see comment)  (case management discharge assessment)   Social/Functional History   Lives With Spouse   Type of Home House   Home Layout One level   Home Access Level entry   Bathroom Shower/Tub Tub/Shower unit   Bathroom Toilet Standard   Bathroom Equipment Grab bars in shower   Bathroom Accessibility Accessible   Home Equipment None   Receives Help From Family   Prior Level of Assist for ADLs Independent   Prior Level of Assist for Homemaking Independent   Homemaking Responsibilities Yes   Prior Level of Assist for Transfers Independent   Active  Yes   Discharge Planning   Type of Residence House   Living Arrangements

## 2025-06-05 ENCOUNTER — OFFICE VISIT (OUTPATIENT)
Dept: CARDIOTHORACIC SURGERY | Age: 75
End: 2025-06-05

## 2025-06-05 VITALS
DIASTOLIC BLOOD PRESSURE: 84 MMHG | BODY MASS INDEX: 26.38 KG/M2 | WEIGHT: 154.5 LBS | OXYGEN SATURATION: 98 % | SYSTOLIC BLOOD PRESSURE: 132 MMHG | HEIGHT: 64 IN | HEART RATE: 61 BPM

## 2025-06-05 DIAGNOSIS — Z95.828 PRESENCE OF INTERNAL CAROTID STENT: Primary | ICD-10-CM

## 2025-06-05 LAB — SURGICAL PATHOLOGY REPORT: NORMAL

## 2025-06-05 PROCEDURE — 1123F ACP DISCUSS/DSCN MKR DOCD: CPT | Performed by: THORACIC SURGERY (CARDIOTHORACIC VASCULAR SURGERY)

## 2025-06-05 PROCEDURE — 1090F PRES/ABSN URINE INCON ASSESS: CPT | Performed by: THORACIC SURGERY (CARDIOTHORACIC VASCULAR SURGERY)

## 2025-06-05 PROCEDURE — 3075F SYST BP GE 130 - 139MM HG: CPT | Performed by: THORACIC SURGERY (CARDIOTHORACIC VASCULAR SURGERY)

## 2025-06-05 PROCEDURE — 1126F AMNT PAIN NOTED NONE PRSNT: CPT | Performed by: THORACIC SURGERY (CARDIOTHORACIC VASCULAR SURGERY)

## 2025-06-05 PROCEDURE — 1111F DSCHRG MED/CURRENT MED MERGE: CPT | Performed by: THORACIC SURGERY (CARDIOTHORACIC VASCULAR SURGERY)

## 2025-06-05 PROCEDURE — 1159F MED LIST DOCD IN RCRD: CPT | Performed by: THORACIC SURGERY (CARDIOTHORACIC VASCULAR SURGERY)

## 2025-06-05 PROCEDURE — 3079F DIAST BP 80-89 MM HG: CPT | Performed by: THORACIC SURGERY (CARDIOTHORACIC VASCULAR SURGERY)

## 2025-06-05 PROCEDURE — G8427 DOCREV CUR MEDS BY ELIG CLIN: HCPCS | Performed by: THORACIC SURGERY (CARDIOTHORACIC VASCULAR SURGERY)

## 2025-06-05 PROCEDURE — G8419 CALC BMI OUT NRM PARAM NOF/U: HCPCS | Performed by: THORACIC SURGERY (CARDIOTHORACIC VASCULAR SURGERY)

## 2025-06-05 PROCEDURE — G8400 PT W/DXA NO RESULTS DOC: HCPCS | Performed by: THORACIC SURGERY (CARDIOTHORACIC VASCULAR SURGERY)

## 2025-06-05 PROCEDURE — 99024 POSTOP FOLLOW-UP VISIT: CPT | Performed by: THORACIC SURGERY (CARDIOTHORACIC VASCULAR SURGERY)

## 2025-06-05 PROCEDURE — 3017F COLORECTAL CA SCREEN DOC REV: CPT | Performed by: THORACIC SURGERY (CARDIOTHORACIC VASCULAR SURGERY)

## 2025-06-05 PROCEDURE — 1036F TOBACCO NON-USER: CPT | Performed by: THORACIC SURGERY (CARDIOTHORACIC VASCULAR SURGERY)

## 2025-06-05 NOTE — PROGRESS NOTES
6/5/2025    Blaire Yancey, DO   [address] [place] [address]             Re: Ivania      Dear Dr. Yancey,    I had the pleasure of seeing your patient, Ivania Garcia (74 y.o. female) in follow up after her TCAR procedure on 5/3/2025.  She was just discharged yesterday and she comes today for a wound check because she had some oozing from her right groin venous access site.  She had to change her dressing several times..  Her   Current Outpatient Medications:     neomycin-bacitracin-polymyxin (NEOSPORIN) 5-400-5000 ointment, Apply topically 4 times daily., Disp: 28 g, Rfl: 1    rosuvastatin (CRESTOR) 20 MG tablet, TAKE 1 TABLET BY MOUTH DAILY, Disp: 90 tablet, Rfl: 1    furosemide (LASIX) 20 MG tablet, Take 1 tablet by mouth daily, Disp: , Rfl:     aspirin 81 MG EC tablet, Take 1 tablet by mouth daily, Disp: 90 tablet, Rfl: 0    glipiZIDE (GLUCOTROL) 5 MG tablet, TAKE 1 TABLET BY MOUTH TWICE DAILY BEFORE MEALS, Disp: 180 tablet, Rfl: 1    omeprazole (PRILOSEC) 40 MG delayed release capsule, Take 1 capsule by mouth daily, Disp: , Rfl:     isosorbide mononitrate (IMDUR) 30 MG extended release tablet, Take 1 tablet by mouth every morning, Disp: , Rfl:     SITagliptin (JANUVIA) 100 MG tablet, Take 1 tablet by mouth daily, Disp: 30 tablet, Rfl: 5    famotidine (PEPCID) 20 MG tablet, Take 1 tablet by mouth nightly as needed, Disp: , Rfl:     losartan (COZAAR) 50 MG tablet, Take 1 tablet by mouth in the morning and at bedtime, Disp: , Rfl:     acetaminophen (TYLENOL) 500 MG tablet, Take 2 tablets by mouth every 4 hours as needed for Pain, Disp: , Rfl:     nitroGLYCERIN (NITROSTAT) 0.4 MG SL tablet, up to max of 3 total doses. If no relief after 1 dose, call 911., Disp: 25 tablet, Rfl: 1    clopidogrel (PLAVIX) 75 MG tablet, Take 1 tablet by mouth daily, Disp: 30 tablet, Rfl: 3    traMADol (ULTRAM) 50 MG tablet, Take 1 tablet by mouth every 8 hours as needed for Pain for up to 10 days. Max Daily

## 2025-06-06 ENCOUNTER — TELEPHONE (OUTPATIENT)
Dept: INTERNAL MEDICINE CLINIC | Age: 75
End: 2025-06-06

## 2025-06-06 LAB
ABO/RH: NORMAL
ANTIBODY SCREEN: NEGATIVE
BLOOD BANK DISPENSE STATUS: NORMAL
BLOOD BANK DISPENSE STATUS: NORMAL
BLOOD BANK SAMPLE EXPIRATION: NORMAL
BPU ID: NORMAL
BPU ID: NORMAL
COMPONENT: NORMAL
COMPONENT: NORMAL
CROSSMATCH RESULT: NORMAL
CROSSMATCH RESULT: NORMAL
TRANSFUSION STATUS: NORMAL
TRANSFUSION STATUS: NORMAL
UNIT DIVISION: 0
UNIT DIVISION: 0

## 2025-06-10 NOTE — PROGRESS NOTES
6/12/2025    Blaire Yancey, DO   [address] [facility] [place] [address]             Re: Ivania      Dear Dr. Yancey,    I had the pleasure of seeing your patient, Ivania Garcia (74 y.o. female) in the office for follow up after her left TCAR on 6/3/25.  She comes for an early follow-up visit today.  She feels well and has no incisional pain.  She has no speech difficulties or difficulty swallowing.  She is very happy with her incision.    Current Medications    Current Outpatient Medications:     neomycin-bacitracin-polymyxin (NEOSPORIN) 5-400-5000 ointment, Apply topically 4 times daily., Disp: 28 g, Rfl: 1    rosuvastatin (CRESTOR) 20 MG tablet, TAKE 1 TABLET BY MOUTH DAILY, Disp: 90 tablet, Rfl: 1    furosemide (LASIX) 20 MG tablet, Take 1 tablet by mouth daily, Disp: , Rfl:     aspirin 81 MG EC tablet, Take 1 tablet by mouth daily, Disp: 90 tablet, Rfl: 0    glipiZIDE (GLUCOTROL) 5 MG tablet, TAKE 1 TABLET BY MOUTH TWICE DAILY BEFORE MEALS, Disp: 180 tablet, Rfl: 1    omeprazole (PRILOSEC) 40 MG delayed release capsule, Take 1 capsule by mouth daily, Disp: , Rfl:     isosorbide mononitrate (IMDUR) 30 MG extended release tablet, Take 1 tablet by mouth every morning, Disp: , Rfl:     SITagliptin (JANUVIA) 100 MG tablet, Take 1 tablet by mouth daily, Disp: 30 tablet, Rfl: 5    famotidine (PEPCID) 20 MG tablet, Take 1 tablet by mouth nightly as needed, Disp: , Rfl:     losartan (COZAAR) 50 MG tablet, Take 1 tablet by mouth in the morning and at bedtime, Disp: , Rfl:     acetaminophen (TYLENOL) 500 MG tablet, Take 2 tablets by mouth every 4 hours as needed for Pain, Disp: , Rfl:     nitroGLYCERIN (NITROSTAT) 0.4 MG SL tablet, up to max of 3 total doses. If no relief after 1 dose, call 911., Disp: 25 tablet, Rfl: 1    clopidogrel (PLAVIX) 75 MG tablet, Take 1 tablet by mouth daily, Disp: 30 tablet, Rfl: 3    Physical Exam:  her vital signs are /72 (BP Site: Left Upper Arm, Patient Position:

## 2025-06-12 ENCOUNTER — OFFICE VISIT (OUTPATIENT)
Dept: CARDIOTHORACIC SURGERY | Age: 75
End: 2025-06-12
Payer: MEDICARE

## 2025-06-12 VITALS
HEIGHT: 64 IN | BODY MASS INDEX: 25.86 KG/M2 | OXYGEN SATURATION: 98 % | HEART RATE: 77 BPM | WEIGHT: 151.5 LBS | DIASTOLIC BLOOD PRESSURE: 72 MMHG | SYSTOLIC BLOOD PRESSURE: 112 MMHG

## 2025-06-12 DIAGNOSIS — Z95.828 PRESENCE OF INTERNAL CAROTID STENT: Primary | ICD-10-CM

## 2025-06-12 LAB
ACTIVATED CLOTTING TIME, LOW RANGE: 152 SEC (ref 89–169)
ACTIVATED CLOTTING TIME, LOW RANGE: 154 SEC (ref 89–169)
ACTIVATED CLOTTING TIME, LOW RANGE: 354 SEC (ref 89–169)

## 2025-06-12 PROCEDURE — 3017F COLORECTAL CA SCREEN DOC REV: CPT | Performed by: THORACIC SURGERY (CARDIOTHORACIC VASCULAR SURGERY)

## 2025-06-12 PROCEDURE — G8400 PT W/DXA NO RESULTS DOC: HCPCS | Performed by: THORACIC SURGERY (CARDIOTHORACIC VASCULAR SURGERY)

## 2025-06-12 PROCEDURE — 99214 OFFICE O/P EST MOD 30 MIN: CPT | Performed by: THORACIC SURGERY (CARDIOTHORACIC VASCULAR SURGERY)

## 2025-06-12 PROCEDURE — 3078F DIAST BP <80 MM HG: CPT | Performed by: THORACIC SURGERY (CARDIOTHORACIC VASCULAR SURGERY)

## 2025-06-12 PROCEDURE — 1159F MED LIST DOCD IN RCRD: CPT | Performed by: THORACIC SURGERY (CARDIOTHORACIC VASCULAR SURGERY)

## 2025-06-12 PROCEDURE — G8427 DOCREV CUR MEDS BY ELIG CLIN: HCPCS | Performed by: THORACIC SURGERY (CARDIOTHORACIC VASCULAR SURGERY)

## 2025-06-12 PROCEDURE — 1090F PRES/ABSN URINE INCON ASSESS: CPT | Performed by: THORACIC SURGERY (CARDIOTHORACIC VASCULAR SURGERY)

## 2025-06-12 PROCEDURE — 1126F AMNT PAIN NOTED NONE PRSNT: CPT | Performed by: THORACIC SURGERY (CARDIOTHORACIC VASCULAR SURGERY)

## 2025-06-12 PROCEDURE — 1123F ACP DISCUSS/DSCN MKR DOCD: CPT | Performed by: THORACIC SURGERY (CARDIOTHORACIC VASCULAR SURGERY)

## 2025-06-12 PROCEDURE — 1036F TOBACCO NON-USER: CPT | Performed by: THORACIC SURGERY (CARDIOTHORACIC VASCULAR SURGERY)

## 2025-06-12 PROCEDURE — G8419 CALC BMI OUT NRM PARAM NOF/U: HCPCS | Performed by: THORACIC SURGERY (CARDIOTHORACIC VASCULAR SURGERY)

## 2025-06-12 PROCEDURE — 1111F DSCHRG MED/CURRENT MED MERGE: CPT | Performed by: THORACIC SURGERY (CARDIOTHORACIC VASCULAR SURGERY)

## 2025-06-12 PROCEDURE — 3074F SYST BP LT 130 MM HG: CPT | Performed by: THORACIC SURGERY (CARDIOTHORACIC VASCULAR SURGERY)

## 2025-06-13 ENCOUNTER — OFFICE VISIT (OUTPATIENT)
Dept: INTERNAL MEDICINE CLINIC | Age: 75
End: 2025-06-13
Payer: MEDICARE

## 2025-06-13 VITALS
OXYGEN SATURATION: 97 % | DIASTOLIC BLOOD PRESSURE: 84 MMHG | SYSTOLIC BLOOD PRESSURE: 130 MMHG | HEART RATE: 84 BPM | WEIGHT: 151.2 LBS | BODY MASS INDEX: 25.94 KG/M2

## 2025-06-13 DIAGNOSIS — I65.23 CAROTID STENOSIS, BILATERAL: Primary | ICD-10-CM

## 2025-06-13 DIAGNOSIS — D64.9 ANEMIA, UNSPECIFIED TYPE: ICD-10-CM

## 2025-06-13 DIAGNOSIS — Z98.62 H/O TRANSCAROTID ARTERY REVASCULARIZATION (TCAR): ICD-10-CM

## 2025-06-13 DIAGNOSIS — I10 ESSENTIAL HYPERTENSION: ICD-10-CM

## 2025-06-13 DIAGNOSIS — E11.22 TYPE 2 DIABETES MELLITUS WITH STAGE 3A CHRONIC KIDNEY DISEASE, WITHOUT LONG-TERM CURRENT USE OF INSULIN (HCC): ICD-10-CM

## 2025-06-13 DIAGNOSIS — Z09 HOSPITAL DISCHARGE FOLLOW-UP: ICD-10-CM

## 2025-06-13 DIAGNOSIS — N18.31 TYPE 2 DIABETES MELLITUS WITH STAGE 3A CHRONIC KIDNEY DISEASE, WITHOUT LONG-TERM CURRENT USE OF INSULIN (HCC): ICD-10-CM

## 2025-06-13 DIAGNOSIS — I48.91 ATRIAL FIBRILLATION, UNSPECIFIED TYPE (HCC): ICD-10-CM

## 2025-06-13 PROCEDURE — 3044F HG A1C LEVEL LT 7.0%: CPT | Performed by: FAMILY MEDICINE

## 2025-06-13 PROCEDURE — G8400 PT W/DXA NO RESULTS DOC: HCPCS | Performed by: FAMILY MEDICINE

## 2025-06-13 PROCEDURE — 1123F ACP DISCUSS/DSCN MKR DOCD: CPT | Performed by: FAMILY MEDICINE

## 2025-06-13 PROCEDURE — 3017F COLORECTAL CA SCREEN DOC REV: CPT | Performed by: FAMILY MEDICINE

## 2025-06-13 PROCEDURE — 1111F DSCHRG MED/CURRENT MED MERGE: CPT | Performed by: FAMILY MEDICINE

## 2025-06-13 PROCEDURE — G8419 CALC BMI OUT NRM PARAM NOF/U: HCPCS | Performed by: FAMILY MEDICINE

## 2025-06-13 PROCEDURE — 3075F SYST BP GE 130 - 139MM HG: CPT | Performed by: FAMILY MEDICINE

## 2025-06-13 PROCEDURE — G8427 DOCREV CUR MEDS BY ELIG CLIN: HCPCS | Performed by: FAMILY MEDICINE

## 2025-06-13 PROCEDURE — 2022F DILAT RTA XM EVC RTNOPTHY: CPT | Performed by: FAMILY MEDICINE

## 2025-06-13 PROCEDURE — 1036F TOBACCO NON-USER: CPT | Performed by: FAMILY MEDICINE

## 2025-06-13 PROCEDURE — 99214 OFFICE O/P EST MOD 30 MIN: CPT | Performed by: FAMILY MEDICINE

## 2025-06-13 PROCEDURE — 1090F PRES/ABSN URINE INCON ASSESS: CPT | Performed by: FAMILY MEDICINE

## 2025-06-13 PROCEDURE — 3079F DIAST BP 80-89 MM HG: CPT | Performed by: FAMILY MEDICINE

## 2025-06-13 NOTE — PROGRESS NOTES
Post-Discharge Transitional Care  Follow Up      Ivania Garcia   YOB: 1950    Date of Office Visit:  6/13/2025  Date of Hospital Admission: 6/3/25  Date of Hospital Discharge: 6/4/25  Risk of hospital readmission (high >=14%. Medium >=10%) :Readmission Risk Score: 11.2      Care management risk score Rising risk (score 2-5) and Complex Care (Scores >=6): No Risk Score On File     Non face to face  following discharge, date last encounter closed (first attempt may have been earlier): *No documented post hospital discharge outreach found in the last 14 days    Call initiated 2 business days of discharge: *No response recorded in the last 14 days    ASSESSMENT/PLAN:   Carotid stenosis, bilateral  H/O transcarotid artery revascularization (TCAR)  Keep f/u with vascular surgery  Atrial fibrillation, unspecified type (HCC)  On aspirin and Plavix. Coreg was stopped  Type 2 diabetes mellitus with stage 3a chronic kidney disease, without long-term current use of insulin (HCC)  Continue Januvia and glipizide  Anemia, unspecified type  Will continue to monitor  Essential hypertension  Continue losartan 50 mg  Hospital discharge follow-up  -     CT DISCHARGE MEDS RECONCILED W/ CURRENT OUTPATIENT MED LIST  Medical Decision Making: high complexity  Return for follow up as scheduled.           Subjective:   HPI:  Follow up of Hospital problems/diagnosis(es): TCAR  procedure, carotid stenosis    History of Present Illness  The patient is a 74-year-old female who presents today for a hospital follow-up visit. The patient was admitted for carotid stenosis. She is status post left TCAR procedure (transcarotid artery revascularization) stent on 06/03/2025. Postoperatively, she has noticed some bruising to the left breast and arms, and a raspy voice. She will continue to follow up with her vascular surgeon. She is stable today    The patient has type 2 diabetes mellitus with stage 3a chronic kidney disease, and she is

## 2025-06-19 ENCOUNTER — HOSPITAL ENCOUNTER (EMERGENCY)
Age: 75
Discharge: HOME | End: 2025-06-19
Payer: MEDICARE

## 2025-06-19 ENCOUNTER — APPOINTMENT (OUTPATIENT)
Dept: GENERAL RADIOLOGY | Age: 75
End: 2025-06-19
Payer: MEDICARE

## 2025-06-19 VITALS
TEMPERATURE: 98.3 F | DIASTOLIC BLOOD PRESSURE: 88 MMHG | BODY MASS INDEX: 27.79 KG/M2 | WEIGHT: 151 LBS | SYSTOLIC BLOOD PRESSURE: 179 MMHG | OXYGEN SATURATION: 100 % | HEIGHT: 62 IN | HEART RATE: 67 BPM

## 2025-06-19 DIAGNOSIS — S61.421A LACERATION OF RIGHT HAND WITH FOREIGN BODY, INITIAL ENCOUNTER: ICD-10-CM

## 2025-06-19 DIAGNOSIS — S63.501A SPRAIN OF RIGHT WRIST, INITIAL ENCOUNTER: Primary | ICD-10-CM

## 2025-06-19 PROCEDURE — 99283 EMERGENCY DEPT VISIT LOW MDM: CPT

## 2025-06-19 PROCEDURE — 73110 X-RAY EXAM OF WRIST: CPT

## 2025-06-19 PROCEDURE — 12002 RPR S/N/AX/GEN/TRNK2.6-7.5CM: CPT

## 2025-06-19 PROCEDURE — 6370000000 HC RX 637 (ALT 250 FOR IP)

## 2025-06-19 PROCEDURE — 6360000002 HC RX W HCPCS

## 2025-06-19 RX ORDER — ACETAMINOPHEN 500 MG
1000 TABLET ORAL ONCE
Status: COMPLETED | OUTPATIENT
Start: 2025-06-19 | End: 2025-06-19

## 2025-06-19 RX ORDER — ACETAMINOPHEN 500 MG
1000 TABLET ORAL EVERY 4 HOURS PRN
Qty: 120 TABLET | Refills: 0 | Status: SHIPPED | OUTPATIENT
Start: 2025-06-19

## 2025-06-19 RX ORDER — LIDOCAINE HYDROCHLORIDE AND EPINEPHRINE 10; 10 MG/ML; UG/ML
20 INJECTION, SOLUTION INFILTRATION; PERINEURAL ONCE
Status: COMPLETED | OUTPATIENT
Start: 2025-06-19 | End: 2025-06-19

## 2025-06-19 RX ADMIN — LIDOCAINE HYDROCHLORIDE,EPINEPHRINE BITARTRATE 20 ML: 10; .01 INJECTION, SOLUTION INFILTRATION; PERINEURAL at 09:47

## 2025-06-19 RX ADMIN — ACETAMINOPHEN 1000 MG: 500 TABLET ORAL at 09:47

## 2025-06-19 ASSESSMENT — PAIN SCALES - GENERAL
PAINLEVEL_OUTOF10: 5
PAINLEVEL_OUTOF10: 2

## 2025-06-19 ASSESSMENT — LIFESTYLE VARIABLES
HOW MANY STANDARD DRINKS CONTAINING ALCOHOL DO YOU HAVE ON A TYPICAL DAY: PATIENT DOES NOT DRINK
HOW OFTEN DO YOU HAVE A DRINK CONTAINING ALCOHOL: NEVER

## 2025-06-19 ASSESSMENT — PAIN DESCRIPTION - LOCATION: LOCATION: WRIST

## 2025-06-19 ASSESSMENT — PAIN DESCRIPTION - ORIENTATION
ORIENTATION: RIGHT
ORIENTATION: RIGHT

## 2025-06-19 ASSESSMENT — PAIN - FUNCTIONAL ASSESSMENT: PAIN_FUNCTIONAL_ASSESSMENT: 0-10

## 2025-06-19 NOTE — DISCHARGE INSTRUCTIONS
Sutures are not dissolvable, can be removed in 7 days if needed.  Please continue your Plavix.  Use Tylenol for pain.  Please monitor for signs and symptoms of infection redness swelling discharge fever.  Return to ED for any of the symptoms.   Please cleanse with antibacterial soap twice daily.  Can remove dressing in 24 hours.  It was my pleasure taking care of you in the emergency department today.  If we prescribed any medications, please be sure to take them as prescribed. Continue taking medications as prescribed by your PCP unless we discussed otherwise.   Please be sure to follow-up with your PCP within the next 1-2 weeks.  Please don't hesitate to return to the emergency department in case of any worsening symptoms.  Wish you a speedy recovery.  Most sincerely,    Amber Arias CNP

## 2025-06-19 NOTE — ED TRIAGE NOTES
Patient tripped this morning, fell, caught herself with her hands. Did not hit head, no LOC. Hit her right wrist on the desk, 2 skin tears. Takes blood thinners. Did not hit head, has full range of motion in bilateral hands, wrists, and shoulders. Only pain is at skin tear sites.
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
## 2025-06-23 ENCOUNTER — TELEPHONE (OUTPATIENT)
Dept: INTERNAL MEDICINE CLINIC | Age: 75
End: 2025-06-23

## 2025-06-23 NOTE — TELEPHONE ENCOUNTER
----- Message from Javed ESCALANTE sent at 6/19/2025 11:57 AM EDT -----  Regarding: [facility] Appointment Request  [facility] Appointment Request    Patient needs appointment for [facility] Appointment Type: ED Follow-Up.    Patient Requested Dates(s):after 7 days   Patient Requested Time:anytime   Provider Name:katie santizo     Reason for Appointment Request: Established Patient - No appointments available during search      Wants a follow up appointment after her stitches remove   --------------------------------------------------------------------------------------------------------------------------    Relationship to Patient: Self     Call Back Information: OK to leave message on voicemail  Preferred Call Back Number: Phone [phone]

## 2025-06-23 NOTE — TELEPHONE ENCOUNTER
Dr. Yancey is out the entire week. Advised patient to utilize the Walk in clinic for stitches to be removed

## 2025-06-27 ENCOUNTER — OFFICE VISIT (OUTPATIENT)
Dept: FAMILY MEDICINE CLINIC | Age: 75
End: 2025-06-27

## 2025-06-27 VITALS — HEART RATE: 81 BPM | BODY MASS INDEX: 26.16 KG/M2 | TEMPERATURE: 97.7 F | OXYGEN SATURATION: 98 % | WEIGHT: 143 LBS

## 2025-06-27 DIAGNOSIS — Z48.02 VISIT FOR SUTURE REMOVAL: Primary | ICD-10-CM

## 2025-06-27 ASSESSMENT — ENCOUNTER SYMPTOMS
NAUSEA: 0
SINUS PAIN: 0
DIARRHEA: 0
COUGH: 0
SINUS PRESSURE: 0
CHEST TIGHTNESS: 0
WHEEZING: 0
SORE THROAT: 0
SHORTNESS OF BREATH: 0
RHINORRHEA: 0
VOMITING: 0

## 2025-06-27 NOTE — PROGRESS NOTES
laceration right wrist area/hand. Patient tolerated procedure well. No signs or symptoms of infection. Applied steri strips to both lacerations after sutures removed. Advised to not scrub the area that the area is still fragile. Explained Ok to shower.     The patients records were reviewed and discussed.  Time was given for questions . All questions were answered to the patients satisfaction. A total time of 20 was spent with at least 50% related to counseling and coordination of care.      No orders of the defined types were placed in this encounter.    There are no discontinued medications.    No orders of the defined types were placed in this encounter.      Care discussed with patient. Questions answered. Patient verbalizes understanding and agrees with plan.   After visit summary provided.   Advised to call for any problems, questions, or concerns.      Return if symptoms worsen or fail to improve.                                         The patient (or guardian, if applicable) and other individuals in attendance with the patient were advised that Artificial Intelligence will be utilized during this visit to record, process the conversation to generate a clinical note, and support improvement of the AI technology. The patient (or guardian, if applicable) and other individuals in attendance at the appointment consented to the use of AI, including the recording.                    Signed:  JOSE RAMON Gutierrez CNP  06/27/25  9:13 AM

## 2025-07-09 ENCOUNTER — APPOINTMENT (OUTPATIENT)
Dept: GENERAL RADIOLOGY | Age: 75
End: 2025-07-09
Payer: MEDICARE

## 2025-07-09 ENCOUNTER — OFFICE VISIT (OUTPATIENT)
Dept: INTERNAL MEDICINE CLINIC | Age: 75
End: 2025-07-09

## 2025-07-09 ENCOUNTER — APPOINTMENT (OUTPATIENT)
Dept: CT IMAGING | Age: 75
End: 2025-07-09
Payer: MEDICARE

## 2025-07-09 ENCOUNTER — HOSPITAL ENCOUNTER (EMERGENCY)
Age: 75
Discharge: HOME | End: 2025-07-10
Payer: MEDICARE

## 2025-07-09 VITALS
TEMPERATURE: 97.7 F | SYSTOLIC BLOOD PRESSURE: 120 MMHG | DIASTOLIC BLOOD PRESSURE: 84 MMHG | BODY MASS INDEX: 23.96 KG/M2 | HEART RATE: 78 BPM | OXYGEN SATURATION: 97 % | WEIGHT: 131 LBS

## 2025-07-09 VITALS
DIASTOLIC BLOOD PRESSURE: 73 MMHG | BODY MASS INDEX: 24.11 KG/M2 | OXYGEN SATURATION: 93 % | HEART RATE: 67 BPM | TEMPERATURE: 97.6 F | RESPIRATION RATE: 18 BRPM | HEIGHT: 62 IN | WEIGHT: 131 LBS | SYSTOLIC BLOOD PRESSURE: 182 MMHG

## 2025-07-09 DIAGNOSIS — R91.1 PULMONARY NODULE: ICD-10-CM

## 2025-07-09 DIAGNOSIS — R74.8 ELEVATED LIPASE: ICD-10-CM

## 2025-07-09 DIAGNOSIS — R11.2 NAUSEA AND VOMITING, UNSPECIFIED VOMITING TYPE: ICD-10-CM

## 2025-07-09 DIAGNOSIS — N18.31 CKD STAGE 3A, GFR 45-59 ML/MIN (HCC): ICD-10-CM

## 2025-07-09 DIAGNOSIS — R11.2 NAUSEA AND VOMITING, UNSPECIFIED VOMITING TYPE: Primary | ICD-10-CM

## 2025-07-09 DIAGNOSIS — E86.0 DEHYDRATION: ICD-10-CM

## 2025-07-09 DIAGNOSIS — N17.9 ACUTE KIDNEY INJURY: ICD-10-CM

## 2025-07-09 DIAGNOSIS — K22.2 ESOPHAGEAL STENOSIS: ICD-10-CM

## 2025-07-09 DIAGNOSIS — R31.9 URINARY TRACT INFECTION WITH HEMATURIA, SITE UNSPECIFIED: ICD-10-CM

## 2025-07-09 DIAGNOSIS — R73.9 HYPERGLYCEMIA: Primary | ICD-10-CM

## 2025-07-09 DIAGNOSIS — D64.9 ANEMIA, UNSPECIFIED TYPE: ICD-10-CM

## 2025-07-09 DIAGNOSIS — K44.9 HIATAL HERNIA: ICD-10-CM

## 2025-07-09 DIAGNOSIS — N39.0 URINARY TRACT INFECTION WITH HEMATURIA, SITE UNSPECIFIED: ICD-10-CM

## 2025-07-09 LAB
ALBUMIN SERPL-MCNC: 3.8 G/DL (ref 3.4–5)
ALBUMIN/GLOB SERPL: 1.1 {RATIO} (ref 1.1–2.2)
ALP SERPL-CCNC: 129 U/L (ref 40–129)
ALT SERPL-CCNC: 8 U/L (ref 10–40)
ANION GAP SERPL CALCULATED.3IONS-SCNC: 21 MMOL/L (ref 9–17)
ARTERIAL PATENCY WRIST A: ABNORMAL
AST SERPL-CCNC: 17 U/L (ref 15–37)
BACTERIA URNS QL MICRO: ABNORMAL
BASOPHILS # BLD: 0.03 K/UL
BASOPHILS NFR BLD: 0 % (ref 0–1)
BILIRUB SERPL-MCNC: 0.6 MG/DL (ref 0–1)
BILIRUB UR QL STRIP: NEGATIVE
BODY TEMPERATURE: 37
BUN SERPL-MCNC: 31 MG/DL (ref 7–20)
CALCIUM SERPL-MCNC: 10.2 MG/DL (ref 8.3–10.6)
CASTS #/AREA URNS LPF: ABNORMAL /LPF
CHARACTER UR: ABNORMAL
CHLORIDE SERPL-SCNC: 107 MMOL/L (ref 99–110)
CLARITY UR: CLEAR
CO2 SERPL-SCNC: 17 MMOL/L (ref 21–32)
COHGB MFR BLD: 1.4 % (ref 0.5–1.5)
COLOR UR: YELLOW
CREAT SERPL-MCNC: 1.5 MG/DL (ref 0.6–1.2)
EOSINOPHIL # BLD: 0.01 K/UL
EOSINOPHILS RELATIVE PERCENT: 0 % (ref 0–3)
EPI CELLS #/AREA URNS HPF: 1 /HPF
ERYTHROCYTE [DISTWIDTH] IN BLOOD BY AUTOMATED COUNT: 17.4 % (ref 11.7–14.9)
GFR, ESTIMATED: 34 ML/MIN/1.73M2
GLUCOSE SERPL-MCNC: 250 MG/DL (ref 74–99)
GLUCOSE UR STRIP-MCNC: 100 MG/DL
HCO3 VENOUS: 19.6 MMOL/L (ref 22–29)
HCT VFR BLD AUTO: 38.2 % (ref 37–47)
HGB BLD-MCNC: 11.7 G/DL (ref 12.5–16)
HGB UR QL STRIP.AUTO: NEGATIVE
IMM GRANULOCYTES # BLD AUTO: 0.1 K/UL
IMM GRANULOCYTES NFR BLD: 1 %
KETONES UR STRIP-MCNC: 15 MG/DL
LEUKOCYTE ESTERASE UR QL STRIP: ABNORMAL
LIPASE SERPL-CCNC: 104 U/L (ref 13–60)
LYMPHOCYTES NFR BLD: 1.45 K/UL
LYMPHOCYTES RELATIVE PERCENT: 16 % (ref 24–44)
MCH RBC QN AUTO: 26.2 PG (ref 27–31)
MCHC RBC AUTO-ENTMCNC: 30.6 G/DL (ref 32–36)
MCV RBC AUTO: 85.5 FL (ref 78–100)
METHEMOGLOBIN: 0.3 % (ref 0.5–1.5)
MONOCYTES NFR BLD: 0.61 K/UL
MONOCYTES NFR BLD: 7 % (ref 0–5)
MUCOUS THREADS URNS QL MICRO: ABNORMAL
NEGATIVE BASE EXCESS, VEN: 4.9 MMOL/L (ref 0–3)
NEUTROPHILS NFR BLD: 76 % (ref 36–66)
NEUTS SEG NFR BLD: 7.06 K/UL
NITRITE UR QL STRIP: NEGATIVE
OXYHGB MFR BLD: 67.1 %
PCO2 VENOUS: 34.6 MM HG (ref 38–54)
PH UR STRIP: 5.5 [PH] (ref 5–8)
PH VENOUS: 7.37 (ref 7.32–7.43)
PLATELET # BLD AUTO: 594 K/UL (ref 140–440)
PMV BLD AUTO: 10.5 FL (ref 7.5–11.1)
PO2 VENOUS: 38.1 MM HG (ref 23–48)
POTASSIUM SERPL-SCNC: 4.9 MMOL/L (ref 3.5–5.1)
PROT SERPL-MCNC: 7.3 G/DL (ref 6.4–8.2)
PROT UR STRIP-MCNC: 30 MG/DL
RBC # BLD AUTO: 4.47 M/UL (ref 4.2–5.4)
RBC #/AREA URNS HPF: 0 /HPF (ref 0–2)
SODIUM SERPL-SCNC: 145 MMOL/L (ref 136–145)
SP GR UR STRIP: 1.02 (ref 1–1.03)
TROPONIN I SERPL HS-MCNC: 36 NG/L (ref 0–14)
TROPONIN I SERPL HS-MCNC: 56 NG/L (ref 0–14)
UROBILINOGEN UR STRIP-ACNC: 0.2 EU/DL (ref 0–1)
WBC #/AREA URNS HPF: 5 /HPF (ref 0–5)
WBC OTHER # BLD: 9.3 K/UL (ref 4–10.5)

## 2025-07-09 PROCEDURE — 6360000004 HC RX CONTRAST MEDICATION: Performed by: NURSE PRACTITIONER

## 2025-07-09 PROCEDURE — 84484 ASSAY OF TROPONIN QUANT: CPT

## 2025-07-09 PROCEDURE — 81001 URINALYSIS AUTO W/SCOPE: CPT

## 2025-07-09 PROCEDURE — 96374 THER/PROPH/DIAG INJ IV PUSH: CPT

## 2025-07-09 PROCEDURE — 96375 TX/PRO/DX INJ NEW DRUG ADDON: CPT

## 2025-07-09 PROCEDURE — 82805 BLOOD GASES W/O2 SATURATION: CPT

## 2025-07-09 PROCEDURE — 36415 COLL VENOUS BLD VENIPUNCTURE: CPT

## 2025-07-09 PROCEDURE — 74177 CT ABD & PELVIS W/CONTRAST: CPT

## 2025-07-09 PROCEDURE — 71045 X-RAY EXAM CHEST 1 VIEW: CPT

## 2025-07-09 PROCEDURE — 6360000002 HC RX W HCPCS: Performed by: NURSE PRACTITIONER

## 2025-07-09 PROCEDURE — 96372 THER/PROPH/DIAG INJ SC/IM: CPT

## 2025-07-09 PROCEDURE — 80053 COMPREHEN METABOLIC PANEL: CPT

## 2025-07-09 PROCEDURE — 99285 EMERGENCY DEPT VISIT HI MDM: CPT

## 2025-07-09 PROCEDURE — 6370000000 HC RX 637 (ALT 250 FOR IP): Performed by: NURSE PRACTITIONER

## 2025-07-09 PROCEDURE — 2580000003 HC RX 258: Performed by: NURSE PRACTITIONER

## 2025-07-09 PROCEDURE — 6360000002 HC RX W HCPCS: Performed by: PHYSICIAN ASSISTANT

## 2025-07-09 PROCEDURE — 85025 COMPLETE CBC W/AUTO DIFF WBC: CPT

## 2025-07-09 PROCEDURE — 83690 ASSAY OF LIPASE: CPT

## 2025-07-09 RX ORDER — METOCLOPRAMIDE 10 MG/1
10 TABLET ORAL 3 TIMES DAILY PRN
COMMUNITY
Start: 2025-07-06 | End: 2025-07-13

## 2025-07-09 RX ORDER — 0.9 % SODIUM CHLORIDE 0.9 %
1000 INTRAVENOUS SOLUTION INTRAVENOUS ONCE
Status: COMPLETED | OUTPATIENT
Start: 2025-07-09 | End: 2025-07-09

## 2025-07-09 RX ORDER — CEFUROXIME AXETIL 500 MG/1
500 TABLET ORAL 2 TIMES DAILY
COMMUNITY

## 2025-07-09 RX ORDER — IOPAMIDOL 755 MG/ML
80 INJECTION, SOLUTION INTRAVASCULAR
Status: COMPLETED | OUTPATIENT
Start: 2025-07-09 | End: 2025-07-09

## 2025-07-09 RX ORDER — ONDANSETRON 4 MG/1
4 TABLET, ORALLY DISINTEGRATING ORAL ONCE
Status: COMPLETED | OUTPATIENT
Start: 2025-07-09 | End: 2025-07-09

## 2025-07-09 RX ORDER — HYDRALAZINE HYDROCHLORIDE 20 MG/ML
10 INJECTION INTRAMUSCULAR; INTRAVENOUS ONCE
Status: COMPLETED | OUTPATIENT
Start: 2025-07-09 | End: 2025-07-09

## 2025-07-09 RX ORDER — ONDANSETRON 4 MG/1
4 TABLET, ORALLY DISINTEGRATING ORAL EVERY 8 HOURS PRN
COMMUNITY
Start: 2025-07-05

## 2025-07-09 RX ORDER — PANTOPRAZOLE SODIUM 40 MG/10ML
40 INJECTION, POWDER, LYOPHILIZED, FOR SOLUTION INTRAVENOUS ONCE
Status: COMPLETED | OUTPATIENT
Start: 2025-07-09 | End: 2025-07-09

## 2025-07-09 RX ORDER — PROMETHAZINE HYDROCHLORIDE 25 MG/ML
25 INJECTION, SOLUTION INTRAMUSCULAR; INTRAVENOUS ONCE
Status: COMPLETED | OUTPATIENT
Start: 2025-07-09 | End: 2025-07-09

## 2025-07-09 RX ORDER — NITROFURANTOIN 25; 75 MG/1; MG/1
100 CAPSULE ORAL 2 TIMES DAILY
COMMUNITY
Start: 2025-07-05

## 2025-07-09 RX ORDER — CARVEDILOL 25 MG/1
25 TABLET ORAL 2 TIMES DAILY WITH MEALS
COMMUNITY

## 2025-07-09 RX ORDER — PROMETHAZINE HYDROCHLORIDE 25 MG/1
25 TABLET ORAL EVERY 6 HOURS PRN
Qty: 10 TABLET | Refills: 0 | Status: SHIPPED | OUTPATIENT
Start: 2025-07-09 | End: 2025-07-16

## 2025-07-09 RX ADMIN — IOPAMIDOL 80 ML: 755 INJECTION, SOLUTION INTRAVENOUS at 13:24

## 2025-07-09 RX ADMIN — PANTOPRAZOLE SODIUM 40 MG: 40 INJECTION, POWDER, FOR SOLUTION INTRAVENOUS at 15:13

## 2025-07-09 RX ADMIN — HYDRALAZINE HYDROCHLORIDE 10 MG: 20 INJECTION INTRAMUSCULAR; INTRAVENOUS at 10:50

## 2025-07-09 RX ADMIN — SODIUM CHLORIDE 1000 ML: 0.9 INJECTION, SOLUTION INTRAVENOUS at 09:55

## 2025-07-09 RX ADMIN — PROMETHAZINE HYDROCHLORIDE 25 MG: 25 INJECTION INTRAMUSCULAR; INTRAVENOUS at 16:23

## 2025-07-09 RX ADMIN — ONDANSETRON 4 MG: 4 TABLET, ORALLY DISINTEGRATING ORAL at 09:53

## 2025-07-09 ASSESSMENT — ENCOUNTER SYMPTOMS
NAUSEA: 1
ABDOMINAL PAIN: 0
SHORTNESS OF BREATH: 0
BACK PAIN: 0
CONSTIPATION: 0
VOMITING: 1
VOMITING: 1
ABDOMINAL PAIN: 0
SHORTNESS OF BREATH: 1
NAUSEA: 1
DIARRHEA: 0
COUGH: 0
BLOOD IN STOOL: 0

## 2025-07-09 ASSESSMENT — LIFESTYLE VARIABLES
HOW OFTEN DO YOU HAVE A DRINK CONTAINING ALCOHOL: NEVER
HOW MANY STANDARD DRINKS CONTAINING ALCOHOL DO YOU HAVE ON A TYPICAL DAY: PATIENT DOES NOT DRINK

## 2025-07-09 ASSESSMENT — PAIN - FUNCTIONAL ASSESSMENT
PAIN_FUNCTIONAL_ASSESSMENT: NONE - DENIES PAIN
PAIN_FUNCTIONAL_ASSESSMENT: NONE - DENIES PAIN

## 2025-07-09 NOTE — PLAN OF CARE
ED provider communicated to me that patient passed p.o. challenge and no longer requires admission and will be discharged home.    I did not physically see or examine the patient.  Dispo as per ED provider

## 2025-07-09 NOTE — ED PROVIDER NOTES
Emergency Department Encounter  Location: [facility] EMERGENCY DEPARTMENT    Patient: Ivania Garcia  MRN: [id number]  : 1950  Date of evaluation: 2025  ED Provider: Dariel Jon PA-C    5851  Ivania Garcia was checked out to me by RAFY Coyne. Please see his/her initial documentation for details of the patient's initial ED presentation, physical exam and completed studies.    In brief, Ivania Garcia is a 74 y.o. female that presented to the emergency department for nauea, vomiting ongoing x 3 days. Pt has not been able to tolerate solids. But has been drinking slushies just fine. No abdominal pina. But does have continued nausea, baseline appetite and elimination noted.     GENERAL APPEARANCE: Awake and alert. Cooperative. No acute distress.  HEAD: Normocephalic. Atraumatic.  EYES: EOM's grossly intact. Sclera anicteric.  ENT: Mucous membranes are moist. Tolerates saliva. No trismus.  NECK: Supple. No meningismus. Trachea midline.  HEART: RRR. Radial pulses 2+.  LUNGS: Respirations unlabored. CTAB  ABDOMEN: Soft. Non-tender. No guarding or rebound.  EXTREMITIES: No acute deformities.  SKIN: Warm and dry.  NEUROLOGICAL: No gross facial drooping. Moves all 4 extremities spontaneously.  PSYCHIATRIC: Normal mood.      I have reviewed and interpreted all of the currently available lab results and diagnostics from this visit:  Results for orders placed or performed during the hospital encounter of 25   Urinalysis with Reflex to Culture    Specimen: Urine   Result Value Ref Range    Color, UA Yellow Yellow    Turbidity UA Clear Clear    Glucose, Ur 100 (A) NEGATIVE mg/dL    Bilirubin, Urine NEGATIVE NEGATIVE    Ketones, Urine 15 (A) NEGATIVE mg/dL    Specific Gravity, UA 1.020 1.005 - 1.030    Urine Hgb NEGATIVE NEGATIVE    pH, Urine 5.5 5.0 - 8.0    Protein, UA 30 (A) NEGATIVE mg/dL    Urobilinogen, Urine 0.2 0.0 - 1.0 EU/dL    Nitrite, Urine NEGATIVE NEGATIVE    Leukocyte 
Sera Coyne, APRN - CNP      Patient was treated the following medications:  Medications   sodium chloride 0.9 % bolus 1,000 mL (0 mLs IntraVENous Stopped 7/9/25 1057)   ondansetron (ZOFRAN-ODT) disintegrating tablet 4 mg (4 mg Oral Given 7/9/25 0953)   hydrALAZINE (APRESOLINE) injection 10 mg (10 mg IntraVENous Given 7/9/25 1050)   iopamidol (ISOVUE-370) 76 % injection 80 mL (80 mLs IntraVENous Given 7/9/25 1324)   pantoprazole (PROTONIX) injection 40 mg (40 mg IntraVENous Given 7/9/25 1513)   promethazine (PHENERGAN) injection 25 mg (25 mg IntraMUSCular Given 7/9/25 1623)     CONSULTS: None      Patient’s care significantly limited by social determinants of health including:   Chronic conditions affecting care: Pt  has a past medical history of Anemia, Arthritis, CAD (coronary artery disease), Carotid stenosis, Colon polyps, COVID-19 (08/18/2021), Diabetes mellitus type 2, uncontrolled, Diastolic dysfunction (09/2012), Esophageal stricture, Hiatal hernia, History of blood transfusion, Hyperlipidemia, Hypertension, IBS (irritable bowel syndrome), Iron deficiency anemia, LVH (left ventricular hypertrophy) (09/2012), Multiple gastric polyps (2004), PAD (peripheral artery disease), S/P CABG x 4 (08/06/2012), Sinus tachycardia, SVT (supraventricular tachycardia), and Vitamin D deficiency.     Disposition Considerations (tests considered but not done, Shared Decision Making, Pt Expectation of Test or Tx.):   Admission Considered. Shared decision making employed. I have discussed Ivania Garcia's ED presentation and ED course with Dr. Mulligan from the IM service. However, Pts beta hydroxybutyrate & vbg are not yet resulted. I signed out patient care to ALISA Doan who will follow up on labs and disposition the patient.     FINAL IMPRESSION      1. Hyperglycemia    2. Acute kidney injury    3. Elevated lipase    4. Pulmonary nodule    5. Nausea and vomiting, unspecified vomiting type

## 2025-07-09 NOTE — ED NOTES
Medication History  [facility]    Patient Name: Ivania Garcia 1950     Medication history has been completed by: Nadege Coyne, Pharmacy Intern    Source(s) of information: Patient, patient provided med list, and insurance claims    Primary Care Physician: Blaire Yancey DO     Pharmacy: [facility]    Allergies as of 07/09/2025 - Fully Reviewed 07/09/2025   Allergen Reaction Noted    Tetanus toxoids Swelling and Rash 11/14/2011    Tetanus-diphtheria toxoids td  08/19/2019    Adhesive tape Rash 08/18/2022        Prior to Admission medications    Medication Sig Start Date End Date Taking? Authorizing Provider   metoclopramide (REGLAN) 10 MG tablet Take 1 tablet by mouth 3 times daily as needed 7/6/25 7/13/25 Yes Provider, MD [facility]   nitrofurantoin, macrocrystal-monohydrate, (MACROBID) 100 MG capsule Take 1 capsule by mouth 2 times daily 7/5/25  Yes Provider, MD [facility]   ondansetron (ZOFRAN-ODT) 4 MG disintegrating tablet Take 1 tablet by mouth every 8 hours as needed for Nausea or Vomiting 7/5/25  Yes Provider, MD [facility]   carvedilol (COREG) 25 MG tablet Take 1 tablet by mouth 2 times daily (with meals)   Yes Provider, MD [facility]   bismuth subsalicylate (PEPTO BISMOL) 262 MG/15ML suspension Take 15 mLs by mouth every 6 hours as needed for Indigestion   Yes Provider, MD [facility]   acetaminophen (TYLENOL) 500 MG tablet Take 2 tablets by mouth every 4 hours as needed for Pain 6/19/25  Yes Amber Arias APRN - CNP   rosuvastatin (CRESTOR) 20 MG tablet TAKE 1 TABLET BY MOUTH DAILY 5/30/25  Yes Blaire Yancey DO   furosemide (LASIX) 20 MG tablet Take 1 tablet by mouth daily 5/20/25  Yes Provider, MD [facility]   aspirin 81 MG EC tablet Take 1 tablet by mouth daily 4/17/25  Yes Keisha Sandoval PA-C   glipiZIDE (GLUCOTROL) 5 MG tablet TAKE 1 TABLET BY MOUTH TWICE DAILY BEFORE MEALS 4/16/25  Yes Blaire Yancey DO   omeprazole (PRILOSEC) 40 MG delayed release capsule

## 2025-07-09 NOTE — PLAN OF CARE
I was forwarded this admission by my colleague Dr. Mulligan at 3.14pm.  Case discussed with covering ED provider JOSE RAMON Coyne over the phone.  Patient given just 1 dose of Zofran at 9:45 AM for her reportedly severe nausea and vomiting, no subsequent antinausea medications given since.  Lab work incomplete for suspected DKA. I communicated my concern regarding incomplete workup.  Ms. Coyne inform me that she will transfer patient to ED JOSE RAMON Jon as she is about to leave.  Case discussed with Dariel who informed me that he will finish the workup and reach back regarding decision to admit and to determine disposition.

## 2025-07-09 NOTE — PROGRESS NOTES
Ivania Garcia (:  1950) is a 74 y.o. female,established patient, here for evaluation of the following chief complaint(s):  Follow-Up from Hospital ( Encounter Summary - UTI (urinary tract infection), bacterial (Primary Dx);/Elevated troponin;/Dehydration;/Elevated serum creatinine;/Elevated blood pressure reading /)      Assessment & Plan   ASSESSMENT/PLAN:    Assessment & Plan        1. Nausea and vomiting, unspecified vomiting type  No help with nausea medication  Unable to keep anything down    2. Dehydration    3. Urinary tract infection with hematuria, site unspecified    4. Esophageal stenosis    5. Hiatal hernia    6. CKD stage 3a, GFR 45-59 ml/min ([facility])    7. Anemia, unspecified type    Patient does not feel better and even more lethargic and she plans to return to the ED now  Return if symptoms worsen or fail to improve.       Lab Results   Component Value Date    WBC 15.3 (H) 2025    HGB 9.8 (L) 2025    HCT 32.6 (L) 2025    MCV 84.7 2025     2025     Lab Results   Component Value Date    CHOL 194 2025     Lab Results   Component Value Date    TRIG 74 2025     Lab Results   Component Value Date    HDL 54 (L) 2025     Lab Results   Component Value Date     (H) 2025    LDLDIRECT 118 (H) 2015       Lab Results   Component Value Date    LABA1C 6.9 (H) 2025     Lab Results   Component Value Date    .2 2021     Lab Results   Component Value Date     2025    K 4.9 2025     2025    CO2 24 2025    BUN 22 (H) 2025    CREATININE 1.2 2025    GLUCOSE 188 (H) 2025    CALCIUM 9.0 2025    BILITOT 0.3 2025    ALKPHOS 98 2025    AST 20 2025    ALT 19 2025    LABGLOM 45 (L) 2025    GFRAA >60 2022    AGRATIO 1.4 2025    GLOB 2.8 2025     Lab Results   Component Value Date    TSH 1.760 2024     Lab Results

## 2025-07-09 NOTE — ED NOTES
ED TO INPATIENT SBAR HANDOFF    Patient Name: Ivania Garcia   :  1950  74 y.o.   Preferred Name:  Ivania  Family/Caregiver Present no   Restraints no   C-SSRS: Risk of Suicide: No Risk  Sitter no   Sepsis Risk Score Sepsis V2 Risk Score: 29.4         Situation  Chief Complaint   Patient presents with    Vomiting     Pt c/o vomiting ongoing since . Pt reports being seen at Urgent care on  then [facility] on  for same complaint. Pt saw PCP today who told her to come to ED.      Brief Description of Patient's Condition: Pt presents to ED w/ c/o 2 weeks of nausea and vomiting.  She states that she cannot drink water and keep it down.  She is able to keep slushy's down but that is only thing that she does not vomit up.  However, today on her way to her PCP appointment she did drink a slushy and that did not stay down as well. The patient has significant medical history including diabetes mellitus and cardiac issues that she is not able to take her medications for due to her intractable vomiting. Pt denies ABD pain, back pain and fever. She has had no dysuria although she was diagnosed with a urinary tract infection at an outside emergency department.  She was placed on a course of outpatient antibiotics.  She has had no hematochezia or melena.  Normal bowel movement yesterday.  She also reports a 10 pound weight loss in the last 2 weeks.Additionally patient mentions that she has had some increased shortness of breath.  Mental Status: oriented, alert, coherent, logical, thought processes intact, and able to concentrate and follow conversation  Arrived from: home    Imaging:   XR CHEST PORTABLE    (Results Pending)     Abnormal labs:   Abnormal Labs Reviewed   CBC WITH AUTO DIFFERENTIAL - Abnormal; Notable for the following components:       Result Value    Hemoglobin 11.7 (*)     MCH 26.2 (*)     MCHC 30.6 (*)     RDW 17.4 (*)     Platelets 594 (*)     Neutrophils % 76 (*)     Lymphocytes % 16 (*)

## 2025-07-10 ENCOUNTER — CARE COORDINATION (OUTPATIENT)
Dept: CARE COORDINATION | Age: 75
End: 2025-07-10

## 2025-07-10 ASSESSMENT — PAIN - FUNCTIONAL ASSESSMENT: PAIN_FUNCTIONAL_ASSESSMENT: 0-10

## 2025-07-10 ASSESSMENT — PAIN SCALES - GENERAL: PAINLEVEL_OUTOF10: 0

## 2025-07-10 NOTE — CARE COORDINATION
Ambulatory Care Coordination Note     7/10/2025 9:34 AM     Patient outreach attempt by this ACM today to offer care management services. ACM was unable to reach the patient by telephone today;   left voice message requesting a return phone call to this ACM.  [facility] requesting patient to contact this ACM.     ACM: Vane Solis RN     Care Summary Note: ACM attempted to contact patient today for ED Follow-up and to offer care management services. Patient was sent to the [facility] ED on 7/9/25 for N/V (since 7/1/25) from PCP OV. ACM will outreach patient at a later date.     PCP/Specialist follow up:   Future Appointments         Provider Specialty Dept Phone    9/25/2025 1:50 PM Roger Starks MD Cardiothoracic Surgery [phone]    10/7/2025 10:20 AM Blaire Yancey DO Internal Medicine [phone]            Follow Up:   Plan for next ACM outreach in approximately 1-2 days  to complete:  - outreach attempt to offer care management services.

## 2025-07-14 ENCOUNTER — CARE COORDINATION (OUTPATIENT)
Dept: CARE COORDINATION | Age: 75
End: 2025-07-14

## 2025-07-14 NOTE — CARE COORDINATION
Ambulatory Care Coordination Note     7/14/2025 10:59 AM     Patient outreach attempt by this ACM today to offer care management services. ACM was unable to reach the patient by telephone today;   left voice message requesting a return phone call to this ACM.     ACM: Vane Solis RN     Care Summary Note: ACM attempted to contact patient today for to offer care management services. ACM will make final outreach attempt at a later date.     PCP/Specialist follow up:   Future Appointments         Provider Specialty Dept Phone    9/25/2025 1:50 PM Roger Starks MD Cardiothoracic Surgery [phone]    10/7/2025 10:20 AM Blaire Yancey DO Internal Medicine [phone]            Follow Up:   Plan for next ACM outreach in approximately 1 week to complete:  - outreach attempt to offer care management services.

## 2025-07-16 ENCOUNTER — OFFICE VISIT (OUTPATIENT)
Dept: INTERNAL MEDICINE CLINIC | Age: 75
End: 2025-07-16

## 2025-07-16 VITALS
SYSTOLIC BLOOD PRESSURE: 118 MMHG | DIASTOLIC BLOOD PRESSURE: 58 MMHG | HEART RATE: 91 BPM | OXYGEN SATURATION: 96 % | WEIGHT: 136.2 LBS | BODY MASS INDEX: 24.91 KG/M2

## 2025-07-16 DIAGNOSIS — N18.32 STAGE 3B CHRONIC KIDNEY DISEASE (HCC): ICD-10-CM

## 2025-07-16 DIAGNOSIS — R74.8 ELEVATED LIPASE: ICD-10-CM

## 2025-07-16 DIAGNOSIS — Z18.9 RETAINED SUTURE, SEQUELA: ICD-10-CM

## 2025-07-16 DIAGNOSIS — Z48.02 VISIT FOR SUTURE REMOVAL: ICD-10-CM

## 2025-07-16 DIAGNOSIS — R11.2 NAUSEA AND VOMITING, UNSPECIFIED VOMITING TYPE: Primary | ICD-10-CM

## 2025-07-16 DIAGNOSIS — T81.89XS RETAINED SUTURE, SEQUELA: ICD-10-CM

## 2025-07-16 DIAGNOSIS — E11.65 TYPE 2 DIABETES MELLITUS WITH HYPERGLYCEMIA, WITHOUT LONG-TERM CURRENT USE OF INSULIN (HCC): ICD-10-CM

## 2025-07-16 DIAGNOSIS — R73.9 HYPERGLYCEMIA: ICD-10-CM

## 2025-07-16 DIAGNOSIS — R91.1 LUNG NODULE: ICD-10-CM

## 2025-07-16 LAB — HBA1C MFR BLD: 6.4 %

## 2025-07-16 RX ORDER — GLIPIZIDE 5 MG/1
TABLET ORAL
Qty: 270 TABLET | Refills: 1 | Status: SHIPPED | OUTPATIENT
Start: 2025-07-16

## 2025-07-16 ASSESSMENT — ENCOUNTER SYMPTOMS
COUGH: 0
SHORTNESS OF BREATH: 0
ABDOMINAL PAIN: 0

## 2025-07-16 NOTE — PROGRESS NOTES
(ZOFRAN-ODT) 4 MG disintegrating tablet Take 1 tablet by mouth every 8 hours as needed for Nausea or Vomiting      carvedilol (COREG) 25 MG tablet Take 1 tablet by mouth 2 times daily (with meals)      bismuth subsalicylate (PEPTO BISMOL) 262 MG/15ML suspension Take 15 mLs by mouth every 6 hours as needed for Indigestion      acetaminophen (TYLENOL) 500 MG tablet Take 2 tablets by mouth every 4 hours as needed for Pain 120 tablet 0    rosuvastatin (CRESTOR) 20 MG tablet TAKE 1 TABLET BY MOUTH DAILY 90 tablet 1    furosemide (LASIX) 20 MG tablet Take 1 tablet by mouth daily      aspirin 81 MG EC tablet Take 1 tablet by mouth daily 90 tablet 0    omeprazole (PRILOSEC) 40 MG delayed release capsule Take 1 capsule by mouth daily      isosorbide mononitrate (IMDUR) 30 MG extended release tablet Take 1 tablet by mouth every morning      SITagliptin (JANUVIA) 100 MG tablet Take 1 tablet by mouth daily (Patient not taking: Reported on 7/9/2025) 30 tablet 5    losartan (COZAAR) 50 MG tablet Take 1 tablet by mouth in the morning and at bedtime      clopidogrel (PLAVIX) 75 MG tablet Take 1 tablet by mouth daily 30 tablet 3     No current facility-administered medications for this visit.            Vitals:    07/16/25 1121   BP: (!) 118/58   Pulse: 91   SpO2: 96%   Weight: 61.8 kg (136 lb 3.2 oz)     Objective   Physical Exam  Vitals reviewed.   Constitutional:       General: She is not in acute distress.  Eyes:      Extraocular Movements: Extraocular movements intact.      Pupils: Pupils are equal, round, and reactive to light.   Cardiovascular:      Rate and Rhythm: Normal rate and regular rhythm.   Pulmonary:      Effort: Pulmonary effort is normal. No respiratory distress.      Breath sounds: Normal breath sounds.   Abdominal:      Palpations: Abdomen is soft.      Tenderness: There is no abdominal tenderness.   Musculoskeletal:      Cervical back: Neck supple.      Right lower leg: No edema.      Left lower leg: No edema.

## 2025-07-17 ENCOUNTER — RESULTS FOLLOW-UP (OUTPATIENT)
Dept: INTERNAL MEDICINE CLINIC | Age: 75
End: 2025-07-17

## 2025-07-22 ENCOUNTER — CARE COORDINATION (OUTPATIENT)
Dept: CARE COORDINATION | Age: 75
End: 2025-07-22

## 2025-07-22 NOTE — CARE COORDINATION
Ambulatory Care Coordination Note     7/22/2025 10:17 AM     patient outreach attempt by this AC today to offer care management services. [facility] was unable to reach the patient by telephone today;   Left message with woman who answered phone requesting a return call.    [facility] made final attempt to outreach patient to offer care management services today. A woman answered patient's phone and took message with request for patient to call this ACM.      Patient closed (unable to reach patient) from the High Risk Care Management program on 7/22/2025. No further Ambulatory Care Manager follow up scheduled.

## 2025-07-30 ENCOUNTER — HOSPITAL ENCOUNTER (OUTPATIENT)
Dept: CT IMAGING | Age: 75
Discharge: HOME | End: 2025-07-30
Payer: MEDICARE

## 2025-07-30 ENCOUNTER — HOSPITAL ENCOUNTER (OUTPATIENT)
Dept: LAB | Age: 75
Discharge: HOME | End: 2025-07-30
Payer: MEDICARE

## 2025-07-30 DIAGNOSIS — R74.8 ELEVATED LIPASE: ICD-10-CM

## 2025-07-30 DIAGNOSIS — R91.1 LUNG NODULE: ICD-10-CM

## 2025-07-30 DIAGNOSIS — N18.32 STAGE 3B CHRONIC KIDNEY DISEASE (HCC): ICD-10-CM

## 2025-07-30 LAB
ANION GAP SERPL CALCULATED.3IONS-SCNC: 10 MMOL/L (ref 9–17)
BASOPHILS # BLD: 0.04 K/UL
BASOPHILS NFR BLD: 1 % (ref 0–1)
BUN SERPL-MCNC: 16 MG/DL (ref 7–20)
CALCIUM SERPL-MCNC: 9.4 MG/DL (ref 8.3–10.6)
CHLORIDE SERPL-SCNC: 103 MMOL/L (ref 99–110)
CO2 SERPL-SCNC: 23 MMOL/L (ref 21–32)
CREAT SERPL-MCNC: 1.3 MG/DL (ref 0.6–1.2)
EOSINOPHIL # BLD: 0.13 K/UL
EOSINOPHILS RELATIVE PERCENT: 2 % (ref 0–3)
ERYTHROCYTE [DISTWIDTH] IN BLOOD BY AUTOMATED COUNT: 15.9 % (ref 11.7–14.9)
GFR, ESTIMATED: 39 ML/MIN/1.73M2
GLUCOSE SERPL-MCNC: 194 MG/DL (ref 74–99)
HCT VFR BLD AUTO: 35.3 % (ref 37–47)
HGB BLD-MCNC: 10.5 G/DL (ref 12.5–16)
IMM GRANULOCYTES # BLD AUTO: 0.04 K/UL
IMM GRANULOCYTES NFR BLD: 1 %
LIPASE SERPL-CCNC: 83 U/L (ref 13–60)
LYMPHOCYTES NFR BLD: 1.6 K/UL
LYMPHOCYTES RELATIVE PERCENT: 21 % (ref 24–44)
MCH RBC QN AUTO: 26.3 PG (ref 27–31)
MCHC RBC AUTO-ENTMCNC: 29.7 G/DL (ref 32–36)
MCV RBC AUTO: 88.3 FL (ref 78–100)
MONOCYTES NFR BLD: 0.53 K/UL
MONOCYTES NFR BLD: 7 % (ref 0–5)
NEUTROPHILS NFR BLD: 70 % (ref 36–66)
NEUTS SEG NFR BLD: 5.45 K/UL
PLATELET # BLD AUTO: 251 K/UL (ref 140–440)
PMV BLD AUTO: 11.2 FL (ref 7.5–11.1)
POTASSIUM SERPL-SCNC: 5.5 MMOL/L (ref 3.5–5.1)
RBC # BLD AUTO: 4 M/UL (ref 4.2–5.4)
SODIUM SERPL-SCNC: 137 MMOL/L (ref 136–145)
WBC OTHER # BLD: 7.8 K/UL (ref 4–10.5)

## 2025-07-30 PROCEDURE — 71250 CT THORAX DX C-: CPT

## 2025-07-30 PROCEDURE — 80048 BASIC METABOLIC PNL TOTAL CA: CPT

## 2025-07-30 PROCEDURE — 85025 COMPLETE CBC W/AUTO DIFF WBC: CPT

## 2025-07-30 PROCEDURE — 83690 ASSAY OF LIPASE: CPT

## 2025-08-04 ENCOUNTER — TELEPHONE (OUTPATIENT)
Dept: INTERNAL MEDICINE CLINIC | Age: 75
End: 2025-08-04

## 2025-08-04 DIAGNOSIS — R74.8 ELEVATED LIPASE: ICD-10-CM

## 2025-08-04 DIAGNOSIS — E11.65 TYPE 2 DIABETES MELLITUS WITH HYPERGLYCEMIA, WITHOUT LONG-TERM CURRENT USE OF INSULIN (HCC): Primary | ICD-10-CM

## 2025-08-04 DIAGNOSIS — N18.32 STAGE 3B CHRONIC KIDNEY DISEASE (HCC): ICD-10-CM

## (undated) DEVICE — TIP COVER ACCESSORY

## (undated) DEVICE — GLOVE SURG SZ 65 L12IN FNGR THK79MIL GRN LTX FREE

## (undated) DEVICE — ENDOSCOPY KIT: Brand: MEDLINE INDUSTRIES, INC.

## (undated) DEVICE — SYRINGE INFL 60ML DISP ALLIANCE II

## (undated) DEVICE — SOLUTION IV 1000ML 0.9% SOD CHL FOR IRRIG PLAS CONT

## (undated) DEVICE — KIT CONVENIENCE PERIPH NAMIC

## (undated) DEVICE — ZINACTIVE USE 2539609 APPLICATOR MEDICATED 10.5 CC SOLUTION HI LT ORNG CHLORAPREP

## (undated) DEVICE — PENCIL ES CRD L10FT HND SWCHING ROCK SWCH W/ EDGE COAT BLDE

## (undated) DEVICE — SYRINGE MED 20ML STD CLR PLAS LUERLOCK TIP N CTRL DISP

## (undated) DEVICE — CATHETER,URETHRAL,REDRUBBER,STRL,18FR: Brand: MEDLINE

## (undated) DEVICE — TROCAR: Brand: KII FIOS FIRST ENTRY

## (undated) DEVICE — CLIP SM RED INTERN HMOCLP TITAN LIGATING

## (undated) DEVICE — SUTURE V-LOC 180 SZ 3-0 L12IN ABSRB GRN V-20 L26MM 1/2 CIR VLOCL0614

## (undated) DEVICE — ARM DRAPE

## (undated) DEVICE — PROTECTOR EYE PT SELF ADH NS OPT GRD LF

## (undated) DEVICE — GOWN,ECLIPSE,POLYRNF,BRTHSLV,L,30/CS: Brand: MEDLINE

## (undated) DEVICE — AGENT HEMOSTATIC SURG ORIGINAL ABS 2X14IN LOOSE KNIT 12/CA

## (undated) DEVICE — INTENDED FOR TISSUE SEPARATION, AND OTHER PROCEDURES THAT REQUIRE A SHARP SURGICAL BLADE TO PUNCTURE OR CUT.: Brand: BARD-PARKER ® STAINLESS STEEL BLADES

## (undated) DEVICE — DRESSING TRNSPAR W5XL4.5IN FLM SHT SEMIPERMEABLE WIND

## (undated) DEVICE — GOWN,SIRUS,POLYRNF,BRTHSLV,XLN/XL,20/CS: Brand: MEDLINE

## (undated) DEVICE — PACK SURG LAP CHOLE

## (undated) DEVICE — GLOVE SURG SZ 65 CRM LTX FREE POLYISOPRENE POLYMER BEAD ANTI

## (undated) DEVICE — BALL RAYON ABSRB COT L STRL ST 10EA/PK

## (undated) DEVICE — EXCEL 10FT (3.05 M) INSUFFLATION TUBING SET WITH 0.1 MICRON FILTER: Brand: EXCEL

## (undated) DEVICE — MARKER SURG SKIN UTIL REGULAR/FINE 2 TIP W/ RUL AND 9 LBL

## (undated) DEVICE — SHEET,T,THYROID,STERILE: Brand: MEDLINE

## (undated) DEVICE — CLIP LIG M BLU TI HRT SHP WIRE HORZ 180 PER BX

## (undated) DEVICE — GAUZE,SPONGE,4"X4",8PLY,STRL,LF,10/TRAY: Brand: MEDLINE

## (undated) DEVICE — GAUZE,SPONGE,4"X4",16PLY,XRAY,STRL,LF: Brand: MEDLINE

## (undated) DEVICE — DRAPE,UTILITY,XL,4/PK,STERILE: Brand: MEDLINE

## (undated) DEVICE — ELECTRODE ES L2.75IN S STL INSUL BLDE W/ SL EDGE

## (undated) DEVICE — TOWEL,OR,DSP,ST,WHITE,DLX,XR,4/PK,20PK/C: Brand: MEDLINE

## (undated) DEVICE — SET TBNG DISP TIP FOR AHTO

## (undated) DEVICE — GLOVE SURG SZ 65 THK91MIL LTX FREE SYN POLYISOPRENE

## (undated) DEVICE — NEEDLE HYPO 25GA L1.5IN BLU POLYPR HUB S STL REG BVL STR

## (undated) DEVICE — GAUZE,SPONGE,8"X4",12PLY,XRAY,STRL,LF: Brand: MEDLINE

## (undated) DEVICE — TISSUE RETRIEVAL SYSTEM: Brand: INZII RETRIEVAL SYSTEM

## (undated) DEVICE — SUTURE NONABSORBABLE MONOFILAMENT 6-0 RB-2 4X30 IN PROLENE M8711

## (undated) DEVICE — SYRINGE MED 3ML NDL 18GA L1.5IN BLNT FILL LUERLOCK TIP DISP

## (undated) DEVICE — APPLICATOR PREP 26ML 0.7% IOD POVACRYLEX 74% ISO ALC ST

## (undated) DEVICE — PAD,EYE,1-5/8X2 5/8,STERILE,LF,1/PK: Brand: MEDLINE

## (undated) DEVICE — SUTURE PROL SZ 5-0 L36IN NONABSORBABLE BLU L17MM RB-1 1/2 8556H

## (undated) DEVICE — Device

## (undated) DEVICE — SUTURE ETHBND EXCEL SZ 0 L36IN NONABSORBABLE GRN SH L26MM X524H

## (undated) DEVICE — APPLICATOR MEDICATED 10.5 CC SOLUTION HI LT ORNG CHLORAPREP

## (undated) DEVICE — SUTURE VCRL SZ 4-0 L27IN ABSRB VLT L26MM SH 1/2 CIR J315H

## (undated) DEVICE — SUTURE SZ 0 27IN 5/8 CIR UR-6  TAPER PT VIOLET ABSRB VICRYL J603H

## (undated) DEVICE — SYRINGE 20ML LL S/C 50

## (undated) DEVICE — INTRODUCER SHTH 0.018 IN 21 GAX7 CM TRANSCAROTID ACCS KT

## (undated) DEVICE — SUTURE ABSORBABLE BRAIDED 2-0 CT-1 27 IN UD VICRYL J259H

## (undated) DEVICE — YANKAUER,FLEXIBLE HANDLE,REGLR CAPACITY: Brand: MEDLINE INDUSTRIES, INC.

## (undated) DEVICE — PLEDGET VASC W3/16XL3/8IN THK1/16IN PTFE SFT

## (undated) DEVICE — TOWEL,OR,DSP,ST,BLUE,STD,6/PK,12PK/CS: Brand: MEDLINE

## (undated) DEVICE — 3M™ STERI-DRAPE™ INSTRUMENT POUCH 1018: Brand: STERI-DRAPE™

## (undated) DEVICE — HARMONIC ACE

## (undated) DEVICE — LOOP VES W25MM THK1MM MAXI RED SIL FLD REPELLENT 100 PER

## (undated) DEVICE — ADHESIVE SKIN CLSR 0.7ML TOP DERMBND ADV

## (undated) DEVICE — GLOVE SURG SZ 6 L12IN FNGR THK75MIL WHT LTX POLYMER BEAD

## (undated) DEVICE — CONTAINER,SPECIMEN,OR STERILE,4OZ: Brand: MEDLINE

## (undated) DEVICE — STAPLER SKIN H3.9MM WIRE DIA0.58MM CRWN 6.9MM 35 STPL FIX

## (undated) DEVICE — SYRINGE MED 10ML LUERLOCK TIP W/O SFTY DISP

## (undated) DEVICE — CLIP INT L POLYMER LOK LIG HEM O LOK

## (undated) DEVICE — SUTURE STRATAFIX SPRL PDS + VLT 3-0 15CM DISPOSABLE/SNGLE SXPP1B420

## (undated) DEVICE — SEAL

## (undated) DEVICE — Z DISCONTINUED USE 2272117 DRAPE SURG 3 QTR N INVASIVE 2 LAYR DISP

## (undated) DEVICE — LAP CHOLE PK

## (undated) DEVICE — GLOVE ORANGE PI 8   MSG9080

## (undated) DEVICE — SUTURE PROL SZ 6-0 L18IN NONABSORBABLE BLU L13MM C-1 3/8 8718H

## (undated) DEVICE — TAPE MED W1/8XL30IN WHT POLY

## (undated) DEVICE — GUIDEWIRE VASC L150CM DIA0.035IN FLX END L7CM J 3MM PTFE

## (undated) DEVICE — TUBING SUCT 12FR MAL ALUM SHFT FN CAP VENT UNIV CONN W/ OBT

## (undated) DEVICE — DECANTER FLD 9IN ST BG FOR ASEP TRNSF OF FLD

## (undated) DEVICE — SUTURE BOOT ASSORTED COLORS XRAY DETECTABLE

## (undated) DEVICE — BOOT,SUTURE,STANDARD,YELLOW-IN-BLUE: Brand: MEDLINE

## (undated) DEVICE — SYRINGE IRRIG 60ML SFT PLIABLE BLB EZ TO GRP 1 HND USE W/

## (undated) DEVICE — CHLORAPREP 26ML ORANGE

## (undated) DEVICE — CLEANER,CAUTERY TIP,2X2",STERILE: Brand: MEDLINE

## (undated) DEVICE — Z DISCONTINUED USE 2220193 SUTURE VCRL SZ 4-0 L27IN ABSRB UD L19MM FS-2 3/8 CIR REV J422H

## (undated) DEVICE — GOWN,ECLIPSE,POLYRNF,BRTHSLV,XL,30/CS: Brand: MEDLINE

## (undated) DEVICE — SYRINGE MED 30ML STD CLR PLAS LUERLOCK TIP N CTRL DISP

## (undated) DEVICE — SUTURE VCRL SZ 4-0 L27IN ABSRB UD L19MM FS-2 3/8 CIR REV J422H

## (undated) DEVICE — TAPE UMBILICAL W1/16XL30IN COTTON ROUND NONRADIOPAQUE

## (undated) DEVICE — PACK,BASIC,SIRUS,V: Brand: MEDLINE

## (undated) DEVICE — NDLCTR:MAG/FOAM STRIP 30/60CT 64/CS: Brand: MEDICAL ACTION INDUSTRIES

## (undated) DEVICE — INFLATION DEVICE: Brand: ENCORE™ 26

## (undated) DEVICE — DUAL LUMEN STOMACH TUBE: Brand: SALEM SUMP

## (undated) DEVICE — SUTURE PROL SZ 6-0 L24IN NONABSORBABLE BLU L13MM C-1 3/8 8726H

## (undated) DEVICE — Z DISCONTINUED (USE MFG CAT MVABO)  TUBING GAS SAMPLING STD 6.5 FT FEMALE CONN SMRT CAPNOLINE

## (undated) DEVICE — COLUMN DRAPE

## (undated) DEVICE — DECANTER BAG 9": Brand: MEDLINE INDUSTRIES, INC.

## (undated) DEVICE — AGENT HEMOSTATIC SURGIFLOW MATRIX KIT W/THROMBIN

## (undated) DEVICE — BLADELESS OBTURATOR: Brand: WECK VISTA

## (undated) DEVICE — SUTURE PERMAHAND SZ 2-0 L17X18IN NONABSORBABLE BLK SILK SA65H

## (undated) DEVICE — TOTAL TRAY, DB, 100% SILI FOLEY, 16FR 10: Brand: MEDLINE

## (undated) DEVICE — 3M™ IOBAN™ 2 ANTIMICROBIAL INCISE DRAPE 6650EZ: Brand: IOBAN™ 2

## (undated) DEVICE — GLOVE SURG SZ 6 THK91MIL LTX FREE SYN POLYISOPRENE ANTI

## (undated) DEVICE — SUTURE STRATAFIX SYMMETRIC SZ 1 L18IN ABSRB VLT CT1 L36CM SXPP1A404

## (undated) DEVICE — DRAPE SHEET ULTRAGARD: Brand: MEDLINE

## (undated) DEVICE — TUBING, SUCTION, 3/16" X 10', STRAIGHT: Brand: MEDLINE

## (undated) DEVICE — AIRSEAL 8 MM ACCESS PORT AND LOW PROFILE OBTURATOR WITH BLADELESS OPTICAL TIP, 120 MM LENGTH: Brand: AIRSEAL

## (undated) DEVICE — PINNACLE R/O II INTRODUCER SHEATH WITH RADIOPAQUE MARKER: Brand: PINNACLE

## (undated) DEVICE — Z DUP USE 2891725 CLIP LIG M BLU TI HRT SHP WIRE HORZ 600 PER BX

## (undated) DEVICE — GLOVE ORANGE PI 7 1/2   MSG9075

## (undated) DEVICE — DISPOSABLE GRASPER: Brand: EPIX LAPAROSCOPIC GRASPER

## (undated) DEVICE — SUTURE VICRYL SZ 0 L27IN ABSRB UD L36MM CT-1 1/2 CIR J260H

## (undated) DEVICE — REDUCER: Brand: ENDOWRIST

## (undated) DEVICE — SUTURE PROL SZ 7-0 L18IN NONABSORBABLE BLU L9.3MM BV-1 3/8 8701H

## (undated) DEVICE — SUTURE PROL SZ 2-0 L30IN NONABSORBABLE BLU L26MM SH 1/2 CIR 8833H

## (undated) DEVICE — COUNTER NDL 30 COUNT FOAM STRP SGL MAG

## (undated) DEVICE — CANNULA SEAL

## (undated) DEVICE — GLOVE SURG SZ 75 CRM LTX FREE POLYISOPRENE POLYMER BEAD ANTI

## (undated) DEVICE — PAD,NON-ADHERENT,3X8,STERILE,LF,1/PK: Brand: MEDLINE

## (undated) DEVICE — NEEDLE HYPO 20GA L1.5IN YEL POLYPR HUB S STL REG BVL STR

## (undated) DEVICE — SYRINGE MED 3ML CLR PLAS STD N CTRL LUERLOCK TIP DISP

## (undated) DEVICE — ANGLED-TIP ARTERIAL SHEATH CONFIGURATION: Brand: ENROUTE TRANSCAROTID NEUROPROTECTION SYSTEM

## (undated) DEVICE — STANDARD HYPODERMIC NEEDLE,POLYPROPYLENE HUB: Brand: MONOJECT

## (undated) DEVICE — KIT MICROINTRODUCER 4FR ECHOGENIC NDL L7CM 21GA STIFF COAX

## (undated) DEVICE — SUTURE STRATAFIX SZ 3-0 L20CM ABSRB VLT NDL SH-1 L22MM 1/2 SXPP1B453

## (undated) DEVICE — SUTURE PERMAHAND SZ 3-0 L30IN NONABSORBABLE BLK SH L26MM K832H

## (undated) DEVICE — MARKER SURG SKIN UTIL BLK REG TIP NONSMEARING W/ 6IN RUL

## (undated) DEVICE — GLOVE ORANGE PI 7   MSG9070

## (undated) DEVICE — HERCULES 3 STAGE BALLOON ESOPHAGEAL: Brand: HERCULES

## (undated) DEVICE — ELECTRODE ES AD CRDLSS PT RET REM POLYHESIVE

## (undated) DEVICE — CADIERE FORCEPS: Brand: ENDOWRIST

## (undated) DEVICE — SUTURE ETHBND EXCEL SZ 2-0 L36IN NONABSORBABLE GRN L26MM SH X523H

## (undated) DEVICE — STAPLER SHEATH: Brand: ENDOWRIST

## (undated) DEVICE — SOLUTION PREP PAINT POV IOD FOR SKIN MUCOUS MEM

## (undated) DEVICE — AGENT HEMSTAT W4XL4IN OXIDIZED REGENERATED CELOS ABSRB SFT

## (undated) DEVICE — GUIDEWIRE VASCULAR L95CM DIA0.014IN ENROUTE

## (undated) DEVICE — GLOVE SURG SZ 8 L12IN THK75MIL DK GRN LTX FREE

## (undated) DEVICE — BAG,DRAINAGE,UROLOGY,2000ML,ANTIREFLUX,E: Brand: MEDLINE INDUSTRIES, INC.

## (undated) DEVICE — SUTURE VICRYL COAT SZ 4-0 L18IN ABSRB UD L19MM PS-2 1/2 CIR J496G

## (undated) DEVICE — SPONGE LAP W18XL18IN WHT COT 4 PLY FLD STRUNG RADPQ DISP ST 2 PER PACK

## (undated) DEVICE — POSITIONER HD AD W4.5XH8XL9IN HIGHLY RESILIENT FOAM CMFRT

## (undated) DEVICE — STAPLER 45 RELOAD BLUE: Brand: ENDOWRIST

## (undated) DEVICE — SUTURE VICRYL SZ 3-0 L36IN ABSRB UD L36MM CT-1 1/2 CIR J944H

## (undated) DEVICE — Z INACTIVE USE 2641837 CLIP LIG M BLU TI HRT SHP WIRE HORZ 600 PER BX

## (undated) DEVICE — GLOVE SURG SZ 6 CRM LTX FREE POLYISOPRENE POLYMER BEAD ANTI

## (undated) DEVICE — Z DUP USE 2641840 CLIP INT L POLYMER LOK LIG HEM O LOK

## (undated) DEVICE — SOLUTION ANTIFOG VIS SYS CLEARIFY LAPSCP